# Patient Record
Sex: FEMALE | Race: WHITE | Employment: UNEMPLOYED | URBAN - METROPOLITAN AREA
[De-identification: names, ages, dates, MRNs, and addresses within clinical notes are randomized per-mention and may not be internally consistent; named-entity substitution may affect disease eponyms.]

---

## 2017-04-24 ENCOUNTER — APPOINTMENT (OUTPATIENT)
Dept: GENERAL RADIOLOGY | Age: 67
DRG: 853 | End: 2017-04-24
Attending: EMERGENCY MEDICINE
Payer: COMMERCIAL

## 2017-04-24 ENCOUNTER — HOSPITAL ENCOUNTER (INPATIENT)
Age: 67
LOS: 8 days | Discharge: HOME HEALTH | DRG: 853 | End: 2017-05-02
Attending: EMERGENCY MEDICINE | Admitting: INTERNAL MEDICINE
Payer: COMMERCIAL

## 2017-04-24 DIAGNOSIS — M79.7 FIBROMYALGIA: ICD-10-CM

## 2017-04-24 DIAGNOSIS — L03.115 CELLULITIS OF RIGHT LOWER EXTREMITY: Primary | ICD-10-CM

## 2017-04-24 PROBLEM — L03.90 CELLULITIS: Status: ACTIVE | Noted: 2017-04-24

## 2017-04-24 PROBLEM — A41.9 SEPSIS (HCC): Status: ACTIVE | Noted: 2017-04-24

## 2017-04-24 LAB
ALBUMIN SERPL BCP-MCNC: 2.8 G/DL (ref 3.4–5)
ALBUMIN/GLOB SERPL: 0.7 {RATIO} (ref 0.8–1.7)
ALP SERPL-CCNC: 246 U/L (ref 45–117)
ALT SERPL-CCNC: 35 U/L (ref 13–56)
ANION GAP BLD CALC-SCNC: 11 MMOL/L (ref 3–18)
AST SERPL W P-5'-P-CCNC: 23 U/L (ref 15–37)
BASOPHILS # BLD AUTO: 0 K/UL (ref 0–0.06)
BASOPHILS # BLD: 0 % (ref 0–3)
BILIRUB SERPL-MCNC: 0.4 MG/DL (ref 0.2–1)
BUN SERPL-MCNC: 46 MG/DL (ref 7–18)
BUN/CREAT SERPL: 28 (ref 12–20)
CALCIUM SERPL-MCNC: 9.4 MG/DL (ref 8.5–10.1)
CHLORIDE SERPL-SCNC: 104 MMOL/L (ref 100–108)
CO2 SERPL-SCNC: 22 MMOL/L (ref 21–32)
CREAT SERPL-MCNC: 1.62 MG/DL (ref 0.6–1.3)
D DIMER PPP FEU-MCNC: 2.79 UG/ML(FEU)
DIFFERENTIAL METHOD BLD: ABNORMAL
EOSINOPHIL # BLD: 0 K/UL (ref 0–0.4)
EOSINOPHIL NFR BLD: 0 % (ref 0–5)
ERYTHROCYTE [DISTWIDTH] IN BLOOD BY AUTOMATED COUNT: 14.8 % (ref 11.6–14.5)
GLOBULIN SER CALC-MCNC: 4.3 G/DL (ref 2–4)
GLUCOSE SERPL-MCNC: 182 MG/DL (ref 74–99)
HCT VFR BLD AUTO: 34 % (ref 35–45)
HGB BLD-MCNC: 11.3 G/DL (ref 12–16)
LACTATE BLD-SCNC: 1.9 MMOL/L (ref 0.4–2)
LYMPHOCYTES # BLD AUTO: 1 % (ref 20–51)
LYMPHOCYTES # BLD: 0.2 K/UL (ref 0.8–3.5)
MCH RBC QN AUTO: 26 PG (ref 24–34)
MCHC RBC AUTO-ENTMCNC: 33.2 G/DL (ref 31–37)
MCV RBC AUTO: 78.3 FL (ref 74–97)
MONOCYTES # BLD: 0.6 K/UL (ref 0–1)
MONOCYTES NFR BLD AUTO: 3 % (ref 2–9)
NEUTS BAND NFR BLD MANUAL: 2 % (ref 0–5)
NEUTS SEG # BLD: 20.2 K/UL (ref 1.8–8)
NEUTS SEG NFR BLD AUTO: 94 % (ref 42–75)
PLATELET # BLD AUTO: 295 K/UL (ref 135–420)
PLATELET COMMENTS,PCOM: ABNORMAL
PMV BLD AUTO: 10.4 FL (ref 9.2–11.8)
POTASSIUM SERPL-SCNC: 4.3 MMOL/L (ref 3.5–5.5)
PROT SERPL-MCNC: 7.1 G/DL (ref 6.4–8.2)
RBC # BLD AUTO: 4.34 M/UL (ref 4.2–5.3)
RBC MORPH BLD: ABNORMAL
SODIUM SERPL-SCNC: 137 MMOL/L (ref 136–145)
WBC # BLD AUTO: 21 K/UL (ref 4.6–13.2)

## 2017-04-24 PROCEDURE — 71010 XR CHEST PORT: CPT

## 2017-04-24 PROCEDURE — 87077 CULTURE AEROBIC IDENTIFY: CPT | Performed by: EMERGENCY MEDICINE

## 2017-04-24 PROCEDURE — 74011250636 HC RX REV CODE- 250/636: Performed by: EMERGENCY MEDICINE

## 2017-04-24 PROCEDURE — 96375 TX/PRO/DX INJ NEW DRUG ADDON: CPT

## 2017-04-24 PROCEDURE — 85025 COMPLETE CBC W/AUTO DIFF WBC: CPT | Performed by: EMERGENCY MEDICINE

## 2017-04-24 PROCEDURE — 74011250637 HC RX REV CODE- 250/637: Performed by: EMERGENCY MEDICINE

## 2017-04-24 PROCEDURE — 73070 X-RAY EXAM OF ELBOW: CPT

## 2017-04-24 PROCEDURE — 93971 EXTREMITY STUDY: CPT

## 2017-04-24 PROCEDURE — 83605 ASSAY OF LACTIC ACID: CPT

## 2017-04-24 PROCEDURE — 99285 EMERGENCY DEPT VISIT HI MDM: CPT

## 2017-04-24 PROCEDURE — 73030 X-RAY EXAM OF SHOULDER: CPT

## 2017-04-24 PROCEDURE — 65660000004 HC RM CVT STEPDOWN

## 2017-04-24 PROCEDURE — 85379 FIBRIN DEGRADATION QUANT: CPT | Performed by: EMERGENCY MEDICINE

## 2017-04-24 PROCEDURE — 87040 BLOOD CULTURE FOR BACTERIA: CPT | Performed by: EMERGENCY MEDICINE

## 2017-04-24 PROCEDURE — 96374 THER/PROPH/DIAG INJ IV PUSH: CPT

## 2017-04-24 PROCEDURE — 80053 COMPREHEN METABOLIC PANEL: CPT | Performed by: EMERGENCY MEDICINE

## 2017-04-24 PROCEDURE — 73080 X-RAY EXAM OF ELBOW: CPT

## 2017-04-24 RX ORDER — ACETAMINOPHEN 500 MG
1000 TABLET ORAL
Status: COMPLETED | OUTPATIENT
Start: 2017-04-24 | End: 2017-04-24

## 2017-04-24 RX ORDER — SODIUM CHLORIDE 0.9 % (FLUSH) 0.9 %
5-10 SYRINGE (ML) INJECTION AS NEEDED
Status: DISCONTINUED | OUTPATIENT
Start: 2017-04-24 | End: 2017-05-02 | Stop reason: HOSPADM

## 2017-04-24 RX ORDER — CLINDAMYCIN HYDROCHLORIDE 300 MG/1
300 CAPSULE ORAL 4 TIMES DAILY
Qty: 40 CAP | Refills: 0 | Status: SHIPPED | OUTPATIENT
Start: 2017-04-24 | End: 2017-05-02

## 2017-04-24 RX ORDER — ONDANSETRON 2 MG/ML
4 INJECTION INTRAMUSCULAR; INTRAVENOUS
Status: COMPLETED | OUTPATIENT
Start: 2017-04-24 | End: 2017-04-24

## 2017-04-24 RX ORDER — MORPHINE SULFATE 4 MG/ML
4 INJECTION, SOLUTION INTRAMUSCULAR; INTRAVENOUS
Status: COMPLETED | OUTPATIENT
Start: 2017-04-24 | End: 2017-04-24

## 2017-04-24 RX ADMIN — SODIUM CHLORIDE 2478 ML: 900 INJECTION, SOLUTION INTRAVENOUS at 23:05

## 2017-04-24 RX ADMIN — Medication 4 MG: at 18:31

## 2017-04-24 RX ADMIN — ONDANSETRON 4 MG: 2 INJECTION INTRAMUSCULAR; INTRAVENOUS at 18:31

## 2017-04-24 RX ADMIN — VANCOMYCIN HYDROCHLORIDE 1750 MG: 10 INJECTION, POWDER, LYOPHILIZED, FOR SOLUTION INTRAVENOUS at 23:04

## 2017-04-24 RX ADMIN — ACETAMINOPHEN 1000 MG: 500 TABLET ORAL at 23:10

## 2017-04-24 NOTE — IP AVS SNAPSHOT
Current Discharge Medication List  
  
START taking these medications Dose & Instructions Dispensing Information Comments Morning Noon Evening Bedtime * amiodarone 200 mg tablet Commonly known as:  CORDARONE Your last dose was: Your next dose is:    
   
   
 Dose:  200 mg Take 1 Tab by mouth two (2) times a day. Quantity:  30 Tab Refills:  0 Refill will be given by PCP or cardiology with dose adjustment * amiodarone 200 mg tablet Commonly known as:  CORDARONE Start taking on:  5/3/2017 Your last dose was: Your next dose is:    
   
   
 Dose:  200 mg Take 1 Tab by mouth daily. Quantity:  30 Tab Refills:  0  
     
   
   
   
  
 dilTIAZem  mg ER capsule Commonly known as:  CARDIZEM CD Your last dose was: Your next dose is:    
   
   
 Dose:  180 mg Take 1 Cap by mouth daily. Quantity:  30 Cap Refills:  0  
     
   
   
   
  
 famotidine 20 mg tablet Commonly known as:  PEPCID Your last dose was: Your next dose is:    
   
   
 Dose:  20 mg Take 1 Tab by mouth two (2) times a day. Quantity:  60 Tab Refills:  0 Lactobacillus Acidoph & Bulgar 1 million cell Tab tablet Commonly known as:  Kwame Sauger Your last dose was: Your next dose is:    
   
   
 Dose:  1 Tab Take 1 Tab by mouth two (2) times a day. Quantity:  60 Tab Refills:  0  
     
   
   
   
  
 * Notice: This list has 2 medication(s) that are the same as other medications prescribed for you. Read the directions carefully, and ask your doctor or other care provider to review them with you. CONTINUE these medications which have CHANGED Dose & Instructions Dispensing Information Comments Morning Noon Evening Bedtime ALPRAZolam 1 mg tablet Commonly known as:  Andre Valencia What changed:   
- how much to take 
- how to take this - when to take this - reasons to take this Your last dose was: Your next dose is:    
   
   
 Dose:  1 mg Take 1 Tab by mouth nightly as needed for Anxiety. Max Daily Amount: 1 mg. Quantity:  10 Tab Refills:  0 DULoxetine 60 mg capsule Commonly known as:  CYMBALTA What changed:   
- how to take this - when to take this Your last dose was: Your next dose is:    
   
   
 Dose:  60 mg Take 1 Cap by mouth daily. Quantity:  30 Cap Refills:  0 HYDROcodone-acetaminophen 5-325 mg per tablet Commonly known as:  Philip Holder What changed:   
- how much to take - when to take this 
- reasons to take this Your last dose was: Your next dose is:    
   
   
 Dose:  1 Tab Take 1 Tab by mouth every six (6) hours as needed for Pain. Max Daily Amount: 4 Tabs. Quantity:  15 Tab Refills:  0 CONTINUE these medications which have NOT CHANGED Dose & Instructions Dispensing Information Comments Morning Noon Evening Bedtime LANTUS SC Your last dose was: Your next dose is:    
   
   
  Refills:  0  
     
   
   
   
  
 lisinopril 10 mg tablet Commonly known as:  Nitin Leys Your last dose was: Your next dose is:    
   
   
 Dose:  10 mg Take 1 Tab by mouth daily. Quantity:  15 Tab Refills:  0 LYRICA 75 mg capsule Generic drug:  pregabalin Your last dose was: Your next dose is:    
   
   
 Dose:  75 mg  
75 mg. Refills:  0  
     
   
   
   
  
 methocarbamol 750 mg tablet Commonly known as:  ROBAXIN Your last dose was: Your next dose is:    
   
   
 Dose:  750 mg Take 1 Tab by mouth four (4) times daily. Quantity:  20 Tab Refills:  0  
     
   
   
   
  
 simvastatin 40 mg tablet Commonly known as:  ZOCOR Your last dose was:     
   
Your next dose is:    
   
   
 Dose:  40 mg  
 40 mg. Refills:  0 Where to Get Your Medications Information on where to get these meds will be given to you by the nurse or doctor. ! Ask your nurse or doctor about these medications ALPRAZolam 1 mg tablet  
 amiodarone 200 mg tablet  
 amiodarone 200 mg tablet  
 dilTIAZem  mg ER capsule DULoxetine 60 mg capsule  
 famotidine 20 mg tablet HYDROcodone-acetaminophen 5-325 mg per tablet Lactobacillus Acidoph & Bulgar 1 million cell Tab tablet  
 lisinopril 10 mg tablet

## 2017-04-24 NOTE — ED NOTES
Pt alert and orientated laying on stretcher with head of bed elevated. Pt only able to lift right arm at elbow. Right arm and hand swollen. Had good radial pulse. Patients right leg swollen, large red area over right shin.    Pedal pulse noted

## 2017-04-24 NOTE — ED NOTES
Report given to Miko Sampson, [place]  Pt remains hooked to monitor  IV 20 gauge, left arm intact  Pt in gown, clean and dry  Pt condition stable  Call bell within reach

## 2017-04-24 NOTE — IP AVS SNAPSHOT
[address] Patient: Robert Alcala MRN: [id number] You are allergic to the following No active allergies Recent Documentation Height Weight Breastfeeding? BMI OB Status Smoking Status 1.575 m 90.7 kg No 36.58 kg/m2 Hysterectomy Never Smoker Unresulted Labs Order Current Status CULTURE, ANAEROBIC Preliminary result CULTURE, WOUND W GRAM STAIN Preliminary result Emergency Contacts Name Discharge Info Relation Home Work Mobile Mirtha Botello DECLINED CAREGIVER [4] Son [22] [phone] About your hospitalization You were admitted on:  April 24, 2017 You last received care in the:  [address] [place] [address] You were discharged on:  May 2, 2017 Unit phone number:  [phone] Why you were hospitalized Your primary diagnosis was:  Not on File Your diagnoses also included:  Cellulitis, Sepsis (Hcc), Type Ii Diabetes Mellitus (Hcc), Fibromyalgia, Htn (Hypertension), Septic Joint Of Right Shoulder Region (Hcc), Sleep Apnea, Atrial Fibrillation (Hcc), Gastric Bypass Status For Obesity, History Of Pulmonary Embolism Providers Seen During Your Hospitalizations Provider Role Specialty Primary office phone Mary Lou Morrell DO Attending Provider Emergency Medicine [phone] Yovana Peña MD Attending Provider Internal Medicine [phone] Ann Reis MD Attending Provider Internal Medicine [phone] Your Primary Care Physician (PCP) Primary Care Physician Office Phone Office Fax Aura Wild [phone] Follow-up Information Follow up With Details Comments Contact Info Nik Austin MD On 5/5/2017 @10:45am w/[facility] [place] [facility] [address] [place] [address] 100 [id number] [facility] [id number] [phone] Your Appointments Wednesday May 03, 2017 To Be Determined START OF CARE with Patricia Reyes RN  
[facility] CARE SCHEDULING/INTAKE ([facility]) [facility] SCHEDULING/INTAKE ([facility]OSP[facility]) Monday May 22, 2017 12:45 PM EDT  
ESTABLISHED PATIENT with Esteban Zamora MD  
[facility] ([address]) [address] [facility] [place], [place] [address]  
[phone] Current Discharge Medication List  
  
START taking these medications Dose & Instructions Dispensing Information Comments Morning Noon Evening Bedtime * amiodarone 200 mg tablet Commonly known as:  CORDARONE Your last dose was: Your next dose is:    
   
   
 Dose:  200 mg Take 1 Tab by mouth two (2) times a day. Quantity:  30 Tab Refills:  0 Refill will be given by PCP or cardiology with dose adjustment * amiodarone 200 mg tablet Commonly known as:  CORDARONE Start taking on:  5/3/2017 Your last dose was: Your next dose is:    
   
   
 Dose:  200 mg Take 1 Tab by mouth daily. Quantity:  30 Tab Refills:  0  
     
   
   
   
  
 dilTIAZem  mg ER capsule Commonly known as:  CARDIZEM CD Your last dose was: Your next dose is:    
   
   
 Dose:  180 mg Take 1 Cap by mouth daily. Quantity:  30 Cap Refills:  0  
     
   
   
   
  
 famotidine 20 mg tablet Commonly known as:  PEPCID Your last dose was: Your next dose is:    
   
   
 Dose:  20 mg Take 1 Tab by mouth two (2) times a day. Quantity:  60 Tab Refills:  0 Lactobacillus Acidoph & Bulgar 1 million cell Tab tablet Commonly known as:  Ashley Jury Your last dose was: Your next dose is:    
   
   
 Dose:  1 Tab Take 1 Tab by mouth two (2) times a day. Quantity:  60 Tab Refills:  0 * Notice: This list has 2 medication(s) that are the same as other medications prescribed for you. Read the directions carefully, and ask your doctor or other care provider to review them with you. CONTINUE these medications which have CHANGED Dose & Instructions Dispensing Information Comments Morning Noon Evening Bedtime ALPRAZolam 1 mg tablet Commonly known as:  [place] What changed:   
- how much to take 
- how to take this - when to take this 
- reasons to take this Your last dose was: Your next dose is:    
   
   
 Dose:  1 mg Take 1 Tab by mouth nightly as needed for Anxiety. Max Daily Amount: 1 mg. Quantity:  10 Tab Refills:  0 DULoxetine 60 mg capsule Commonly known as:  CYMBALTA What changed:   
- how to take this - when to take this Your last dose was: Your next dose is:    
   
   
 Dose:  60 mg Take 1 Cap by mouth daily. Quantity:  30 Cap Refills:  0 HYDROcodone-acetaminophen 5-325 mg per tablet Commonly known as:  Sury Flynn What changed:   
- how much to take - when to take this 
- reasons to take this Your last dose was: Your next dose is:    
   
   
 Dose:  1 Tab Take 1 Tab by mouth every six (6) hours as needed for Pain. Max Daily Amount: 4 Tabs. Quantity:  15 Tab Refills:  0 CONTINUE these medications which have NOT CHANGED Dose & Instructions Dispensing Information Comments Morning Noon Evening Bedtime LANTUS SC Your last dose was: Your next dose is:    
   
   
  Refills:  0  
     
   
   
   
  
 lisinopril 10 mg tablet Commonly known as:  Tanda Limerick Your last dose was: Your next dose is:    
   
   
 Dose:  10 mg Take 1 Tab by mouth daily. Quantity:  15 Tab Refills:  0 LYRICA 75 mg capsule Generic drug:  pregabalin Your last dose was: Your next dose is:    
   
   
 Dose:  75 mg  
75 mg. Refills:  0  
     
   
   
   
  
 methocarbamol 750 mg tablet Commonly known as:  ROBAXIN Your last dose was: Your next dose is:    
   
   
 Dose:  750 mg Take 1 Tab by mouth four (4) times daily. Quantity:  20 Tab Refills:  0  
     
   
   
   
  
 simvastatin 40 mg tablet Commonly known as:  ZOCOR Your last dose was: Your next dose is:    
   
   
 Dose:  40 mg  
40 mg. Refills:  0 Where to Get Your Medications Information on where to get these meds will be given to you by the nurse or doctor. ! Ask your nurse or doctor about these medications ALPRAZolam 1 mg tablet  
 amiodarone 200 mg tablet  
 amiodarone 200 mg tablet  
 dilTIAZem  mg ER capsule DULoxetine 60 mg capsule  
 famotidine 20 mg tablet HYDROcodone-acetaminophen 5-325 mg per tablet Lactobacillus Acidoph & Bulgar 1 million cell Tab tablet  
 lisinopril 10 mg tablet Discharge Instructions DISCHARGE SUMMARY from Nurse The following personal items are in your possession at time of discharge: 
 
Dental Appliances: Uppers, At home Visual Aid: Glasses, At home Home Medications: None Jewelry: Maury Kendall Clothing: Pants, Other (comment), Footwear (sweat shirt) Other Valuables: Cell Phone Personal Items Sent to Safe: no PATIENT INSTRUCTIONS: 
 
 
F-face looks uneven A-arms unable to move or move unevenly S-speech slurred or non-existent T-time-call 911 as soon as signs and symptoms begin-DO NOT go Back to bed or wait to see if you get better-TIME IS BRAIN. Warning Signs of HEART ATTACK Call 911 if you have these symptoms: 
? Chest discomfort. Most heart attacks involve discomfort in the center of the chest that lasts more than a few minutes, or that goes away and comes back. It can feel like uncomfortable pressure, squeezing, fullness, or pain. ? Discomfort in other areas of the upper body. Symptoms can include pain or discomfort in one or both arms, the back, neck, jaw, or stomach. ? Shortness of breath with or without chest discomfort. ? Other signs may include breaking out in a cold sweat, nausea, or lightheadedness. Don't wait more than five minutes to call [address]! Fast action can save your life. Calling 911 is almost always the fastest way to get lifesaving treatment. Emergency Medical Services staff can begin treatment when they arrive  up to an hour sooner than if someone gets to the hospital by car. The discharge information has been reviewed with the patient. The patient verbalized understanding. Discharge medications reviewed with the patient and appropriate educational materials and side effects teaching were provided. Patient armband removed and shredded. [employer]vo Activation Thank you for requesting access to [employer]. Please follow the instructions below to securely access and download your online medical record. E[employer]vo allows you to send messages to your doctor, view your test results, renew your prescriptions, schedule appointments, and more. How Do I Sign Up? 1. In your internet browser, go to [email or web address] 
2. Click on the First Time User? Click Here link in the Sign In box. You will be redirect to the New Member Sign Up page. 3. Enter your Enevo Access Code exactly as it appears below. You will not need to use this code after youve completed the sign-up process. If you do not sign up before the expiration date, you must request a new code. [employer] Access Code: Activation code not generated Current [employer] Status: Active (This is the date your [employer] access code will ) 4. Enter the last four digits of your Social Security Number (xxxx) and Date of Birth (mm/dd/yyyy) as indicated and click Submit. You will be taken to the next sign-up page. 5. Create a COSMIC COLORt ID. This will be your [employer] login ID and cannot be changed, so think of one that is secure and easy to remember. 6. Create a [employer] password. You can change your password at any time. 7. Enter your Password Reset Question and Answer. This can be used at a later time if you forget your password. 8. Enter your e-mail address. You will receive e-mail notification when new information is available in [address] 9. Click Sign Up. You can now view and download portions of your medical record. 10. Click the Download Summary menu link to download a portable copy of your medical information. Additional Information If you have questions, please visit the Frequently Asked Questions section of the [employer] website at [email or web address] Remember, [employer] is NOT to be used for urgent needs. For medical emergencies, dial 911. Discharge Orders Procedure Order Date Status Priority Quantity Spec Type Associated Dx 200 [facility] 17 1035 Normal Routine 1 Comments:  [facility] Care Wound dressing of surgical wound till healing CBC every other day for 3 occasion and inform results to PCP - Dr Leopoldo Seashore [employer] Announcement We are excited to announce that we are making your provider's discharge notes available to you in [employer]. You will see these notes when they are completed and signed by the physician that discharged you from your recent hospital stay.   If you have any questions or concerns about any information you see in [employer], please call the [facility] Information Department where you were seen or reach out to your Primary Care Provider for more information about your plan of care. Introducing [facility] & HEALTH SERVICES! Dear Grecia Aragon: Thank you for requesting a [employer] account. Our records indicate that you already have an active [employer] account. You can access your account anytime at [email or web address] Did you know that you can access your hospital and ER discharge instructions at any time in Photetica? You can also review all of your test results from your hospital stay or ER visit. Additional Information If you have questions, please visit the Frequently Asked Questions section of the [employer] website at [email or web address] Remember, [employer] is NOT to be used for urgent needs. For medical emergencies, dial 911. Now available from your iPhone and Android! General Information Please provide this summary of care documentation to your next provider. Patient Signature:  ____________________________________________________________ Date:  ____________________________________________________________  
  
[facility] Provider Signature:  ____________________________________________________________ Date:  ____________________________________________________________

## 2017-04-24 NOTE — Clinical Note
Status[de-identified] Inpatient [101] Type of Bed: Stepdown [17] Inpatient Hospitalization Certified Necessary for the Following Reasons: 3. Patient receiving treatment that can only be provided in an inpatient setting (further clarification in H&P documentation) Admitting Diagnosis: Sepsis ([facility] [address] [[id number]] Admitting Diagnosis: Cellulitis [913160] Admitting Physician: Lonne Opitz Attending Physician: Lonne Opitz Estimated Length of Stay: 3-4 Midnights Discharge Plan[de-identified] [facility]

## 2017-04-24 NOTE — ED NOTES
IV access established by Wendy Reeves drawn and sent  Pt medicated per [facility] instructions for 10/10 intermittent, sharp right arm and leg pain  Pt's comfortable level of pain is 5/10  Call bell within reach

## 2017-04-24 NOTE — ED PROVIDER NOTES
HPI Comments: 4:05 PM Ras Alexander is a 74 y.o. female with a h/o fibromyalgia, HTN and DM, who presents to the ED for the evaluation of R shoulder pain, back pain and RLE swelling. Pt states that although she has chronic R shoulder pain, she woke up with the worse pain in her shoulder that she has ever had. Reports having surgery on the kalnai shoulder about 4 years ago. States that her R leg pain and swelling onset last night. Pain exacerbated with movement. No smoking, drinking, or drug use. No other complaints at this time. PCP: Richard Boggs MD     The history is provided by the patient. Past Medical History:   Diagnosis Date    Diabetes ([facility]     Fibromyalgia     Hypercholesteremia     Hypertension        Past Surgical History:   Procedure Laterality Date    HX BREAST LUMPECTOMY      HX CARPAL TUNNEL RELEASE      HX KNEE REPLACEMENT      bilater    HX PARTIAL THYROIDECTOMY      HX SHOULDER ARTHROSCOPY      HX TUBAL LIGATION           Family History:   Problem Relation Age of Onset    Asthma Mother     Diabetes Maternal Grandmother     Hypertension Mother     Kidney Disease Mother     Thyroid Disease Mother     Thyroid Disease Maternal Grandmother        Social History     Social History    Marital status: LEGALLY      Spouse name: N/A    Number of children: N/A    Years of education: N/A     Occupational History    Not on file. Social History Main Topics    Smoking status: Never Smoker    Smokeless tobacco: Never Used    Alcohol use No    Drug use: No    Sexual activity: Yes     Other Topics Concern    Not on file     Social History Narrative         ALLERGIES: Review of patient's allergies indicates no known allergies. Review of Systems   Constitutional: Negative. Negative for chills, diaphoresis and fever. HENT: Negative. Negative for congestion, sore throat and trouble swallowing. Eyes: Negative. Negative for photophobia, pain and redness. Respiratory: Negative. Negative for cough, chest tightness, shortness of breath and wheezing. Cardiovascular: Positive for leg swelling. Negative for chest pain and palpitations. Gastrointestinal: Negative. Negative for abdominal pain, blood in stool, diarrhea, nausea and vomiting. Genitourinary: Negative for difficulty urinating, dysuria and frequency. Musculoskeletal: Positive for arthralgias, back pain and myalgias. Negative for neck pain and neck stiffness. Skin: Negative. Neurological: Negative. Negative for dizziness, tremors, seizures, syncope, speech difficulty, light-headedness and headaches. Psychiatric/Behavioral: Negative. Negative for confusion. The patient is not nervous/anxious. All other systems reviewed and are negative. Vitals:    04/24/17 1533   BP: 139/70   Pulse: 68   Resp: 16   Temp: 97.6 °F (36.4 °C)   SpO2: 99%   Weight: 82.6 kg (182 lb)   Height: 5' 2\" (1.575 m)        99% within normal limits     Physical Exam   Constitutional: She appears well-developed and well-nourished. Non-toxic appearance. She does not have a sickly appearance. She does not appear ill. No distress. HENT:   Head: Normocephalic and atraumatic. Mouth/Throat: Oropharynx is clear and moist. No oropharyngeal exudate. Eyes: Conjunctivae and EOM are normal. Pupils are equal, round, and reactive to light. No scleral icterus. Neck: Normal range of motion. Neck supple. No hepatojugular reflux and no JVD present. No tracheal deviation present. No thyromegaly present. Cardiovascular: Normal rate, regular rhythm, S1 normal, S2 normal, normal heart sounds, intact distal pulses and normal pulses. Exam reveals no gallop, no S3 and no S4. No murmur heard. Pulses:       Radial pulses are 2+ on the right side, and 2+ on the left side. Dorsalis pedis pulses are 2+ on the right side, and 2+ on the left side.    Pulmonary/Chest: Effort normal and breath sounds normal. No respiratory distress. She has no decreased breath sounds. She has no wheezes. She has no rhonchi. She has no rales. Abdominal: Soft. Normal appearance and bowel sounds are normal. She exhibits no distension and no mass. There is no hepatosplenomegaly. There is no tenderness. There is no rigidity, no rebound, no guarding, no CVA tenderness, no tenderness at McBurney's point and negative Morrow's sign. Musculoskeletal:        Right shoulder: She exhibits decreased range of motion, tenderness and pain. She exhibits no bony tenderness, no swelling, no effusion, no crepitus, no deformity, no laceration, no spasm, normal pulse and normal strength. Strength 4/5 throughout with exception of right shoulder pain   Lymphadenopathy:        Head (right side): No submental, no submandibular, no preauricular and no occipital adenopathy present. Head (left side): No submental, no submandibular, no preauricular and no occipital adenopathy present. She has no cervical adenopathy. Right: No supraclavicular adenopathy present. Left: No supraclavicular adenopathy present. Neurological: She is alert. She has normal strength and normal reflexes. She is not disoriented. No cranial nerve deficit or sensory deficit. Coordination and gait normal. GCS eye subscore is 4. GCS verbal subscore is 5. GCS motor subscore is 6. Grossly intact    Skin: Skin is warm, dry and intact. No rash noted. She is not diaphoretic. Psychiatric: She has a normal mood and affect. Her speech is normal and behavior is normal. Judgment and thought content normal. Cognition and memory are normal.   Nursing note and vitals reviewed.        MDM  Number of Diagnoses or Management Options  Cellulitis of right lower extremity:   Fibromyalgia:   Diagnosis management comments: Shoulder pain   DVT  ACS  PE      ED Course       Procedures    Vitals:  Patient Vitals for the past 12 hrs:   Temp Pulse Resp BP SpO2   04/24/17 2034 - 83 18 136/72 94 % 04/24/17 1815 - 72 22 - 100 %   04/24/17 1803 - 73 17 171/65 97 %   04/24/17 1533 97.6 °F (36.4 °C) 68 16 139/70 99 %     Pulse ox reviewed    Labs, Radiology, EKG:  Labs Reviewed   CBC WITH AUTOMATED DIFF - Abnormal; Notable for the following:        Result Value    WBC 21.0 (*)     HGB 11.3 (*)     HCT 34.0 (*)     RDW 14.8 (*)     NEUTROPHILS 94 (*)     LYMPHOCYTES 1 (*)     ABS. NEUTROPHILS 20.2 (*)     ABS. LYMPHOCYTES 0.2 (*)     All other components within normal limits   METABOLIC PANEL, COMPREHENSIVE - Abnormal; Notable for the following:     Glucose 182 (*)     BUN 46 (*)     Creatinine 1.62 (*)     BUN/Creatinine ratio 28 (*)     GFR est AA 39 (*)     GFR est non-AA 32 (*)     Alk. phosphatase 246 (*)     Albumin 2.8 (*)     Globulin 4.3 (*)     A-G Ratio 0.7 (*)     All other components within normal limits   D DIMER - Abnormal; Notable for the following:     D DIMER 2.79 (*)     All other components within normal limits        Labs essentially normal with the exception of WBC of 21,000, pt states that she has been taking Prednisone at home. Electrolytes normal, elevated BUN and Creatinine. D-dimer of 2.79. PVL showed:   Right leg :  Duplex images were obtained using 2-D gray scale, color flow, and spectral Doppler analysis. 1. No evidence of deep venous thrombosis detected in the veins visualized. 2. Deep veins visualized include the common femoral, femoral, popliteal, posterior tibial and peroneal veins. 3. No evidence of superficial thrombosis detected. 4. Superficial veins visualized include the proximal great saphenous vein. 5. No evidence of deep vein thrombosis in the contralateral common femoral vein.   Right shoulder X-ray showed no acute process  Right elbow X-ray showed no acute process    9:39 PM 4/24/2017    Progress notes, Consult notes or additional Procedure notes:    Consult:  Discussed care with Dr. Nir Marcial, Specialty: Hospitalist  Standard discussion; including history of patients chief complaint, available diagnostic results, and treatment course. He agrees with admission. 10:27 PM, 4/24/2017       Disposition:  Diagnosis:   1. Cellulitis of right lower extremity    2. Fibromyalgia          SCRIBE ATTESTATION STATEMENT  Documented by: Cece Mcgarry scribing for, and in the presence of,Igor Humphrey DO   9:39 PM     Signed by: Efra Krishna, 04/24/17 9:39 PM    Documented by: Iva Cevallos. Skip Frame for, and in the presence of, [employer] Association[employer] 4:04 PM     Signed by: Iva Cevallos. Kayleigh Amor, [facility] [address], 4/24/2017 4:04 PM     PROVIDER ATTESTATION STATEMENT  I personally performed the services described in the documentation, reviewed the documentation, as recorded by the scribe in my presence, and it accurately and completely records my words and actions.   [employer], DO

## 2017-04-24 NOTE — PROCEDURES
[facility]  *** FINAL REPORT ***    Name: Kevin Posadas  MRN: [id number]  : 05 Mar 1950  HIS Order #: [id number]  [address] [facility] Visit #: [id number]  Date: 2017    TYPE OF TEST: Peripheral Venous Testing    REASON FOR TEST  Pain in limb, Limb swelling    Right Leg:-  Deep venous thrombosis:           No  Superficial venous thrombosis:    No  Deep venous insufficiency:        Not examined  Superficial venous insufficiency: Not examined      INTERPRETATION/FINDINGS  Right leg :  Duplex images were obtained using 2-D gray scale, color flow, and  spectral Doppler analysis. 1. No evidence of deep venous thrombosis detected in the veins  visualized. 2. Deep veins visualized include the common femoral, femoral,  popliteal, posterior tibial and peroneal veins. 3. No evidence of superficial thrombosis detected. 4. Superficial veins visualized include the proximal great saphenous  vein. 5. No evidence of deep vein thrombosis in the contralateral common  femoral vein. ADDITIONAL COMMENTS    I have personally reviewed the data relevant to the interpretation of  this  study.     TECHNOLOGIST: Peter Ch RVT  Signed: 2017 05:29 PM    PHYSICIAN: Esteban Zamora MD  Signed: 2017 09:25 AM

## 2017-04-24 NOTE — ED TRIAGE NOTES
C/o right arm pain, right shoulder pain, and right leg pain with redness and swelling to the calf that started this am.

## 2017-04-25 LAB
ANION GAP BLD CALC-SCNC: 10 MMOL/L (ref 3–18)
ATRIAL RATE: 89 BPM
BASOPHILS # BLD AUTO: 0 K/UL (ref 0–0.06)
BASOPHILS # BLD: 0 % (ref 0–3)
BUN SERPL-MCNC: 53 MG/DL (ref 7–18)
BUN/CREAT SERPL: 30 (ref 12–20)
CALCIUM SERPL-MCNC: 8.5 MG/DL (ref 8.5–10.1)
CALCULATED P AXIS, ECG09: 60 DEGREES
CALCULATED R AXIS, ECG10: 77 DEGREES
CALCULATED T AXIS, ECG11: 45 DEGREES
CHLORIDE SERPL-SCNC: 108 MMOL/L (ref 100–108)
CO2 SERPL-SCNC: 18 MMOL/L (ref 21–32)
CREAT SERPL-MCNC: 1.74 MG/DL (ref 0.6–1.3)
DIAGNOSIS, 93000: NORMAL
DIFFERENTIAL METHOD BLD: ABNORMAL
EOSINOPHIL # BLD: 0 K/UL (ref 0–0.4)
EOSINOPHIL NFR BLD: 0 % (ref 0–5)
ERYTHROCYTE [DISTWIDTH] IN BLOOD BY AUTOMATED COUNT: 15.1 % (ref 11.6–14.5)
GLUCOSE BLD STRIP.AUTO-MCNC: 165 MG/DL (ref 70–110)
GLUCOSE BLD STRIP.AUTO-MCNC: 181 MG/DL (ref 70–110)
GLUCOSE BLD STRIP.AUTO-MCNC: 210 MG/DL (ref 70–110)
GLUCOSE BLD STRIP.AUTO-MCNC: 222 MG/DL (ref 70–110)
GLUCOSE SERPL-MCNC: 172 MG/DL (ref 74–99)
HBA1C MFR BLD: 9.1 % (ref 4.2–5.6)
HCT VFR BLD AUTO: 30.5 % (ref 35–45)
HGB BLD-MCNC: 10 G/DL (ref 12–16)
LYMPHOCYTES # BLD AUTO: 4 % (ref 20–51)
LYMPHOCYTES # BLD: 0.8 K/UL (ref 0.8–3.5)
MCH RBC QN AUTO: 26 PG (ref 24–34)
MCHC RBC AUTO-ENTMCNC: 32.8 G/DL (ref 31–37)
MCV RBC AUTO: 79.4 FL (ref 74–97)
MONOCYTES # BLD: 0.6 K/UL (ref 0–1)
MONOCYTES NFR BLD AUTO: 3 % (ref 2–9)
NEUTS BAND NFR BLD MANUAL: 27 % (ref 0–5)
NEUTS SEG # BLD: 18 K/UL (ref 1.8–8)
NEUTS SEG NFR BLD AUTO: 66 % (ref 42–75)
P-R INTERVAL, ECG05: 172 MS
PLATELET # BLD AUTO: 230 K/UL (ref 135–420)
PLATELET COMMENTS,PCOM: ABNORMAL
PMV BLD AUTO: 10.7 FL (ref 9.2–11.8)
POTASSIUM SERPL-SCNC: 4.2 MMOL/L (ref 3.5–5.5)
Q-T INTERVAL, ECG07: 378 MS
QRS DURATION, ECG06: 90 MS
QTC CALCULATION (BEZET), ECG08: 459 MS
RBC # BLD AUTO: 3.84 M/UL (ref 4.2–5.3)
RBC MORPH BLD: ABNORMAL
SODIUM SERPL-SCNC: 136 MMOL/L (ref 136–145)
VENTRICULAR RATE, ECG03: 89 BPM
WBC # BLD AUTO: 19.4 K/UL (ref 4.6–13.2)

## 2017-04-25 PROCEDURE — 83036 HEMOGLOBIN GLYCOSYLATED A1C: CPT | Performed by: HOSPITALIST

## 2017-04-25 PROCEDURE — 74011636637 HC RX REV CODE- 636/637: Performed by: INTERNAL MEDICINE

## 2017-04-25 PROCEDURE — 74011250637 HC RX REV CODE- 250/637: Performed by: INTERNAL MEDICINE

## 2017-04-25 PROCEDURE — 93005 ELECTROCARDIOGRAM TRACING: CPT

## 2017-04-25 PROCEDURE — 36415 COLL VENOUS BLD VENIPUNCTURE: CPT | Performed by: INTERNAL MEDICINE

## 2017-04-25 PROCEDURE — 82962 GLUCOSE BLOOD TEST: CPT

## 2017-04-25 PROCEDURE — 74011000258 HC RX REV CODE- 258: Performed by: INTERNAL MEDICINE

## 2017-04-25 PROCEDURE — 80048 BASIC METABOLIC PNL TOTAL CA: CPT | Performed by: INTERNAL MEDICINE

## 2017-04-25 PROCEDURE — 65660000000 HC RM CCU STEPDOWN

## 2017-04-25 PROCEDURE — 85025 COMPLETE CBC W/AUTO DIFF WBC: CPT | Performed by: INTERNAL MEDICINE

## 2017-04-25 PROCEDURE — 74011250636 HC RX REV CODE- 250/636: Performed by: INTERNAL MEDICINE

## 2017-04-25 RX ORDER — PREGABALIN 75 MG/1
75 CAPSULE ORAL 2 TIMES DAILY
Status: DISCONTINUED | OUTPATIENT
Start: 2017-04-25 | End: 2017-05-02 | Stop reason: HOSPADM

## 2017-04-25 RX ORDER — DEXTROSE 50 % IN WATER (D50W) INTRAVENOUS SYRINGE
25-50 AS NEEDED
Status: DISCONTINUED | OUTPATIENT
Start: 2017-04-25 | End: 2017-05-02 | Stop reason: HOSPADM

## 2017-04-25 RX ORDER — INSULIN LISPRO 100 [IU]/ML
INJECTION, SOLUTION INTRAVENOUS; SUBCUTANEOUS
Status: DISCONTINUED | OUTPATIENT
Start: 2017-04-25 | End: 2017-05-02 | Stop reason: HOSPADM

## 2017-04-25 RX ORDER — SIMVASTATIN 40 MG/1
40 TABLET, FILM COATED ORAL
Status: DISCONTINUED | OUTPATIENT
Start: 2017-04-25 | End: 2017-05-02 | Stop reason: HOSPADM

## 2017-04-25 RX ORDER — ONDANSETRON 2 MG/ML
4 INJECTION INTRAMUSCULAR; INTRAVENOUS
Status: DISCONTINUED | OUTPATIENT
Start: 2017-04-25 | End: 2017-05-02 | Stop reason: HOSPADM

## 2017-04-25 RX ORDER — ALPRAZOLAM 0.5 MG/1
0.5 TABLET ORAL
Status: DISCONTINUED | OUTPATIENT
Start: 2017-04-25 | End: 2017-05-02 | Stop reason: HOSPADM

## 2017-04-25 RX ORDER — HYDROCODONE BITARTRATE AND ACETAMINOPHEN 5; 325 MG/1; MG/1
1 TABLET ORAL
Status: DISCONTINUED | OUTPATIENT
Start: 2017-04-25 | End: 2017-05-02 | Stop reason: HOSPADM

## 2017-04-25 RX ORDER — INSULIN GLARGINE 100 [IU]/ML
10 INJECTION, SOLUTION SUBCUTANEOUS
Status: DISCONTINUED | OUTPATIENT
Start: 2017-04-25 | End: 2017-04-29

## 2017-04-25 RX ORDER — MAGNESIUM SULFATE 100 %
16 CRYSTALS MISCELLANEOUS AS NEEDED
Status: DISCONTINUED | OUTPATIENT
Start: 2017-04-25 | End: 2017-05-02 | Stop reason: HOSPADM

## 2017-04-25 RX ORDER — DULOXETIN HYDROCHLORIDE 60 MG/1
60 CAPSULE, DELAYED RELEASE ORAL DAILY
Status: DISCONTINUED | OUTPATIENT
Start: 2017-04-25 | End: 2017-04-25

## 2017-04-25 RX ORDER — HEPARIN SODIUM 5000 [USP'U]/ML
5000 INJECTION, SOLUTION INTRAVENOUS; SUBCUTANEOUS EVERY 8 HOURS
Status: DISCONTINUED | OUTPATIENT
Start: 2017-04-25 | End: 2017-04-28

## 2017-04-25 RX ADMIN — INSULIN LISPRO 4 UNITS: 100 INJECTION, SOLUTION INTRAVENOUS; SUBCUTANEOUS at 13:12

## 2017-04-25 RX ADMIN — PIPERACILLIN AND TAZOBACTAM 3.38 G: 3; .375 INJECTION, POWDER, LYOPHILIZED, FOR SOLUTION INTRAVENOUS; PARENTERAL at 09:31

## 2017-04-25 RX ADMIN — HYDROCODONE BITARTRATE AND ACETAMINOPHEN 1 TABLET: 5; 325 TABLET ORAL at 09:33

## 2017-04-25 RX ADMIN — INSULIN LISPRO 2 UNITS: 100 INJECTION, SOLUTION INTRAVENOUS; SUBCUTANEOUS at 21:15

## 2017-04-25 RX ADMIN — DULOXETINE HYDROCHLORIDE 60 MG: 60 CAPSULE, DELAYED RELEASE ORAL at 09:30

## 2017-04-25 RX ADMIN — HEPARIN SODIUM 5000 UNITS: 5000 INJECTION INTRAVENOUS; SUBCUTANEOUS at 17:28

## 2017-04-25 RX ADMIN — HEPARIN SODIUM 5000 UNITS: 5000 INJECTION INTRAVENOUS; SUBCUTANEOUS at 01:16

## 2017-04-25 RX ADMIN — PIPERACILLIN AND TAZOBACTAM 3.38 G: 3; .375 INJECTION, POWDER, LYOPHILIZED, FOR SOLUTION INTRAVENOUS; PARENTERAL at 01:17

## 2017-04-25 RX ADMIN — HEPARIN SODIUM 5000 UNITS: 5000 INJECTION INTRAVENOUS; SUBCUTANEOUS at 09:31

## 2017-04-25 RX ADMIN — PREGABALIN 75 MG: 75 CAPSULE ORAL at 17:28

## 2017-04-25 RX ADMIN — SIMVASTATIN 40 MG: 40 TABLET, FILM COATED ORAL at 21:15

## 2017-04-25 RX ADMIN — HYDROCODONE BITARTRATE AND ACETAMINOPHEN 1 TABLET: 5; 325 TABLET ORAL at 17:28

## 2017-04-25 RX ADMIN — SIMVASTATIN 40 MG: 40 TABLET, FILM COATED ORAL at 01:15

## 2017-04-25 RX ADMIN — HYDROCODONE BITARTRATE AND ACETAMINOPHEN 1 TABLET: 5; 325 TABLET ORAL at 01:15

## 2017-04-25 RX ADMIN — PREGABALIN 75 MG: 75 CAPSULE ORAL at 09:31

## 2017-04-25 RX ADMIN — INSULIN GLARGINE 10 UNITS: 100 INJECTION, SOLUTION SUBCUTANEOUS at 01:15

## 2017-04-25 RX ADMIN — INSULIN GLARGINE 10 UNITS: 100 INJECTION, SOLUTION SUBCUTANEOUS at 21:16

## 2017-04-25 NOTE — ROUTINE PROCESS
Krzysztof Old TRANSFER - IN REPORT:    Verbal report received from CHESTER Gunderson(name) on Crys Mendez  being received from ER(unit) for routine progression of care      Report consisted of patients Situation, Background, Assessment and   Recommendations(SBAR). Information from the following report(s) SBAR, ED Summary, Intake/Output, MAR, Recent Results and Cardiac Rhythm SR was reviewed with the receiving nurse. Opportunity for questions and clarification was provided. Assessment completed upon patients arrival to unit and care assumed. Primary Nurse Jaclyn Buchanan RN and CHESTER Meza, RN performed a dual skin assessment on this patient Impairment noted- see wound doc flow sheet  Neel score is 17      Bedside and Verbal shift change report given to Skip Sepulveda (oncoming nurse) by me (offgoing nurse).  Report included the following information SBAR, Kardex, ED Summary, Intake/Output, MAR, Recent Results and Cardiac Rhythm SR.

## 2017-04-25 NOTE — ED NOTES
Pt considerably cooler to touch.   Will obtain temp in 30 minutes as patient just had some ice water

## 2017-04-25 NOTE — ED NOTES
Went to patient room with water amd crackers to give patient meds. Pt short of breath and trying to get up to bathroom. Pt desating as she was getting up. Back area was extremely hot.  tempeture obtained and resulted as 103.4. Dr Tova Iglesias paged to room.

## 2017-04-25 NOTE — ROUTINE PROCESS
TRANSFER - OUT REPORT:    Verbal report given to Maia Stovall on The[facility] Board  being transferred to [facility](unit) for routine progression of care       Report consisted of patients Situation, Background, Assessment and   Recommendations(SBAR). Information from the following report(s) SBAR, ED Summary and MAR was reviewed with the receiving nurse. Lines:   Peripheral IV 04/24/17 Left;Upper Arm (Active)   Site Assessment Clean, dry, & intact 4/24/2017  6:30 PM   Phlebitis Assessment 0 4/24/2017  6:30 PM   Infiltration Assessment 0 4/24/2017  6:30 PM   Dressing Status Clean, dry, & intact 4/24/2017  6:30 PM   Dressing Type Transparent 4/24/2017  6:30 PM   Hub Color/Line Status Pink 4/24/2017  6:30 PM        Opportunity for questions and clarification was provided.       Patient transported with:   Monitor  O2 @ 2 liters

## 2017-04-25 NOTE — PROGRESS NOTES
NUTRITION    BPA/MST Referral       RECOMMENDATIONS / PLAN:     - Change to bariatric maintenance diet. - Add supplements: Unjury TID.  - Recommend starting daily multivitamin.   - Continue RD inpatient monitoring and evaluation. NUTRITION INTERVENTIONS & DIAGNOSIS:     [x] Meals/Snacks: modified diet  [x] Medical food supplementation: initiate  [x] Vitamin/mineral supplementation: recommend MVI     Nutrition Diagnosis: Obese related to excess energy intake as evidenced by BMI of 34 kg/m^2. ASSESSMENT:     Subjective/Objective: Hx of gastric bypass last year. States she is compliant with bariatric diet, small frequent meals and daily multivitamin. Wishes to follow bariatric maintenance diet here. Portions of regular, diabetic diet are too large per pt report.       Average po intake adequate to meet patients estimated nutritional needs:   [x] Yes     [] No   [] Unable to determine at this time    Diet: DIET DIABETIC CONSISTENT CARB Regular      Food Allergies: NKFA  Current Appetite:   [x] Good     [] Fair     [] Poor     [] Other:  Appetite/meal intake prior to admission:   [x] Good     [] Fair     [] Poor     [] Other:  Feeding Limitations:  [] Swallowing difficulty    [x] Chewing difficulty: missing teeth, dentures at home    [] Other:  Current Meal Intake: Patient Vitals for the past 100 hrs:   % Diet Eaten   04/25/17 0944 50 %       BM: 4/24   Skin Integrity: arm sore, leg cellulitis   Edema: 2+ RLE  Pertinent Medications: Reviewed    Recent Labs      04/25/17   0539  04/24/17   1828   NA  136  137   K  4.2  4.3   CL  108  104   CO2  18*  22   GLU  172*  182*   BUN  53*  46*   CREA  1.74*  1.62*   CA  8.5  9.4   ALB   --   2.8*   SGOT   --   23   ALT   --   35       Intake/Output Summary (Last 24 hours) at 04/25/17 1405  Last data filed at 04/25/17 1116   Gross per 24 hour   Intake              100 ml   Output                0 ml   Net              100 ml       Anthropometrics:  Ht Readings from Last 1 Encounters:   04/25/17 5' 2\" (1.575 m)     Last 3 Recorded Weights in this Encounter    04/24/17 1533 04/25/17 0426   Weight: 82.6 kg (182 lb) 86 kg (189 lb 9.5 oz)     Body mass index is 34.68 kg/(m^2). Obese, Class I     Weight History: patient reports previous weight of 292 lb last year prior to gastric bypass surgery (110 lb, 38% intentional weight loss x year)     Weight Metrics 4/25/2017 9/5/2016 3/2/2015 1/23/2015 12/12/2014   Weight 189 lb 9.5 oz 250 lb 245 lb 285 lb 285 lb   BMI 34.68 kg/m2 45.73 kg/m2 44.8 kg/m2 52.11 kg/m2 52.11 kg/m2        Ad[facility]  Cellulitis  Past Medical History:   Diagnosis Date    Diabetes ([facility]     Fibromyalgia     Hypercholesteremia     Hypertension        Education Needs:        [x] None identified  [] Identified - Not appropriate at this time  []  Identified and addressed - refer to education log  Learning Limitations:   [x] None identified  [] Identified    Cultural, Anabaptism & ethnic food preferences:  [x] None identified    [] Identified and addressed     ESTIMATED NUTRITION NEEDS:     Calories: 6713-8757 kcal (MSJx1.2-1.3) based on  [x] Actual BW 86 kg     [] IBW   CHO: 204-221 gm (50% kcal)   Protein: 69-86 gm (0.8-1 gm/kg) based on  [x] Actual BW      [] IBW   Fluid: 1 mL/kcal     MONITORING & EVALUATION:     Nutrition Goal(s):   1. Po intake of meals will meet >75% of patient estimated nutritional needs within the next 7 days.   Outcome:  [] Met/Ongoing    []  Not Met    [x] New/Initial Goal     Monitoring:   [x] Diet tolerance   [x] Meal intake   [x] Supplement intake   [] GI symptoms/ability to tolerate po diet   [] Respiratory status   [] Plan of care      Previous Recommendations (for follow-up assessments only):     []   Implemented       []   Not Implemented (RD to address)     [] No Recommendation Made     Discharge Planning: bariatric maintenance diet, daily multivitamin   [x] Participated in care planning, discharge planning, & interdisciplinary rounds as appropriate      Lara Grewal, [address] [place] [address], [address] [place]    Pager: [phone]

## 2017-04-25 NOTE — DIABETES MGMT
Diabetes Patient/Family Education Record    Factors That  May Influence Patients Ability  to Learn or  Comply With  Recommendations:    []   Language barrier    []   Cultural needs   []   Motivation    []   Cognitive limitation    []   Physical   []   Education    []   Physiological factors   []   Hearing/vision/speaking impairment   []   Mandaeism beliefs    []   Financial factors   [x]  Other: Pt reports being forgetful   []  No factors identified at this time. Person Instructed:   [x]   Patient   []   Family   []  Other     Preference for Learning:   [x]   Verbal   [x]   Written   []  Demonstration     Level of Comprehension & Competence:    []  Good                                      [x] Fair                                     []  Poor                             [x]  Needs Reinforcement   [x]  Teachback completed    Education Component:   [x]  Medication management, including how to administer insulin (if appropriate) and potential medication interactions  Pt reports taking Lantus insulin 20 units at night. Reports taking larger dose in the past but was having some hypoglycemia. Pt reports being forgetful and missing her Lantus dose sometimes. Encouraged her to keep a calendar and check off each day or set an alarm.      [x]  Nutritional management including the role of carbohydrate intake   []  Exercise   [x]  Signs, symptoms, and treatment of hyperglycemia and hypoglycemia   [x] Treatment of hyperglycemia and hypoglycemia   [x]  Importance of blood glucose monitoring and how to obtain a blood glucose meter    []  Instruction on use of blood glucose meter   [x]  Discuss the importance of HbA1C monitoring and provide patient with  results   []  Sick day guidelines   []  Proper use and disposal of lancets, needles, syringes or insulin pens (if appropriate)   [x]  Potential long-term complications (retinopathy, kidney disease, neuropathy, heart disease, stroke, vascular disease, foot care)   [x] Provide emergency contact number and contact number for more information    [x]  Goal:  Patient/family will demonstrate understanding of Diabetes Self Management Skills by: 5/02/17  Plan for post-discharge education or self-management support:    [x] Outpatient class schedule provided            [] Patient Declined    [] Scheduled for outpatient classes (date) _______       Himanshu Kruse RD, CDE

## 2017-04-25 NOTE — ROUTINE PROCESS
Received patient in bed, awake, alert and oriented. on oxygen at 2 lpm via nasal cannula. No complaints made, will continue to monitor.

## 2017-04-25 NOTE — CONSULTS
Infectious Disease Consultation Note    Requested by: Dr. Selene Browne    Reason: right leg cellulitis    Current abx Prior abx   Pip/tazo since 4/25  Vancomycin since 4/24 Ceftriaxone x 1 in ED     Lines:       Assessment :    74 y.o. female with a PMH of DM-2 (uncontrolled), HTN, chronic pain and fibromyalgia who presented to ED on 4/24/17 with c/c of Right leg swelling and pain. Clinical picture consistent with acute right leg cellulitis. Exact microbial etiology not known. However, abrupt onset favors infection with gram positive bacteria such as streptococcus, staphylococcus. Preceding h/o dog bite 2 weeks ago - but no erythema or pain up until 4/23 makes cellulitis due to indolent pathogens related to dog bite less likely. Recommendations:    1. Continue vancomycin  2. Discontinue pip/tazo  3. D/c cymbalta so that linezolid can be prescribed upon discharge if needed (linezolid has interaction with cymbalta. D/w patient - she is ok with stopping cymbalta temporarily)  4. F/u blood cultures, clinically      Thank you for consultation request. Above plan was discussed in details with patient. Will d/w dr Héctor Sorenson. Please call me if any further questions or concerns. Will continue to participate in the care of this patient. HPI:    74 y.o. female with a PMH of DM-2 (uncontrolled), HTN, chronic pain and fibromyalgia who presented to ED on 4/24/17 with c/c of Right leg swelling and pain. Patient states that about 2 weeks ago, she had a dog bite on her right leg. the lesion healed with a scab and she was doing fine subsequently. On 4/23, she started having chills, some redness and pain right leg. The swelling of the right leg, pain and erythema worsened yesterday and she came to ed with these complaints. Here in ED she was found to have fever, tachycardia and elevated WBC with diagnosis of cellulitis of her right leg and started on IV antibiotics with vancomycin and ceftriaxone.  Blood culture was ordered and admitted for further evaluation and management. Her abx were switched to pip/tazo, vancomycin per admitting hospitalist. I have been consulted for further recommendations. Patient feels that her right leg pain and redness are slightly better today. Patient denies headaches, visual disturbances, sore throat, runny nose, earaches, cp, sob, chills, cough, abdominal pain, diarrhea, burning micturition, or weakness in extremities. she denies back pain/flank pain. she denies recent sick contacts. No h/o recent travel. No known h/o MRSA colonization or infection in the past.      Past Medical History:   Diagnosis Date    Diabetes ([facility] [address]     Fibromyalgia     Hypercholesteremia     Hypertension        Past Surgical History:   Procedure Laterality Date    HX BREAST LUMPECTOMY      HX CARPAL TUNNEL RELEASE      HX KNEE REPLACEMENT      bilater    HX PARTIAL THYROIDECTOMY      HX SHOULDER ARTHROSCOPY      HX TUBAL LIGATION         Home Medication List    Details   !! clindamycin (CLEOCIN) 300 mg capsule Take 1 Cap by mouth four (4) times daily for 10 days. Qty: 40 Cap, Refills: 0      !! clindamycin (CLEOCIN) 300 mg capsule Take 1 Cap by mouth four (4) times daily for 10 days. Qty: 40 Cap, Refills: 0       Details   lisinopril (PRINIVIL, ZESTRIL) 10 mg tablet Take 10 mg by mouth daily. methocarbamol (ROBAXIN) 750 mg tablet Take 1 Tab by mouth four (4) times daily. Qty: 20 Tab, Refills: 0      HYDROcodone-acetaminophen (NORCO) 5-325 mg per tablet Take  by mouth. simvastatin (ZOCOR) 40 mg tablet 40 mg.      pregabalin (LYRICA) 75 mg capsule 75 mg. INSULIN GLARGINE,HUM. REC. ANLOG (LANTUS SC)       DULoxetine (CYMBALTA) 60 mg capsule 60 mg.       ALPRAZolam (XANAX) 1 mg tablet              Current Facility-Administered Medications   Medication Dose Route Frequency    ALPRAZolam (XANAX) tablet 0.5 mg  0.5 mg Oral TID PRN    DULoxetine (CYMBALTA) capsule 60 mg  60 mg Oral DAILY    HYDROcodone-acetaminophen (NORCO) 5-325 mg per tablet 1 Tab  1 Tab Oral Q6H PRN    pregabalin (LYRICA) capsule 75 mg  75 mg Oral BID    simvastatin (ZOCOR) tablet 40 mg  40 mg Oral QHS    ondansetron (ZOFRAN) injection 4 mg  4 mg IntraVENous Q4H PRN    heparin (porcine) injection 5,000 Units  5,000 Units SubCUTAneous Q8H    insulin lispro (HUMALOG) injection   SubCUTAneous AC&HS    glucose chewable tablet 16 g  16 g Oral PRN    glucagon (GLUCAGEN) injection 1 mg  1 mg IntraMUSCular PRN    dextrose (D50W) injection syrg 12.5-25 g  25-50 mL IntraVENous PRN    insulin glargine (LANTUS) injection 10 Units  10 Units SubCUTAneous QHS    piperacillin-tazobactam (ZOSYN) 3.375 g in 0.9% sodium chloride (MBP/ADV) 100 mL MBP  3.375 g IntraVENous Q6H    sodium chloride (NS) flush 5-10 mL  5-10 mL IntraVENous PRN    [START ON 2017] vancomycin (VANCOCIN) 1,500 mg in 0.9% sodium chloride 500 mL IVPB  1,500 mg IntraVENous Q36H    sodium chloride (NS) flush 5-10 mL  5-10 mL IntraVENous PRN       Allergies: Review of patient's allergies indicates no known allergies. Family History   Problem Relation Age of Onset    Asthma Mother     Diabetes Maternal Grandmother     Hypertension Mother     Kidney Disease Mother     Thyroid Disease Mother     Thyroid Disease Maternal Grandmother      Social History     Social History    Marital status: LEGALLY      Spouse name: N/A    Number of children: N/A    Years of education: N/A     Occupational History    Not on file.      Social History Main Topics    Smoking status: Never Smoker    Smokeless tobacco: Never Used    Alcohol use No    Drug use: No    Sexual activity: Yes     Other Topics Concern    Not on file     Social History Narrative     History   Smoking Status    Never Smoker   Smokeless Tobacco    Never Used        Temp (24hrs), Av.1 °F (37.3 °C), Min:97.6 °F (36.4 °C), Max:103.4 °F (39.7 °C)    Visit Vitals    /75 (BP 1 Location: Right arm, BP Patient Position: At rest)    Pulse 77    Temp 98.1 °F (36.7 °C)    Resp 18    Ht 5' 2\" (1.575 m)    Wt 86 kg (189 lb 9.5 oz)    SpO2 96%    Breastfeeding No    BMI 34.68 kg/m2       ROS: 12 point ROS obtained in details. Pertinent positives as mentioned in HPI,   otherwise negative    Physical Exam:    General: Well developed, well nourished female laying on the bed AAOx3 in no acute distress. General:   awake alert and oriented   HEENT:  Normocephalic, atraumatic, PERRL, EOMI, no scleral icterus or pallor; no conjunctival hemmohage;  nasal and oral mucous are moist and without evidence of lesions. No thrush. Neck supple, no bruits. Lymph Nodes:   no cervical, axillary or inguinal adenopathy   Lungs:   non-labored, bilaterally clear to auscultation- no crackles wheezes rales or rhonchi   Heart:  RRR, s1 and s2; no murmurs rubs or gallops, no edema, + pedal pulses   Abdomen:  soft, non-distended, active bowel sounds, no hepatomegaly, no splenomegaly. Non-tender   Genitourinary:  deferred   Extremities:   no clubbing, cyanosis; no joint effusions or swelling; Full ROM of all large joints to the upper and lower extremities; muscle mass appropriate for age, erythema and tenderness right leg   Neurologic:  No gross focal sensory abnormalities; 5/5 muscle strength to upper and lower extremities. Speech appropriate.  Cranial nerves intact                        Skin:  Scab right leg, erythema of skin of right leg, surgical scar both knees well healed   Back:  no spinal or paraspinal muscle tenderness or rigidity, no CVA tenderness     Psychiatric:  No suicidal or homicidal ideations, appropriate mood and affect         Labs: Results:   Chemistry Recent Labs      04/25/17   0539  04/24/17   1828   GLU  172*  182*   NA  136  137   K  4.2  4.3   CL  108  104   CO2  18*  22   BUN  53*  46*   CREA  1.74*  1.62*   CA  8.5  9.4   AGAP  10  11   BUCR  30*  28*   AP   --   246*   TP   --   7.1   ALB   -- 2.8*   GLOB   --   4.3*   AGRAT   --   0.7*      CBC w/Diff Recent Labs      04/25/17   0539  04/24/17   1828   WBC  19.4*  21.0*   RBC  3.84*  4.34   HGB  10.0*  11.3*   HCT  30.5*  34.0*   PLT  230  295   GRANS  66  94*   LYMPH  4*  1*   EOS  0  0      Microbiology Recent Labs      04/24/17   2255  04/24/17   2229   CULT  CULTURE IN PROGRESS,FURTHER UPDATES TO FOLLOW  CULTURE IN PROGRESS,FURTHER UPDATES TO FOLLOW          RADIOLOGY:    All available imaging studies/reports in [facility] for this admission were reviewed    Dr. Savanna Enciso, Infectious Disease Specialist  [phone]  April 25, 2017  9:12 AM

## 2017-04-25 NOTE — PROGRESS NOTES
Bedside and Verbal shift change report given to Mika Platt RN (oncoming nurse) by Vincenzo Lee RN (offgoing nurse). Report included the following information SBAR, Kardex and Cardiac Rhythm Sinus Rhythm.

## 2017-04-25 NOTE — DIABETES MGMT
GLYCEMIC CONTROL PLAN OF CARE    Assessment/Recommendations:  Pt is a 74year old female with a past medical history significant for diabetes, fibromyalgia, hypercholesterolemia, gastric bypass, and hypertension admitted with cellulitis. Continue basal and correctional insulin coverage. Monitor need to advance to the very insulin resistant correctional Lispro scale. Diabetes education     Most recent blood glucose values: 165, 222 mg/dL    Current A1C of 9.1% is equivalent to average blood glucose of 214 mg/dl over the past 2-3 months.     Current hospital diabetes medications:   Lantus insulin 10 units every bedtime  Correctional Lispro insulin 4 times daily ACHS (normal insulin sensitivity)    Home diabetes medications:  Lantus insulin 20 units nightly     Diet:  Bariatric maintenance     Education:  __x__Refer to Diabetes Education Record             ____Education not indicated at this time      Elmore Jeans, RD, CDE

## 2017-04-25 NOTE — H&P
History and Physical      NAME:  Leslie Byrd   :   1950   MRN:   [id number]     Date/Time:  2017     CHIEF COMPLAINT: right leg swelling     HISTORY OF PRESENT ILLNESS:     Ms. Haritha Santiago is a 74 y.o.   female with a PMH of DM-2, HTN, chronic pain and fibromyalgia who presents with c/c of  Right leg swelling and pain. She was also complaining of right shoulder and back pain. She denies fever or chills. According to the patient she had dog bite on her right leg about 2 weeks ago but she wasn't complaining anything until yesterday when she started to have swelling of the right leg, pain and erythema. Here  in ED she was found to have fever, tachycardia and elevated WBC with diagnosis of cellulitis of her right leg and started on IV antibiotics with vancomycin and ceftriaxone. Blood culture has been ordered and admitted for further evaluation and management. Past Medical History:   Diagnosis Date    Diabetes ([address]     Fibromyalgia     Hypercholesteremia     Hypertension         Past Surgical History:   Procedure Laterality Date    HX BREAST LUMPECTOMY      HX CARPAL TUNNEL RELEASE      HX KNEE REPLACEMENT      bilater    HX PARTIAL THYROIDECTOMY      HX SHOULDER ARTHROSCOPY      HX TUBAL LIGATION         Social History   Substance Use Topics    Smoking status: Never Smoker    Smokeless tobacco: Never Used    Alcohol use No        Family History   Problem Relation Age of Onset    Asthma Mother     Diabetes Maternal Grandmother     Hypertension Mother     Kidney Disease Mother     Thyroid Disease Mother     Thyroid Disease Maternal Grandmother         No Known Allergies     Prior to Admission medications    Medication Sig Start Date End Date Taking? Authorizing Provider   clindamycin (CLEOCIN) 300 mg capsule Take 1 Cap by mouth four (4) times daily for 10 days.  17 Yes Igor Humphrey DO   clindamycin (CLEOCIN) 300 mg capsule Take 1 Cap by mouth four (4) times daily for 10 days. 4/24/17 5/4/17 Yes Igor Humphrey DO   lisinopril (PRINIVIL, ZESTRIL) 10 mg tablet Take 10 mg by mouth daily. Yahir Reyes MD   methocarbamol (ROBAXIN) 750 mg tablet Take 1 Tab by mouth four (4) times daily. 9/5/16   Joann Antunez PA-C   HYDROcodone-acetaminophen (NORCO) 5-325 mg per tablet Take  by mouth. Historical Provider   simvastatin (ZOCOR) 40 mg tablet 40 mg. Historical Provider   pregabalin (LYRICA) 75 mg capsule 75 mg. 5/7/10   Historical Provider   INSULIN GLARGINE,HUM. REC. ANLOG (LANTUS SC)     Historical Provider   DULoxetine (CYMBALTA) 60 mg capsule 60 mg.     Historical Provider   ALPRAZolam [place]) 1 mg tablet     Historical Provider       REVIEW OF SYSTEMS:     CONSTITUTIONAL: No Fever, No chills, No weight loss, No Night sweats  HEENT:  No epistaxis, No diff in swallowing  CVS: No chest pain, No palpitations, No syncope, No peripheral edema, No PND, No orthopnea  RS: No shortness of breath, No cough, No hemoptysis, No pleuritic chest pain  GI: No abd pain, No vomitting, No diarrhea, No hematemesis, No rectal bleeding, No acid reflux or heartburn  NEURO: No focal weakness, No headaches, No seizures  PSYCH: No anxiety, No depression  MUSCULOSKLETAL: No joint pain or swelling  : No hematuria or dysuria  SKIN: RLE erythema      Physical Exam:    VITALS:    Vital signs reviewed; most recent are:    Visit Vitals    /83 (BP 1 Location: Left arm)    Pulse (!) 102    Temp (!) 103.4 °F (39.7 °C)    Resp 22    Ht 5' 2\" (1.575 m)    Wt 82.6 kg (182 lb)    SpO2 94%    BMI 33.29 kg/m2     SpO2 Readings from Last 6 Encounters:   04/24/17 94%   09/05/16 100%        No intake or output data in the 24 hours ending 04/24/17 5520       GENERAL: Not in acute distress  HEENT: pink conjunctiva, un icteric sclera,   NECK: No lymphadenopthy or thyroid swelling, JVD not seen  LYMPH: No supraclavicular or cervical or axillary nodes on both sides  CVS: tachycardia  RS: CTA, No wheezing or crackles  Abd: Soft, non tender, not distended, No guarding, No rigidity  NEURO:  No focal neurologic deficits   Extrm: RLE erythema and swelling   Skin: No rash      Labs:  Recent Results (from the past 24 hour(s))   CBC WITH AUTOMATED DIFF    Collection Time: 04/24/17  6:28 PM   Result Value Ref Range    WBC 21.0 (H) 4.6 - 13.2 K/uL    RBC 4.34 4.20 - 5.30 M/uL    HGB 11.3 (L) 12.0 - 16.0 g/dL    HCT 34.0 (L) 35.0 - 45.0 %    MCV 78.3 74.0 - 97.0 FL    MCH 26.0 24.0 - 34.0 PG    MCHC 33.2 31.0 - 37.0 g/dL    RDW 14.8 (H) 11.6 - 14.5 %    PLATELET 155 860 - 948 K/uL    MPV 10.4 9.2 - 11.8 FL    NEUTROPHILS 94 (H) 42 - 75 %    BAND NEUTROPHILS 2 0 - 5 %    LYMPHOCYTES 1 (L) 20 - 51 %    MONOCYTES 3 2 - 9 %    EOSINOPHILS 0 0 - 5 %    BASOPHILS 0 0 - 3 %    ABS. NEUTROPHILS 20.2 (H) 1.8 - 8.0 K/UL    ABS. LYMPHOCYTES 0.2 (L) 0.8 - 3.5 K/UL    ABS. MONOCYTES 0.6 0 - 1.0 K/UL    ABS. EOSINOPHILS 0.0 0.0 - 0.4 K/UL    ABS. BASOPHILS 0.0 0.0 - 0.06 K/UL    DF MANUAL      PLATELET COMMENTS ADEQUATE PLATELETS      RBC COMMENTS NORMOCYTIC, NORMOCHROMIC     METABOLIC PANEL, COMPREHENSIVE    Collection Time: 04/24/17  6:28 PM   Result Value Ref Range    Sodium 137 136 - 145 mmol/L    Potassium 4.3 3.5 - 5.5 mmol/L    Chloride 104 100 - 108 mmol/L    CO2 22 21 - 32 mmol/L    Anion gap 11 3.0 - 18 mmol/L    Glucose 182 (H) 74 - 99 mg/dL    BUN 46 (H) 7.0 - 18 MG/DL    Creatinine 1.62 (H) 0.6 - 1.3 MG/DL    BUN/Creatinine ratio 28 (H) 12 - 20      GFR est AA 39 (L) >60 ml/min/1.73m2    GFR est non-AA 32 (L) >60 ml/min/1.73m2    Calcium 9.4 8.5 - 10.1 MG/DL    Bilirubin, total 0.4 0.2 - 1.0 MG/DL    ALT (SGPT) 35 13 - 56 U/L    AST (SGOT) 23 15 - 37 U/L    Alk.  phosphatase 246 (H) 45 - 117 U/L    Protein, total 7.1 6.4 - 8.2 g/dL    Albumin 2.8 (L) 3.4 - 5.0 g/dL    Globulin 4.3 (H) 2.0 - 4.0 g/dL    A-G Ratio 0.7 (L) 0.8 - 1.7     D DIMER    Collection Time: 04/24/17  6:28 PM Result Value Ref Range    D DIMER 2.79 (H) <0.46 ug/ml(FEU)   POC LACTIC ACID    Collection Time: 04/24/17 10:30 PM   Result Value Ref Range    Lactic Acid (POC) 1.9 0.4 - 2.0 mmol/L         Active Problems:    Cellulitis (4/24/2017)      Sepsis ([address] (4/24/2017)        Assessment:       1. Sepsis POA secondary to skin infection  2. Right LE cellulitis  3. Recent Dog bite , 2 weeks ago  4. JAMES, prerenal  5. DM-2, uncontrolled  6. HTN, controlled  7. Chronic pain secondary to fibromyalgia      Plan:       · Patient being admitted to stepdown unit  · Blood culture drawn in ED  · Will continue IV antibiotics with Vancomycin and change ceftriaxone to Zosyn for better coverage of animal bite  · Continue IVF hydration  · Monitor renal function  · Monitor blood sugar, SSI coverage  · DVT and GI prophylaxsis        Total time:  76 minutes             _______________________________________________________________________        Attending Physician:  Milton Laura MD

## 2017-04-25 NOTE — PROGRESS NOTES
conducted an initial consultation and Spiritual Assessment for Robert Alcala, who is a 74 y.o.,female. Patients Primary Language is: [place]. According to the patients EMR Jain Affiliation is: [place]. The reason the Patient came to the hospital is:   Patient Active Problem List    Diagnosis Date Noted    Cellulitis 04/24/2017    Sepsis ([facility] 04/24/2017    Incomplete bladder emptying 01/23/2015    Stress incontinence 01/23/2015    Sleep apnea 01/25/2012    Type II diabetes mellitus ([facility] 01/25/2012    Acquired trigger finger 01/25/2012    Primary localized osteoarthrosis, hand 01/25/2012    Osteoarthrosis 11/06/2009    Enthesopathy of hip region 05/06/2009    Myalgia and myositis 05/06/2009        The  provided the following Interventions:  Initiated a relationship of care and support. Listened empathically. Provided information about Spiritual Care Services. Chart reviewed. The following outcomes where achieved:  Patient expressed gratitude for 's visit. Assessment:  Patient does not have any Lutheran/cultural needs that will affect patients preferences in health care. There are no spiritual or Lutheran issues which require intervention at this time. Plan:  Chaplains will continue to follow and will provide pastoral care on an as needed/requested basis.  recommends bedside caregivers page  on duty if patient shows signs of acute spiritual or emotional distress.     [address]  [phone])

## 2017-04-25 NOTE — PROGRESS NOTES
[facility] Hospitalist Group  Progress Note    Patient: Leslie Byrd Age: 74 y.o. : 1950 MR#: [id number] SSN: [id number]  Date/Time: 2017     Subjective:     Review of systems  General: No fevers or chills. Cardiovascular: No chest pain or pressure. No palpitations. Pulmonary: No shortness of breath, cough or wheeze. Gastrointestinal: No abdominal pain, nausea, vomiting or diarrhea. Genitourinary: No urinary frequency, urgency, hesitancy or dysuria. Musculoskeletal: No joint or muscle pain, no back pain, no recent trauma. Neurologic: No headache, numbness, tingling or weakness. Assessment/Plan:   1. Cellulitis Leg   - open , dry wound on toe   - local warmth and redness still significant   - S/B ID , on Vanco   - Follow Cultures   - DVT study negative , high D Dimer ?  Related to infection , clinically no evidence of PE     2 DM2  - Continue SSI     3 JAMES   - Probably prerenal , IVF     4 Leucocytosis - related to infection     5 H/o Dog bite 2 weeks ago - unlikely cause of current condition , if timing of bote is 2 weeks ago      6 HTN  - Continue home meds     7 HLD- on statin       8 anxiety disorder  - continue Xanax     Full CODE       Case discussed with:  [x]Patient  []Family  [x]Nursing  []Case Management  DVT Prophylaxis:  []Lovenox  [x]Hep SQ  []SCDs  []Coumadin   []On Heparin gtt    Patient Active Problem List   Diagnosis Code    Sleep apnea G47.30    Type II diabetes mellitus ([facility] [address] E11.9    Enthesopathy of hip region M76.899    Acquired trigger finger M65.30    Primary localized osteoarthrosis, hand M19.049    Myalgia and myositis [id number]    Osteoarthrosis M19.90    Incomplete bladder emptying R33.9    Stress incontinence N39.3    Cellulitis L03.90    Sepsis (HCC) A41.9       Objective:   VS:   Visit Vitals    /80 (BP 1 Location: Right arm, BP Patient Position: At rest)    Pulse 71    Temp 97.4 °F (36.3 °C)    Resp 18    Ht 5' 2\" (1.575 m)    Wt 86 kg (189 lb 9.5 oz)    SpO2 98%    Breastfeeding No    BMI 34.68 kg/m2      Tmax/24hrs: Temp (24hrs), Av.8 °F (37.1 °C), Min:97.4 °F (36.3 °C), Max:103.4 °F (39.7 °C)  IOBRIEF  Intake/Output Summary (Last 24 hours) at 17 1333  Last data filed at 17 1116   Gross per 24 hour   Intake              100 ml   Output                0 ml   Net              100 ml       General:  Alert, cooperative, no acute distress    HEENT:  NC, Atraumatic. PERRLA, anicteric sclerae. Pulmonary:  CTA Bilaterally. No Wheezing/Rhonchi/Rales. Cardiovascular: Regular rate and Rhythm. GI:  Soft, Non distended, Non tender. + Bowel sounds. Extremities: right LL - redness , swelling and tenderness with loca warmth   Psych:  Not anxious or agitated. Neurologic: Grossly - Motor and Sensory functions are intact .  No Anxiety , calm , no Agitation         Medications:   Current Facility-Administered Medications   Medication Dose Route Frequency    ALPRAZolam (XANAX) tablet 0.5 mg  0.5 mg Oral TID PRN    HYDROcodone-acetaminophen (NORCO) 5-325 mg per tablet 1 Tab  1 Tab Oral Q6H PRN    pregabalin (LYRICA) capsule 75 mg  75 mg Oral BID    simvastatin (ZOCOR) tablet 40 mg  40 mg Oral QHS    ondansetron (ZOFRAN) injection 4 mg  4 mg IntraVENous Q4H PRN    heparin (porcine) injection 5,000 Units  5,000 Units SubCUTAneous Q8H    insulin lispro (HUMALOG) injection   SubCUTAneous AC&HS    glucose chewable tablet 16 g  16 g Oral PRN    glucagon (GLUCAGEN) injection 1 mg  1 mg IntraMUSCular PRN    dextrose (D50W) injection syrg 12.5-25 g  25-50 mL IntraVENous PRN    insulin glargine (LANTUS) injection 10 Units  10 Units SubCUTAneous QHS    sodium chloride (NS) flush 5-10 mL  5-10 mL IntraVENous PRN    [START ON 2017] vancomycin (VANCOCIN) 1,500 mg in 0.9% sodium chloride 500 mL IVPB  1,500 mg IntraVENous Q36H    sodium chloride (NS) flush 5-10 mL  5-10 mL IntraVENous PRN       Labs:    Recent Results (from the past 24 hour(s))   CBC WITH AUTOMATED DIFF    Collection Time: 04/24/17  6:28 PM   Result Value Ref Range    WBC 21.0 (H) 4.6 - 13.2 K/uL    RBC 4.34 4.20 - 5.30 M/uL    HGB 11.3 (L) 12.0 - 16.0 g/dL    HCT 34.0 (L) 35.0 - 45.0 %    MCV 78.3 74.0 - 97.0 FL    MCH 26.0 24.0 - 34.0 PG    MCHC 33.2 31.0 - 37.0 g/dL    RDW 14.8 (H) 11.6 - 14.5 %    PLATELET 898 468 - 419 K/uL    MPV 10.4 9.2 - 11.8 FL    NEUTROPHILS 94 (H) 42 - 75 %    BAND NEUTROPHILS 2 0 - 5 %    LYMPHOCYTES 1 (L) 20 - 51 %    MONOCYTES 3 2 - 9 %    EOSINOPHILS 0 0 - 5 %    BASOPHILS 0 0 - 3 %    ABS. NEUTROPHILS 20.2 (H) 1.8 - 8.0 K/UL    ABS. LYMPHOCYTES 0.2 (L) 0.8 - 3.5 K/UL    ABS. MONOCYTES 0.6 0 - 1.0 K/UL    ABS. EOSINOPHILS 0.0 0.0 - 0.4 K/UL    ABS. BASOPHILS 0.0 0.0 - 0.06 K/UL    DF MANUAL      PLATELET COMMENTS ADEQUATE PLATELETS      RBC COMMENTS NORMOCYTIC, NORMOCHROMIC     METABOLIC PANEL, COMPREHENSIVE    Collection Time: 04/24/17  6:28 PM   Result Value Ref Range    Sodium 137 136 - 145 mmol/L    Potassium 4.3 3.5 - 5.5 mmol/L    Chloride 104 100 - 108 mmol/L    CO2 22 21 - 32 mmol/L    Anion gap 11 3.0 - 18 mmol/L    Glucose 182 (H) 74 - 99 mg/dL    BUN 46 (H) 7.0 - 18 MG/DL    Creatinine 1.62 (H) 0.6 - 1.3 MG/DL    BUN/Creatinine ratio 28 (H) 12 - 20      GFR est AA 39 (L) >60 ml/min/1.73m2    GFR est non-AA 32 (L) >60 ml/min/1.73m2    Calcium 9.4 8.5 - 10.1 MG/DL    Bilirubin, total 0.4 0.2 - 1.0 MG/DL    ALT (SGPT) 35 13 - 56 U/L    AST (SGOT) 23 15 - 37 U/L    Alk.  phosphatase 246 (H) 45 - 117 U/L    Protein, total 7.1 6.4 - 8.2 g/dL    Albumin 2.8 (L) 3.4 - 5.0 g/dL    Globulin 4.3 (H) 2.0 - 4.0 g/dL    A-G Ratio 0.7 (L) 0.8 - 1.7     D DIMER    Collection Time: 04/24/17  6:28 PM   Result Value Ref Range    D DIMER 2.79 (H) <0.46 ug/ml(FEU)   CULTURE, BLOOD    Collection Time: 04/24/17 10:29 PM   Result Value Ref Range    Special Requests: NO SPECIAL REQUESTS      GRAM STAIN        GRAM POSITIVE COCCI  IN CHAINS  AEROBIC AND ANAEROBIC BOTTLES      Culture result: CULTURE IN PROGRESS,FURTHER UPDATES TO FOLLOW     POC LACTIC ACID    Collection Time: 04/24/17 10:30 PM   Result Value Ref Range    Lactic Acid (POC) 1.9 0.4 - 2.0 mmol/L   CULTURE, BLOOD    Collection Time: 04/24/17 10:55 PM   Result Value Ref Range    Special Requests: NO SPECIAL REQUESTS      GRAM STAIN        GRAM POSITIVE COCCI  IN CHAINS  AEROBIC AND ANAEROBIC BOTTLES      GRAM STAIN        SMEAR CALLED TO AND CORRECTLY REPEATED BY:  EMILY XIAO RN 2S [phone] BY NICOLE BRITO [facility] MICRO      Culture result: CULTURE IN PROGRESS,FURTHER UPDATES TO FOLLOW     EKG, 12 LEAD, INITIAL    Collection Time: 04/25/17 12:40 AM   Result Value Ref Range    Ventricular Rate 89 BPM    Atrial Rate 89 BPM    P-R Interval 172 ms    QRS Duration 90 ms    Q-T Interval 378 ms    QTC Calculation (Bezet) 459 ms    Calculated P Axis 60 degrees    Calculated R Axis 77 degrees    Calculated T Axis 45 degrees    Diagnosis       Normal sinus rhythm  Possible Lateral infarct , age undetermined  Abnormal ECG  No previous ECGs available  Confirmed by [place] Figures ([id number]) on 4/25/2017 8:09:40 AM     GLUCOSE, POC    Collection Time: 04/25/17  1:09 AM   Result Value Ref Range    Glucose (POC) 165 (H) 70 - 549 mg/dL   METABOLIC PANEL, BASIC    Collection Time: 04/25/17  5:39 AM   Result Value Ref Range    Sodium 136 136 - 145 mmol/L    Potassium 4.2 3.5 - 5.5 mmol/L    Chloride 108 100 - 108 mmol/L    CO2 18 (L) 21 - 32 mmol/L    Anion gap 10 3.0 - 18 mmol/L    Glucose 172 (H) 74 - 99 mg/dL    BUN 53 (H) 7.0 - 18 MG/DL    Creatinine 1.74 (H) 0.6 - 1.3 MG/DL    BUN/Creatinine ratio 30 (H) 12 - 20      GFR est AA 36 (L) >60 ml/min/1.73m2    GFR est non-AA 29 (L) >60 ml/min/1.73m2    Calcium 8.5 8.5 - 10.1 MG/DL   CBC WITH AUTOMATED DIFF    Collection Time: 04/25/17  5:39 AM   Result Value Ref Range    WBC 19.4 (H) 4.6 - 13.2 K/uL    RBC 3.84 (L) 4.20 - 5.30 M/uL    HGB 10.0 (L) 12.0 - 16.0 g/dL HCT 30.5 (L) 35.0 - 45.0 %    MCV 79.4 74.0 - 97.0 FL    MCH 26.0 24.0 - 34.0 PG    MCHC 32.8 31.0 - 37.0 g/dL    RDW 15.1 (H) 11.6 - 14.5 %    PLATELET 949 002 - 993 K/uL    MPV 10.7 9.2 - 11.8 FL    NEUTROPHILS 66 42 - 75 %    BAND NEUTROPHILS 27 (H) 0 - 5 %    LYMPHOCYTES 4 (L) 20 - 51 %    MONOCYTES 3 2 - 9 %    EOSINOPHILS 0 0 - 5 %    BASOPHILS 0 0 - 3 %    ABS. NEUTROPHILS 18.0 (H) 1.8 - 8.0 K/UL    ABS. LYMPHOCYTES 0.8 0.8 - 3.5 K/UL    ABS. MONOCYTES 0.6 0 - 1.0 K/UL    ABS. EOSINOPHILS 0.0 0.0 - 0.4 K/UL    ABS.  BASOPHILS 0.0 0.0 - 0.06 K/UL    DF MANUAL      PLATELET COMMENTS ADEQUATE PLATELETS      RBC COMMENTS NORMOCYTIC, NORMOCHROMIC     HEMOGLOBIN A1C W/O EAG    Collection Time: 04/25/17  5:39 AM   Result Value Ref Range    Hemoglobin A1c 9.1 (H) 4.2 - 5.6 %   GLUCOSE, POC    Collection Time: 04/25/17 12:33 PM   Result Value Ref Range    Glucose (POC) 222 (H) 70 - 110 mg/dL       Signed By: Bao Ly MD     April 25, 2017

## 2017-04-25 NOTE — ED NOTES
Pt placed back in bed Oxygen started at 3 liters,  02 sats increased to 90. Patient is a mouth breather.

## 2017-04-26 ENCOUNTER — APPOINTMENT (OUTPATIENT)
Dept: CT IMAGING | Age: 67
DRG: 853 | End: 2017-04-26
Attending: INTERNAL MEDICINE
Payer: COMMERCIAL

## 2017-04-26 LAB
ANION GAP BLD CALC-SCNC: 10 MMOL/L (ref 3–18)
BACTERIA SPEC CULT: ABNORMAL
BACTERIA SPEC CULT: ABNORMAL
BASOPHILS # BLD AUTO: 0 K/UL (ref 0–0.1)
BASOPHILS # BLD: 0 % (ref 0–2)
BUN SERPL-MCNC: 65 MG/DL (ref 7–18)
BUN/CREAT SERPL: 34 (ref 12–20)
CALCIUM SERPL-MCNC: 8.5 MG/DL (ref 8.5–10.1)
CHLORIDE SERPL-SCNC: 107 MMOL/L (ref 100–108)
CO2 SERPL-SCNC: 19 MMOL/L (ref 21–32)
CREAT SERPL-MCNC: 1.93 MG/DL (ref 0.6–1.3)
DIFFERENTIAL METHOD BLD: ABNORMAL
EOSINOPHIL # BLD: 0.1 K/UL (ref 0–0.4)
EOSINOPHIL NFR BLD: 0 % (ref 0–5)
ERYTHROCYTE [DISTWIDTH] IN BLOOD BY AUTOMATED COUNT: 15.1 % (ref 11.6–14.5)
GLUCOSE BLD STRIP.AUTO-MCNC: 115 MG/DL (ref 70–110)
GLUCOSE BLD STRIP.AUTO-MCNC: 119 MG/DL (ref 70–110)
GLUCOSE BLD STRIP.AUTO-MCNC: 133 MG/DL (ref 70–110)
GLUCOSE BLD STRIP.AUTO-MCNC: 167 MG/DL (ref 70–110)
GLUCOSE BLD STRIP.AUTO-MCNC: 185 MG/DL (ref 70–110)
GLUCOSE SERPL-MCNC: 117 MG/DL (ref 74–99)
GRAM STN SPEC: ABNORMAL
HCT VFR BLD AUTO: 31.5 % (ref 35–45)
HGB BLD-MCNC: 10 G/DL (ref 12–16)
LYMPHOCYTES # BLD AUTO: 4 % (ref 21–52)
LYMPHOCYTES # BLD: 0.6 K/UL (ref 0.9–3.6)
MCH RBC QN AUTO: 25.4 PG (ref 24–34)
MCHC RBC AUTO-ENTMCNC: 31.7 G/DL (ref 31–37)
MCV RBC AUTO: 80.2 FL (ref 74–97)
MONOCYTES # BLD: 0.7 K/UL (ref 0.05–1.2)
MONOCYTES NFR BLD AUTO: 4 % (ref 3–10)
NEUTS SEG # BLD: 14.1 K/UL (ref 1.8–8)
NEUTS SEG NFR BLD AUTO: 92 % (ref 40–73)
PLATELET # BLD AUTO: 270 K/UL (ref 135–420)
PMV BLD AUTO: 11.3 FL (ref 9.2–11.8)
POTASSIUM SERPL-SCNC: 4.3 MMOL/L (ref 3.5–5.5)
RBC # BLD AUTO: 3.93 M/UL (ref 4.2–5.3)
SERVICE CMNT-IMP: ABNORMAL
SERVICE CMNT-IMP: ABNORMAL
SODIUM SERPL-SCNC: 136 MMOL/L (ref 136–145)
VANCOMYCIN SERPL-MCNC: 10.6 UG/ML (ref 5–40)
WBC # BLD AUTO: 15.5 K/UL (ref 4.6–13.2)

## 2017-04-26 PROCEDURE — 77010033678 HC OXYGEN DAILY

## 2017-04-26 PROCEDURE — 73200 CT UPPER EXTREMITY W/O DYE: CPT

## 2017-04-26 PROCEDURE — 74011250636 HC RX REV CODE- 250/636: Performed by: INTERNAL MEDICINE

## 2017-04-26 PROCEDURE — 87040 BLOOD CULTURE FOR BACTERIA: CPT | Performed by: INTERNAL MEDICINE

## 2017-04-26 PROCEDURE — 36415 COLL VENOUS BLD VENIPUNCTURE: CPT | Performed by: INTERNAL MEDICINE

## 2017-04-26 PROCEDURE — 80202 ASSAY OF VANCOMYCIN: CPT | Performed by: EMERGENCY MEDICINE

## 2017-04-26 PROCEDURE — 65660000000 HC RM CCU STEPDOWN

## 2017-04-26 PROCEDURE — 74011636637 HC RX REV CODE- 636/637: Performed by: INTERNAL MEDICINE

## 2017-04-26 PROCEDURE — 74011250637 HC RX REV CODE- 250/637: Performed by: INTERNAL MEDICINE

## 2017-04-26 PROCEDURE — 82962 GLUCOSE BLOOD TEST: CPT

## 2017-04-26 PROCEDURE — 74011000250 HC RX REV CODE- 250: Performed by: INTERNAL MEDICINE

## 2017-04-26 PROCEDURE — 80048 BASIC METABOLIC PNL TOTAL CA: CPT | Performed by: INTERNAL MEDICINE

## 2017-04-26 PROCEDURE — 74011000258 HC RX REV CODE- 258: Performed by: INTERNAL MEDICINE

## 2017-04-26 PROCEDURE — 85025 COMPLETE CBC W/AUTO DIFF WBC: CPT | Performed by: INTERNAL MEDICINE

## 2017-04-26 RX ORDER — SODIUM CHLORIDE 9 MG/ML
100 INJECTION, SOLUTION INTRAVENOUS CONTINUOUS
Status: DISCONTINUED | OUTPATIENT
Start: 2017-04-26 | End: 2017-05-02 | Stop reason: HOSPADM

## 2017-04-26 RX ADMIN — PREGABALIN 75 MG: 75 CAPSULE ORAL at 08:14

## 2017-04-26 RX ADMIN — CLINDAMYCIN PHOSPHATE 900 MG: 150 INJECTION, SOLUTION, CONCENTRATE INTRAVENOUS at 11:10

## 2017-04-26 RX ADMIN — AMPICILLIN SODIUM AND SULBACTAM SODIUM 1.5 G: 1; .5 INJECTION, POWDER, FOR SOLUTION INTRAMUSCULAR; INTRAVENOUS at 23:30

## 2017-04-26 RX ADMIN — HYDROCODONE BITARTRATE AND ACETAMINOPHEN 1 TABLET: 5; 325 TABLET ORAL at 02:21

## 2017-04-26 RX ADMIN — DIPHENHYDRAMINE HYDROCHLORIDE AND LIDOCAINE HYDROCHLORIDE AND ALUMINUM HYDROXIDE AND MAGNESIUM HYDRO 10 ML: KIT at 16:10

## 2017-04-26 RX ADMIN — DIPHENHYDRAMINE HYDROCHLORIDE AND LIDOCAINE HYDROCHLORIDE AND ALUMINUM HYDROXIDE AND MAGNESIUM HYDRO 10 ML: KIT at 11:30

## 2017-04-26 RX ADMIN — HEPARIN SODIUM 5000 UNITS: 5000 INJECTION INTRAVENOUS; SUBCUTANEOUS at 08:14

## 2017-04-26 RX ADMIN — INSULIN GLARGINE 10 UNITS: 100 INJECTION, SOLUTION SUBCUTANEOUS at 21:51

## 2017-04-26 RX ADMIN — HYDROCODONE BITARTRATE AND ACETAMINOPHEN 1 TABLET: 5; 325 TABLET ORAL at 08:13

## 2017-04-26 RX ADMIN — SODIUM CHLORIDE 100 ML/HR: 900 INJECTION, SOLUTION INTRAVENOUS at 23:23

## 2017-04-26 RX ADMIN — HYDROCODONE BITARTRATE AND ACETAMINOPHEN 1 TABLET: 5; 325 TABLET ORAL at 16:02

## 2017-04-26 RX ADMIN — HYDROCODONE BITARTRATE AND ACETAMINOPHEN 1 TABLET: 5; 325 TABLET ORAL at 21:59

## 2017-04-26 RX ADMIN — PREGABALIN 75 MG: 75 CAPSULE ORAL at 18:50

## 2017-04-26 RX ADMIN — AMPICILLIN SODIUM AND SULBACTAM SODIUM 1.5 G: 1; .5 INJECTION, POWDER, FOR SOLUTION INTRAMUSCULAR; INTRAVENOUS at 16:02

## 2017-04-26 RX ADMIN — HEPARIN SODIUM 5000 UNITS: 5000 INJECTION INTRAVENOUS; SUBCUTANEOUS at 18:51

## 2017-04-26 RX ADMIN — SIMVASTATIN 40 MG: 40 TABLET, FILM COATED ORAL at 23:31

## 2017-04-26 RX ADMIN — HEPARIN SODIUM 5000 UNITS: 5000 INJECTION INTRAVENOUS; SUBCUTANEOUS at 00:06

## 2017-04-26 RX ADMIN — INSULIN LISPRO 2 UNITS: 100 INJECTION, SOLUTION INTRAVENOUS; SUBCUTANEOUS at 21:50

## 2017-04-26 RX ADMIN — CLINDAMYCIN PHOSPHATE 900 MG: 150 INJECTION, SOLUTION, CONCENTRATE INTRAVENOUS at 18:50

## 2017-04-26 NOTE — DIABETES MGMT
Glycemic Control: Pt reports home glucometer is in storage due to moving recently. Generic brand glucometer provided to patient. Stressed importance of blood glucose monitoring. Continue inpatient monitoring and intervention.      Gustabo Carpenter RD, CDE

## 2017-04-26 NOTE — PROGRESS NOTES
Care Management Interventions  PCP Verified by CM: Yes Ervin Isabel )  Last Visit to PCP: 04/12/17  Palliative Care Consult (Criteria: CHF and RRAT>21): No (No order RRAt = 15)  Reason for No Palliative Care Consult:  Other (see comment)  Mode of Transport at Discharge: S  Transition of Care Consult (CM Consult): Discharge Planning  MyChart Signup: No  Discharge Durable Medical Equipment: No (has been falling recentlt at home - may need PT evaluation for DME)  Physical Therapy Consult: No  Occupational Therapy Consult: No  Speech Therapy Consult: No  Current Support Network: Own Home (recenlty diviorced, lives in one story home 3 steps to enter with 25 & 21 yr old grandchildren- son Dirk Boss is primary support ([phone])  )  Confirm Follow Up Transport: Family (son or daughter-in law )  Plan discussed with Pt/Family/Caregiver: Yes (with pt who was lying on back in bed with reddened left leg outside covers, Iv infusing)  Discharge Location  Discharge Placement: Home with home health (contingent on PT /Ot evaluations)      During evaluation, pt revealed to CM that her glucometer and test strips have not been available  to her since moving into new home after  from - thinks they are in a storage unit and will be difficult to locate them- gave information on [employer] brand affordable monitor and test strips that can be purchased at [facility] for under 30.00 encouraged to purchase in order to [facility] correct blood glucose levels    CM to follow for PT/OT recommendations and possible home health  needs

## 2017-04-26 NOTE — ROUTINE PROCESS
Bedside and Verbal shift change report given to [employer] (oncoming nurse) by Mily James RN (offgoing nurse). Report included the following information SBAR, Kardex, MAR and Recent Results.     SITUATION:    Code Status: Full Code  Reason for Admission: Sepsis ([facility] [address]  [address] day: 2   Problem List:       Hospital Problems  Date Reviewed: 1/23/2015          Codes Class Noted POA    Cellulitis ICD-10-CM: L03.90  ICD-9-CM: 682.9  4/24/2017 Unknown        Sepsis [facility]) ICD-10-CM: A41.9  ICD-9-CM: 038.9, 995.91  4/24/2017 Unknown              BACKGROUND:    Past Medical History:   Past Medical History:   Diagnosis Date    Diabetes ([facility] [address]     Fibromyalgia     Hypercholesteremia     Hypertension          Patient taking anticoagulants yes/heparin     ASSESSMENT:    Changes in Assessment Throughout Shift: none     Patient has Central Line: no Reasons if yes: na   Patient has Fuentes Cath: no Reasons if yes: na      Last Vitals:     Vitals:    04/25/17 2000 04/26/17 0015 04/26/17 0400 04/26/17 0741   BP: 137/76 146/85 145/80 151/87   Pulse: 82 72 80 75   Resp: 12 15 18 20   Temp: 97.5 °F (36.4 °C) 97 °F (36.1 °C) 97.4 °F (36.3 °C) 97.1 °F (36.2 °C)   SpO2: 97% 98% 98% 100%   Weight:   85 kg (187 lb 8 oz)    Height:            IV and DRAINS (will only show if present)   Peripheral IV 04/24/17 Left;Upper Arm-Site Assessment: Clean, dry, & intact     WOUND (if present)   Wound Type:  Dog bite and diabetic wound,    Dressing present Dressing Present : No   Wound Concerns/Notes:  none     PAIN    Pain Assessment    Pain Intensity 1: 10 (04/26/17 0819)    Pain Location 1: Shoulder, Arm    Pain Intervention(s) 1: Medication (see MAR)    Patient Stated Pain Goal: 0  o Interventions for Pain:  none  o Intervention effective: na  o Time of last intervention: na   o Reassessment Completed: yes      Last 3 Weights:  Last 3 Recorded Weights in this Encounter    04/24/17 1533 04/25/17 0426 04/26/17 0400 Weight: 82.6 kg (182 lb) 86 kg (189 lb 9.5 oz) 85 kg (187 lb 8 oz)     Weight change: 2.495 kg (5 lb 8 oz)     INTAKE/OUPUT    Current Shift: 04/26 0701 - 04/26 1900  In: 236 [P.O.:236]  Out: -     Last three shifts: 04/24 1901 - 04/26 0700  In: 250 [P.O.:250]  Out: 0      LAB RESULTS     Recent Labs      04/26/17   0400  04/25/17   0539  04/24/17 1828   WBC  15.5*  19.4*  21.0*   HGB  10.0*  10.0*  11.3*   HCT  31.5*  30.5*  34.0*   PLT  270  230  295        Recent Labs      04/26/17 0400 04/25/17 0539  04/24/17 1828   NA  136  136  137   K  4.3  4.2  4.3   GLU  117*  172*  182*   BUN  65*  53*  46*   CREA  1.93*  1.74*  1.62*   CA  8.5  8.5  9.4       RECOMMENDATIONS AND DISCHARGE PLANNING     1. Pending tests/procedures/ Plan of Care or Other Needs: continue to monitor     2. Discharge plan for patient and Needs/Barriers: home    3. Estimated Discharge Date: 5 days Posted on Whiteboard in [facility]: yes      4. The patient's care plan was reviewed with the oncoming nurse. \"HEALS\" SAFETY CHECK      Fall Risk    Total Score: 4    Safety Measures: Safety Measures: Bed/Chair alarm on, Bed/Chair-Wheels locked, Bed in low position, Call light within reach, Gripper socks, Sitter at bedside    A safety check occurred in the patient's room between off going nurse and oncoming nurse listed above.     The safety check included the below items  Area Items   H  High Alert Medications - Verify all high alert medication drips (heparin, PCA, etc.)   E  Equipment - Suction is set up for ALL patients (with yanker)  - Red plugs utilized for all equipment (IV pumps, etc.)  - WOWs wiped down at end of shift.  - Room stocked with oxygen, suction, and other unit-specific supplies   A  Alarms - Bed alarm is set for fall risk patients  - Ensure chair alarm is in place and activated if patient is up in a chair   L  Lines - Check IV for any infiltration  - Fuentes bag is empty if patient has a Fuentes   - Tubing and IV bags are labeled   S  Safety   - Room is clean, patient is clean, and equipment is clean. - Hallways are clear from equipment besides carts. - Fall bracelet on for fall risk patients  - Ensure room is clear and free of clutter  - Suction is set up for ALL patients (with richmondker)  - Hallways are clear from equipment besides carts.    - Isolation precautions followed, supplies available outside room, sign posted     Renard Maciel RN

## 2017-04-26 NOTE — ROUTINE PROCESS
Bedside and Verbal shift change report given to [address] [place] [address] (oncoming nurse) by Torres Vazquez (offgoing nurse). Report included the following information SBAR, Kardex, MAR and Recent Results.     SITUATION:    Code Status: Full Code   Reason for Admission: Sepsis ([facility] [address]  [address]. day: 2   Problem List:       Hospital Problems  Date Reviewed: 1/23/2015          Codes Class Noted POA    Cellulitis ICD-10-CM: L03.90  ICD-9-CM: 682.9  4/24/2017 Unknown        Sepsis ([address] ICD-10-CM: A41.9  ICD-9-CM: 038.9, 995.91  4/24/2017 Unknown              BACKGROUND:    Past Medical History:   Past Medical History:   Diagnosis Date    Diabetes ([facility] [address]     Fibromyalgia     Hypercholesteremia     Hypertension          Patient taking anticoagulants yes/heparin     ASSESSMENT:    Changes in Assessment Throughout Shift: none     Patient has Central Line: no Reasons if yes: na   Patient has Fuentes Cath: no Reasons if yes: na      Last Vitals:     Vitals:    04/25/17 1111 04/25/17 1524 04/25/17 2000 04/26/17 0015   BP: 141/80 137/80 137/76 146/85   Pulse: 71 74 82 72   Resp: 18 18 12 15   Temp: 97.4 °F (36.3 °C) 97.1 °F (36.2 °C) 97.5 °F (36.4 °C) 97 °F (36.1 °C)   SpO2: 98% 94% 97% 98%   Weight:       Height:            IV and DRAINS (will only show if present)   Peripheral IV 04/24/17 Left;Upper Arm-Site Assessment: Clean, dry, & intact     WOUND (if present)   Wound Type:  Dog bite and diabetic wound,    Dressing present Dressing Present : No   Wound Concerns/Notes:  none     PAIN    Pain Assessment    Pain Intensity 1: 0 (04/26/17 0015)    Pain Location 1: Arm    Pain Intervention(s) 1: Medication (see MAR)    Patient Stated Pain Goal: 0  o Interventions for Pain:  none  o Intervention effective: na  o Time of last intervention: na   o Reassessment Completed: yes      Last 3 Weights:  Last 3 Recorded Weights in this Encounter    04/24/17 1533 04/25/17 0426   Weight: 82.6 kg (182 lb) 86 kg (189 lb 9.5 oz)     Weight change:      INTAKE/OUPUT    Current Shift:      Last three shifts: 04/24 0701 - 04/25 1900  In: 250 [P.O.:250]  Out: 0      LAB RESULTS     Recent Labs      04/25/17 0539  04/24/17 1828   WBC  19.4*  21.0*   HGB  10.0*  11.3*   HCT  30.5*  34.0*   PLT  230  295        Recent Labs      04/25/17 0539  04/24/17 1828   NA  136  137   K  4.2  4.3   GLU  172*  182*   BUN  53*  46*   CREA  1.74*  1.62*   CA  8.5  9.4       RECOMMENDATIONS AND DISCHARGE PLANNING     1. Pending tests/procedures/ Plan of Care or Other Needs: continue to monitor     2. Discharge plan for patient and Needs/Barriers: home    3. Estimated Discharge Date: 5 days Posted on Whiteboard in [facility]: yes      4. The patient's care plan was reviewed with the oncoming nurse. \"HEALS\" SAFETY CHECK      Fall Risk    Total Score: 4    Safety Measures: Safety Measures: Bed/Chair-Wheels locked, Bed in low position, Call light within reach, Fall prevention (comment), Gripper socks, Side rails X 3    A safety check occurred in the patient's room between off going nurse and oncoming nurse listed above. The safety check included the below items  Area Items   H  High Alert Medications - Verify all high alert medication drips (heparin, PCA, etc.)   E  Equipment - Suction is set up for ALL patients (with yanker)  - Red plugs utilized for all equipment (IV pumps, etc.)  - WOWs wiped down at end of shift.  - Room stocked with oxygen, suction, and other unit-specific supplies   A  Alarms - Bed alarm is set for fall risk patients  - Ensure chair alarm is in place and activated if patient is up in a chair   L  Lines - Check IV for any infiltration  - Fuentes bag is empty if patient has a Fuentes   - Tubing and IV bags are labeled   S  Safety   - Room is clean, patient is clean, and equipment is clean. - Hallways are clear from equipment besides carts.    - Fall bracelet on for fall risk patients  - Ensure room is clear and free of clutter  - Suction is set up for ALL patients (with tonny)  - Hallways are clear from equipment besides carts.    - Isolation precautions followed, supplies available outside room, sign posted     Inocencia Mehta

## 2017-04-26 NOTE — PROGRESS NOTES
[facility] Hospitalist Group  Progress Note    Patient: Chucho Etienne Age: 74 y.o. : 1950 MR#: [id number] SSN: [id number]  Date/Time: 2017     Subjective:     Review of systems  General: No fevers or chills. Cardiovascular: No chest pain or pressure. No palpitations. Pulmonary: No shortness of breath, cough or wheeze. Gastrointestinal: No abdominal pain, nausea, vomiting or diarrhea. Genitourinary: No urinary frequency, urgency, hesitancy or dysuria. Musculoskeletal: No joint or muscle pain, no back pain, no recent trauma. Neurologic: No headache, numbness, tingling or weakness. Assessment/Plan:   1.  Cellulitis Leg   Blood culture positive   -AEROBIC AND ANAEROBIC BOTTLES   STREPTOCOCCI, BETA HEMOLYTIC GROUP A   Reported to Dept of Health   - now on Unasyn and Clinda  - ID following   - Swelling is very impressive - DVT study negative   - Will get arterial doppler - so far no indication of ischemia       2 DM2  - Continue SSI     3 JAMES   - Probably prerenal , IVF     4 Leucocytosis - related to infection     5 H/o Dog bite 2 weeks ago - unlikely cause of current condition , if timing of bote is 2 weeks ago      6 HTN  - Continue home meds     7 HLD- on statin       8 anxiety disorder  - continue Xanax     Full CODE       Case discussed with:  [x]Patient  []Family  [x]Nursing  []Case Management  DVT Prophylaxis:  []Lovenox  [x]Hep SQ  []SCDs  []Coumadin   []On Heparin gtt    Patient Active Problem List   Diagnosis Code    Sleep apnea G47.30    Type II diabetes mellitus ([facility] [address] E11.9    Enthesopathy of hip region M76.899    Acquired trigger finger M65.30    Primary localized osteoarthrosis, hand M19.049    Myalgia and myositis [id number]    Osteoarthrosis M19.90    Incomplete bladder emptying R33.9    Stress incontinence N39.3    Cellulitis L03.90    Sepsis (HCC) A41.9       Objective:   VS:   Visit Vitals    /87 (BP 1 Location: Left arm, BP Patient Position: Sitting)    Pulse 75    Temp 97.1 °F (36.2 °C)    Resp 20    Ht 5' 2\" (1.575 m)    Wt 85 kg (187 lb 8 oz)    SpO2 100%    Breastfeeding No    BMI 34.29 kg/m2      Tmax/24hrs: Temp (24hrs), Av.2 °F (36.2 °C), Min:97 °F (36.1 °C), Max:97.5 °F (36.4 °C)  IOBRIEF    Intake/Output Summary (Last 24 hours) at 17 1201  Last data filed at 17 0819   Gross per 24 hour   Intake              386 ml   Output                0 ml   Net              386 ml       General:  Alert, cooperative, no acute distress    HEENT:  NC, Atraumatic. PERRLA, anicteric sclerae. Pulmonary:  CTA Bilaterally. No Wheezing/Rhonchi/Rales. Cardiovascular: Regular rate and Rhythm. GI:  Soft, Non distended, Non tender. + Bowel sounds. Extremities: right LL - redness , swelling and tenderness with loca warmth   Psych:  Not anxious or agitated. Neurologic: Grossly - Motor and Sensory functions are intact .  No Anxiety , calm , no Agitation         Medications:   Current Facility-Administered Medications   Medication Dose Route Frequency    ampicillin-sulbactam (UNASYN) 1.5 g in 0.9% sodium chloride (MBP/ADV) 50 mL MBP  1.5 g IntraVENous Q6H    clindamycin phosphate 900 mg in 0.9% sodium chloride (MBP/ADV) 100 mL ADV  900 mg IntraVENous Q8H    magic mouthwash (FIRST-MOUTHWASH BLM) oral suspension 10 mL  10 mL Oral TIDAC    ALPRAZolam (XANAX) tablet 0.5 mg  0.5 mg Oral TID PRN    HYDROcodone-acetaminophen (NORCO) 5-325 mg per tablet 1 Tab  1 Tab Oral Q6H PRN    pregabalin (LYRICA) capsule 75 mg  75 mg Oral BID    simvastatin (ZOCOR) tablet 40 mg  40 mg Oral QHS    ondansetron (ZOFRAN) injection 4 mg  4 mg IntraVENous Q4H PRN    heparin (porcine) injection 5,000 Units  5,000 Units SubCUTAneous Q8H    insulin lispro (HUMALOG) injection   SubCUTAneous AC&HS    glucose chewable tablet 16 g  16 g Oral PRN    glucagon (GLUCAGEN) injection 1 mg  1 mg IntraMUSCular PRN    dextrose (D50W) injection syrg 12.5-25 g  25-50 mL IntraVENous PRN    insulin glargine (LANTUS) injection 10 Units  10 Units SubCUTAneous QHS    sodium chloride (NS) flush 5-10 mL  5-10 mL IntraVENous PRN    sodium chloride (NS) flush 5-10 mL  5-10 mL IntraVENous PRN       Labs:    Recent Results (from the past 24 hour(s))   GLUCOSE, POC    Collection Time: 04/25/17 12:33 PM   Result Value Ref Range    Glucose (POC) 222 (H) 70 - 110 mg/dL   GLUCOSE, POC    Collection Time: 04/25/17  7:05 PM   Result Value Ref Range    Glucose (POC) 210 (H) 70 - 110 mg/dL   GLUCOSE, POC    Collection Time: 04/25/17  8:16 PM   Result Value Ref Range    Glucose (POC) 181 (H) 70 - 077 mg/dL   METABOLIC PANEL, BASIC    Collection Time: 04/26/17  4:00 AM   Result Value Ref Range    Sodium 136 136 - 145 mmol/L    Potassium 4.3 3.5 - 5.5 mmol/L    Chloride 107 100 - 108 mmol/L    CO2 19 (L) 21 - 32 mmol/L    Anion gap 10 3.0 - 18 mmol/L    Glucose 117 (H) 74 - 99 mg/dL    BUN 65 (H) 7.0 - 18 MG/DL    Creatinine 1.93 (H) 0.6 - 1.3 MG/DL    BUN/Creatinine ratio 34 (H) 12 - 20      GFR est AA 32 (L) >60 ml/min/1.73m2    GFR est non-AA 26 (L) >60 ml/min/1.73m2    Calcium 8.5 8.5 - 10.1 MG/DL   CBC WITH AUTOMATED DIFF    Collection Time: 04/26/17  4:00 AM   Result Value Ref Range    WBC 15.5 (H) 4.6 - 13.2 K/uL    RBC 3.93 (L) 4.20 - 5.30 M/uL    HGB 10.0 (L) 12.0 - 16.0 g/dL    HCT 31.5 (L) 35.0 - 45.0 %    MCV 80.2 74.0 - 97.0 FL    MCH 25.4 24.0 - 34.0 PG    MCHC 31.7 31.0 - 37.0 g/dL    RDW 15.1 (H) 11.6 - 14.5 %    PLATELET 920 289 - 412 K/uL    MPV 11.3 9.2 - 11.8 FL    NEUTROPHILS 92 (H) 40 - 73 %    LYMPHOCYTES 4 (L) 21 - 52 %    MONOCYTES 4 3 - 10 %    EOSINOPHILS 0 0 - 5 %    BASOPHILS 0 0 - 2 %    ABS. NEUTROPHILS 14.1 (H) 1.8 - 8.0 K/UL    ABS. LYMPHOCYTES 0.6 (L) 0.9 - 3.6 K/UL    ABS. MONOCYTES 0.7 0.05 - 1.2 K/UL    ABS. EOSINOPHILS 0.1 0.0 - 0.4 K/UL    ABS.  BASOPHILS 0.0 0.0 - 0.1 K/UL    DF AUTOMATED     GLUCOSE, POC    Collection Time: 04/26/17  7:45 AM   Result Value Ref Range    Glucose (POC) 119 (H) 70 - 110 mg/dL   GLUCOSE, POC    Collection Time: 04/26/17 11:33 AM   Result Value Ref Range    Glucose (POC) 115 (H) 70 - 110 mg/dL       Signed By: Mendez Giles MD     April 26, 2017

## 2017-04-26 NOTE — PROGRESS NOTES
NUTRITION    BPA/MST Referral       RECOMMENDATIONS / PLAN:     - Start daily multivitamin.   - Change diet to soft solid consistency for improved tolerance. - Continue RD inpatient monitoring and evaluation. NUTRITION INTERVENTIONS & DIAGNOSIS:     [x] Meals/Snacks: modified diet  [x] Medical food supplementation: Unjury TID   [x] Vitamin/mineral supplementation: start MVI     Nutrition Diagnosis: Obese related to excess energy intake as evidenced by BMI of 34 kg/m^2. ASSESSMENT:     Subjective/Objective: Hx of gastric bypass last year, compliant with diet and MVI. Tolerating bariatric maintenance diet, ate 100% of meals today. Broccoli a little too tough, agreeable to softer diet.       Average po intake adequate to meet patients estimated nutritional needs:   [x] Yes     [] No   [] Unable to determine at this time    Diet: DIET BARIATRIC MAINTENANCE  DIET NUTRITIONAL SUPPLEMENTS All Meals; Sudha Hunter      Food Allergies: NKFA  Current Appetite:   [x] Good     [] Fair     [] Poor     [] Other:  Appetite/meal intake prior to admission:   [x] Good     [] Fair     [] Poor     [] Other:  Feeding Limitations:  [] Swallowing difficulty    [x] Chewing difficulty: missing teeth, dentures at home    [] Other:  Current Meal Intake:   Patient Vitals for the past 100 hrs:   % Diet Eaten   04/26/17 0819 100 %   04/25/17 1750 100 %   04/25/17 1400 100 %   04/25/17 0944 50 %       BM: 4/25  Skin Integrity: arm wound/sore  Edema: 2+ RLE, 1+ RUE  Pertinent Medications: Reviewed; magic mouthwash, zofran     Recent Labs      04/26/17   0400  04/25/17   0539  04/24/17   1828   NA  136  136  137   K  4.3  4.2  4.3   CL  107  108  104   CO2  19*  18*  22   GLU  117*  172*  182*   BUN  65*  53*  46*   CREA  1.93*  1.74*  1.62*   CA  8.5  8.5  9.4   ALB   --    --   2.8*   SGOT   --    --   23   ALT   --    --   35       Intake/Output Summary (Last 24 hours) at 04/26/17 1510  Last data filed at 04/26/17 0819   Gross per 24 hour Intake              386 ml   Output                0 ml   Net              386 ml       Anthropometrics:  Ht Readings from Last 1 Encounters:   04/25/17 5' 2\" (1.575 m)     Last 3 Recorded Weights in this Encounter    04/24/17 1533 04/25/17 0426 04/26/17 0400   Weight: 82.6 kg (182 lb) 86 kg (189 lb 9.5 oz) 85 kg (187 lb 8 oz)     Body mass index is 34.29 kg/(m^2). Obese, Class I     Weight History: patient reports previous weight of 292 lb last year prior to gastric bypass surgery (110 lb, 38% intentional weight loss x year)     Weight Metrics 4/26/2017 9/5/2016 3/2/2015 1/23/2015 12/12/2014   Weight 187 lb 8 oz 250 lb 245 lb 285 lb 285 lb   BMI 34.29 kg/m2 45.73 kg/m2 44.8 kg/m2 52.11 kg/m2 52.11 kg/m2        Admitting Diagnosis: [facility]epsis ([facility]  Cellulitis  Past Medical History:   Diagnosis Date    Diabetes ([email or web address]     Fibromyalgia     Hypercholesteremia     Hypertension        Education Needs:        [x] None identified  [] Identified - Not appropriate at this time  []  Identified and addressed - refer to education log  Learning Limitations:   [x] None identified  [] Identified    Cultural, Oriental orthodox & ethnic food preferences:  [x] None identified    [] Identified and addressed     ESTIMATED NUTRITION NEEDS:     Calories: 2252-6596 kcal (MSJx1.2-1.3) based on  [x] Actual BW 86 kg     [] IBW   CHO: 204-221 gm (50% kcal)   Protein: 69-86 gm (0.8-1 gm/kg) based on  [x] Actual BW      [] IBW   Fluid: 1 mL/kcal     MONITORING & EVALUATION:     Nutrition Goal(s):   1. Po intake of meals will meet >75% of patient estimated nutritional needs within the next 7 days.   Outcome:  [x] Met/Ongoing    []  Not Met    [] New/Initial Goal     Monitoring:   [x] Diet tolerance   [x] Meal intake   [x] Supplement intake   [] GI symptoms/ability to tolerate po diet   [] Respiratory status   [] Plan of care      Previous Recommendations (for follow-up assessments only):     [x]   Implemented       []   Not Implemented (RD to address)     [] No Recommendation Made     Discharge Planning: bariatric maintenance diet, daily multivitamin   [x] Participated in care planning, discharge planning, & interdisciplinary rounds as appropriate      Rolando Wang, [address], [address] [place] [address]    Pager: [phone]

## 2017-04-26 NOTE — PROGRESS NOTES
Infectious Disease progress Note    Requested by: Dr. Sanjay Martinez    Reason: right leg cellulitis    Current abx Prior abx     Vancomycin since 4/24 Ceftriaxone x 1 in ED  Pip/tazo 4/25     Lines:       Assessment :    74 y.o. female with a PMH of DM-2 (uncontrolled), HTN, chronic pain and fibromyalgia who presented to ED on 4/24/17 with c/c of Right leg swelling and pain. Clinical picture consistent with sepsis (POA) due to group A streptococcus bloodstream infection. Most likely source of bloodstream infection is acute right leg cellulitis. Increased right shoulder pain noted on today's exam - no joint effusion noted on recent x ray - will obtain ct scan to r/o septic arthritis. No significant tenderness of cervical spine or right sternoclavicular joint on today's exam - will monitor to r/o infections of these joints. Preceding h/o dog bite 2 weeks ago - but no erythema or pain up until 4/23 makes cellulitis due to indolent pathogens related to dog bite less likely. Increasing creatinine - acute renal failure likely due to sepsis - monitor for post streptococcal glomerulonephritis (usually occurs about one week after onset of streptococcal infection)    Tongue pain/erythematous tongue likely streptococcal toxin mediated strawberry tongue    Recommendations:    1. discontinue vancomycin  2. Start iv ampicillin/sulbactam, clindamycin. Will adjust unasyn dose per changing renal function  3. Ct scan right shoulder  4.ok to restart cymbalta  5. Magic mouthwash     Above plan was discussed in details with patient. Will d/w dr Oziel Wolf. Please call me if any further questions or concerns. Will continue to participate in the care of this patient. subjective:    C/o increased swelling right leg, increased pain right shoulder, raw tongue.     Patient denies headaches, visual disturbances, sore throat, runny nose, earaches, cp, sob, chills, cough, abdominal pain, diarrhea, burning micturition, or weakness in extremities. she denies back pain/flank pain. Home Medication List    Details   !! clindamycin (CLEOCIN) 300 mg capsule Take 1 Cap by mouth four (4) times daily for 10 days. Qty: 40 Cap, Refills: 0      !! clindamycin (CLEOCIN) 300 mg capsule Take 1 Cap by mouth four (4) times daily for 10 days. Qty: 40 Cap, Refills: 0       Details   lisinopril (PRINIVIL, ZESTRIL) 10 mg tablet Take 10 mg by mouth daily. methocarbamol (ROBAXIN) 750 mg tablet Take 1 Tab by mouth four (4) times daily. Qty: 20 Tab, Refills: 0      HYDROcodone-acetaminophen (NORCO) 5-325 mg per tablet Take  by mouth. simvastatin (ZOCOR) 40 mg tablet 40 mg.      pregabalin (LYRICA) 75 mg capsule 75 mg. INSULIN GLARGINE,HUM. REC. ANLOG (LANTUS SC)       DULoxetine (CYMBALTA) 60 mg capsule 60 mg. ALPRAZolam (XANAX) 1 mg tablet              Current Facility-Administered Medications   Medication Dose Route Frequency    VANCOMYCIN INFORMATION NOTE   Other CONTINUOUS    ALPRAZolam (XANAX) tablet 0.5 mg  0.5 mg Oral TID PRN    HYDROcodone-acetaminophen (NORCO) 5-325 mg per tablet 1 Tab  1 Tab Oral Q6H PRN    pregabalin (LYRICA) capsule 75 mg  75 mg Oral BID    simvastatin (ZOCOR) tablet 40 mg  40 mg Oral QHS    ondansetron (ZOFRAN) injection 4 mg  4 mg IntraVENous Q4H PRN    heparin (porcine) injection 5,000 Units  5,000 Units SubCUTAneous Q8H    insulin lispro (HUMALOG) injection   SubCUTAneous AC&HS    glucose chewable tablet 16 g  16 g Oral PRN    glucagon (GLUCAGEN) injection 1 mg  1 mg IntraMUSCular PRN    dextrose (D50W) injection syrg 12.5-25 g  25-50 mL IntraVENous PRN    insulin glargine (LANTUS) injection 10 Units  10 Units SubCUTAneous QHS    sodium chloride (NS) flush 5-10 mL  5-10 mL IntraVENous PRN    sodium chloride (NS) flush 5-10 mL  5-10 mL IntraVENous PRN       Allergies: Review of patient's allergies indicates no known allergies.     Temp (24hrs), Av.3 °F (36.3 °C), Min:97 °F (36.1 °C), Max:97.5 °F (36.4 °C)    Visit Vitals    /87 (BP 1 Location: Left arm, BP Patient Position: Sitting)    Pulse 75    Temp 97.1 °F (36.2 °C)    Resp 20    Ht 5' 2\" (1.575 m)    Wt 85 kg (187 lb 8 oz)    SpO2 100%    Breastfeeding No    BMI 34.29 kg/m2       ROS: 12 point ROS obtained in details. Pertinent positives as mentioned in HPI,   otherwise negative    Physical Exam:    General: Well developed, well nourished female laying on the bed AAOx3 in no acute distress. General:   awake alert and oriented   HEENT:  Normocephalic, atraumatic, PERRL, EOMI, no scleral icterus or pallor; no conjunctival hemmohage;  nasal and oral mucous are moist and without evidence of lesions. No thrush. Neck supple, no bruits. Lymph Nodes:   no cervical, axillary or inguinal adenopathy   Lungs:   non-labored, bilaterally clear to auscultation- no crackles wheezes rales or rhonchi   Heart:  RRR, s1 and s2; no murmurs rubs or gallops, no edema, + pedal pulses   Abdomen:  soft, non-distended, active bowel sounds, no hepatomegaly, no splenomegaly. Non-tender   Genitourinary:  deferred   Extremities:   no clubbing, cyanosis; no joint effusions or swelling; pain on active and passive movements right shoulder, tenderness on palpation of shoulder; muscle mass appropriate for age, erythema and tenderness right leg   Neurologic:  No gross focal sensory abnormalities; 5/5 muscle strength to upper and lower extremities. Speech appropriate.  Cranial nerves intact                        Skin:  Scab right leg, decreased erythema of skin of right leg, surgical scar both knees well healed   Back:  no paraspinal muscle tenderness or rigidity, no CVA tenderness, minimal cervical spine tenderness     Psychiatric:  No suicidal or homicidal ideations, appropriate mood and affect         Labs: Results:   Chemistry Recent Labs      04/26/17   0400  04/25/17   0539  04/24/17   1828   GLU  117*  172*  182*   NA  136  136  137   K  4.3  4.2 4.3   CL  107  108  104   CO2  19*  18*  22   BUN  65*  53*  46*   CREA  1.93*  1.74*  1.62*   CA  8.5  8.5  9.4   AGAP  10  10  11   BUCR  34*  30*  28*   AP   --    --   246*   TP   --    --   7.1   ALB   --    --   2.8*   GLOB   --    --   4.3*   AGRAT   --    --   0.7*      CBC w/Diff Recent Labs      04/26/17   0400  04/25/17   0539  04/24/17   1828   WBC  15.5*  19.4*  21.0*   RBC  3.93*  3.84*  4.34   HGB  10.0*  10.0*  11.3*   HCT  31.5*  30.5*  34.0*   PLT  270  230  295   GRANS  92*  66  94*   LYMPH  4*  4*  1*   EOS  0  0  0      Microbiology Recent Labs      04/24/17   2255  04/24/17   2229   CULT  AEROBIC AND ANAEROBIC BOTTLES  STREPTOCOCCI, BETA HEMOLYTIC GROUP A  Reported to Dept of Health  *  AEROBIC AND ANAEROBIC BOTTLES  STREPTOCOCCI, BETA HEMOLYTIC GROUP A  Reported to Dept of Health  *          RADIOLOGY:    All available imaging studies/reports in [facility] for this admission were reviewed    Dr. Vaibhav Nixon, Infectious Disease Specialist  [phone]  April 26, 2017  9:12 AM

## 2017-04-27 ENCOUNTER — ANESTHESIA EVENT (OUTPATIENT)
Dept: SURGERY | Age: 67
DRG: 853 | End: 2017-04-27
Payer: COMMERCIAL

## 2017-04-27 ENCOUNTER — ANESTHESIA (OUTPATIENT)
Dept: SURGERY | Age: 67
DRG: 853 | End: 2017-04-27
Payer: COMMERCIAL

## 2017-04-27 PROBLEM — M79.7 FIBROMYALGIA: Status: ACTIVE | Noted: 2017-04-27

## 2017-04-27 PROBLEM — I10 HTN (HYPERTENSION): Chronic | Status: ACTIVE | Noted: 2017-04-27

## 2017-04-27 PROBLEM — M00.9 SEPTIC JOINT OF RIGHT SHOULDER REGION (HCC): Status: ACTIVE | Noted: 2017-04-27

## 2017-04-27 LAB
ANION GAP BLD CALC-SCNC: 9 MMOL/L (ref 3–18)
APPEARANCE SNV: ABNORMAL
BASOPHILS # BLD AUTO: 0 K/UL (ref 0–0.1)
BASOPHILS # BLD: 0 % (ref 0–2)
BODY FLD TYPE: NORMAL
BUN SERPL-MCNC: 50 MG/DL (ref 7–18)
BUN/CREAT SERPL: 36 (ref 12–20)
CALCIUM SERPL-MCNC: 8.3 MG/DL (ref 8.5–10.1)
CHLORIDE SERPL-SCNC: 111 MMOL/L (ref 100–108)
CO2 SERPL-SCNC: 20 MMOL/L (ref 21–32)
COLOR SNV: YELLOW
CREAT SERPL-MCNC: 1.37 MG/DL (ref 0.6–1.3)
CRYSTALS FLD MICRO: NORMAL
DIFFERENTIAL METHOD BLD: ABNORMAL
EOSINOPHIL # BLD: 0.1 K/UL (ref 0–0.4)
EOSINOPHIL # FLD: 0 %
EOSINOPHIL NFR BLD: 1 % (ref 0–5)
ERYTHROCYTE [DISTWIDTH] IN BLOOD BY AUTOMATED COUNT: 15.2 % (ref 11.6–14.5)
GLUCOSE BLD STRIP.AUTO-MCNC: 101 MG/DL (ref 70–110)
GLUCOSE BLD STRIP.AUTO-MCNC: 105 MG/DL (ref 70–110)
GLUCOSE BLD STRIP.AUTO-MCNC: 90 MG/DL (ref 70–110)
GLUCOSE BLD STRIP.AUTO-MCNC: 92 MG/DL (ref 70–110)
GLUCOSE SERPL-MCNC: 93 MG/DL (ref 74–99)
HCT VFR BLD AUTO: 29 % (ref 35–45)
HGB BLD-MCNC: 9.3 G/DL (ref 12–16)
LYMPHOCYTES # BLD AUTO: 7 % (ref 21–52)
LYMPHOCYTES # BLD: 0.8 K/UL (ref 0.9–3.6)
LYMPHOCYTES NFR FLD: 1 %
MCH RBC QN AUTO: 25.5 PG (ref 24–34)
MCHC RBC AUTO-ENTMCNC: 32.1 G/DL (ref 31–37)
MCV RBC AUTO: 79.7 FL (ref 74–97)
MONOCYTES # BLD: 0.8 K/UL (ref 0.05–1.2)
MONOCYTES NFR BLD AUTO: 7 % (ref 3–10)
MONOCYTES NFR FLD: 4 %
NEUTS BAND # FLD: 0 %
NEUTS SEG # BLD: 9.8 K/UL (ref 1.8–8)
NEUTS SEG NFR BLD AUTO: 85 % (ref 40–73)
NEUTS SEG NFR FLD: 95 %
PLATELET # BLD AUTO: 261 K/UL (ref 135–420)
PMV BLD AUTO: 11 FL (ref 9.2–11.8)
POTASSIUM SERPL-SCNC: 4.3 MMOL/L (ref 3.5–5.5)
RBC # BLD AUTO: 3.64 M/UL (ref 4.2–5.3)
RBC # SNV: ABNORMAL /CU MM
SODIUM SERPL-SCNC: 140 MMOL/L (ref 136–145)
SPECIMEN SITE: ABNORMAL
WBC # BLD AUTO: 11.6 K/UL (ref 4.6–13.2)
WBC # SNV: ABNORMAL /CU MM (ref 0–200)

## 2017-04-27 PROCEDURE — 74011000250 HC RX REV CODE- 250

## 2017-04-27 PROCEDURE — 74011000258 HC RX REV CODE- 258: Performed by: INTERNAL MEDICINE

## 2017-04-27 PROCEDURE — 87070 CULTURE OTHR SPECIMN AEROBIC: CPT | Performed by: HOSPITALIST

## 2017-04-27 PROCEDURE — 77030018673: Performed by: SPECIALIST

## 2017-04-27 PROCEDURE — 74011250636 HC RX REV CODE- 250/636: Performed by: NURSE ANESTHETIST, CERTIFIED REGISTERED

## 2017-04-27 PROCEDURE — 77030002966 HC SUT PDS J&J -A: Performed by: SPECIALIST

## 2017-04-27 PROCEDURE — 89060 EXAM SYNOVIAL FLUID CRYSTALS: CPT | Performed by: SPECIALIST

## 2017-04-27 PROCEDURE — 89050 BODY FLUID CELL COUNT: CPT | Performed by: SPECIALIST

## 2017-04-27 PROCEDURE — 85025 COMPLETE CBC W/AUTO DIFF WBC: CPT | Performed by: INTERNAL MEDICINE

## 2017-04-27 PROCEDURE — 74011250636 HC RX REV CODE- 250/636: Performed by: INTERNAL MEDICINE

## 2017-04-27 PROCEDURE — 77030019908 HC STETH ESOPH SIMS -A: Performed by: ANESTHESIOLOGY

## 2017-04-27 PROCEDURE — 77030011640 HC PAD GRND REM COVD -A: Performed by: SPECIALIST

## 2017-04-27 PROCEDURE — 74011250636 HC RX REV CODE- 250/636

## 2017-04-27 PROCEDURE — 80048 BASIC METABOLIC PNL TOTAL CA: CPT | Performed by: INTERNAL MEDICINE

## 2017-04-27 PROCEDURE — 74011000250 HC RX REV CODE- 250: Performed by: INTERNAL MEDICINE

## 2017-04-27 PROCEDURE — 77030018836 HC SOL IRR NACL ICUM -A: Performed by: SPECIALIST

## 2017-04-27 PROCEDURE — 82962 GLUCOSE BLOOD TEST: CPT

## 2017-04-27 PROCEDURE — 77030020782 HC GWN BAIR PAWS FLX 3M -B: Performed by: SPECIALIST

## 2017-04-27 PROCEDURE — 87070 CULTURE OTHR SPECIMN AEROBIC: CPT | Performed by: SPECIALIST

## 2017-04-27 PROCEDURE — 76210000006 HC OR PH I REC 0.5 TO 1 HR: Performed by: SPECIALIST

## 2017-04-27 PROCEDURE — 65660000000 HC RM CCU STEPDOWN

## 2017-04-27 PROCEDURE — 77030002933 HC SUT MCRYL J&J -A: Performed by: SPECIALIST

## 2017-04-27 PROCEDURE — 74011250637 HC RX REV CODE- 250/637: Performed by: INTERNAL MEDICINE

## 2017-04-27 PROCEDURE — 76060000033 HC ANESTHESIA 1 TO 1.5 HR: Performed by: SPECIALIST

## 2017-04-27 PROCEDURE — 87075 CULTR BACTERIA EXCEPT BLOOD: CPT | Performed by: HOSPITALIST

## 2017-04-27 PROCEDURE — 77030032490 HC SLV COMPR SCD KNE COVD -B: Performed by: SPECIALIST

## 2017-04-27 PROCEDURE — 76010000149 HC OR TIME 1 TO 1.5 HR: Performed by: SPECIALIST

## 2017-04-27 PROCEDURE — 36415 COLL VENOUS BLD VENIPUNCTURE: CPT | Performed by: INTERNAL MEDICINE

## 2017-04-27 PROCEDURE — 77030008683 HC TU ET CUF COVD -A: Performed by: ANESTHESIOLOGY

## 2017-04-27 PROCEDURE — 77030012422 HC DRN WND COVD -A: Performed by: SPECIALIST

## 2017-04-27 PROCEDURE — 77030013708 HC HNDPC SUC IRR PULS STRY –B: Performed by: SPECIALIST

## 2017-04-27 RX ORDER — SODIUM CHLORIDE, SODIUM LACTATE, POTASSIUM CHLORIDE, CALCIUM CHLORIDE 600; 310; 30; 20 MG/100ML; MG/100ML; MG/100ML; MG/100ML
INJECTION, SOLUTION INTRAVENOUS
Status: DISCONTINUED | OUTPATIENT
Start: 2017-04-27 | End: 2017-04-27 | Stop reason: HOSPADM

## 2017-04-27 RX ORDER — PROPOFOL 10 MG/ML
INJECTION, EMULSION INTRAVENOUS AS NEEDED
Status: DISCONTINUED | OUTPATIENT
Start: 2017-04-27 | End: 2017-04-27 | Stop reason: HOSPADM

## 2017-04-27 RX ORDER — LIDOCAINE HYDROCHLORIDE 20 MG/ML
INJECTION, SOLUTION EPIDURAL; INFILTRATION; INTRACAUDAL; PERINEURAL AS NEEDED
Status: DISCONTINUED | OUTPATIENT
Start: 2017-04-27 | End: 2017-04-27 | Stop reason: HOSPADM

## 2017-04-27 RX ORDER — ACETAMINOPHEN 325 MG/1
325 TABLET ORAL
Status: DISCONTINUED | OUTPATIENT
Start: 2017-04-27 | End: 2017-05-02 | Stop reason: HOSPADM

## 2017-04-27 RX ORDER — FENTANYL CITRATE 50 UG/ML
INJECTION, SOLUTION INTRAMUSCULAR; INTRAVENOUS AS NEEDED
Status: DISCONTINUED | OUTPATIENT
Start: 2017-04-27 | End: 2017-04-27 | Stop reason: HOSPADM

## 2017-04-27 RX ORDER — HYDROMORPHONE HYDROCHLORIDE 2 MG/ML
0.5 INJECTION, SOLUTION INTRAMUSCULAR; INTRAVENOUS; SUBCUTANEOUS
Status: CANCELLED | OUTPATIENT
Start: 2017-04-27

## 2017-04-27 RX ORDER — ONDANSETRON 2 MG/ML
INJECTION INTRAMUSCULAR; INTRAVENOUS AS NEEDED
Status: DISCONTINUED | OUTPATIENT
Start: 2017-04-27 | End: 2017-04-27 | Stop reason: HOSPADM

## 2017-04-27 RX ORDER — UREA 10 %
1 LOTION (ML) TOPICAL 2 TIMES DAILY
Status: DISCONTINUED | OUTPATIENT
Start: 2017-04-27 | End: 2017-05-02 | Stop reason: HOSPADM

## 2017-04-27 RX ORDER — SUCCINYLCHOLINE CHLORIDE 20 MG/ML
INJECTION INTRAMUSCULAR; INTRAVENOUS AS NEEDED
Status: DISCONTINUED | OUTPATIENT
Start: 2017-04-27 | End: 2017-04-27 | Stop reason: HOSPADM

## 2017-04-27 RX ORDER — HYDRALAZINE HYDROCHLORIDE 20 MG/ML
10 INJECTION INTRAMUSCULAR; INTRAVENOUS
Status: COMPLETED | OUTPATIENT
Start: 2017-04-27 | End: 2017-04-27

## 2017-04-27 RX ORDER — HYDROMORPHONE HYDROCHLORIDE 1 MG/ML
0.5 INJECTION, SOLUTION INTRAMUSCULAR; INTRAVENOUS; SUBCUTANEOUS
Status: DISCONTINUED | OUTPATIENT
Start: 2017-04-27 | End: 2017-04-28 | Stop reason: HOSPADM

## 2017-04-27 RX ORDER — BUPIVACAINE HYDROCHLORIDE 5 MG/ML
10 INJECTION, SOLUTION EPIDURAL; INTRACAUDAL ONCE
Status: DISPENSED | OUTPATIENT
Start: 2017-04-27 | End: 2017-04-27

## 2017-04-27 RX ORDER — CEFAZOLIN SODIUM IN 0.9 % NACL 2 G/100 ML
PLASTIC BAG, INJECTION (ML) INTRAVENOUS AS NEEDED
Status: DISCONTINUED | OUTPATIENT
Start: 2017-04-27 | End: 2017-04-27 | Stop reason: HOSPADM

## 2017-04-27 RX ORDER — MIDAZOLAM HYDROCHLORIDE 1 MG/ML
INJECTION, SOLUTION INTRAMUSCULAR; INTRAVENOUS AS NEEDED
Status: DISCONTINUED | OUTPATIENT
Start: 2017-04-27 | End: 2017-04-27 | Stop reason: HOSPADM

## 2017-04-27 RX ORDER — DULOXETIN HYDROCHLORIDE 60 MG/1
60 CAPSULE, DELAYED RELEASE ORAL DAILY
Status: DISCONTINUED | OUTPATIENT
Start: 2017-04-27 | End: 2017-05-02 | Stop reason: HOSPADM

## 2017-04-27 RX ORDER — HYDROMORPHONE HYDROCHLORIDE 2 MG/ML
INJECTION, SOLUTION INTRAMUSCULAR; INTRAVENOUS; SUBCUTANEOUS
Status: DISPENSED
Start: 2017-04-27 | End: 2017-04-28

## 2017-04-27 RX ADMIN — ONDANSETRON 4 MG: 2 INJECTION INTRAMUSCULAR; INTRAVENOUS at 23:19

## 2017-04-27 RX ADMIN — LACTOBACILLUS TAB 1 TABLET: TAB at 17:28

## 2017-04-27 RX ADMIN — CLINDAMYCIN PHOSPHATE 900 MG: 150 INJECTION, SOLUTION, CONCENTRATE INTRAVENOUS at 09:35

## 2017-04-27 RX ADMIN — SUCCINYLCHOLINE CHLORIDE 120 MG: 20 INJECTION INTRAMUSCULAR; INTRAVENOUS at 22:37

## 2017-04-27 RX ADMIN — DIPHENHYDRAMINE HYDROCHLORIDE AND LIDOCAINE HYDROCHLORIDE AND ALUMINUM HYDROXIDE AND MAGNESIUM HYDRO 10 ML: KIT at 11:30

## 2017-04-27 RX ADMIN — AMPICILLIN SODIUM AND SULBACTAM SODIUM 1.5 G: 1; .5 INJECTION, POWDER, FOR SOLUTION INTRAMUSCULAR; INTRAVENOUS at 09:42

## 2017-04-27 RX ADMIN — LACTOBACILLUS TAB 1 TABLET: TAB at 09:38

## 2017-04-27 RX ADMIN — HEPARIN SODIUM 5000 UNITS: 5000 INJECTION INTRAVENOUS; SUBCUTANEOUS at 17:27

## 2017-04-27 RX ADMIN — HEPARIN SODIUM 5000 UNITS: 5000 INJECTION INTRAVENOUS; SUBCUTANEOUS at 01:14

## 2017-04-27 RX ADMIN — SODIUM CHLORIDE 100 ML/HR: 900 INJECTION, SOLUTION INTRAVENOUS at 14:35

## 2017-04-27 RX ADMIN — HEPARIN SODIUM 5000 UNITS: 5000 INJECTION INTRAVENOUS; SUBCUTANEOUS at 08:30

## 2017-04-27 RX ADMIN — HYDROCODONE BITARTRATE AND ACETAMINOPHEN 1 TABLET: 5; 325 TABLET ORAL at 06:58

## 2017-04-27 RX ADMIN — PREGABALIN 75 MG: 75 CAPSULE ORAL at 17:28

## 2017-04-27 RX ADMIN — FENTANYL CITRATE 100 MCG: 50 INJECTION, SOLUTION INTRAMUSCULAR; INTRAVENOUS at 22:37

## 2017-04-27 RX ADMIN — DULOXETINE HYDROCHLORIDE 60 MG: 60 CAPSULE, DELAYED RELEASE ORAL at 09:39

## 2017-04-27 RX ADMIN — HYDRALAZINE HYDROCHLORIDE 10 MG: 20 INJECTION INTRAMUSCULAR; INTRAVENOUS at 23:58

## 2017-04-27 RX ADMIN — LIDOCAINE HYDROCHLORIDE 80 MG: 20 INJECTION, SOLUTION EPIDURAL; INFILTRATION; INTRACAUDAL; PERINEURAL at 22:37

## 2017-04-27 RX ADMIN — AMPICILLIN SODIUM AND SULBACTAM SODIUM 1.5 G: 1; .5 INJECTION, POWDER, FOR SOLUTION INTRAMUSCULAR; INTRAVENOUS at 16:00

## 2017-04-27 RX ADMIN — HYDROMORPHONE HYDROCHLORIDE 0.5 MG: 1 INJECTION, SOLUTION INTRAMUSCULAR; INTRAVENOUS; SUBCUTANEOUS at 20:48

## 2017-04-27 RX ADMIN — Medication 2 G: at 22:53

## 2017-04-27 RX ADMIN — DIPHENHYDRAMINE HYDROCHLORIDE AND LIDOCAINE HYDROCHLORIDE AND ALUMINUM HYDROXIDE AND MAGNESIUM HYDRO 10 ML: KIT at 07:30

## 2017-04-27 RX ADMIN — FENTANYL CITRATE 50 MCG: 50 INJECTION, SOLUTION INTRAMUSCULAR; INTRAVENOUS at 23:09

## 2017-04-27 RX ADMIN — DIPHENHYDRAMINE HYDROCHLORIDE AND LIDOCAINE HYDROCHLORIDE AND ALUMINUM HYDROXIDE AND MAGNESIUM HYDRO 10 ML: KIT at 16:30

## 2017-04-27 RX ADMIN — SODIUM CHLORIDE, SODIUM LACTATE, POTASSIUM CHLORIDE, CALCIUM CHLORIDE: 600; 310; 30; 20 INJECTION, SOLUTION INTRAVENOUS at 22:31

## 2017-04-27 RX ADMIN — MIDAZOLAM HYDROCHLORIDE 2 MG: 1 INJECTION, SOLUTION INTRAMUSCULAR; INTRAVENOUS at 22:31

## 2017-04-27 RX ADMIN — PROPOFOL 150 MG: 10 INJECTION, EMULSION INTRAVENOUS at 22:37

## 2017-04-27 RX ADMIN — MULTIPLE VITAMIN LIQUID 30 ML: LIQUID at 09:33

## 2017-04-27 RX ADMIN — CLINDAMYCIN PHOSPHATE 900 MG: 150 INJECTION, SOLUTION, CONCENTRATE INTRAVENOUS at 01:15

## 2017-04-27 RX ADMIN — PREGABALIN 75 MG: 75 CAPSULE ORAL at 09:00

## 2017-04-27 RX ADMIN — AMPICILLIN SODIUM AND SULBACTAM SODIUM 1.5 G: 1; .5 INJECTION, POWDER, FOR SOLUTION INTRAMUSCULAR; INTRAVENOUS at 05:23

## 2017-04-27 NOTE — PROGRESS NOTES
[facility]  Progress Note    Patient: Felipe Antoine Age: 74 y.o. : 1950 MR#: [id number] SSN: [id number]  Date/Time: 2017     Subjective:     Review of systems  General: No fevers or chills. Cardiovascular: No chest pain or pressure. No palpitations. Pulmonary: No shortness of breath, cough or wheeze. Gastrointestinal: No abdominal pain, nausea, vomiting or diarrhea. Genitourinary: No urinary frequency, urgency, hesitancy or dysuria. Musculoskeletal:right shoulder pain or muscle pain, no back pain, no recent trauma. Right leg swelling    Neurologic: No headache, numbness, tingling or weakness. Assessment/Plan:   1.  Cellulitis Right Leg / h/o dog bite 2 weeks before cellulitis   Blood culture positive   -AEROBIC AND ANAEROBIC BOTTLES   STREPTOCOCCI, BETA HEMOLYTIC GROUP A   Reported to Dept of Health   - now on Unasyn and off Clinda  -D/W ID,   ORTHO CONSULT placed for right shoulder effusion - arthrocentesis for ID  - Swelling is very impressive - DVT study negative   - Will get arterial doppler - so far no indication of ischemia       2 DM2  - Continue SSI     3 JAMES   - Probably prerenal , IVF     4 Leucocytosis - related to infection     5 H/o Dog bite 2 weeks ago - unlikely cause of current condition , if timing of bote is 2 weeks ago      6 HTN  - Continue home meds     7 HLD- on statin       8 anxiety disorder  - continue Xanax     Full CODE       Case discussed with:  [x]Patient  []Family  [x]Nursing  []Case Management  DVT Prophylaxis:  []Lovenox  [x]Hep SQ  []SCDs  []Coumadin   []On Heparin gtt    Patient Active Problem List   Diagnosis Code    Sleep apnea G47.30    Type II diabetes mellitus ([facility] [address] E11.9    Enthesopathy of hip region M76.899    Acquired trigger finger M65.30    Primary localized osteoarthrosis, hand M19.049    Myalgia and myositis ABH0324    Osteoarthrosis M19.90    Incomplete bladder emptying R33.9    Stress incontinence N39.3    Cellulitis L03.90    Sepsis (HCC) A41.9       Objective:   VS:   Visit Vitals    /78 (BP 1 Location: Left arm, BP Patient Position: At rest)    Pulse 78    Temp 98.5 °F (36.9 °C)    Resp 16    Ht 5' 2\" (1.575 m)    Wt 85 kg (187 lb 4.8 oz)    SpO2 98%    Breastfeeding No    BMI 34.26 kg/m2      Tmax/24hrs: Temp (24hrs), Av.7 °F (36.5 °C), Min:97 °F (36.1 °C), Max:98.5 °F (36.9 °C)  IOBRIEF    Intake/Output Summary (Last 24 hours) at 17 0902  Last data filed at 17 0700   Gross per 24 hour   Intake             1610 ml   Output             1200 ml   Net              410 ml       General:  Alert, cooperative, no acute distress    HEENT:  NC, Atraumatic. PERRLA, anicteric sclerae. Pulmonary:  CTA Bilaterally. No Wheezing/Rhonchi/Rales. Cardiovascular: Regular rate and Rhythm. GI:  Soft, Non distended, Non tender. + Bowel sounds. Extremities: right LL - redness , swelling and tenderness with loca warmth   Psych:  Not anxious or agitated. Neurologic: Grossly - Motor and Sensory functions are intact .  No Anxiety , calm , no Agitation         Medications:   Current Facility-Administered Medications   Medication Dose Route Frequency    Lactobacillus Acidoph & Bulgar (FLORANEX) tablet 1 Tab  1 Tab Oral BID    DULoxetine (CYMBALTA) capsule 60 mg  60 mg Oral DAILY    ampicillin-sulbactam (UNASYN) 1.5 g in 0.9% sodium chloride (MBP/ADV) 50 mL MBP  1.5 g IntraVENous Q6H    clindamycin phosphate 900 mg in 0.9% sodium chloride (MBP/ADV) 100 mL ADV  900 mg IntraVENous Q8H    magic mouthwash (FIRST-MOUTHWASH BLM) oral suspension 10 mL  10 mL Oral TIDAC    multivitamin (MULTI-DELYN, WELLESSE) oral liquid 30 mL  30 mL Oral DAILY    0.9% sodium chloride infusion  100 mL/hr IntraVENous CONTINUOUS    ALPRAZolam (XANAX) tablet 0.5 mg  0.5 mg Oral TID PRN    HYDROcodone-acetaminophen (NORCO) 5-325 mg per tablet 1 Tab  1 Tab Oral Q6H PRN    pregabalin (LYRICA) capsule 75 mg 75 mg Oral BID    simvastatin (ZOCOR) tablet 40 mg  40 mg Oral QHS    ondansetron (ZOFRAN) injection 4 mg  4 mg IntraVENous Q4H PRN    heparin (porcine) injection 5,000 Units  5,000 Units SubCUTAneous Q8H    insulin lispro (HUMALOG) injection   SubCUTAneous AC&HS    glucose chewable tablet 16 g  16 g Oral PRN    glucagon (GLUCAGEN) injection 1 mg  1 mg IntraMUSCular PRN    dextrose (D50W) injection syrg 12.5-25 g  25-50 mL IntraVENous PRN    insulin glargine (LANTUS) injection 10 Units  10 Units SubCUTAneous QHS    sodium chloride (NS) flush 5-10 mL  5-10 mL IntraVENous PRN    sodium chloride (NS) flush 5-10 mL  5-10 mL IntraVENous PRN       Labs:    Recent Results (from the past 24 hour(s))   VANCOMYCIN, RANDOM    Collection Time: 04/26/17 11:13 AM   Result Value Ref Range    VANCOMYCIN,RANDOM 10.6 5.0 - 40.0 UG/ML   CULTURE, BLOOD    Collection Time: 04/26/17 11:13 AM   Result Value Ref Range    Special Requests: NO SPECIAL REQUESTS      Culture result: NO GROWTH AFTER 19 HOURS     CULTURE, BLOOD    Collection Time: 04/26/17 11:30 AM   Result Value Ref Range    Special Requests: NO SPECIAL REQUESTS      Culture result: NO GROWTH AFTER 19 HOURS     GLUCOSE, POC    Collection Time: 04/26/17 11:33 AM   Result Value Ref Range    Glucose (POC) 115 (H) 70 - 110 mg/dL   GLUCOSE, POC    Collection Time: 04/26/17  4:05 PM   Result Value Ref Range    Glucose (POC) 133 (H) 70 - 110 mg/dL   GLUCOSE, POC    Collection Time: 04/26/17  7:38 PM   Result Value Ref Range    Glucose (POC) 167 (H) 70 - 110 mg/dL   GLUCOSE, POC    Collection Time: 04/26/17  9:48 PM   Result Value Ref Range    Glucose (POC) 185 (H) 70 - 818 mg/dL   METABOLIC PANEL, BASIC    Collection Time: 04/27/17  2:40 AM   Result Value Ref Range    Sodium 140 136 - 145 mmol/L    Potassium 4.3 3.5 - 5.5 mmol/L    Chloride 111 (H) 100 - 108 mmol/L    CO2 20 (L) 21 - 32 mmol/L    Anion gap 9 3.0 - 18 mmol/L    Glucose 93 74 - 99 mg/dL    BUN 50 (H) 7.0 - 18 MG/DL    Creatinine 1.37 (H) 0.6 - 1.3 MG/DL    BUN/Creatinine ratio 36 (H) 12 - 20      GFR est AA 47 (L) >60 ml/min/1.73m2    GFR est non-AA 39 (L) >60 ml/min/1.73m2    Calcium 8.3 (L) 8.5 - 10.1 MG/DL   CBC WITH AUTOMATED DIFF    Collection Time: 04/27/17  2:40 AM   Result Value Ref Range    WBC 11.6 4.6 - 13.2 K/uL    RBC 3.64 (L) 4.20 - 5.30 M/uL    HGB 9.3 (L) 12.0 - 16.0 g/dL    HCT 29.0 (L) 35.0 - 45.0 %    MCV 79.7 74.0 - 97.0 FL    MCH 25.5 24.0 - 34.0 PG    MCHC 32.1 31.0 - 37.0 g/dL    RDW 15.2 (H) 11.6 - 14.5 %    PLATELET 883 343 - 917 K/uL    MPV 11.0 9.2 - 11.8 FL    NEUTROPHILS 85 (H) 40 - 73 %    LYMPHOCYTES 7 (L) 21 - 52 %    MONOCYTES 7 3 - 10 %    EOSINOPHILS 1 0 - 5 %    BASOPHILS 0 0 - 2 %    ABS. NEUTROPHILS 9.8 (H) 1.8 - 8.0 K/UL    ABS. LYMPHOCYTES 0.8 (L) 0.9 - 3.6 K/UL    ABS. MONOCYTES 0.8 0.05 - 1.2 K/UL    ABS. EOSINOPHILS 0.1 0.0 - 0.4 K/UL    ABS.  BASOPHILS 0.0 0.0 - 0.1 K/UL    DF AUTOMATED     GLUCOSE, POC    Collection Time: 04/27/17  8:22 AM   Result Value Ref Range    Glucose (POC) 101 70 - 110 mg/dL       Signed By: Andry Berkowitz MD     April 27, 2017

## 2017-04-27 NOTE — INTERDISCIPLINARY ROUNDS
IDR/SLIDR Summary          Patient: Teofilo Colunga MRN: [id number]    Age: 74 y.o. YOB: 1950 Room/Bed: 206/01   Admit Diagnosis: Sepsis ([address]  Cellulitis  Principal Diagnosis: <principal problem not specified>   Goals: safety, no pain, free from infection  Readmission: NO  Quality Measure: SEPSIS  VTE Prophylaxis: Chemical  Influenza Vaccine screening completed? NO  Pneumococcal Vaccine screening completed? NO  Mobility needs: Yes   Nutrition plan:No  Consults:P.T, O.T. and Case Management    Financial concerns:No  Escalated to CM? NO  RRAT Score: 15  [facility]   Testing due for pt today?  NO  LOS: 3 days Expected length of stay 5 days  Discharge plan: home   PCP: Ana Fontanez MD  Transportation needs: No    Days before discharge:discharge pending  Discharge disposition: Home    Signed:     Erika Ding  4/27/2017  2:32 AM

## 2017-04-27 NOTE — ROUTINE PROCESS
Bedside and Verbal shift change report given to CHESTER Charles(oncoming nurse) by Kal Vidal (offgoing nurse). Report included the following information SBAR, Kardex, MAR and Recent Results.     SITUATION:    Code Status: Full Code   Reason for Admission: Sepsis ([facility] [address]  [address]. day: 3   Problem List:       Hospital Problems  Date Reviewed: 1/23/2015          Codes Class Noted POA    Cellulitis ICD-10-CM: L03.90  ICD-9-CM: 682.9  4/24/2017 Unknown        Sepsis ([facility] [address] ICD-10-CM: A41.9  ICD-9-CM: 038.9, 995.91  4/24/2017 Unknown              BACKGROUND:    Past Medical History:   Past Medical History:   Diagnosis Date    Diabetes ([facility] [address]     Fibromyalgia     Hypercholesteremia     Hypertension          Patient taking anticoagulants yes     ASSESSMENT:    Changes in Assessment Throughout Shift: No change      Patient has Central Line: no Reasons if yes: N/A   Patient has Fuentes Cath: no Reasons if yes: N/A      Last Vitals:     Vitals:    04/26/17 1602 04/26/17 1938 04/26/17 2000 04/27/17 0000   BP: 154/86 (!) 162/93 169/80 175/86   Pulse: 74 78 74 76   Resp: 20 18 18 18   Temp: 97.9 °F (36.6 °C) 97 °F (36.1 °C) 97 °F (36.1 °C) 98.3 °F (36.8 °C)   SpO2: 100% 99% 99% 98%   Weight:       Height:            IV and DRAINS (will only show if present)   [REMOVED] Peripheral IV 04/24/17 Left;Upper Arm-Site Assessment: Clean, dry, & intact     WOUND (if present)   Wound Type:  none   Dressing present Dressing Present : No   Wound Concerns/Notes:  none     PAIN    Pain Assessment    Pain Intensity 1: 0 (04/27/17 0010)    Pain Location 1: Shoulder    Pain Intervention(s) 1: Medication (see MAR)    Patient Stated Pain Goal: 0  o Interventions for Pain:  Norco  o Intervention effective: no  o Time of last intervention: See MAR   o Reassessment Completed: yes      Last 3 Weights:  Last 3 Recorded Weights in this Encounter    04/24/17 1533 04/25/17 0426 04/26/17 0400   Weight: 82.6 kg (182 lb) 86 kg (189 lb 9.5 oz) 85 kg (187 lb 8 oz)     Weight change:      INTAKE/OUPUT    Current Shift: 04/26 1901 - 04/27 0700  In: -   Out: 500 [Urine:500]    Last three shifts: 04/25 0701 - 04/26 1900  In: 5 [P.O.:486]  Out: 0      LAB RESULTS     Recent Labs      04/26/17   0400  04/25/17   0539  04/24/17   1828   WBC  15.5*  19.4*  21.0*   HGB  10.0*  10.0*  11.3*   HCT  31.5*  30.5*  34.0*   PLT  270  230  295        Recent Labs      04/26/17   0400  04/25/17   0539  04/24/17   1828   NA  136  136  137   K  4.3  4.2  4.3   GLU  117*  172*  182*   BUN  65*  53*  46*   CREA  1.93*  1.74*  1.62*   CA  8.5  8.5  9.4       RECOMMENDATIONS AND DISCHARGE PLANNING     1. Pending tests/procedures/ Plan of Care or Other Needs: Infectious Disease Consult     2. Discharge plan for patient and Needs/Barriers: Discharge home    3. Estimated Discharge Date: Pending Posted on Whiteboard in Patients Room: yes      4. The patient's care plan was reviewed with the oncoming nurse. \"HEALS\" SAFETY CHECK      Fall Risk    Total Score: 5    Safety Measures: Safety Measures: Bed/Chair alarm on, Bed/Chair-Wheels locked, Bed in low position, Call light within reach, Gripper socks, Sitter at bedside    A safety check occurred in the patient's room between off going nurse and oncoming nurse listed above.     The safety check included the below items  Area Items   H  High Alert Medications - Verify all high alert medication drips (heparin, PCA, etc.)   E  Equipment - Suction is set up for ALL patients (with yanker)  - Red plugs utilized for all equipment (IV pumps, etc.)  - WOWs wiped down at end of shift.  - Room stocked with oxygen, suction, and other unit-specific supplies   A  Alarms - Bed alarm is set for fall risk patients  - Ensure chair alarm is in place and activated if patient is up in a chair   L  Lines - Check IV for any infiltration  - Fuentes bag is empty if patient has a Fuentes   - Tubing and IV bags are labeled   S  Safety   - Room is clean, patient is clean, and equipment is clean. - Hallways are clear from equipment besides carts. - Fall bracelet on for fall risk patients  - Ensure room is clear and free of clutter  - Suction is set up for ALL patients (with tonny)  - Hallways are clear from equipment besides carts.    - Isolation precautions followed, supplies available outside room, sign posted     Celena Tovar

## 2017-04-27 NOTE — PROGRESS NOTES
Infectious Disease progress Note    Requested by: Dr. Brown Speaker    Reason: right leg cellulitis    Current abx Prior abx     Vancomycin since 4/24 Ceftriaxone x 1 in ED  Pip/tazo 4/25     Lines:       Assessment :    74 y.o. female with a PMH of DM-2 (uncontrolled), HTN, chronic pain and fibromyalgia who presented to ED on 4/24/17 with c/c of Right leg swelling and pain. Clinical picture consistent with sepsis (POA) due to group A streptococcus bloodstream infection. Most likely source of bloodstream infection is acute right leg cellulitis. Increased right shoulder pain last few days - CT scan 4/26 reveals large right glenohumeral joint effusion concerning for septic arthritis. No significant tenderness of cervical spine or right sternoclavicular joint on today's exam - will monitor to r/o infections of these joints. Preceding h/o dog bite 2 weeks ago - but no erythema or pain up until 4/23 makes cellulitis due to indolent pathogens related to dog bite less likely. Increasing creatinine - acute renal failure likely due to sepsis - improved today    Tongue pain/erythematous tongue likely streptococcal toxin mediated strawberry tongue - improved tongue pain on today's exam    Recommendations:    1. continue iv ampicillin/sulbactam  2. D/c clindamycin   3. Ortho evaluation for right glenohumeral joint aspiration, ?need for I&D  4. ok to restart cymbalta  5. Magic mouthwash     Above plan was discussed in details with patient, dr. Andrew Pruitt Please call me if any further questions or concerns. Will continue to participate in the care of this patient. subjective:    C/o increased swelling right leg, increased pain right shoulder, raw tongue. Patient denies headaches, visual disturbances, sore throat, runny nose, earaches, cp, sob, chills, cough, abdominal pain, diarrhea, burning micturition, or weakness in extremities. she denies back pain/flank pain.       Home Medication List    Details   !! clindamycin (CLEOCIN) 300 mg capsule Take 1 Cap by mouth four (4) times daily for 10 days. Qty: 40 Cap, Refills: 0      !! clindamycin (CLEOCIN) 300 mg capsule Take 1 Cap by mouth four (4) times daily for 10 days. Qty: 40 Cap, Refills: 0       Details   lisinopril (PRINIVIL, ZESTRIL) 10 mg tablet Take 10 mg by mouth daily. methocarbamol (ROBAXIN) 750 mg tablet Take 1 Tab by mouth four (4) times daily. Qty: 20 Tab, Refills: 0      HYDROcodone-acetaminophen (NORCO) 5-325 mg per tablet Take  by mouth. simvastatin (ZOCOR) 40 mg tablet 40 mg.      pregabalin (LYRICA) 75 mg capsule 75 mg. INSULIN GLARGINE,HUM. REC. ANLOG (LANTUS SC)       DULoxetine (CYMBALTA) 60 mg capsule 60 mg.       ALPRAZolam (XANAX) 1 mg tablet              Current Facility-Administered Medications   Medication Dose Route Frequency    ampicillin-sulbactam (UNASYN) 1.5 g in 0.9% sodium chloride (MBP/ADV) 50 mL MBP  1.5 g IntraVENous Q6H    clindamycin phosphate 900 mg in 0.9% sodium chloride (MBP/ADV) 100 mL ADV  900 mg IntraVENous Q8H    magic mouthwash (FIRST-MOUTHWASH BLM) oral suspension 10 mL  10 mL Oral TIDAC    multivitamin (MULTI-DELYN, WELLESSE) oral liquid 30 mL  30 mL Oral DAILY    0.9% sodium chloride infusion  100 mL/hr IntraVENous CONTINUOUS    ALPRAZolam (XANAX) tablet 0.5 mg  0.5 mg Oral TID PRN    HYDROcodone-acetaminophen (NORCO) 5-325 mg per tablet 1 Tab  1 Tab Oral Q6H PRN    pregabalin (LYRICA) capsule 75 mg  75 mg Oral BID    simvastatin (ZOCOR) tablet 40 mg  40 mg Oral QHS    ondansetron (ZOFRAN) injection 4 mg  4 mg IntraVENous Q4H PRN    heparin (porcine) injection 5,000 Units  5,000 Units SubCUTAneous Q8H    insulin lispro (HUMALOG) injection   SubCUTAneous AC&HS    glucose chewable tablet 16 g  16 g Oral PRN    glucagon (GLUCAGEN) injection 1 mg  1 mg IntraMUSCular PRN    dextrose (D50W) injection syrg 12.5-25 g  25-50 mL IntraVENous PRN    insulin glargine (LANTUS) injection 10 Units  10 Units SubCUTAneous QHS    sodium chloride (NS) flush 5-10 mL  5-10 mL IntraVENous PRN    sodium chloride (NS) flush 5-10 mL  5-10 mL IntraVENous PRN       Allergies: Review of patient's allergies indicates no known allergies. Temp (24hrs), Av.7 °F (36.5 °C), Min:97 °F (36.1 °C), Max:98.5 °F (36.9 °C)    Visit Vitals    /78 (BP 1 Location: Left arm, BP Patient Position: At rest)    Pulse 78    Temp 98.5 °F (36.9 °C)    Resp 16    Ht 5' 2\" (1.575 m)    Wt 85 kg (187 lb 4.8 oz)    SpO2 98%    Breastfeeding No    BMI 34.26 kg/m2       ROS: 12 point ROS obtained in details. Pertinent positives as mentioned in HPI,   otherwise negative    Physical Exam:    General: Well developed, well nourished female laying on the bed AAOx3 in no acute distress. General:   awake alert and oriented   HEENT:  Normocephalic, atraumatic, PERRL, EOMI, no scleral icterus or pallor; no conjunctival hemmohage;  nasal and oral mucous are moist and without evidence of lesions. No thrush. Neck supple, no bruits. Lymph Nodes:   no cervical, axillary or inguinal adenopathy   Lungs:   non-labored, bilaterally clear to auscultation- no crackles wheezes rales or rhonchi   Heart:  RRR, s1 and s2; no murmurs rubs or gallops, no edema, + pedal pulses   Abdomen:  soft, non-distended, active bowel sounds, no hepatomegaly, no splenomegaly. Non-tender   Genitourinary:  deferred   Extremities:   no clubbing, cyanosis; no joint effusions or swelling; pain on active and passive movements right shoulder, tenderness on palpation of shoulder; muscle mass appropriate for age, erythema and tenderness right leg   Neurologic:  No gross focal sensory abnormalities; 5/5 muscle strength to upper and lower extremities. Speech appropriate.  Cranial nerves intact                        Skin:  Scab right leg, decreased erythema of skin of right leg, surgical scar both knees well healed   Back:  no paraspinal muscle tenderness or rigidity, no CVA tenderness, minimal cervical spine tenderness     Psychiatric:  No suicidal or homicidal ideations, appropriate mood and affect         Labs: Results:   Chemistry Recent Labs      04/27/17   0240  04/26/17   0400  04/25/17   0539  04/24/17   1828   GLU  93  117*  172*  182*   NA  140  136  136  137   K  4.3  4.3  4.2  4.3   CL  111*  107  108  104   CO2  20*  19*  18*  22   BUN  50*  65*  53*  46*   CREA  1.37*  1.93*  1.74*  1.62*   CA  8.3*  8.5  8.5  9.4   AGAP  9  10  10  11   BUCR  36*  34*  30*  28*   AP   --    --    --   246*   TP   --    --    --   7.1   ALB   --    --    --   2.8*   GLOB   --    --    --   4.3*   AGRAT   --    --    --   0.7*      CBC w/Diff Recent Labs      04/27/17   0240  04/26/17   0400  04/25/17   0539   WBC  11.6  15.5*  19.4*   RBC  3.64*  3.93*  3.84*   HGB  9.3*  10.0*  10.0*   HCT  29.0*  31.5*  30.5*   PLT  261  270  230   GRANS  85*  92*  66   LYMPH  7*  4*  4*   EOS  1  0  0      Microbiology Recent Labs      04/26/17   1130  04/26/17   1113  04/24/17   2255  04/24/17   2229   CULT  NO GROWTH AFTER 19 HOURS  NO GROWTH AFTER 19 HOURS  AEROBIC AND ANAEROBIC BOTTLES  STREPTOCOCCI, BETA HEMOLYTIC GROUP A  Reported to Dept of Health  *  AEROBIC AND ANAEROBIC BOTTLES  STREPTOCOCCI, BETA HEMOLYTIC GROUP A  Reported to Dept of Health  *          RADIOLOGY:    All available imaging studies/reports in [facility] for this admission were reviewed    Dr. Dianna Coto, Infectious Disease Specialist  [phone]  April 27, 2017  9:12 AM

## 2017-04-27 NOTE — PERIOP NOTES
TRANSFER - IN REPORT:    Verbal report received from Araceli on [facility]  being received from [address] [place] [address] for routine progression of care      Report consisted of patients Situation, Background, Assessment and   Recommendations(SBAR). Information from the following report(s) SBAR was reviewed with the receiving nurse. Opportunity for questions and clarification was provided. Assessment completed upon patients arrival to unit and care assumed. Araceli aware to complete vital signs, CHG wipes, checklist prior to patient transfer.

## 2017-04-27 NOTE — PROGRESS NOTES
FALL ASSESSMENT NOTE  [facility] EVMS Service    Patient: Patricia Bull MRN: [id number]   SSN: [id number]  YOB: 1950   Age: 74 y.o. Sex: female    Admit date: 4/24/2017 Length of stay:  LOS: 2 days    PCP: Rema Tuttle MD PP: <principal problem not specified>     Subjective:   Called to bedside by nursing staff for fall evaluation. Pt had an unwitnessed fall in the bathroom. She lost her balance and hit her shoulder and then her head. She did not have any loss of consciousness and did not have any confusion. She states that she is doing okay and is not in any pain. Objective:   Vital Signs:  Visit Vitals    /80    Pulse 74    Temp 97 °F (36.1 °C)    Resp 18    Ht 5' 2\" (1.575 m)    Wt 85 kg (187 lb 8 oz)    SpO2 99%    Breastfeeding No    BMI 34.29 kg/m2       Physical Exam:  General appearance: nad  Head: non-tender, no lesions  Lungs: ctab s rrw  Heart: rrr s mrg  Abdomen: soft, non-distended, +bs, non-ttp  Pulses: 2+ radial pulses intact bilaterally, wwp  Skin: No gross abnormalities  MSK: 4/5 strength in upper/lower ext bilaterally  Neuro: cranial nerves 2-12 intact  Psych: aox3    Assessment and Plan:   74 y.o. yo female admitted for <principal problem not specified> s/p fall in house. Patient doing well. Fall precautions in place. Counseled on how to avoid falling in the future. Glucose 167, /80. Patient stable at this time.     Signed,  Kaleigh Gutierrez DO, PGY-1    April 26, 2017    8:30 PM

## 2017-04-28 PROBLEM — Z98.84 GASTRIC BYPASS STATUS FOR OBESITY: Status: ACTIVE | Noted: 2017-04-28

## 2017-04-28 PROBLEM — I48.91 ATRIAL FIBRILLATION (HCC): Status: ACTIVE | Noted: 2017-04-28

## 2017-04-28 PROBLEM — Z86.711 HISTORY OF PULMONARY EMBOLISM: Status: ACTIVE | Noted: 2017-04-28

## 2017-04-28 LAB
ANION GAP BLD CALC-SCNC: 15 MMOL/L (ref 3–18)
ATRIAL RATE: 120 BPM
BASOPHILS # BLD AUTO: 0 K/UL (ref 0–0.1)
BASOPHILS # BLD: 0 % (ref 0–2)
BUN SERPL-MCNC: 35 MG/DL (ref 7–18)
BUN/CREAT SERPL: 32 (ref 12–20)
CALCIUM SERPL-MCNC: 8.6 MG/DL (ref 8.5–10.1)
CALCULATED R AXIS, ECG10: 76 DEGREES
CALCULATED T AXIS, ECG11: -97 DEGREES
CHLORIDE SERPL-SCNC: 109 MMOL/L (ref 100–108)
CK MB CFR SERPL CALC: 4.7 % (ref 0–4)
CK MB CFR SERPL CALC: 5 % (ref 0–4)
CK MB SERPL-MCNC: 1.5 NG/ML (ref 5–25)
CK MB SERPL-MCNC: 2 NG/ML (ref 5–25)
CK SERPL-CCNC: 32 U/L (ref 26–192)
CK SERPL-CCNC: 40 U/L (ref 26–192)
CO2 SERPL-SCNC: 17 MMOL/L (ref 21–32)
CREAT SERPL-MCNC: 1.1 MG/DL (ref 0.6–1.3)
DIAGNOSIS, 93000: NORMAL
DIFFERENTIAL METHOD BLD: ABNORMAL
EOSINOPHIL # BLD: 0 K/UL (ref 0–0.4)
EOSINOPHIL NFR BLD: 0 % (ref 0–5)
ERYTHROCYTE [DISTWIDTH] IN BLOOD BY AUTOMATED COUNT: 15.5 % (ref 11.6–14.5)
GLUCOSE BLD STRIP.AUTO-MCNC: 111 MG/DL (ref 70–110)
GLUCOSE BLD STRIP.AUTO-MCNC: 179 MG/DL (ref 70–110)
GLUCOSE BLD STRIP.AUTO-MCNC: 209 MG/DL (ref 70–110)
GLUCOSE BLD STRIP.AUTO-MCNC: 219 MG/DL (ref 70–110)
GLUCOSE BLD STRIP.AUTO-MCNC: 93 MG/DL (ref 70–110)
GLUCOSE SERPL-MCNC: 150 MG/DL (ref 74–99)
HCT VFR BLD AUTO: 34 % (ref 35–45)
HGB BLD-MCNC: 10.6 G/DL (ref 12–16)
LYMPHOCYTES # BLD AUTO: 13 % (ref 21–52)
LYMPHOCYTES # BLD: 1.2 K/UL (ref 0.9–3.6)
MCH RBC QN AUTO: 25.7 PG (ref 24–34)
MCHC RBC AUTO-ENTMCNC: 31.2 G/DL (ref 31–37)
MCV RBC AUTO: 82.3 FL (ref 74–97)
MONOCYTES # BLD: 0.7 K/UL (ref 0.05–1.2)
MONOCYTES NFR BLD AUTO: 8 % (ref 3–10)
NEUTS SEG # BLD: 7.1 K/UL (ref 1.8–8)
NEUTS SEG NFR BLD AUTO: 79 % (ref 40–73)
PLATELET # BLD AUTO: 306 K/UL (ref 135–420)
PMV BLD AUTO: 11.1 FL (ref 9.2–11.8)
POTASSIUM SERPL-SCNC: 4.3 MMOL/L (ref 3.5–5.5)
Q-T INTERVAL, ECG07: 324 MS
QRS DURATION, ECG06: 104 MS
QTC CALCULATION (BEZET), ECG08: 469 MS
RBC # BLD AUTO: 4.13 M/UL (ref 4.2–5.3)
SODIUM SERPL-SCNC: 141 MMOL/L (ref 136–145)
T4 FREE SERPL-MCNC: 0.9 NG/DL (ref 0.7–1.5)
TROPONIN I SERPL-MCNC: 0.04 NG/ML (ref 0–0.04)
TROPONIN I SERPL-MCNC: 0.05 NG/ML (ref 0–0.04)
TSH SERPL DL<=0.05 MIU/L-ACNC: 0.94 UIU/ML (ref 0.36–3.74)
VENTRICULAR RATE, ECG03: 126 BPM
WBC # BLD AUTO: 9 K/UL (ref 4.6–13.2)

## 2017-04-28 PROCEDURE — 74011250636 HC RX REV CODE- 250/636: Performed by: SPECIALIST

## 2017-04-28 PROCEDURE — 74011000250 HC RX REV CODE- 250: Performed by: INTERNAL MEDICINE

## 2017-04-28 PROCEDURE — 82550 ASSAY OF CK (CPK): CPT | Performed by: NURSE PRACTITIONER

## 2017-04-28 PROCEDURE — 80048 BASIC METABOLIC PNL TOTAL CA: CPT | Performed by: INTERNAL MEDICINE

## 2017-04-28 PROCEDURE — 74011250637 HC RX REV CODE- 250/637: Performed by: INTERNAL MEDICINE

## 2017-04-28 PROCEDURE — 82962 GLUCOSE BLOOD TEST: CPT

## 2017-04-28 PROCEDURE — 74011250637 HC RX REV CODE- 250/637: Performed by: SPECIALIST

## 2017-04-28 PROCEDURE — 74011250636 HC RX REV CODE- 250/636: Performed by: INTERNAL MEDICINE

## 2017-04-28 PROCEDURE — 0R9J0ZZ DRAINAGE OF RIGHT SHOULDER JOINT, OPEN APPROACH: ICD-10-PCS | Performed by: SPECIALIST

## 2017-04-28 PROCEDURE — 84443 ASSAY THYROID STIM HORMONE: CPT | Performed by: NURSE PRACTITIONER

## 2017-04-28 PROCEDURE — 74011250636 HC RX REV CODE- 250/636: Performed by: NURSE PRACTITIONER

## 2017-04-28 PROCEDURE — 93005 ELECTROCARDIOGRAM TRACING: CPT

## 2017-04-28 PROCEDURE — 77030012935 HC DRSG AQUACEL BMS -B

## 2017-04-28 PROCEDURE — 36415 COLL VENOUS BLD VENIPUNCTURE: CPT | Performed by: NURSE PRACTITIONER

## 2017-04-28 PROCEDURE — 85025 COMPLETE CBC W/AUTO DIFF WBC: CPT | Performed by: INTERNAL MEDICINE

## 2017-04-28 PROCEDURE — 74011250637 HC RX REV CODE- 250/637: Performed by: NURSE PRACTITIONER

## 2017-04-28 PROCEDURE — 74011000258 HC RX REV CODE- 258: Performed by: INTERNAL MEDICINE

## 2017-04-28 PROCEDURE — 84439 ASSAY OF FREE THYROXINE: CPT | Performed by: NURSE PRACTITIONER

## 2017-04-28 PROCEDURE — 74011636637 HC RX REV CODE- 636/637: Performed by: INTERNAL MEDICINE

## 2017-04-28 PROCEDURE — 65660000000 HC RM CCU STEPDOWN

## 2017-04-28 RX ORDER — ENOXAPARIN SODIUM 100 MG/ML
90 INJECTION SUBCUTANEOUS EVERY 12 HOURS
Status: COMPLETED | OUTPATIENT
Start: 2017-04-28 | End: 2017-04-28

## 2017-04-28 RX ORDER — DILTIAZEM HYDROCHLORIDE 5 MG/ML
5 INJECTION INTRAVENOUS ONCE
Status: COMPLETED | OUTPATIENT
Start: 2017-04-28 | End: 2017-04-28

## 2017-04-28 RX ORDER — DILTIAZEM HYDROCHLORIDE 60 MG/1
60 TABLET, FILM COATED ORAL EVERY 6 HOURS
Status: DISCONTINUED | OUTPATIENT
Start: 2017-04-28 | End: 2017-05-02 | Stop reason: HOSPADM

## 2017-04-28 RX ORDER — SODIUM CHLORIDE 0.9 % (FLUSH) 0.9 %
5-10 SYRINGE (ML) INJECTION AS NEEDED
Status: DISCONTINUED | OUTPATIENT
Start: 2017-04-28 | End: 2017-05-02 | Stop reason: HOSPADM

## 2017-04-28 RX ORDER — HYDROMORPHONE HYDROCHLORIDE 1 MG/ML
1 INJECTION, SOLUTION INTRAMUSCULAR; INTRAVENOUS; SUBCUTANEOUS
Status: DISCONTINUED | OUTPATIENT
Start: 2017-04-28 | End: 2017-05-02 | Stop reason: HOSPADM

## 2017-04-28 RX ORDER — OXYCODONE AND ACETAMINOPHEN 5; 325 MG/1; MG/1
1 TABLET ORAL
Status: DISCONTINUED | OUTPATIENT
Start: 2017-04-28 | End: 2017-05-02 | Stop reason: HOSPADM

## 2017-04-28 RX ORDER — DOCUSATE SODIUM 100 MG/1
100 CAPSULE, LIQUID FILLED ORAL 2 TIMES DAILY
Status: DISCONTINUED | OUTPATIENT
Start: 2017-04-28 | End: 2017-04-30

## 2017-04-28 RX ORDER — ACETAMINOPHEN 325 MG/1
650 TABLET ORAL
Status: DISCONTINUED | OUTPATIENT
Start: 2017-04-28 | End: 2017-04-30

## 2017-04-28 RX ORDER — SODIUM CHLORIDE, SODIUM LACTATE, POTASSIUM CHLORIDE, CALCIUM CHLORIDE 600; 310; 30; 20 MG/100ML; MG/100ML; MG/100ML; MG/100ML
50 INJECTION, SOLUTION INTRAVENOUS CONTINUOUS
Status: DISPENSED | OUTPATIENT
Start: 2017-04-28 | End: 2017-04-29

## 2017-04-28 RX ORDER — SODIUM CHLORIDE 0.9 % (FLUSH) 0.9 %
5-10 SYRINGE (ML) INJECTION EVERY 8 HOURS
Status: DISCONTINUED | OUTPATIENT
Start: 2017-04-28 | End: 2017-05-02 | Stop reason: HOSPADM

## 2017-04-28 RX ORDER — ENOXAPARIN SODIUM 100 MG/ML
90 INJECTION SUBCUTANEOUS EVERY 12 HOURS
Status: DISCONTINUED | OUTPATIENT
Start: 2017-04-28 | End: 2017-04-28

## 2017-04-28 RX ADMIN — HEPARIN SODIUM 5000 UNITS: 5000 INJECTION INTRAVENOUS; SUBCUTANEOUS at 09:10

## 2017-04-28 RX ADMIN — SODIUM CHLORIDE 5 MG/HR: 900 INJECTION, SOLUTION INTRAVENOUS at 09:06

## 2017-04-28 RX ADMIN — VANCOMYCIN HYDROCHLORIDE 1750 MG: 10 INJECTION, POWDER, LYOPHILIZED, FOR SOLUTION INTRAVENOUS at 15:52

## 2017-04-28 RX ADMIN — HYDROMORPHONE HYDROCHLORIDE 1 MG: 1 INJECTION, SOLUTION INTRAMUSCULAR; INTRAVENOUS; SUBCUTANEOUS at 21:55

## 2017-04-28 RX ADMIN — MULTIPLE VITAMIN LIQUID 30 ML: LIQUID at 09:09

## 2017-04-28 RX ADMIN — LACTOBACILLUS TAB 1 TABLET: TAB at 17:58

## 2017-04-28 RX ADMIN — DILTIAZEM HYDROCHLORIDE 60 MG: 60 TABLET, FILM COATED ORAL at 15:30

## 2017-04-28 RX ADMIN — AMPICILLIN SODIUM AND SULBACTAM SODIUM 1.5 G: 1; .5 INJECTION, POWDER, FOR SOLUTION INTRAMUSCULAR; INTRAVENOUS at 06:42

## 2017-04-28 RX ADMIN — DOCUSATE SODIUM 100 MG: 100 CAPSULE, LIQUID FILLED ORAL at 09:09

## 2017-04-28 RX ADMIN — PREGABALIN 75 MG: 75 CAPSULE ORAL at 17:58

## 2017-04-28 RX ADMIN — DILTIAZEM HYDROCHLORIDE 60 MG: 60 TABLET, FILM COATED ORAL at 17:58

## 2017-04-28 RX ADMIN — ENOXAPARIN SODIUM 90 MG: 100 INJECTION SUBCUTANEOUS at 15:30

## 2017-04-28 RX ADMIN — PREGABALIN 75 MG: 75 CAPSULE ORAL at 09:09

## 2017-04-28 RX ADMIN — DILTIAZEM HYDROCHLORIDE 5 MG: 5 INJECTION INTRAVENOUS at 08:50

## 2017-04-28 RX ADMIN — AMPICILLIN SODIUM AND SULBACTAM SODIUM 1.5 G: 1; .5 INJECTION, POWDER, FOR SOLUTION INTRAMUSCULAR; INTRAVENOUS at 21:40

## 2017-04-28 RX ADMIN — DIPHENHYDRAMINE HYDROCHLORIDE AND LIDOCAINE HYDROCHLORIDE AND ALUMINUM HYDROXIDE AND MAGNESIUM HYDRO 10 ML: KIT at 15:30

## 2017-04-28 RX ADMIN — SIMVASTATIN 40 MG: 40 TABLET, FILM COATED ORAL at 21:40

## 2017-04-28 RX ADMIN — SODIUM CHLORIDE 100 ML/HR: 900 INJECTION, SOLUTION INTRAVENOUS at 17:58

## 2017-04-28 RX ADMIN — INSULIN GLARGINE 10 UNITS: 100 INJECTION, SOLUTION SUBCUTANEOUS at 21:41

## 2017-04-28 RX ADMIN — Medication 10 ML: at 15:53

## 2017-04-28 RX ADMIN — HYDROMORPHONE HYDROCHLORIDE 1 MG: 1 INJECTION, SOLUTION INTRAMUSCULAR; INTRAVENOUS; SUBCUTANEOUS at 07:10

## 2017-04-28 RX ADMIN — OXYCODONE HYDROCHLORIDE AND ACETAMINOPHEN 1 TABLET: 5; 325 TABLET ORAL at 18:03

## 2017-04-28 RX ADMIN — DOCUSATE SODIUM 100 MG: 100 CAPSULE, LIQUID FILLED ORAL at 17:58

## 2017-04-28 RX ADMIN — SODIUM CHLORIDE 100 ML/HR: 900 INJECTION, SOLUTION INTRAVENOUS at 07:06

## 2017-04-28 RX ADMIN — INSULIN LISPRO 4 UNITS: 100 INJECTION, SOLUTION INTRAVENOUS; SUBCUTANEOUS at 17:58

## 2017-04-28 RX ADMIN — DULOXETINE HYDROCHLORIDE 60 MG: 60 CAPSULE, DELAYED RELEASE ORAL at 09:09

## 2017-04-28 RX ADMIN — LACTOBACILLUS TAB 1 TABLET: TAB at 09:09

## 2017-04-28 RX ADMIN — AMPICILLIN SODIUM AND SULBACTAM SODIUM 1.5 G: 1; .5 INJECTION, POWDER, FOR SOLUTION INTRAMUSCULAR; INTRAVENOUS at 15:30

## 2017-04-28 RX ADMIN — INSULIN LISPRO 4 UNITS: 100 INJECTION, SOLUTION INTRAVENOUS; SUBCUTANEOUS at 21:41

## 2017-04-28 RX ADMIN — AMPICILLIN SODIUM AND SULBACTAM SODIUM 1.5 G: 1; .5 INJECTION, POWDER, FOR SOLUTION INTRAMUSCULAR; INTRAVENOUS at 00:44

## 2017-04-28 NOTE — BRIEF OP NOTE
BRIEF OPERATIVE NOTE    Date of Procedure: 4/27/2017   Preoperative Diagnosis:  SEPTIC ARTHRITIS RIGHT SHOULDER  Postoperative Diagnosis: SAME    Procedure(s):  INCISION AND DRAINAGE, IRRIGATION SEPTIC RIGHT SHOULDER  Surgeon(s) and Role:     * Trace Spears MD - Primary         Assistant Staff:  Surgical Staff:  Circ-1: Lela Arenas RN  Scrub Tech-1: Shun Shay  Surg Asst-1: Torres Colon  Event Time In   Incision Start 2304   Incision Close      Anesthesia: General   Estimated Blood Loss: 15 CC  Specimens:   ID Type Source Tests Collected by Time Destination   1 : culture aerobic /anaerobic right shoulder Wound Shoulder CULTURE, ANAEROBIC, CULTURE, WOUND W GRAM STAIN Trace Spears MD 4/27/2017 2314 Microbiology      Findings: above   Complications: none  Implants: * No implants in log *

## 2017-04-28 NOTE — ROUTINE PROCESS
Bedside and Verbal shift change report given to Dariana Gutierrez (oncoming nurse) by Mike Palacios (offgoing nurse). Report included the following information SBAR, Kardex, MAR and Recent Results.     SITUATION:    Code Status: Full Code   Reason for Admission: Sepsis ([facility].)   Cellulitis   dx    [facility] day: 4   Problem List:       Hospital Problems  Date Reviewed: 1/23/2015          Codes Class Noted POA    Fibromyalgia ICD-10-CM: M79.7  ICD-9-CM: 729.1  4/27/2017 Unknown        HTN (hypertension) (Chronic) ICD-10-CM: I10  ICD-9-CM: 401.9  4/27/2017 Unknown        Septic joint of right shoulder region [facility]) ICD-10-CM: M00.9  ICD-9-CM: 711.01  4/27/2017 Yes        Cellulitis ICD-10-CM: L03.90  ICD-9-CM: 682.9  4/24/2017 Unknown        Sepsis ([facility] ICD-10-CM: A41.9  ICD-9-CM: 038.9, 995.91  4/24/2017 Unknown        Type II diabetes mellitus ([facility] ICD-10-CM: E11.9  ICD-9-CM: 250.00  1/25/2012 Yes              BACKGROUND:    Past Medical History:   Past Medical History:   Diagnosis Date    Diabetes ([facility]     Fibromyalgia     Hypercholesteremia     Hypertension          Patient taking anticoagulants yes     ASSESSMENT:    Changes in Assessment Throughout Shift: No change     Patient has Central Line: no Reasons if yes: N/A   Patient has Fuentes Cath: no Reasons if yes: N/A      Last Vitals:     Vitals:    04/28/17 0000 04/28/17 0005 04/28/17 0015 04/28/17 0400   BP: 151/78 162/77 160/70 170/83   Pulse: 89 93 95 84   Resp: 18 15 18 19   Temp: 97.6 °F (36.4 °C)  98.9 °F (37.2 °C) 98 °F (36.7 °C)   SpO2: 91% 99% 99% 97%   Weight:    88.9 kg (196 lb)   Height:            IV and DRAINS (will only show if present)   [REMOVED] Peripheral IV 04/24/17 Left;Upper Arm-Site Assessment: Clean, dry, & intact  Peripheral IV 04/26/17 Right Arm-Site Assessment: Clean, dry, & intact  [REMOVED] Peripheral IV 04/27/17 Left Arm-Site Assessment: Clean, dry, & intact, Clean  Peripheral IV 04/27/17 Right Wrist-Site Assessment: Clean, dry, & intact     WOUND (if present)   Wound Type:  none   Dressing present Dressing Present : No   Wound Concerns/Notes:  none     PAIN    Pain Assessment    Pain Intensity 1: 0 (04/28/17 0202)    Pain Location 1: Shoulder    Pain Intervention(s) 1: Medication (see MAR)    Patient Stated Pain Goal: 0  o Interventions for Pain:  Yes, See MAR  o Intervention effective: yes  o Time of last intervention: See MAR   o Reassessment Completed: yes      Last 3 Weights:  Last 3 Recorded Weights in this Encounter    04/26/17 0400 04/27/17 0624 04/28/17 0400   Weight: 85 kg (187 lb 8 oz) 85 kg (187 lb 4.8 oz) 88.9 kg (196 lb)     Weight change: 3.946 kg (8 lb 11.2 oz)     INTAKE/OUPUT    Current Shift: 04/27 1901 - 04/28 0700  In: 700 [I.V.:700]  Out: 1350 [Urine:1350]    Last three shifts: 04/26 0701 - 04/27 1900  In: 1846 [P.O.:436; I.V.:1410]  Out: 1600 [Urine:1600]     LAB RESULTS     Recent Labs      04/27/17   0240  04/26/17   0400   WBC  11.6  15.5*   HGB  9.3*  10.0*   HCT  29.0*  31.5*   PLT  261  270        Recent Labs      04/27/17   0240  04/26/17   0400   NA  140  136   K  4.3  4.3   GLU  93  117*   BUN  50*  65*   CREA  1.37*  1.93*   CA  8.3*  8.5       RECOMMENDATIONS AND DISCHARGE PLANNING     1. Pending tests/procedures/ Plan of Care or Other Needs: None     2. Discharge plan for patient and Needs/Barriers: Discharge home    3. Estimated Discharge Date: Pending Posted on Whiteboard in Patients Room: yes      4. The patient's care plan was reviewed with the oncoming nurse. \"HEALS\" SAFETY CHECK      Fall Risk    Total Score: 5    Safety Measures: Safety Measures: Bed/Chair-Wheels locked, Bed in low position, Call light within reach, Fall prevention (comment), Gripper socks, Side rails X 3    A safety check occurred in the patient's room between off going nurse and oncoming nurse listed above.     The safety check included the below items  Area Items   H  High Alert Medications - Verify all high alert medication drips (heparin, PCA, etc.)   E  Equipment - Suction is set up for ALL patients (with tonny)  - Red plugs utilized for all equipment (IV pumps, etc.)  - WOWs wiped down at end of shift.  - Room stocked with oxygen, suction, and other unit-specific supplies   A  Alarms - Bed alarm is set for fall risk patients  - Ensure chair alarm is in place and activated if patient is up in a chair   L  Lines - Check IV for any infiltration  - Fuentes bag is empty if patient has a Fuentes   - Tubing and IV bags are labeled   S  Safety   - Room is clean, patient is clean, and equipment is clean. - Hallways are clear from equipment besides carts. - Fall bracelet on for fall risk patients  - Ensure room is clear and free of clutter  - Suction is set up for ALL patients (with tonny)  - Hallways are clear from equipment besides carts.    - Isolation precautions followed, supplies available outside room, sign posted     Morenita Cagle

## 2017-04-28 NOTE — CONSULTS
[facility] - Consult Note    Date of  Admission: 4/24/2017  3:48 PM     Primary Care Physician:  Ashley Marques MD     Plan:     1. Paroxymal atrial Fibrillation with RVR-converted to NSR. Stable no chest pain or pressure no SOB no CHF symptoms. Will give one dose Lovenox. Discussed with Ortho.  will change Cardizem drip to po.follow cardiac troponin. Ordered echo to assess LVF, RVF or any WMA. Follow TSH and T4   2. Hypertension-controlled on Cardizem   3. Hx Pulmonary embolism on 2014  4. Hx DVT on 208  5. Hx normal NUC stress test in 02/17   6. S/p  Irrigation, incision and drainage, septic right shoulder 04/27/17. 7. Hx gastric bypass surgery 10/2016  8. Cellulitis Right Leg / h/o dog bite 2 weeks before cellulitis   9. Sepsis- on antibiotics managed per ID     Continue current meds  Atrial fibrillation likely post op. Will f/u after echo report    NUC stress test 2/17  IMPRESSION:  1. Ejection fraction 58%  2. No fixed or reversible myocardial perfusion defects. Echo 2014  Interpretation Summary  A complete two-dimensional transthoracic echocardiogram was performed (2D, M-  mode, Doppler and color flow Doppler). The study was technically adequate. Left ventricular systolic function is normal.  The estimated left ventricular ejection fraction is 65%. The transmitral spectral Doppler flow pattern is suggestive of impaired LV  relaxation. There is moderate concentric left ventricular hypertrophy. Dilated left atrium  There is mild tricuspid regurgitation. Based on the peak tricuspid regurgitation velocity the estimated right  ventricular systolic pressure is 47 mmHg.   No recent study for comparison  Other findings as noted           Assessment:     Hospital Problems  Date Reviewed: 1/23/2015          Codes Class Noted POA    Atrial fibrillation [facility]) ICD-10-CM: I48.91  ICD-9-CM: 427.31  4/28/2017 Unknown        Fibromyalgia ICD-10-CM: M79.7  ICD-9-CM: 729.1  4/27/2017 Unknown HTN (hypertension) (Chronic) ICD-10-CM: I10  ICD-9-CM: 401.9  4/27/2017 Unknown        Septic joint of right shoulder region [facility]) ICD-10-CM: M00.9  ICD-9-CM: 711.01  4/27/2017 Yes        Cellulitis ICD-10-CM: L03.90  ICD-9-CM: 682.9  4/24/2017 Unknown        Sepsis ([facility] 75.) ICD-10-CM: A41.9  ICD-9-CM: 038.9, 995.91  4/24/2017 Unknown        Sleep apnea ICD-10-CM: G47.30  ICD-9-CM: 780.57  1/25/2012 Yes        Type II diabetes mellitus ([facility] 75.) ICD-10-CM: E11.9  ICD-9-CM: 250.00  1/25/2012 Yes                   History of Present Illness: This is a 74 y.o. female admitted for Sepsis ([facility] 75.) new onset atrial fibrillation. Patient PMH of DM-2 (uncontrolled), HTN, chronic pain and fibromyalgia who presented to ED on 4/24/17 with c/c of Right leg swelling and pain. Patient underwent Irrigation, incision and drainage, septic right shoulder 04/27/17. Today patient was found to be on Atrial fibrilation with RVR. Cardiology has been consulted Patient is resting in bed. Right shoulder pain is improving she denies any chest pain or pressure. No palpitations. No SOB. No orthopnea. No cough. No pnd. No dizziness or lightheadedness. No fever or chills. No diaphoresis. No leg swelling. No recent syncopal episodes.          Past Medical History:     Past Medical History:   Diagnosis Date    Diabetes ([facility].)     Fibromyalgia     Hypercholesteremia     Hypertension          Social History:     Social History     Social History    Marital status: LEGALLY      Spouse name: N/A    Number of children: N/A    Years of education: N/A     Social History Main Topics    Smoking status: Never Smoker    Smokeless tobacco: Never Used    Alcohol use No    Drug use: No    Sexual activity: Yes     Other Topics Concern    None     Social History Narrative        Family History:     Family History   Problem Relation Age of Onset   [facility] Asthma Mother     Hypertension Mother     Kidney Disease Mother     Thyroid Disease Mother    [facility] Diabetes Maternal Grandmother     Thyroid Disease Maternal Grandmother         Medications:   No Known Allergies     Current Facility-Administered Medications   Medication Dose Route Frequency    sodium chloride (NS) flush 5-10 mL  5-10 mL IntraVENous Q8H    sodium chloride (NS) flush 5-10 mL  5-10 mL IntraVENous PRN    acetaminophen (TYLENOL) tablet 650 mg  650 mg Oral Q4H PRN    oxyCODONE-acetaminophen (PERCOCET) 5-325 mg per tablet 1 Tab  1 Tab Oral Q4H PRN    HYDROmorphone (PF) (DILAUDID) injection 1 mg  1 mg IntraVENous Q4H PRN    docusate sodium (COLACE) capsule 100 mg  100 mg Oral BID    lactated ringers infusion  50 mL/hr IntraVENous CONTINUOUS    dilTIAZem (CARDIZEM) 100 mg in 0.9% sodium chloride (MBP/ADV) 100 mL infusion  5 mg/hr IntraVENous CONTINUOUS    VANCOMYCIN INFORMATION NOTE   Other CONTINUOUS    vancomycin (VANCOCIN) 1,750 mg in 0.9% sodium chloride 500 mL IVPB  1,750 mg IntraVENous ONCE    Lactobacillus Acidoph & Bulgar (FLORANEX) tablet 1 Tab  1 Tab Oral BID    DULoxetine (CYMBALTA) capsule 60 mg  60 mg Oral DAILY    acetaminophen (TYLENOL) tablet 325 mg  325 mg Oral Q4H PRN    ampicillin-sulbactam (UNASYN) 1.5 g in 0.9% sodium chloride (MBP/ADV) 50 mL MBP  1.5 g IntraVENous Q6H    magic mouthwash (FIRST-MOUTHWASH BLM) oral suspension 10 mL  10 mL Oral TIDAC    multivitamin (MULTI-DELYN, WELLESSE) oral liquid 30 mL  30 mL Oral DAILY    0.9% sodium chloride infusion  100 mL/hr IntraVENous CONTINUOUS    ALPRAZolam (XANAX) tablet 0.5 mg  0.5 mg Oral TID PRN    HYDROcodone-acetaminophen (NORCO) 5-325 mg per tablet 1 Tab  1 Tab Oral Q6H PRN    pregabalin (LYRICA) capsule 75 mg  75 mg Oral BID    simvastatin (ZOCOR) tablet 40 mg  40 mg Oral QHS    ondansetron (ZOFRAN) injection 4 mg  4 mg IntraVENous Q4H PRN    heparin (porcine) injection 5,000 Units  5,000 Units SubCUTAneous Q8H    insulin lispro (HUMALOG) injection   SubCUTAneous AC&HS    glucose chewable tablet 16 g  16 g Oral PRN    glucagon (GLUCAGEN) injection 1 mg  1 mg IntraMUSCular PRN    dextrose (D50W) injection syrg 12.5-25 g  25-50 mL IntraVENous PRN    insulin glargine (LANTUS) injection 10 Units  10 Units SubCUTAneous QHS    sodium chloride (NS) flush 5-10 mL  5-10 mL IntraVENous PRN    sodium chloride (NS) flush 5-10 mL  5-10 mL IntraVENous PRN        Review Of Systems:           Constitutional: No fever, no chills, no weight loss, no night sweats   HEENT: No epistaxis, no nasal drainage, no difficulty in swallowing, no redness in eyes  Respiratory:  negative for cough, sputum, hemoptysis, pleurisy/chest pain, wheezing, dyspnea on exertion or emphysema  Cardiovascular: no chest pain, no chest pressure, no dyspnea, no pnd, no claudication no palpitations, no chronic leg edema, no syncope  Gastrointestinal: no abd pain, no vomiting, no diarrhea, no bleeding symptoms  Genitourinary: No urinary symptoms or hematuria  Integument/breast: No ulcers or rashes  Musculoskeletal: right shoulder pain   Neurological: No focal weakness, no seizures, no headaches  Behvioral/Psych: No anxiety, no depression         Physical Exam:     Visit Vitals    BP (!) 139/93 (BP 1 Location: Left arm, BP Patient Position: At rest)    Pulse (!) 128    Temp 98.1 °F (36.7 °C)    Resp 18    Ht 5' 2\" (1.575 m)    Wt 88.9 kg (196 lb)    SpO2 98%    Breastfeeding No    BMI 35.85 kg/m2     BP Readings from Last 3 Encounters:   04/28/17 (!) 139/93   09/05/16 137/69   01/23/15 112/70     Pulse Readings from Last 3 Encounters:   04/28/17 (!) 128   09/05/16 62     Wt Readings from Last 3 Encounters:   04/28/17 88.9 kg (196 lb)   09/05/16 113.4 kg (250 lb)   03/02/15 111.1 kg (245 lb)       General:  alert, cooperative, no distress, appears stated age, morbidly obese  Skin: Warm and dry, acyanotic, normal color. Head: Normocephalic, atraumatic. Eyes: Sclerae anicteric, conjunctivae without injection.   Neck:  nontender, no nuchal rigidity, no masses, no stridor, no carotid bruit, no JVD  Lungs:  clear to auscultation bilaterally. Heart:  irregularly irregular rhythm, S1, S2 normal, no S3 or S4, no click, no rub  Abdomen:  abdomen is soft without significant tenderness, masses, organomegaly or guarding  Extremities:   atraumatic, no cyanosis or edema. Right shoulder cover with dressing   Neurological: grossly intact. No focal abnormalities, moves all extremities well. Psychiatric Affect: The patient is awake, alert and oriented x3. Alis Gruber is interactive and appropriate.        Data Review:     Recent Results (from the past 48 hour(s))   GLUCOSE, POC    Collection Time: 04/26/17  4:05 PM   Result Value Ref Range    Glucose (POC) 133 (H) 70 - 110 mg/dL   GLUCOSE, POC    Collection Time: 04/26/17  7:38 PM   Result Value Ref Range    Glucose (POC) 167 (H) 70 - 110 mg/dL   GLUCOSE, POC    Collection Time: 04/26/17  9:48 PM   Result Value Ref Range    Glucose (POC) 185 (H) 70 - 746 mg/dL   METABOLIC PANEL, BASIC    Collection Time: 04/27/17  2:40 AM   Result Value Ref Range    Sodium 140 136 - 145 mmol/L    Potassium 4.3 3.5 - 5.5 mmol/L    Chloride 111 (H) 100 - 108 mmol/L    CO2 20 (L) 21 - 32 mmol/L    Anion gap 9 3.0 - 18 mmol/L    Glucose 93 74 - 99 mg/dL    BUN 50 (H) 7.0 - 18 MG/DL    Creatinine 1.37 (H) 0.6 - 1.3 MG/DL    BUN/Creatinine ratio 36 (H) 12 - 20      GFR est AA 47 (L) >60 ml/min/1.73m2    GFR est non-AA 39 (L) >60 ml/min/1.73m2    Calcium 8.3 (L) 8.5 - 10.1 MG/DL   CBC WITH AUTOMATED DIFF    Collection Time: 04/27/17  2:40 AM   Result Value Ref Range    WBC 11.6 4.6 - 13.2 K/uL    RBC 3.64 (L) 4.20 - 5.30 M/uL    HGB 9.3 (L) 12.0 - 16.0 g/dL    HCT 29.0 (L) 35.0 - 45.0 %    MCV 79.7 74.0 - 97.0 FL    MCH 25.5 24.0 - 34.0 PG    MCHC 32.1 31.0 - 37.0 g/dL    RDW 15.2 (H) 11.6 - 14.5 %    PLATELET 760 810 - 463 K/uL    MPV 11.0 9.2 - 11.8 FL    NEUTROPHILS 85 (H) 40 - 73 %    LYMPHOCYTES 7 (L) 21 - 52 %    MONOCYTES 7 3 - 10 % EOSINOPHILS 1 0 - 5 %    BASOPHILS 0 0 - 2 %    ABS. NEUTROPHILS 9.8 (H) 1.8 - 8.0 K/UL    ABS. LYMPHOCYTES 0.8 (L) 0.9 - 3.6 K/UL    ABS. MONOCYTES 0.8 0.05 - 1.2 K/UL    ABS. EOSINOPHILS 0.1 0.0 - 0.4 K/UL    ABS. BASOPHILS 0.0 0.0 - 0.1 K/UL    DF AUTOMATED     GLUCOSE, POC    Collection Time: 04/27/17  8:22 AM   Result Value Ref Range    Glucose (POC) 101 70 - 110 mg/dL   GLUCOSE, POC    Collection Time: 04/27/17 12:15 PM   Result Value Ref Range    Glucose (POC) 105 70 - 110 mg/dL   CRYSTALS, SYNOVIAL FLUID    Collection Time: 04/27/17  1:00 PM   Result Value Ref Range    FLUID TYPE(7) FLUID      Crystals, body fluid NO CRYSTALS SEEN. MANY LEUKOCYTES SEEN.     CELL COUNT, SYNOVIAL    Collection Time: 04/27/17  1:00 PM   Result Value Ref Range    SYNOVIAL FLD SITE RIGHT SHOULDER     SYN FLUID COLOR YELLOW      SYN FLUID APPEARANCE CLOUDY      SYNOVIAL [place] [address] (H) 0 /cu mm    SYNOVIAL FLD WBC CT 648634 (H) 0 - 200 /cu mm   CULTURE, BODY FLUID W GRAM STAIN    Collection Time: 04/27/17  1:00 PM   Result Value Ref Range    Special Requests: NO SPECIAL REQUESTS      GRAM STAIN MANY  WBC'S        GRAM STAIN NO ORGANISMS SEEN      Culture result: NO GROWTH AFTER 22 HOURS     BODY FLUID DIFF    Collection Time: 04/27/17  1:00 PM   Result Value Ref Range    FLD SEGS 95 %    FLD BANDS 0 %    FLD LYMPHS 1 %    FLD MONOCYTES 4 %    FLD EOSINS 0 %   GLUCOSE, POC    Collection Time: 04/27/17  4:04 PM   Result Value Ref Range    Glucose (POC) 90 70 - 110 mg/dL   GLUCOSE, POC    Collection Time: 04/27/17  7:59 PM   Result Value Ref Range    Glucose (POC) 92 70 - 110 mg/dL   CULTURE, WOUND W GRAM STAIN    Collection Time: 04/27/17 11:16 PM   Result Value Ref Range    Special Requests: NO SPECIAL REQUESTS      GRAM STAIN FEW  WBC'S        GRAM STAIN NO ORGANISMS SEEN      Culture result: PENDING    GLUCOSE, POC    Collection Time: 04/28/17 12:03 AM   Result Value Ref Range    Glucose (POC) 93 70 - 110 mg/dL   GLUCOSE, POC Collection Time: 04/28/17  8:11 AM   Result Value Ref Range    Glucose (POC) 111 (H) 70 - 110 mg/dL   EKG, 12 LEAD, SUBSEQUENT    Collection Time: 04/28/17  8:40 AM   Result Value Ref Range    Ventricular Rate 126 BPM    Atrial Rate 120 BPM    QRS Duration 104 ms    Q-T Interval 324 ms    QTC Calculation (Bezet) 469 ms    Calculated R Axis 76 degrees    Calculated T Axis -97 degrees    Diagnosis       Atrial fibrillation with rapid ventricular response  Marked ST abnormality, possible inferior subendocardial injury  Abnormal ECG  When compared with ECG of 25-APR-2017 00:40,  Atrial fibrillation has replaced Sinus rhythm  ST now depressed in Inferior leads  ST now depressed in Anterolateral leads  T wave inversion now evident in Inferior leads  T wave inversion now evident in Lateral leads     GLUCOSE, POC    Collection Time: 04/28/17 11:09 AM   Result Value Ref Range    Glucose (POC) 179 (H) 70 - 110 mg/dL         Intake/Output Summary (Last 24 hours) at 04/28/17 1247  Last data filed at 04/28/17 0390   Gross per 24 hour   Intake              700 ml   Output             2150 ml   Net            -1450 ml       Cardiographics:     ECG: Atrial fibrillation with RVR  Echocardiogram: ordered      Signed By: Duyen Ritter NP supervised    April 28, 2017      I have independently evaluated and examined the patient. All relevant labs and testing data's are reviewed. Care plan discussed and updated after review.     Nina Vasquez MD

## 2017-04-28 NOTE — OP NOTES
[address]    Name:  Cristopher Frederick  MR#:  [id number]  :  1950  Account #:  [de-identified]  Date of Adm:  2017  Date of Surgery:  2017      PREOPERATIVE DIAGNOSIS: Septic arthritis, right shoulder. POSTOPERATIVE DIAGNOSIS:    PROCEDURES PERFORMED: Irrigation, incision and drainage, septic  right shoulder. SURGEON: Hiwot Obando MD    ANESTHESIA: General.    CONDITION: Good. ESTIMATED BLOOD LOSS: 15 mL. SPECIMENS REMOVED: Aerobic and anaerobic cultures. DESCRIPTION OF PROCEDURE: After satisfactory general  anesthesia, the patient in the supine beach chair position, the patient's  right shoulder was prepped and field draped since she had an IV in her  right arm and had difficult IV access. A deltopectoral incision was  made overlying the patient's right shoulder. The incision was carried  down through the subcutaneous tissue. The cephalic vein was  identified. The deltopectoral interval was dissected with sharp and  blunt dissection. The anterior capsule of the shoulder was identified. There was a large effusion noted. An anterior capsulotomy was  performed, yielding copious amounts of thick, purulent-appearing fluid. Aerobic and anaerobic cultures were taken. Shoulder was copiously  irrigated with normal saline solution. The capsule was loosely closed  with 0 PDS suture material. Subcutaneous tissue was closed with 2-0  Monocryl and staples were used for the skin. The patient was  transported back to the recovery room in good condition with a  shoulder dressing and a sling in place. Sponge and needle count was  correct.         MD VAMSI Randolph / ELLY  D:  2017   23:29  T:  2017   00:40  Job #:  [id number]

## 2017-04-28 NOTE — ANESTHESIA POSTPROCEDURE EVALUATION
Post-Anesthesia Evaluation and Assessment    Patient: Laz Castellanos MRN: [id number]  SSN: [id number]    YOB: 1950  Age: 74 y.o. Sex: female       Cardiovascular Function/Vital Signs  Visit Vitals    /83 (BP 1 Location: Right arm, BP Patient Position: At rest)    Pulse 84    Temp 36.7 °C (98 °F)    Resp 19    Ht 5' 2\" (1.575 m)    Wt 88.9 kg (196 lb)    SpO2 97%    Breastfeeding No    BMI 35.85 kg/m2       Patient is status post general anesthesia for Procedure(s):  INCISION AND DRAINAGE/ [address] OUT  /RIGHT SHOULDER . Nausea/Vomiting: None    Postoperative hydration reviewed and adequate. Pain:  Pain Scale 1: Numeric (0 - 10) (04/28/17 0440)  Pain Intensity 1: 0 (04/28/17 0202)   Managed    Neurological Status:   Neuro (WDL): Within Defined Limits (04/27/17 1116)   At baseline    Mental Status and Level of Consciousness: Alert and oriented     Pulmonary Status:   O2 Device: Nasal cannula (04/27/17 1162)   Adequate oxygenation and airway patent    Complications related to anesthesia: None    Post-anesthesia assessment completed.  No concerns    Signed By: Dewane Gosselin, MD     April 28, 2017

## 2017-04-28 NOTE — ANESTHESIA PREPROCEDURE EVALUATION
Anesthetic History   No history of anesthetic complications            Review of Systems / Medical History  Patient summary reviewed and pertinent labs reviewed    Pulmonary  Within defined limits      Sleep apnea: No treatment           Neuro/Psych   Within defined limits           Cardiovascular  Within defined limits  Hypertension              Exercise tolerance: >4 METS     GI/Hepatic/Renal  Within defined limits              Endo/Other  Within defined limits  Diabetes: well controlled, type 2         Other Findings   Comments:   Risk Factors for Postoperative nausea/vomiting:       History of postoperative nausea/vomiting? NO       Female? NO       Motion sickness? NO       Intended opioid administration for postoperative analgesia? YES    S/p gastric BP 1 yr ago with >100# weight loss.            Physical Exam    Airway  Mallampati: II  TM Distance: 4 - 6 cm  Neck ROM: normal range of motion   Mouth opening: Normal     Cardiovascular  Regular rate and rhythm,  S1 and S2 normal,  no murmur, click, rub, or gallop  Rhythm: regular  Rate: normal         Dental    Dentition: Edentulous     Pulmonary  Breath sounds clear to auscultation               Abdominal  GI exam deferred       Other Findings            Anesthetic Plan    ASA: 3  Anesthesia type: general          Induction: Intravenous  Anesthetic plan and risks discussed with: Patient

## 2017-04-28 NOTE — CONSULTS
Consult    Patient: Kait Hernandez MRN: [id number]  SSN: [id number]    YOB: 1950  Age: 74 y.o. Sex: female      Subjective:      Kait Hernandez is a 74 y.o. female who is being seen for right shoulder swelling and pain. Pt developed severe swelling and pain in her right shoulder 4 days ago. Her motion is now limited and painful. She is scheduled to see a rheumatologist for RA.     Past Medical History:   Diagnosis Date    Diabetes ([address]     Fibromyalgia     Hypercholesteremia     Hypertension      Past Surgical History:   Procedure Laterality Date    HX BREAST LUMPECTOMY      HX CARPAL TUNNEL RELEASE      HX KNEE REPLACEMENT      bilater    HX PARTIAL THYROIDECTOMY      HX SHOULDER ARTHROSCOPY      HX TUBAL LIGATION        Family History   Problem Relation Age of Onset    Asthma Mother     Diabetes Maternal Grandmother     Hypertension Mother     Kidney Disease Mother     Thyroid Disease Mother     Thyroid Disease Maternal Grandmother      Social History   Substance Use Topics    Smoking status: Never Smoker    Smokeless tobacco: Never Used    Alcohol use No      Current Facility-Administered Medications   Medication Dose Route Frequency Provider Last Rate Last Dose    Lactobacillus Acidoph & Bulgar (FLORANEX) tablet 1 Tab  1 Tab Oral BID Doreen Cannon MD   1 Tab at 04/27/17 1728    DULoxetine (CYMBALTA) capsule 60 mg  60 mg Oral DAILY Steve Browning MD   60 mg at 04/27/17 0939    bupivacaine (PF) (MARCAINE) 0.5 % (5 mg/mL) injection 50 mg  10 mL Intra artICUlar ONCE Matthew Valdovinos MD   Stopped at 04/27/17 1000    acetaminophen (TYLENOL) tablet 325 mg  325 mg Oral Q4H PRN Doreen Cannon MD        ampicillin-sulbactam (UNASYN) 1.5 g in 0.9% sodium chloride (MBP/ADV) 50 mL MBP  1.5 g IntraVENous Q6H Doreen Cannon  mL/hr at 04/27/17 1600 1.5 g at 04/27/17 1600    magic mouthwash (FIRST-MOUTHWASH BLM) oral suspension 10 mL  10 mL Oral TIDAC Cynthia Iraheta MD   10 mL at 04/27/17 1630    multivitamin (MULTI-DELYN, WELLESSE) oral liquid 30 mL  30 mL Oral DAILY Nara Mejia MD   30 mL at 04/27/17 0933    0.9% sodium chloride infusion  100 mL/hr IntraVENous CONTINUOUS Nara Mejia  mL/hr at 04/27/17 1435 100 mL/hr at 04/27/17 1435    ALPRAZolam (XANAX) tablet 0.5 mg  0.5 mg Oral TID PRN Luis Carlos Ventura MD        HYDROcodone-acetaminophen (NORCO) 5-325 mg per tablet 1 Tab  1 Tab Oral Q6H PRN Luis Carlos Ventura MD   1 Tab at 04/27/17 0658    pregabalin (LYRICA) capsule 75 mg  75 mg Oral BID Luis Carlos Ventura MD   75 mg at 04/27/17 1728    simvastatin (ZOCOR) tablet 40 mg  40 mg Oral QHS Luis Carlos Ventura MD   40 mg at 04/26/17 2331    ondansetron (ZOFRAN) injection 4 mg  4 mg IntraVENous Q4H PRN Luis Carlos Ventura MD        heparin (porcine) injection 5,000 Units  5,000 Units SubCUTAneous Q8H Luis Carlos Ventura MD   5,000 Units at 04/27/17 1727    insulin lispro (HUMALOG) injection   SubCUTAneous AC&HS Luis Carlos Ventura MD   Stopped at 04/27/17 0730    glucose chewable tablet 16 g  16 g Oral PRN Luis Carlos Ventura MD        glucagon (GLUCAGEN) injection 1 mg  1 mg IntraMUSCular PRN Luis Carlos Ventura MD        dextrose (D50W) injection syrg 12.5-25 g  25-50 mL IntraVENous PRN Luis Carlos Ventura MD        insulin glargine (LANTUS) injection 10 Units  10 Units SubCUTAneous QHS Luis Carlos Ventura MD   10 Units at 04/26/17 2151    sodium chloride (NS) flush 5-10 mL  5-10 mL IntraVENous PRN Pito Humphrey, DO        sodium chloride (NS) flush 5-10 mL  5-10 mL IntraVENous PRN Pito Novacion, DO            No Known Allergies    Review of Systems:  A comprehensive review of systems was negative except for that written in the History of Present Illness.     Objective:     Vitals:    04/27/17 1211 04/27/17 1559 04/27/17 1752 04/27/17 1857   BP: 180/84 (!) 182/92 154/77 150/76   Pulse: 80 77 76 74   Resp: 16 18 20 18   Temp: 98 °F (36.7 °C) 98.3 °F (36.8 °C) 98.1 °F (36.7 °C) 98.9 °F (37.2 °C)   SpO2: 99% 98%     Weight:       Height:        Results for Jeanette Herrera (MRN [id number]) as of 4/27/2017 20:19   Ref. Range 11/22/2016 11:15   Sed rate (ESR) Latest Ref Range: 0 - 30 mm/Hr 52 (H)       Physical Exam:  EXTREMITIES:  3+ right shoulder effusion    Right shoulder ROM is limited and painful    Distal CMS intact    FINDINGS:     No evidence of fracture, dislocation or subluxation in right shoulder. At the  right acromioclavicular joint, there are moderate to marked osteoarthritic  changes. At the greater tuberosity of head of right humerus there are sclerotic  changes, which may be reactive changes secondary to chronic rotator cuff  disease. Clinical correlation suggested.         R shoulder x ray  IMPRESSION  IMPRESSION:     No evidence of fracture or dislocation at right shoulder.     Advanced osteoarthritis at the right [facility] joint.     Reactive sclerotic changes at the greater tuberosity of head of right humerus  suggestive of chronic rotator cuff disease. Assessment:     Hospital Problems  Date Reviewed: 1/23/2015          Codes Class Noted POA    Fibromyalgia ICD-10-CM: M79.7  ICD-9-CM: 729.1  4/27/2017 Unknown        HTN (hypertension) (Chronic) ICD-10-CM: I10  ICD-9-CM: 401.9  4/27/2017 Unknown        Septic joint of right shoulder region [facility]) ICD-10-CM: M00.9  ICD-9-CM: 711.01  4/27/2017 Yes        Cellulitis ICD-10-CM: L03.90  ICD-9-CM: 682.9  4/24/2017 Unknown        Sepsis ([facility] [address] ICD-10-CM: A41.9  ICD-9-CM: 038.9, 995.91  4/24/2017 Unknown        Type II diabetes mellitus ([facility] [address] ICD-10-CM: E11.9  ICD-9-CM: 250.00  1/25/2012 Yes              Plan:      To OR for I&D washout right shoulder    Signed By: Rock Pema MD     April 27, 2017

## 2017-04-28 NOTE — ROUTINE PROCESS
TRANSFER - IN REPORT:    Verbal report received from Rhonda Roger RN(name) on Ricki Herman  being received from Recovery(unit) for routine progression of care      Report consisted of patients Situation, Background, Assessment and   Recommendations(SBAR). Information from the following report(s) SBAR, Kardex, OR Summary, Procedure Summary and MAR was reviewed with the receiving nurse. Opportunity for questions and clarification was provided. Assessment completed upon patients arrival to unit and care assumed.

## 2017-04-28 NOTE — PROGRESS NOTES
Orthopaedic Surgery Daily Progress Note        Subjective: 74 y.o., female, 1 Day Post-Op, Procedure(s):  INCISION AND DRAINAGE/ WASH OUT  /RIGHT SHOULDER      Pain Scale 5/10     [XX] Dull, [  Zada Ahtanum, [  Shandra Rides, [  ]Shooting, [  ] Comfort Areas,     [  Debera Jaquez         [  Louana Callander   [  ] Left  [  ] Hip           [  Louana Callander  [  ] Left  [  ] Cresenciano Du        [  Louana Callander  [  ] Left  [  ] Wrist        [  Louana Callander  [  ] Left  [  ] Alissa Kub       [  Louana Callander  [  ] Left  [XX] Shoulder  [XX]Right  [  ] Left  [  ] Ankle       [  Louana Callander   [  ] Left   [  ] Vannan Rocher          [  Louana Callander  [  ] Left     [Not] with radiation to:     Critical events past 24 hours:None          Review of Systems -  Pain as above, No CP, No SOB, No F/C/NS, voiding [No] thomas,   [No] Stool, (+) Flatus    Vital Signs:  Visit Vitals    BP (!) 139/93 (BP 1 Location: Left arm, BP Patient Position: At rest)    Pulse (!) 128    Temp 98.1 °F (36.7 °C)    Resp 18    Ht 5' 2\" (1.575 m)    Wt 196 lb (88.9 kg)    SpO2 98%    Breastfeeding No    BMI 35.85 kg/m2       Lab Results   Component Value Date/Time    Specimen Description: CLEAN CATCH 09/07/2010 03:50 PM    Specimen Description: CLEAN CATCH 08/23/2010 08:55 AM    Lab Results   Component Value Date/Time    Culture result: PENDING 04/27/2017 11:16 PM    Culture result: NO GROWTH AFTER 22 HOURS 04/27/2017 01:00 PM    Culture result: NO GROWTH 2 DAYS 04/26/2017 11:30 AM    Culture result: NO GROWTH 2 DAYS 04/26/2017 11:13 AM    Culture result:  04/24/2017 10:55 PM     AEROBIC AND ANAEROBIC BOTTLES  STREPTOCOCCI, BETA HEMOLYTIC GROUP A  Reported to Dept of Health          Labs:  Recent Results (from the past 24 hour(s))   GLUCOSE, POC    Collection Time: 04/27/17  4:04 PM   Result Value Ref Range    Glucose (POC) 90 70 - 110 mg/dL   GLUCOSE, POC    Collection Time: 04/27/17  7:59 PM   Result Value Ref Range    Glucose (POC) 92 70 - 110 mg/dL   CULTURE, WOUND W GRAM STAIN    Collection Time: 04/27/17 11:16 PM Result Value Ref Range    Special Requests: NO SPECIAL REQUESTS      GRAM STAIN FEW  WBC'S        GRAM STAIN NO ORGANISMS SEEN      Culture result: PENDING    GLUCOSE, POC    Collection Time: 04/28/17 12:03 AM   Result Value Ref Range    Glucose (POC) 93 70 - 110 mg/dL   GLUCOSE, POC    Collection Time: 04/28/17  8:11 AM   Result Value Ref Range    Glucose (POC) 111 (H) 70 - 110 mg/dL   EKG, 12 LEAD, SUBSEQUENT    Collection Time: 04/28/17  8:40 AM   Result Value Ref Range    Ventricular Rate 126 BPM    Atrial Rate 120 BPM    QRS Duration 104 ms    Q-T Interval 324 ms    QTC Calculation (Bezet) 469 ms    Calculated R Axis 76 degrees    Calculated T Axis -97 degrees    Diagnosis       Atrial fibrillation with rapid ventricular response  Marked ST abnormality, possible inferior subendocardial injury  Abnormal ECG  When compared with ECG of 25-APR-2017 00:40,  Atrial fibrillation has replaced Sinus rhythm  ST now depressed in Inferior leads  ST now depressed in Anterolateral leads  T wave inversion now evident in Inferior leads  T wave inversion now evident in Lateral leads     GLUCOSE, POC    Collection Time: 04/28/17 11:09 AM   Result Value Ref Range    Glucose (POC) 179 (H) 70 - 110 mg/dL       Procedure(s): Wound site examined: [Yes] surgical dressing removed    Objective findings / Data:     Patient is awake, pleasant, and cooperative sociable and oriented  x 3, sitting up at bedside, right shoulder sx site intact with sx dressings /  intact, no indurations, no erythema, no evidence cellulitis, noted soft tissue sx swelling bracketing sx site, skin sta  ples intact with wound borders well approximated, right upper extremity NVI, ROM intact but guarded UE to include shoulder, elbow, and wrist,  cap refill < 2 sec Alex UE,  no calf pain nor evidence DVT Alex LE, distal NVI fully Alex LE, cap refill < 2 sec Alex LE.                   Patient administered Vancomycin IVPB Pre-operatively due to high risk   Methicillin Resistant Staph Aureus (MRSA) in local population. I have independently examined the patient and reviewed all lab studies and imgaing as well as review of nursing notes and physican notes from the past 24 hours. The plan of care has been discussed with the patient and all questions are answered. Case and finding(s) discussed with attending Orthopaedic Surgeon and:  [X]Patient   [X]Family   [X]Nursing   [X]Case Management       [ ] Full WBAT, [Yes] Partial WBAT, [ ] Toetouch WB, [ ] Non Weight Bearing:     [XX] RUE [ ] RLE  [ ] LUE  [ ] LLE    ================================================================  [XX]Patient administered Vancomycin IVPB Pre-operatively due to high risk        Methicillin Resistant Staph Aureus (MRSA) in local population. I have independently examined the patient and reviewed all lab studies and imgaing as well as review of nursing notes and physican notes from the past 24 hours. The plan of care has been discussed with the following below and all questions are answered. Case and finding(s) discussed with attending Orthopaedic Surgeon and:  [  ] Consulted Hospitalist and or attending Speciatist  [XX]Patient   [  ]Family   [XX]Nursing   [  ]Case Management      Per [address] this  case was discussed with attending surgeon, and reflects their agreement with orders and (plan of care)  as confirmed by their   cosignature.            Mo Cardenas, APA, APC, MPAS,  Orthopaedic Surgical Physician Assistant-C  Department of [facility]   Cell [phone]    4/28/2017  1:25 PM

## 2017-04-28 NOTE — PROGRESS NOTES
[facility]  Progress Note    Patient: Felipe Antoine Age: 74 y.o. : 1950 MR#: [id number] SSN: [id number]  Date/Time: 2017     Subjective:     Review of systems  General: No fevers or chills. Cardiovascular: No chest pain or pressure. No palpitations. Pulmonary: No shortness of breath, cough or wheeze. Gastrointestinal: No abdominal pain, nausea, vomiting or diarrhea. Genitourinary: No urinary frequency, urgency, hesitancy or dysuria. Musculoskeletal:right shoulder pain or muscle pain, no back pain, no recent trauma. Right leg swelling    Neurologic: No headache, numbness, tingling or weakness.      Assessment/Plan:   Admitted with h/o Right leg cellulitis blood culture positive , streptococci , on CT right shoulder - effusion , ortho consulted , I&D and wash out , antibiotics managed by ID , Developed new afib on Lovenox (  may hold if surgery is planned ) cardiology following , H/o DVT and PE in remote past .     1. Cellulitis Right Leg / h/o dog bite 2 weeks before cellulitis   Blood culture positive   -AEROBIC AND ANAEROBIC BOTTLES   STREPTOCOCCI, BETA HEMOLYTIC GROUP A   - now on Unasyn and  Clinda  -D/W ID,   - right LL DVT negative   - follow up with ortho - right shoulder effusion - follow post op pathology     2 DM2  - Continue SSI     3 JAMES   - Probably prerenal , IVF - improving     4 Leucocytosis - related to infection - improved     5 H/o Dog bite 2 weeks ago - unlikely cause of current condition , if timing of bote is 2 weeks ago      6 HTN  - Continue home meds     7 HLD- on statin       8 anxiety disorder  - continue Xanax     9  Afib - RVR- On Cardizem drip   - D/W cardiology   - AC per cardio     Full CODE       Case discussed with:  [x]Patient  []Family  [x]Nursing  []Case Management  DVT Prophylaxis:  []Lovenox  [x]Hep SQ  []SCDs  []Coumadin   []On Heparin gtt    Patient Active Problem List   Diagnosis Code    Sleep apnea G47.30    Type II diabetes mellitus (HCC) E11.9    Enthesopathy of hip region M76.899    Acquired trigger finger M65.30    Primary localized osteoarthrosis, hand M19.049    Myalgia and myositis LYE7672    Osteoarthrosis M19.90    Incomplete bladder emptying R33.9    Stress incontinence N39.3    Cellulitis L03.90    Sepsis (HCC) A41.9    Fibromyalgia M79.7    HTN (hypertension) I10    Septic joint of right shoulder region ([address] M00.9    Atrial fibrillation (HCC) I48.91    Gastric bypass status for obesity Z98.84    History of pulmonary embolism Z86.711       Objective:   VS:   Visit Vitals    BP (!) 139/93 (BP 1 Location: Left arm, BP Patient Position: At rest)    Pulse (!) 128    Temp 98.1 °F (36.7 °C)    Resp 18    Ht 5' 2\" (1.575 m)    Wt 88.9 kg (196 lb)    SpO2 98%    Breastfeeding No    BMI 35.85 kg/m2      Tmax/24hrs: Temp (24hrs), Av.3 °F (36.8 °C), Min:96.8 °F (36 °C), Max:98.9 °F (37.2 °C)  IOBRIEF    Intake/Output Summary (Last 24 hours) at 17 1538  Last data filed at 17 0707   Gross per 24 hour   Intake              700 ml   Output             1750 ml   Net            -1050 ml       General:  Alert, cooperative, no acute distress    HEENT:  NC, Atraumatic. PERRLA, anicteric sclerae. Pulmonary:  CTA Bilaterally. No Wheezing/Rhonchi/Rales. Cardiovascular: Regular rate and Rhythm. GI:  Soft, Non distended, Non tender. + Bowel sounds. Extremities: right LL - redness , swelling and tenderness with loca warmth   Psych:  Not anxious or agitated. Neurologic: Grossly - Motor and Sensory functions are intact .  No Anxiety , calm , no Agitation         Medications:   Current Facility-Administered Medications   Medication Dose Route Frequency    sodium chloride (NS) flush 5-10 mL  5-10 mL IntraVENous Q8H    sodium chloride (NS) flush 5-10 mL  5-10 mL IntraVENous PRN    acetaminophen (TYLENOL) tablet 650 mg  650 mg Oral Q4H PRN    oxyCODONE-acetaminophen (PERCOCET) 5-325 mg per tablet 1 Tab  1 Tab Oral Q4H PRN    HYDROmorphone (PF) (DILAUDID) injection 1 mg  1 mg IntraVENous Q4H PRN    docusate sodium (COLACE) capsule 100 mg  100 mg Oral BID    lactated ringers infusion  50 mL/hr IntraVENous CONTINUOUS    VANCOMYCIN INFORMATION NOTE   Other CONTINUOUS    vancomycin (VANCOCIN) 1,750 mg in 0.9% sodium chloride 500 mL IVPB  1,750 mg IntraVENous ONCE    dilTIAZem (CARDIZEM) IR tablet 60 mg  60 mg Oral Q6H    Lactobacillus Acidoph & Bulgar (FLORANEX) tablet 1 Tab  1 Tab Oral BID    DULoxetine (CYMBALTA) capsule 60 mg  60 mg Oral DAILY    acetaminophen (TYLENOL) tablet 325 mg  325 mg Oral Q4H PRN    ampicillin-sulbactam (UNASYN) 1.5 g in 0.9% sodium chloride (MBP/ADV) 50 mL MBP  1.5 g IntraVENous Q6H    magic mouthwash (FIRST-MOUTHWASH BLM) oral suspension 10 mL  10 mL Oral TIDAC    multivitamin (MULTI-DELYN, WELLESSE) oral liquid 30 mL  30 mL Oral DAILY    0.9% sodium chloride infusion  100 mL/hr IntraVENous CONTINUOUS    ALPRAZolam (XANAX) tablet 0.5 mg  0.5 mg Oral TID PRN    HYDROcodone-acetaminophen (NORCO) 5-325 mg per tablet 1 Tab  1 Tab Oral Q6H PRN    pregabalin (LYRICA) capsule 75 mg  75 mg Oral BID    simvastatin (ZOCOR) tablet 40 mg  40 mg Oral QHS    ondansetron (ZOFRAN) injection 4 mg  4 mg IntraVENous Q4H PRN    insulin lispro (HUMALOG) injection   SubCUTAneous AC&HS    glucose chewable tablet 16 g  16 g Oral PRN    glucagon (GLUCAGEN) injection 1 mg  1 mg IntraMUSCular PRN    dextrose (D50W) injection syrg 12.5-25 g  25-50 mL IntraVENous PRN    insulin glargine (LANTUS) injection 10 Units  10 Units SubCUTAneous QHS    sodium chloride (NS) flush 5-10 mL  5-10 mL IntraVENous PRN    sodium chloride (NS) flush 5-10 mL  5-10 mL IntraVENous PRN       Labs:    Recent Results (from the past 24 hour(s))   GLUCOSE, POC    Collection Time: 04/27/17  4:04 PM   Result Value Ref Range    Glucose (POC) 90 70 - 110 mg/dL   GLUCOSE, POC    Collection Time: 04/27/17  7:59 PM   Result Value Ref Range    Glucose (POC) 92 70 - 110 mg/dL   CULTURE, WOUND W GRAM STAIN    Collection Time: 04/27/17 11:16 PM   Result Value Ref Range    Special Requests: NO SPECIAL REQUESTS      GRAM STAIN FEW  WBC'S        GRAM STAIN NO ORGANISMS SEEN      Culture result: PENDING    GLUCOSE, POC    Collection Time: 04/28/17 12:03 AM   Result Value Ref Range    Glucose (POC) 93 70 - 110 mg/dL   GLUCOSE, POC    Collection Time: 04/28/17  8:11 AM   Result Value Ref Range    Glucose (POC) 111 (H) 70 - 110 mg/dL   EKG, 12 LEAD, SUBSEQUENT    Collection Time: 04/28/17  8:40 AM   Result Value Ref Range    Ventricular Rate 126 BPM    Atrial Rate 120 BPM    QRS Duration 104 ms    Q-T Interval 324 ms    QTC Calculation (Bezet) 469 ms    Calculated R Axis 76 degrees    Calculated T Axis -97 degrees    Diagnosis       Atrial fibrillation with rapid ventricular response  Marked ST abnormality, possible inferior subendocardial injury  Abnormal ECG  When compared with ECG of 25-APR-2017 00:40,  Atrial fibrillation has replaced Sinus rhythm  ST now depressed in Inferior leads  ST now depressed in Anterolateral leads  T wave inversion now evident in Inferior leads  T wave inversion now evident in Lateral leads     GLUCOSE, POC    Collection Time: 04/28/17 11:09 AM   Result Value Ref Range    Glucose (POC) 179 (H) 70 - 232 mg/dL   METABOLIC PANEL, BASIC    Collection Time: 04/28/17 12:44 PM   Result Value Ref Range    Sodium 141 136 - 145 mmol/L    Potassium 4.3 3.5 - 5.5 mmol/L    Chloride 109 (H) 100 - 108 mmol/L    CO2 17 (L) 21 - 32 mmol/L    Anion gap 15 3.0 - 18 mmol/L    Glucose 150 (H) 74 - 99 mg/dL    BUN 35 (H) 7.0 - 18 MG/DL    Creatinine 1.10 0.6 - 1.3 MG/DL    BUN/Creatinine ratio 32 (H) 12 - 20      GFR est AA >60 >60 ml/min/1.73m2    GFR est non-AA 50 (L) >60 ml/min/1.73m2    Calcium 8.6 8.5 - 10.1 MG/DL   CBC WITH AUTOMATED DIFF    Collection Time: 04/28/17 12:44 PM   Result Value Ref Range    WBC 9.0 4.6 - 13.2 K/uL    RBC 4.13 (L) 4.20 - 5.30 M/uL    HGB 10.6 (L) 12.0 - 16.0 g/dL    HCT 34.0 (L) 35.0 - 45.0 %    MCV 82.3 74.0 - 97.0 FL    MCH 25.7 24.0 - 34.0 PG    MCHC 31.2 31.0 - 37.0 g/dL    RDW 15.5 (H) 11.6 - 14.5 %    PLATELET 276 293 - 346 K/uL    MPV 11.1 9.2 - 11.8 FL    NEUTROPHILS 79 (H) 40 - 73 %    LYMPHOCYTES 13 (L) 21 - 52 %    MONOCYTES 8 3 - 10 %    EOSINOPHILS 0 0 - 5 %    BASOPHILS 0 0 - 2 %    ABS. NEUTROPHILS 7.1 1.8 - 8.0 K/UL    ABS. LYMPHOCYTES 1.2 0.9 - 3.6 K/UL    ABS. MONOCYTES 0.7 0.05 - 1.2 K/UL    ABS. EOSINOPHILS 0.0 0.0 - 0.4 K/UL    ABS.  BASOPHILS 0.0 0.0 - 0.1 K/UL    DF AUTOMATED         Signed By: Zeyad Gonzalez MD     April 28, 2017

## 2017-04-28 NOTE — ROUTINE PROCESS
Bedside and Verbal shift change report given to Mauricio Villanueva RN (oncoming nurse) by Brie Navarro RN (offgoing nurse). Report included the following information SBAR, Kardex, Intake/Output, MAR and Recent Results.

## 2017-04-28 NOTE — ROUTINE PROCESS
TRANSFER - OUT REPORT:    Verbal report given to [facility] Akash Gross  being transferred to room 206 for routine progression of care       Report consisted of patients Situation, Background, Assessment and   Recommendations(SBAR). Information from the following report(s) SBAR, OR Summary, Intake/Output, MAR and Recent Results was reviewed with the receiving nurse. Opportunity for questions and clarification was provided.       Patient transported with:   O2 @ 2 liters  Registered Nurse

## 2017-04-28 NOTE — PROGRESS NOTES
Infectious Disease progress Note    Requested by: Dr. Serjio Adrian    Reason: right leg cellulitis    Current abx Prior abx     Vancomycin since 4/24 Ceftriaxone x 1 in ED  Pip/tazo 4/25     Lines:       Assessment :    74 y.o. female with a PMH of DM-2 (uncontrolled), HTN, chronic pain and fibromyalgia who presented to ED on 4/24/17 with c/c of Right leg swelling and pain. Clinical picture consistent with sepsis (POA) due to group A streptococcus bloodstream infection. Most likely source of bloodstream infection is acute right leg cellulitis. Increased right shoulder pain last few days - CT scan 4/26 reveals large right glenohumeral joint effusion s/p bedside aspiration with finding of purulent fluid suggestive of septic arthritis. Ortho consult appreciated. Plans for I&D 4/28    Preceding h/o dog bite 2 weeks ago - but no erythema or pain up until 4/23 makes cellulitis due to indolent pathogens related to dog bite less likely. Increasing creatinine - acute renal failure likely due to sepsis - improved today    Tongue pain/erythematous tongue likely streptococcal toxin mediated strawberry tongue - improved tongue pain on today's exam    Recommendations:    1. continue iv ampicillin/sulbactam  2. Agree with  I&D right shoulder  3. F/u intra op findings and cultures     Above plan was discussed in details with patient, ortho team. Please call me if any further questions or concerns. Will continue to participate in the care of this patient. subjective:    Improved pain and swelling right leg, persistent pain right shoulder, raw tongue. Patient denies headaches, visual disturbances, sore throat, runny nose, earaches, cp, sob, chills, cough, abdominal pain, diarrhea, burning micturition, or weakness in extremities. she denies back pain/flank pain. Home Medication List    Details   !! clindamycin (CLEOCIN) 300 mg capsule Take 1 Cap by mouth four (4) times daily for 10 days.   Qty: 40 Cap, Refills: 0      !! clindamycin (CLEOCIN) 300 mg capsule Take 1 Cap by mouth four (4) times daily for 10 days. Qty: 40 Cap, Refills: 0       Details   lisinopril (PRINIVIL, ZESTRIL) 10 mg tablet Take 10 mg by mouth daily. methocarbamol (ROBAXIN) 750 mg tablet Take 1 Tab by mouth four (4) times daily. Qty: 20 Tab, Refills: 0      HYDROcodone-acetaminophen (NORCO) 5-325 mg per tablet Take  by mouth. simvastatin (ZOCOR) 40 mg tablet 40 mg.      pregabalin (LYRICA) 75 mg capsule 75 mg. INSULIN GLARGINE,HUM. REC. ANLOG (LANTUS SC)       DULoxetine (CYMBALTA) 60 mg capsule 60 mg.       ALPRAZolam (XANAX) 1 mg tablet              Current Facility-Administered Medications   Medication Dose Route Frequency    sodium chloride (NS) flush 5-10 mL  5-10 mL IntraVENous Q8H    sodium chloride (NS) flush 5-10 mL  5-10 mL IntraVENous PRN    acetaminophen (TYLENOL) tablet 650 mg  650 mg Oral Q4H PRN    oxyCODONE-acetaminophen (PERCOCET) 5-325 mg per tablet 1 Tab  1 Tab Oral Q4H PRN    HYDROmorphone (PF) (DILAUDID) injection 1 mg  1 mg IntraVENous Q4H PRN    docusate sodium (COLACE) capsule 100 mg  100 mg Oral BID    lactated ringers infusion  50 mL/hr IntraVENous CONTINUOUS    Lactobacillus Acidoph & Bulgar (FLORANEX) tablet 1 Tab  1 Tab Oral BID    DULoxetine (CYMBALTA) capsule 60 mg  60 mg Oral DAILY    acetaminophen (TYLENOL) tablet 325 mg  325 mg Oral Q4H PRN    HYDROmorphone (PF) (DILAUDID) 2 mg/mL injection        ampicillin-sulbactam (UNASYN) 1.5 g in 0.9% sodium chloride (MBP/ADV) 50 mL MBP  1.5 g IntraVENous Q6H    magic mouthwash (FIRST-MOUTHWASH BLM) oral suspension 10 mL  10 mL Oral TIDAC    multivitamin (MULTI-DELYN, WELLESSE) oral liquid 30 mL  30 mL Oral DAILY    0.9% sodium chloride infusion  100 mL/hr IntraVENous CONTINUOUS    ALPRAZolam (XANAX) tablet 0.5 mg  0.5 mg Oral TID PRN    HYDROcodone-acetaminophen (NORCO) 5-325 mg per tablet 1 Tab  1 Tab Oral Q6H PRN    pregabalin (LYRICA) capsule 75 mg  75 mg Oral BID    simvastatin (ZOCOR) tablet 40 mg  40 mg Oral QHS    ondansetron (ZOFRAN) injection 4 mg  4 mg IntraVENous Q4H PRN    heparin (porcine) injection 5,000 Units  5,000 Units SubCUTAneous Q8H    insulin lispro (HUMALOG) injection   SubCUTAneous AC&HS    glucose chewable tablet 16 g  16 g Oral PRN    glucagon (GLUCAGEN) injection 1 mg  1 mg IntraMUSCular PRN    dextrose (D50W) injection syrg 12.5-25 g  25-50 mL IntraVENous PRN    insulin glargine (LANTUS) injection 10 Units  10 Units SubCUTAneous QHS    sodium chloride (NS) flush 5-10 mL  5-10 mL IntraVENous PRN    sodium chloride (NS) flush 5-10 mL  5-10 mL IntraVENous PRN       Allergies: Review of patient's allergies indicates no known allergies. Temp (24hrs), Av.4 °F (36.9 °C), Min:97.6 °F (36.4 °C), Max:98.9 °F (37.2 °C)    Visit Vitals    /83 (BP 1 Location: Right arm, BP Patient Position: At rest)    Pulse 84    Temp 98 °F (36.7 °C)    Resp 19    Ht 5' 2\" (1.575 m)    Wt 88.9 kg (196 lb)    SpO2 97%    Breastfeeding No    BMI 35.85 kg/m2       ROS: 12 point ROS obtained in details. Pertinent positives as mentioned in HPI,   otherwise negative    Physical Exam:    General: Well developed, well nourished female laying on the bed AAOx3 in no acute distress. General:   awake alert and oriented   HEENT:  Normocephalic, atraumatic, PERRL, EOMI, no scleral icterus or pallor; no conjunctival hemmohage;  nasal and oral mucous are moist and without evidence of lesions. No thrush. Neck supple, no bruits. Lymph Nodes:   no cervical, axillary or inguinal adenopathy   Lungs:   non-labored, bilaterally clear to auscultation- no crackles wheezes rales or rhonchi   Heart:  RRR, s1 and s2; no murmurs rubs or gallops, no edema, + pedal pulses   Abdomen:  soft, non-distended, active bowel sounds, no hepatomegaly, no splenomegaly.  Non-tender   Genitourinary:  deferred   Extremities:   no clubbing, cyanosis; no joint effusions or swelling; pain on active and passive movements right shoulder, tenderness on palpation of shoulder; muscle mass appropriate for age, erythema and tenderness right leg   Neurologic:  No gross focal sensory abnormalities; 5/5 muscle strength to upper and lower extremities. Speech appropriate.  Cranial nerves intact                        Skin:  Scab right leg, decreased erythema of skin of right leg, surgical scar both knees well healed   Back:  no paraspinal muscle tenderness or rigidity, no CVA tenderness, minimal cervical spine tenderness     Psychiatric:  No suicidal or homicidal ideations, appropriate mood and affect         Labs: Results:   Chemistry Recent Labs      04/27/17   0240  04/26/17   0400   GLU  93  117*   NA  140  136   K  4.3  4.3   CL  111*  107   CO2  20*  19*   BUN  50*  65*   CREA  1.37*  1.93*   CA  8.3*  8.5   AGAP  9  10   BUCR  36*  34*      CBC w/Diff Recent Labs      04/27/17   0240  04/26/17   0400   WBC  11.6  15.5*   RBC  3.64*  3.93*   HGB  9.3*  10.0*   HCT  29.0*  31.5*   PLT  261  270   GRANS  85*  92*   LYMPH  7*  4*   EOS  1  0      Microbiology Recent Labs      04/27/17   1300  04/26/17   1130  04/26/17   1113   CULT  PENDING  NO GROWTH 2 DAYS  NO GROWTH 2 DAYS          RADIOLOGY:    All available imaging studies/reports in [facility] for this admission were reviewed    Dr. Angela Ch, Infectious Disease Specialist  [phone]  April 28, 2017  9:12 AM

## 2017-04-28 NOTE — PROGRESS NOTES
Tele room reports uncontrolled AFIB heart rate 130-140's. Patient denies chest pain, dizziness or shortness of breath. She states that her right shoulder hurts. Will medicate for pain, monitor heart rate and rhythm. Will page hospitalist to make aware.

## 2017-04-29 LAB
ANION GAP BLD CALC-SCNC: 9 MMOL/L (ref 3–18)
BUN SERPL-MCNC: 34 MG/DL (ref 7–18)
BUN/CREAT SERPL: 31 (ref 12–20)
CALCIUM SERPL-MCNC: 8.2 MG/DL (ref 8.5–10.1)
CHLORIDE SERPL-SCNC: 111 MMOL/L (ref 100–108)
CO2 SERPL-SCNC: 22 MMOL/L (ref 21–32)
CREAT SERPL-MCNC: 1.1 MG/DL (ref 0.6–1.3)
ERYTHROCYTE [DISTWIDTH] IN BLOOD BY AUTOMATED COUNT: 15.2 % (ref 11.6–14.5)
GLUCOSE BLD STRIP.AUTO-MCNC: 187 MG/DL (ref 70–110)
GLUCOSE BLD STRIP.AUTO-MCNC: 263 MG/DL (ref 70–110)
GLUCOSE BLD STRIP.AUTO-MCNC: 267 MG/DL (ref 70–110)
GLUCOSE BLD STRIP.AUTO-MCNC: 279 MG/DL (ref 70–110)
GLUCOSE SERPL-MCNC: 189 MG/DL (ref 74–99)
HCT VFR BLD AUTO: 27.4 % (ref 35–45)
HGB BLD-MCNC: 8.6 G/DL (ref 12–16)
MCH RBC QN AUTO: 25.1 PG (ref 24–34)
MCHC RBC AUTO-ENTMCNC: 31.4 G/DL (ref 31–37)
MCV RBC AUTO: 80.1 FL (ref 74–97)
PLATELET # BLD AUTO: 249 K/UL (ref 135–420)
PMV BLD AUTO: 10.6 FL (ref 9.2–11.8)
POTASSIUM SERPL-SCNC: 3.9 MMOL/L (ref 3.5–5.5)
RBC # BLD AUTO: 3.42 M/UL (ref 4.2–5.3)
SODIUM SERPL-SCNC: 142 MMOL/L (ref 136–145)
VANCOMYCIN SERPL-MCNC: 18.9 UG/ML (ref 5–40)
WBC # BLD AUTO: 6.6 K/UL (ref 4.6–13.2)

## 2017-04-29 PROCEDURE — 77030011255 HC DSG AQUACEL AG BMS -A

## 2017-04-29 PROCEDURE — 74011636637 HC RX REV CODE- 636/637: Performed by: INTERNAL MEDICINE

## 2017-04-29 PROCEDURE — 74011250636 HC RX REV CODE- 250/636: Performed by: SPECIALIST

## 2017-04-29 PROCEDURE — 74011250637 HC RX REV CODE- 250/637: Performed by: SPECIALIST

## 2017-04-29 PROCEDURE — 74011250637 HC RX REV CODE- 250/637: Performed by: INTERNAL MEDICINE

## 2017-04-29 PROCEDURE — 74011636637 HC RX REV CODE- 636/637: Performed by: HOSPITALIST

## 2017-04-29 PROCEDURE — 74011000258 HC RX REV CODE- 258: Performed by: INTERNAL MEDICINE

## 2017-04-29 PROCEDURE — 93306 TTE W/DOPPLER COMPLETE: CPT

## 2017-04-29 PROCEDURE — 85027 COMPLETE CBC AUTOMATED: CPT | Performed by: SPECIALIST

## 2017-04-29 PROCEDURE — 36415 COLL VENOUS BLD VENIPUNCTURE: CPT | Performed by: SPECIALIST

## 2017-04-29 PROCEDURE — 74011250636 HC RX REV CODE- 250/636: Performed by: INTERNAL MEDICINE

## 2017-04-29 PROCEDURE — 65660000000 HC RM CCU STEPDOWN

## 2017-04-29 PROCEDURE — 74011250637 HC RX REV CODE- 250/637: Performed by: NURSE PRACTITIONER

## 2017-04-29 PROCEDURE — 80202 ASSAY OF VANCOMYCIN: CPT | Performed by: SPECIALIST

## 2017-04-29 PROCEDURE — 82962 GLUCOSE BLOOD TEST: CPT

## 2017-04-29 PROCEDURE — 80048 BASIC METABOLIC PNL TOTAL CA: CPT | Performed by: SPECIALIST

## 2017-04-29 RX ORDER — INSULIN GLARGINE 100 [IU]/ML
14 INJECTION, SOLUTION SUBCUTANEOUS
Status: DISCONTINUED | OUTPATIENT
Start: 2017-04-29 | End: 2017-05-02 | Stop reason: HOSPADM

## 2017-04-29 RX ADMIN — DOCUSATE SODIUM 100 MG: 100 CAPSULE, LIQUID FILLED ORAL at 08:59

## 2017-04-29 RX ADMIN — DILTIAZEM HYDROCHLORIDE 60 MG: 60 TABLET, FILM COATED ORAL at 05:55

## 2017-04-29 RX ADMIN — AMPICILLIN SODIUM AND SULBACTAM SODIUM 1.5 G: 1; .5 INJECTION, POWDER, FOR SOLUTION INTRAMUSCULAR; INTRAVENOUS at 00:53

## 2017-04-29 RX ADMIN — MULTIPLE VITAMIN LIQUID 30 ML: LIQUID at 08:59

## 2017-04-29 RX ADMIN — AMPICILLIN SODIUM AND SULBACTAM SODIUM 1.5 G: 1; .5 INJECTION, POWDER, FOR SOLUTION INTRAMUSCULAR; INTRAVENOUS at 13:07

## 2017-04-29 RX ADMIN — HYDROMORPHONE HYDROCHLORIDE 1 MG: 1 INJECTION, SOLUTION INTRAMUSCULAR; INTRAVENOUS; SUBCUTANEOUS at 21:04

## 2017-04-29 RX ADMIN — Medication 10 ML: at 15:48

## 2017-04-29 RX ADMIN — Medication 10 ML: at 00:57

## 2017-04-29 RX ADMIN — VANCOMYCIN HYDROCHLORIDE 1250 MG: 10 INJECTION, POWDER, LYOPHILIZED, FOR SOLUTION INTRAVENOUS at 17:15

## 2017-04-29 RX ADMIN — AMPICILLIN SODIUM AND SULBACTAM SODIUM 1.5 G: 1; .5 INJECTION, POWDER, FOR SOLUTION INTRAMUSCULAR; INTRAVENOUS at 20:13

## 2017-04-29 RX ADMIN — DILTIAZEM HYDROCHLORIDE 60 MG: 60 TABLET, FILM COATED ORAL at 00:53

## 2017-04-29 RX ADMIN — PREGABALIN 75 MG: 75 CAPSULE ORAL at 08:58

## 2017-04-29 RX ADMIN — DIPHENHYDRAMINE HYDROCHLORIDE AND LIDOCAINE HYDROCHLORIDE AND ALUMINUM HYDROXIDE AND MAGNESIUM HYDRO 10 ML: KIT at 11:55

## 2017-04-29 RX ADMIN — INSULIN LISPRO 6 UNITS: 100 INJECTION, SOLUTION INTRAVENOUS; SUBCUTANEOUS at 21:12

## 2017-04-29 RX ADMIN — HYDROMORPHONE HYDROCHLORIDE 1 MG: 1 INJECTION, SOLUTION INTRAMUSCULAR; INTRAVENOUS; SUBCUTANEOUS at 05:55

## 2017-04-29 RX ADMIN — DOCUSATE SODIUM 100 MG: 100 CAPSULE, LIQUID FILLED ORAL at 17:12

## 2017-04-29 RX ADMIN — LACTOBACILLUS TAB 1 TABLET: TAB at 17:11

## 2017-04-29 RX ADMIN — SODIUM CHLORIDE 100 ML/HR: 900 INJECTION, SOLUTION INTRAVENOUS at 15:58

## 2017-04-29 RX ADMIN — Medication 10 ML: at 15:49

## 2017-04-29 RX ADMIN — LACTOBACILLUS TAB 1 TABLET: TAB at 08:59

## 2017-04-29 RX ADMIN — SODIUM CHLORIDE 100 ML/HR: 900 INJECTION, SOLUTION INTRAVENOUS at 04:21

## 2017-04-29 RX ADMIN — AMPICILLIN SODIUM AND SULBACTAM SODIUM 1.5 G: 1; .5 INJECTION, POWDER, FOR SOLUTION INTRAMUSCULAR; INTRAVENOUS at 06:01

## 2017-04-29 RX ADMIN — Medication 10 ML: at 21:06

## 2017-04-29 RX ADMIN — INSULIN LISPRO 6 UNITS: 100 INJECTION, SOLUTION INTRAVENOUS; SUBCUTANEOUS at 17:08

## 2017-04-29 RX ADMIN — DULOXETINE HYDROCHLORIDE 60 MG: 60 CAPSULE, DELAYED RELEASE ORAL at 08:58

## 2017-04-29 RX ADMIN — INSULIN GLARGINE 14 UNITS: 100 INJECTION, SOLUTION SUBCUTANEOUS at 21:21

## 2017-04-29 RX ADMIN — PREGABALIN 75 MG: 75 CAPSULE ORAL at 17:11

## 2017-04-29 RX ADMIN — INSULIN LISPRO 6 UNITS: 100 INJECTION, SOLUTION INTRAVENOUS; SUBCUTANEOUS at 12:59

## 2017-04-29 RX ADMIN — HYDROMORPHONE HYDROCHLORIDE 1 MG: 1 INJECTION, SOLUTION INTRAMUSCULAR; INTRAVENOUS; SUBCUTANEOUS at 11:45

## 2017-04-29 RX ADMIN — DILTIAZEM HYDROCHLORIDE 60 MG: 60 TABLET, FILM COATED ORAL at 17:10

## 2017-04-29 RX ADMIN — DILTIAZEM HYDROCHLORIDE 60 MG: 60 TABLET, FILM COATED ORAL at 11:45

## 2017-04-29 RX ADMIN — INSULIN LISPRO 2 UNITS: 100 INJECTION, SOLUTION INTRAVENOUS; SUBCUTANEOUS at 09:05

## 2017-04-29 RX ADMIN — SIMVASTATIN 40 MG: 40 TABLET, FILM COATED ORAL at 21:04

## 2017-04-29 NOTE — PROGRESS NOTES
[facility] Hospitalist Group  Progress Note    Patient: Susy Castellano Age: 74 y.o. : 1950 MR#: [id number] SSN: [id number]  Date/Time: 2017     Subjective: Had echo this am. Denies chest pain. Shoulder feels better, RLE cellulitis gradually improving. Assessment/Plan:     1. Cellulitis RLE in the setting of dog bite 2 weeks pta. 2. large right glenohumeral joint effusion s/p bedside aspiration with finding of purulent fluid suggestive of septic arthritis. S/p Irrigation, incision and drainage, septic right shoulder 17. abx per ID  3. DM2 - SSI / lantus / ADA diet. 4. sepsis (POA) due to group A streptococcus bloodstream infection. Most likely source of bloodstream infection is acute right leg cellulitis. 5. DVT . Pulmonary embolism on    6. Hypertension on cardizem  7. Dyslipidemia - on statin   8 anxiety disorder  - continue Xanax   9. Atrial fib, was in RVR, now controlled on po cardizem  AC per cardio and ortho. 10. gastric bypass surgery 10/2016 - check B12  11. ARF - Probably prerenal , IVF - improving   12. Tongue pain/erythematous tongue likely streptococcal toxin mediated strawberry tongue - improving. 13. Full CODE . Pt and ot.      Case discussed with:  [x]Patient  []Family  [x]Nursing  []Case Management  DVT Prophylaxis:  []Lovenox  [x]Hep SQ  []SCDs  []Coumadin   []On Heparin gtt    Patient Active Problem List   Diagnosis Code    Sleep apnea G47.30    Type II diabetes mellitus ([address] E11.9    Enthesopathy of hip region M76.899    Acquired trigger finger M65.30    Primary localized osteoarthrosis, hand M19.049    Myalgia and myositis [id number]    Osteoarthrosis M19.90    Incomplete bladder emptying R33.9    Stress incontinence N39.3    Cellulitis L03.90    Sepsis ([address] A41.9    Fibromyalgia M79.7    HTN (hypertension) I10    Septic joint of right shoulder region ([address] M00.9    Atrial fibrillation (HCC) I48.91    Gastric bypass status for obesity Z98.84    History of pulmonary embolism Z86.711       Objective:   VS:   Visit Vitals    /78 (BP 1 Location: Left arm, BP Patient Position: At rest)    Pulse 74    Temp 97.8 °F (36.6 °C)    Resp 16    Ht 5' 2\" (1.575 m)    Wt 96.8 kg (213 lb 8 oz)    SpO2 97%    Breastfeeding No    BMI 39.05 kg/m2      Tmax/24hrs: Temp (24hrs), Av.5 °F (36.9 °C), Min:97.8 °F (36.6 °C), Max:99.4 °F (37.4 °C)  IOBRIEF    Intake/Output Summary (Last 24 hours) at 17 1111  Last data filed at 17 0650   Gross per 24 hour   Intake             5795 ml   Output                0 ml   Net             5795 ml       General:  Alert, cooperative, no acute distress    HEENT:  NC, Atraumatic. PERRL, anicteric sclerae. Pulmonary:  CTA b.l  Cardiovascular: Regular rate and Rhythm. GI:  Soft, Non distended, Non tender. + Bowel sounds. Extremities: lateral aspect of RLE +erythema , swelling.  Minimal ttp  Neurologic: no focal deficit    Medications:   Current Facility-Administered Medications   Medication Dose Route Frequency    vancomycin (VANCOCIN) 1,250 mg in 0.9% sodium chloride 250 mL IVPB  1,250 mg IntraVENous ONCE    sodium chloride (NS) flush 5-10 mL  5-10 mL IntraVENous Q8H    sodium chloride (NS) flush 5-10 mL  5-10 mL IntraVENous PRN    acetaminophen (TYLENOL) tablet 650 mg  650 mg Oral Q4H PRN    oxyCODONE-acetaminophen (PERCOCET) 5-325 mg per tablet 1 Tab  1 Tab Oral Q4H PRN    HYDROmorphone (PF) (DILAUDID) injection 1 mg  1 mg IntraVENous Q4H PRN    docusate sodium (COLACE) capsule 100 mg  100 mg Oral BID    VANCOMYCIN INFORMATION NOTE   Other CONTINUOUS    dilTIAZem (CARDIZEM) IR tablet 60 mg  60 mg Oral Q6H    Lactobacillus Acidoph & Bulgar (FLORANEX) tablet 1 Tab  1 Tab Oral BID    DULoxetine (CYMBALTA) capsule 60 mg  60 mg Oral DAILY    acetaminophen (TYLENOL) tablet 325 mg  325 mg Oral Q4H PRN    ampicillin-sulbactam (UNASYN) 1.5 g in 0.9% sodium chloride (MBP/ADV) 50 mL MBP  1.5 g IntraVENous Q6H    magic mouthwash (FIRST-MOUTHWASH BLM) oral suspension 10 mL  10 mL Oral TIDAC    multivitamin (MULTI-DELYN, WELLESSE) oral liquid 30 mL  30 mL Oral DAILY    0.9% sodium chloride infusion  100 mL/hr IntraVENous CONTINUOUS    ALPRAZolam (XANAX) tablet 0.5 mg  0.5 mg Oral TID PRN    HYDROcodone-acetaminophen (NORCO) 5-325 mg per tablet 1 Tab  1 Tab Oral Q6H PRN    pregabalin (LYRICA) capsule 75 mg  75 mg Oral BID    simvastatin (ZOCOR) tablet 40 mg  40 mg Oral QHS    ondansetron (ZOFRAN) injection 4 mg  4 mg IntraVENous Q4H PRN    insulin lispro (HUMALOG) injection   SubCUTAneous AC&HS    glucose chewable tablet 16 g  16 g Oral PRN    glucagon (GLUCAGEN) injection 1 mg  1 mg IntraMUSCular PRN    dextrose (D50W) injection syrg 12.5-25 g  25-50 mL IntraVENous PRN    insulin glargine (LANTUS) injection 10 Units  10 Units SubCUTAneous QHS    sodium chloride (NS) flush 5-10 mL  5-10 mL IntraVENous PRN    sodium chloride (NS) flush 5-10 mL  5-10 mL IntraVENous PRN       Labs:    Recent Results (from the past 24 hour(s))   METABOLIC PANEL, BASIC    Collection Time: 04/28/17 12:44 PM   Result Value Ref Range    Sodium 141 136 - 145 mmol/L    Potassium 4.3 3.5 - 5.5 mmol/L    Chloride 109 (H) 100 - 108 mmol/L    CO2 17 (L) 21 - 32 mmol/L    Anion gap 15 3.0 - 18 mmol/L    Glucose 150 (H) 74 - 99 mg/dL    BUN 35 (H) 7.0 - 18 MG/DL    Creatinine 1.10 0.6 - 1.3 MG/DL    BUN/Creatinine ratio 32 (H) 12 - 20      GFR est AA >60 >60 ml/min/1.73m2    GFR est non-AA 50 (L) >60 ml/min/1.73m2    Calcium 8.6 8.5 - 10.1 MG/DL   CBC WITH AUTOMATED DIFF    Collection Time: 04/28/17 12:44 PM   Result Value Ref Range    WBC 9.0 4.6 - 13.2 K/uL    RBC 4.13 (L) 4.20 - 5.30 M/uL    HGB 10.6 (L) 12.0 - 16.0 g/dL    HCT 34.0 (L) 35.0 - 45.0 %    MCV 82.3 74.0 - 97.0 FL    MCH 25.7 24.0 - 34.0 PG    MCHC 31.2 31.0 - 37.0 g/dL    RDW 15.5 (H) 11.6 - 14.5 %    PLATELET 290 336 - 720 K/uL MPV 11.1 9.2 - 11.8 FL    NEUTROPHILS 79 (H) 40 - 73 %    LYMPHOCYTES 13 (L) 21 - 52 %    MONOCYTES 8 3 - 10 %    EOSINOPHILS 0 0 - 5 %    BASOPHILS 0 0 - 2 %    ABS. NEUTROPHILS 7.1 1.8 - 8.0 K/UL    ABS. LYMPHOCYTES 1.2 0.9 - 3.6 K/UL    ABS. MONOCYTES 0.7 0.05 - 1.2 K/UL    ABS. EOSINOPHILS 0.0 0.0 - 0.4 K/UL    ABS.  BASOPHILS 0.0 0.0 - 0.1 K/UL    DF AUTOMATED     CARDIAC PANEL,(CK, CKMB & TROPONIN)    Collection Time: 04/28/17  4:15 PM   Result Value Ref Range    CK 40 26 - 192 U/L    CK - MB 2.0 <3.6 ng/ml    CK-MB Index 5.0 (H) 0.0 - 4.0 %    Troponin-I, Qt. 0.04 0.0 - 0.045 NG/ML   TSH 3RD GENERATION    Collection Time: 04/28/17  4:15 PM   Result Value Ref Range    TSH 0.94 0.36 - 3.74 uIU/mL   T4, FREE    Collection Time: 04/28/17  4:15 PM   Result Value Ref Range    T4, Free 0.9 0.7 - 1.5 NG/DL   GLUCOSE, POC    Collection Time: 04/28/17  5:55 PM   Result Value Ref Range    Glucose (POC) 209 (H) 70 - 110 mg/dL   GLUCOSE, POC    Collection Time: 04/28/17  9:11 PM   Result Value Ref Range    Glucose (POC) 219 (H) 70 - 110 mg/dL   CARDIAC PANEL,(CK, CKMB & TROPONIN)    Collection Time: 04/28/17 11:10 PM   Result Value Ref Range    CK 32 26 - 192 U/L    CK - MB 1.5 <3.6 ng/ml    CK-MB Index 4.7 (H) 0.0 - 4.0 %    Troponin-I, Qt. 0.05 (H) 0.0 - 6.897 NG/ML   METABOLIC PANEL, BASIC    Collection Time: 04/29/17 12:05 AM   Result Value Ref Range    Sodium 142 136 - 145 mmol/L    Potassium 3.9 3.5 - 5.5 mmol/L    Chloride 111 (H) 100 - 108 mmol/L    CO2 22 21 - 32 mmol/L    Anion gap 9 3.0 - 18 mmol/L    Glucose 189 (H) 74 - 99 mg/dL    BUN 34 (H) 7.0 - 18 MG/DL    Creatinine 1.10 0.6 - 1.3 MG/DL    BUN/Creatinine ratio 31 (H) 12 - 20      GFR est AA >60 >60 ml/min/1.73m2    GFR est non-AA 50 (L) >60 ml/min/1.73m2    Calcium 8.2 (L) 8.5 - 10.1 MG/DL   CBC W/O DIFF    Collection Time: 04/29/17 12:05 AM   Result Value Ref Range    WBC 6.6 4.6 - 13.2 K/uL    RBC 3.42 (L) 4.20 - 5.30 M/uL    HGB 8.6 (L) 12.0 - 16.0 g/dL    HCT 27.4 (L) 35.0 - 45.0 %    MCV 80.1 74.0 - 97.0 FL    MCH 25.1 24.0 - 34.0 PG    MCHC 31.4 31.0 - 37.0 g/dL    RDW 15.2 (H) 11.6 - 14.5 %    PLATELET 408 018 - 314 K/uL    MPV 10.6 9.2 - 11.8 FL   VANCOMYCIN, RANDOM    Collection Time: 04/29/17  5:04 AM   Result Value Ref Range    VANCOMYCIN,RANDOM 18.9 5.0 - 40.0 UG/ML   GLUCOSE, POC    Collection Time: 04/29/17  8:14 AM   Result Value Ref Range    Glucose (POC) 187 (H) 70 - 110 mg/dL       Signed By: Shira Orantes MD     April 29, 2017

## 2017-04-29 NOTE — ROUTINE PROCESS
Bedside and Verbal shift change report given to Rachel Mtz RN (oncoming nurse) by Antonio Coulter RN (offgoing nurse). Report included the following information SBAR, Kardex, Intake/Output, MAR and Recent Results.

## 2017-04-29 NOTE — PROGRESS NOTES
[facility]      CARDIOLOGY PROGRESS NOTE  RECS:  1. Paroxymal atrial Fibrillation with RVR-converted to NSR. Stable no chest pain or pressure no SOB no CHF symptoms. Will give one dose Lovenox. Discussed with Ortho.  will change Cardizem drip to po.follow cardiac troponin. Follow  echo to assess LVF, RVF or any WMA. Follow TSH and T4   2. Hypertension-controlled on Cardizem   3. Hx Pulmonary embolism on 2014  4. Hx DVT on 208  5. Hx normal NUC stress test in 02/17   6. S/p Irrigation, incision and drainage, septic right shoulder 04/27/17. 7. Hx gastric bypass surgery 10/2016  8. Cellulitis Right Leg / h/o dog bite 2 weeks before cellulitis   9.  Sepsis- on antibiotics managed per ID    ASSESSMENT:  Hospital Problems  Date Reviewed: 1/23/2015          Codes Class Noted POA    Atrial fibrillation ([facility] ICD-10-CM: I48.91  ICD-9-CM: 427.31  4/28/2017 Unknown        Gastric bypass status for obesity ICD-10-CM: Z98.84  ICD-9-CM: V45.86  4/28/2017 Unknown    Overview Signed 4/28/2017  1:09 PM by Alejo Lui NP     Done 10/2016             History of pulmonary embolism ICD-10-CM: K57.718  ICD-9-CM: V12.55  4/28/2017 Unknown        Fibromyalgia ICD-10-CM: M79.7  ICD-9-CM: 729.1  4/27/2017 Unknown        HTN (hypertension) (Chronic) ICD-10-CM: I10  ICD-9-CM: 401.9  4/27/2017 Unknown        Septic joint of right shoulder region [facility]) ICD-10-CM: M00.9  ICD-9-CM: 711.01  4/27/2017 Yes        Cellulitis ICD-10-CM: L03.90  ICD-9-CM: 682.9  4/24/2017 Unknown        Sepsis ([facility] ICD-10-CM: A41.9  ICD-9-CM: 038.9, 995.91  4/24/2017 Unknown        Sleep apnea ICD-10-CM: G47.30  ICD-9-CM: 780.57  1/25/2012 Yes        Type II diabetes mellitus ([address] ICD-10-CM: E11.9  ICD-9-CM: 250.00  1/25/2012 Yes                SUBJECTIVE:  none    No CP or SOB    OBJECTIVE:    VS:   Visit Vitals    /78 (BP 1 Location: Left arm, BP Patient Position: At rest)    Pulse 74    Temp 97.8 °F (36.6 °C)    Resp 16    Ht 5' 2\" (1.575 m)    Wt 96.8 kg (213 lb 8 oz)    SpO2 97%    Breastfeeding No    BMI 39.05 kg/m2         Intake/Output Summary (Last 24 hours) at 04/29/17 1048  Last data filed at 04/29/17 0650   Gross per 24 hour   Intake             5795 ml   Output                0 ml   Net             5795 ml     TELE: normal sinus rhythm    General: alert and in no apparent distress  HENT: Normocephalic, atraumatic. Normal external eye. Neck :  no JVD  Cardiac:  S1, S2 normal  Lungs: clear to auscultation bilaterally  Abdomen: Soft, nontender, no masses  Extremities:  No c/c/e, peripheral pulses present      Labs: Results:       Chemistry Recent Labs      04/29/17   0005  04/28/17   1244  04/27/17   0240   GLU  189*  150*  93   NA  142  141  140   K  3.9  4.3  4.3   CL  111*  109*  111*   CO2  22  17*  20*   BUN  34*  35*  50*   CREA  1.10  1.10  1.37*   CA  8.2*  8.6  8.3*   AGAP  9  15  9   BUCR  31*  32*  36*      CBC w/Diff Recent Labs      04/29/17   0005  04/28/17   1244  04/27/17   0240   WBC  6.6  9.0  11.6   RBC  3.42*  4.13*  3.64*   HGB  8.6*  10.6*  9.3*   HCT  27.4*  34.0*  29.0*   PLT  249  306  261   GRANS   --   79*  85*   LYMPH   --   13*  7*   EOS   --   0  1      Cardiac Enzymes Recent Labs      04/28/17   2310  04/28/17   1615   CPK  32  40   CKND1  4.7*  5.0*      Coagulation No results for input(s): PTP, INR, APTT in the last 72 hours. No lab exists for component: INREXT    Lipid Panel Lab Results   Component Value Date/Time    Cholesterol, total 222 10/31/2016 01:54 PM    HDL Cholesterol 62 10/31/2016 01:54 PM    LDL, calculated 115 10/31/2016 01:54 PM    VLDL, calculated 21.4 10/05/2010 08:36 AM    Triglyceride 227 10/31/2016 01:54 PM    CHOL/HDL Ratio 3.6 10/31/2016 01:54 PM      BNP No results for input(s): BNPP in the last 72 hours. Liver Enzymes No results for input(s): TP, ALB, TBIL, AP, SGOT, GPT in the last 72 hours.     No lab exists for component: DBIL   Thyroid Studies Lab Results   Component Value Date/Time    TSH 0.94 04/28/2017 04:15 PM              Semaj Vazquez MD   Pager # [phone]

## 2017-04-29 NOTE — PROGRESS NOTES
Ortho    Pt seen and evaluated  Doing well  Feeling better  No cp, sob, abd pain    Visit Vitals    /78 (BP 1 Location: Left arm, BP Patient Position: At rest)    Pulse 74    Temp 97.8 °F (36.6 °C)    Resp 16    Ht 5' 2\" (1.575 m)    Wt 213 lb 8 oz (96.8 kg)    SpO2 97%    Breastfeeding No    BMI 39.05 kg/m2     Right shoulder  Dressing cdi  ROM, int/ext rotation without pain  nv intact to UE  No cce  Radial pulse 2+    Labs  Lab Results   Component Value Date/Time    WBC 6.6 04/29/2017 12:05 AM    HGB 8.6 04/29/2017 12:05 AM    HCT 27.4 04/29/2017 12:05 AM    PLATELET 221 19/75/6838 12:05 AM    MCV 80.1 04/29/2017 12:05 AM     Micro- no growth to current    A: sp right shoulder I&D    P:  Continue with current tx, abx, pain control.

## 2017-04-30 ENCOUNTER — APPOINTMENT (OUTPATIENT)
Dept: NUCLEAR MEDICINE | Age: 67
DRG: 853 | End: 2017-04-30
Attending: HOSPITALIST
Payer: COMMERCIAL

## 2017-04-30 LAB
ANION GAP BLD CALC-SCNC: 8 MMOL/L (ref 3–18)
APTT PPP: 30.6 SEC (ref 23–36.4)
BASOPHILS # BLD AUTO: 0 K/UL (ref 0–0.1)
BASOPHILS # BLD: 0 % (ref 0–2)
BUN SERPL-MCNC: 25 MG/DL (ref 7–18)
BUN/CREAT SERPL: 24 (ref 12–20)
CALCIUM SERPL-MCNC: 8 MG/DL (ref 8.5–10.1)
CHLORIDE SERPL-SCNC: 111 MMOL/L (ref 100–108)
CO2 SERPL-SCNC: 24 MMOL/L (ref 21–32)
CREAT SERPL-MCNC: 1.05 MG/DL (ref 0.6–1.3)
DIFFERENTIAL METHOD BLD: ABNORMAL
EOSINOPHIL # BLD: 0.2 K/UL (ref 0–0.4)
EOSINOPHIL NFR BLD: 2 % (ref 0–5)
ERYTHROCYTE [DISTWIDTH] IN BLOOD BY AUTOMATED COUNT: 15 % (ref 11.6–14.5)
FERRITIN SERPL-MCNC: 98 NG/ML (ref 8–388)
FOLATE SERPL-MCNC: 8 NG/ML (ref 3.1–17.5)
GLUCOSE BLD STRIP.AUTO-MCNC: 130 MG/DL (ref 70–110)
GLUCOSE BLD STRIP.AUTO-MCNC: 190 MG/DL (ref 70–110)
GLUCOSE BLD STRIP.AUTO-MCNC: 227 MG/DL (ref 70–110)
GLUCOSE BLD STRIP.AUTO-MCNC: 272 MG/DL (ref 70–110)
GLUCOSE SERPL-MCNC: 169 MG/DL (ref 74–99)
HCT VFR BLD AUTO: 22.8 % (ref 35–45)
HCT VFR BLD AUTO: 25.6 % (ref 35–45)
HEMOCCULT STL QL: POSITIVE
HGB BLD-MCNC: 7.2 G/DL (ref 12–16)
HGB BLD-MCNC: 8 G/DL (ref 12–16)
INR PPP: 1 (ref 0.8–1.2)
IRON SATN MFR SERPL: 10 %
IRON SERPL-MCNC: 20 UG/DL (ref 50–175)
LYMPHOCYTES # BLD AUTO: 19 % (ref 21–52)
LYMPHOCYTES # BLD: 1.4 K/UL (ref 0.9–3.6)
MAGNESIUM SERPL-MCNC: 1.8 MG/DL (ref 1.6–2.6)
MCH RBC QN AUTO: 25.1 PG (ref 24–34)
MCHC RBC AUTO-ENTMCNC: 31.3 G/DL (ref 31–37)
MCV RBC AUTO: 80.3 FL (ref 74–97)
MONOCYTES # BLD: 1.1 K/UL (ref 0.05–1.2)
MONOCYTES NFR BLD AUTO: 15 % (ref 3–10)
NEUTS SEG # BLD: 4.8 K/UL (ref 1.8–8)
NEUTS SEG NFR BLD AUTO: 64 % (ref 40–73)
PLATELET # BLD AUTO: 266 K/UL (ref 135–420)
PMV BLD AUTO: 10.5 FL (ref 9.2–11.8)
POTASSIUM SERPL-SCNC: 4 MMOL/L (ref 3.5–5.5)
PROTHROMBIN TIME: 12.7 SEC (ref 11.5–15.2)
RBC # BLD AUTO: 3.19 M/UL (ref 4.2–5.3)
SODIUM SERPL-SCNC: 143 MMOL/L (ref 136–145)
TIBC SERPL-MCNC: 201 UG/DL (ref 250–450)
VANCOMYCIN SERPL-MCNC: 20.2 UG/ML (ref 5–40)
VIT B12 SERPL-MCNC: >2000 PG/ML (ref 211–911)
WBC # BLD AUTO: 7.5 K/UL (ref 4.6–13.2)

## 2017-04-30 PROCEDURE — 85025 COMPLETE CBC W/AUTO DIFF WBC: CPT | Performed by: HOSPITALIST

## 2017-04-30 PROCEDURE — 30233N0 TRANSFUSION OF AUTOLOGOUS RED BLOOD CELLS INTO PERIPHERAL VEIN, PERCUTANEOUS APPROACH: ICD-10-PCS | Performed by: INTERNAL MEDICINE

## 2017-04-30 PROCEDURE — 86900 BLOOD TYPING SEROLOGIC ABO: CPT | Performed by: HOSPITALIST

## 2017-04-30 PROCEDURE — 74011250637 HC RX REV CODE- 250/637: Performed by: NURSE PRACTITIONER

## 2017-04-30 PROCEDURE — 74011250637 HC RX REV CODE- 250/637: Performed by: INTERNAL MEDICINE

## 2017-04-30 PROCEDURE — 82962 GLUCOSE BLOOD TEST: CPT

## 2017-04-30 PROCEDURE — 65660000000 HC RM CCU STEPDOWN

## 2017-04-30 PROCEDURE — 74011250636 HC RX REV CODE- 250/636: Performed by: HOSPITALIST

## 2017-04-30 PROCEDURE — 83735 ASSAY OF MAGNESIUM: CPT | Performed by: HOSPITALIST

## 2017-04-30 PROCEDURE — 82272 OCCULT BLD FECES 1-3 TESTS: CPT | Performed by: HOSPITALIST

## 2017-04-30 PROCEDURE — 74011000258 HC RX REV CODE- 258: Performed by: INTERNAL MEDICINE

## 2017-04-30 PROCEDURE — 80048 BASIC METABOLIC PNL TOTAL CA: CPT | Performed by: HOSPITALIST

## 2017-04-30 PROCEDURE — 82728 ASSAY OF FERRITIN: CPT | Performed by: HOSPITALIST

## 2017-04-30 PROCEDURE — 74011250636 HC RX REV CODE- 250/636: Performed by: INTERNAL MEDICINE

## 2017-04-30 PROCEDURE — A9560 TC99M LABELED RBC: HCPCS

## 2017-04-30 PROCEDURE — P9016 RBC LEUKOCYTES REDUCED: HCPCS | Performed by: HOSPITALIST

## 2017-04-30 PROCEDURE — 36430 TRANSFUSION BLD/BLD COMPNT: CPT

## 2017-04-30 PROCEDURE — 74011636637 HC RX REV CODE- 636/637: Performed by: INTERNAL MEDICINE

## 2017-04-30 PROCEDURE — 80202 ASSAY OF VANCOMYCIN: CPT | Performed by: SPECIALIST

## 2017-04-30 PROCEDURE — 83540 ASSAY OF IRON: CPT | Performed by: HOSPITALIST

## 2017-04-30 PROCEDURE — 85018 HEMOGLOBIN: CPT | Performed by: HOSPITALIST

## 2017-04-30 PROCEDURE — 74011250636 HC RX REV CODE- 250/636: Performed by: SPECIALIST

## 2017-04-30 PROCEDURE — 86920 COMPATIBILITY TEST SPIN: CPT | Performed by: HOSPITALIST

## 2017-04-30 PROCEDURE — 85610 PROTHROMBIN TIME: CPT | Performed by: HOSPITALIST

## 2017-04-30 PROCEDURE — 85730 THROMBOPLASTIN TIME PARTIAL: CPT | Performed by: HOSPITALIST

## 2017-04-30 PROCEDURE — 36415 COLL VENOUS BLD VENIPUNCTURE: CPT | Performed by: HOSPITALIST

## 2017-04-30 PROCEDURE — 82607 VITAMIN B-12: CPT | Performed by: HOSPITALIST

## 2017-04-30 RX ORDER — HEPARIN SODIUM 1000 [USP'U]/ML
30 INJECTION, SOLUTION INTRAVENOUS; SUBCUTANEOUS
Status: COMPLETED | OUTPATIENT
Start: 2017-04-30 | End: 2017-04-30

## 2017-04-30 RX ORDER — SODIUM CHLORIDE 9 MG/ML
250 INJECTION, SOLUTION INTRAVENOUS AS NEEDED
Status: DISCONTINUED | OUTPATIENT
Start: 2017-04-30 | End: 2017-05-02 | Stop reason: HOSPADM

## 2017-04-30 RX ORDER — FOLIC ACID 1 MG/1
1 TABLET ORAL DAILY
Status: DISCONTINUED | OUTPATIENT
Start: 2017-05-01 | End: 2017-05-02 | Stop reason: HOSPADM

## 2017-04-30 RX ORDER — FAMOTIDINE 20 MG/1
20 TABLET, FILM COATED ORAL 2 TIMES DAILY
Status: DISCONTINUED | OUTPATIENT
Start: 2017-04-30 | End: 2017-05-02 | Stop reason: HOSPADM

## 2017-04-30 RX ORDER — ACETAMINOPHEN 500 MG
500 TABLET ORAL
Status: DISCONTINUED | OUTPATIENT
Start: 2017-04-30 | End: 2017-05-02 | Stop reason: HOSPADM

## 2017-04-30 RX ORDER — ACETAMINOPHEN 500 MG
500 TABLET ORAL
Status: DISCONTINUED | OUTPATIENT
Start: 2017-04-30 | End: 2017-04-30

## 2017-04-30 RX ORDER — AMIODARONE HYDROCHLORIDE 200 MG/1
200 TABLET ORAL 2 TIMES DAILY
Status: DISCONTINUED | OUTPATIENT
Start: 2017-04-30 | End: 2017-05-02

## 2017-04-30 RX ADMIN — AMPICILLIN SODIUM AND SULBACTAM SODIUM 1.5 G: 1; .5 INJECTION, POWDER, FOR SOLUTION INTRAMUSCULAR; INTRAVENOUS at 16:01

## 2017-04-30 RX ADMIN — HYDROCODONE BITARTRATE AND ACETAMINOPHEN 1 TABLET: 5; 325 TABLET ORAL at 21:18

## 2017-04-30 RX ADMIN — AMIODARONE HYDROCHLORIDE 200 MG: 200 TABLET ORAL at 13:01

## 2017-04-30 RX ADMIN — HYDROMORPHONE HYDROCHLORIDE 1 MG: 1 INJECTION, SOLUTION INTRAMUSCULAR; INTRAVENOUS; SUBCUTANEOUS at 13:02

## 2017-04-30 RX ADMIN — Medication 10 ML: at 13:02

## 2017-04-30 RX ADMIN — DIPHENHYDRAMINE HYDROCHLORIDE AND LIDOCAINE HYDROCHLORIDE AND ALUMINUM HYDROXIDE AND MAGNESIUM HYDRO 10 ML: KIT at 08:17

## 2017-04-30 RX ADMIN — AMIODARONE HYDROCHLORIDE 200 MG: 200 TABLET ORAL at 17:39

## 2017-04-30 RX ADMIN — LACTOBACILLUS TAB 1 TABLET: TAB at 17:38

## 2017-04-30 RX ADMIN — Medication 10 ML: at 08:31

## 2017-04-30 RX ADMIN — LACTOBACILLUS TAB 1 TABLET: TAB at 08:19

## 2017-04-30 RX ADMIN — AMPICILLIN SODIUM AND SULBACTAM SODIUM 1.5 G: 1; .5 INJECTION, POWDER, FOR SOLUTION INTRAMUSCULAR; INTRAVENOUS at 00:22

## 2017-04-30 RX ADMIN — AMPICILLIN SODIUM AND SULBACTAM SODIUM 1.5 G: 1; .5 INJECTION, POWDER, FOR SOLUTION INTRAMUSCULAR; INTRAVENOUS at 06:00

## 2017-04-30 RX ADMIN — VANCOMYCIN HYDROCHLORIDE 1250 MG: 10 INJECTION, POWDER, LYOPHILIZED, FOR SOLUTION INTRAVENOUS at 13:00

## 2017-04-30 RX ADMIN — DILTIAZEM HYDROCHLORIDE 60 MG: 60 TABLET, FILM COATED ORAL at 00:22

## 2017-04-30 RX ADMIN — Medication 10 ML: at 08:38

## 2017-04-30 RX ADMIN — DILTIAZEM HYDROCHLORIDE 60 MG: 60 TABLET, FILM COATED ORAL at 13:01

## 2017-04-30 RX ADMIN — HEPARIN SODIUM 30 UNITS: 1000 INJECTION, SOLUTION INTRAVENOUS; SUBCUTANEOUS at 22:00

## 2017-04-30 RX ADMIN — INSULIN LISPRO 2 UNITS: 100 INJECTION, SOLUTION INTRAVENOUS; SUBCUTANEOUS at 08:35

## 2017-04-30 RX ADMIN — SODIUM CHLORIDE 100 ML/HR: 900 INJECTION, SOLUTION INTRAVENOUS at 04:51

## 2017-04-30 RX ADMIN — DILTIAZEM HYDROCHLORIDE 60 MG: 60 TABLET, FILM COATED ORAL at 17:37

## 2017-04-30 RX ADMIN — HYDROMORPHONE HYDROCHLORIDE 1 MG: 1 INJECTION, SOLUTION INTRAMUSCULAR; INTRAVENOUS; SUBCUTANEOUS at 02:44

## 2017-04-30 RX ADMIN — INSULIN LISPRO 4 UNITS: 100 INJECTION, SOLUTION INTRAVENOUS; SUBCUTANEOUS at 16:15

## 2017-04-30 RX ADMIN — HYDROMORPHONE HYDROCHLORIDE 1 MG: 1 INJECTION, SOLUTION INTRAMUSCULAR; INTRAVENOUS; SUBCUTANEOUS at 08:25

## 2017-04-30 RX ADMIN — HYDROMORPHONE HYDROCHLORIDE 1 MG: 1 INJECTION, SOLUTION INTRAMUSCULAR; INTRAVENOUS; SUBCUTANEOUS at 17:41

## 2017-04-30 RX ADMIN — PREGABALIN 75 MG: 75 CAPSULE ORAL at 08:21

## 2017-04-30 RX ADMIN — PREGABALIN 75 MG: 75 CAPSULE ORAL at 17:39

## 2017-04-30 RX ADMIN — DIPHENHYDRAMINE HYDROCHLORIDE AND LIDOCAINE HYDROCHLORIDE AND ALUMINUM HYDROXIDE AND MAGNESIUM HYDRO 10 ML: KIT at 17:34

## 2017-04-30 RX ADMIN — INSULIN LISPRO 6 UNITS: 100 INJECTION, SOLUTION INTRAVENOUS; SUBCUTANEOUS at 13:17

## 2017-04-30 RX ADMIN — MULTIPLE VITAMIN LIQUID 30 ML: LIQUID at 08:21

## 2017-04-30 RX ADMIN — DULOXETINE HYDROCHLORIDE 60 MG: 60 CAPSULE, DELAYED RELEASE ORAL at 08:20

## 2017-04-30 RX ADMIN — DILTIAZEM HYDROCHLORIDE 60 MG: 60 TABLET, FILM COATED ORAL at 05:59

## 2017-04-30 RX ADMIN — Medication 10 ML: at 16:06

## 2017-04-30 NOTE — ROUTINE PROCESS
Patient noted to have dark red colored stool h&h stable at this time  Patient has no complaints of abd pain all vs are wdl at current time will continue to monitor

## 2017-04-30 NOTE — PROGRESS NOTES
[facility]      CARDIOLOGY PROGRESS NOTE  RECS:  Paroxymal atrial Fibrillation with RVR-recurrent episode last night  Discussed option of starting sotalol-does not want to stay in hospital for monitoring  Discussed and will start amiodarone  Mild mitral stenosis-monitor  anticoagulation    ASSESSMENT:  Hospital Problems  Date Reviewed: 1/23/2015          Codes Class Noted POA    Atrial fibrillation ([facility] ICD-10-CM: I48.91  ICD-9-CM: 427.31  4/28/2017 Unknown        Gastric bypass status for obesity ICD-10-CM: Z98.84  ICD-9-CM: V45.86  4/28/2017 Unknown    Overview Signed 4/28/2017  1:09 PM by Jodee Burns NP     Done 10/2016             History of pulmonary embolism ICD-10-CM: Z86.711  ICD-9-CM: V12.55  4/28/2017 Unknown        Fibromyalgia ICD-10-CM: M79.7  ICD-9-CM: 729.1  4/27/2017 Unknown        HTN (hypertension) (Chronic) ICD-10-CM: I10  ICD-9-CM: 401.9  4/27/2017 Unknown        Septic joint of right shoulder region [facility]) ICD-10-CM: M00.9  ICD-9-CM: 711.01  4/27/2017 Yes        Cellulitis ICD-10-CM: L03.90  ICD-9-CM: 682.9  4/24/2017 Unknown        Sepsis ([facility] ICD-10-CM: A41.9  ICD-9-CM: 038.9, 995.91  4/24/2017 Unknown        Sleep apnea ICD-10-CM: G47.30  ICD-9-CM: 780.57  1/25/2012 Yes        Type II diabetes mellitus ([facility] ICD-10-CM: E11.9  ICD-9-CM: 250.00  1/25/2012 Yes                SUBJECTIVE:  none    No CP or SOB    OBJECTIVE:    VS:   Visit Vitals    /85 (BP 1 Location: Left arm, BP Patient Position: At rest)    Pulse 74    Temp 96.9 °F (36.1 °C)    Resp 20    Ht 5' 2\" (1.575 m)    Wt 91.1 kg (200 lb 14.4 oz)    SpO2 99%    Breastfeeding No    BMI 36.75 kg/m2         Intake/Output Summary (Last 24 hours) at 04/30/17 1143  Last data filed at 04/30/17 0600   Gross per 24 hour   Intake             2980 ml   Output                0 ml   Net             2980 ml     TELE: normal sinus rhythm    General: alert and in no apparent distress  HENT: Normocephalic, atraumatic. Normal external eye. Neck :  no JVD  Cardiac:  S1, S2 normal  Lungs: clear to auscultation bilaterally  Abdomen: Soft, nontender, no masses  Extremities:  No c/c/e, peripheral pulses present      Labs: Results:       Chemistry Recent Labs      04/30/17 0218 04/29/17   0005  04/28/17   1244   GLU  169*  189*  150*   NA  143  142  141   K  4.0  3.9  4.3   CL  111*  111*  109*   CO2  24  22  17*   BUN  25*  34*  35*   CREA  1.05  1.10  1.10   CA  8.0*  8.2*  8.6   AGAP  8  9  15   BUCR  24*  31*  32*      CBC w/Diff Recent Labs      04/30/17 0218 04/29/17   0005  04/28/17   1244   WBC  7.5  6.6  9.0   RBC  3.19*  3.42*  4.13*   HGB  8.0*  8.6*  10.6*   HCT  25.6*  27.4*  34.0*   PLT  266  249  306   GRANS  64   --   79*   LYMPH  19*   --   13*   EOS  2   --   0      Cardiac Enzymes Recent Labs      04/28/17   2310  04/28/17   1615   CPK  32  40   CKND1  4.7*  5.0*      Coagulation No results for input(s): PTP, INR, APTT in the last 72 hours. No lab exists for component: INREXT, INREXT    Lipid Panel Lab Results   Component Value Date/Time    Cholesterol, total 222 10/31/2016 01:54 PM    HDL Cholesterol 62 10/31/2016 01:54 PM    LDL, calculated 115 10/31/2016 01:54 PM    VLDL, calculated 21.4 10/05/2010 08:36 AM    Triglyceride 227 10/31/2016 01:54 PM    CHOL/HDL Ratio 3.6 10/31/2016 01:54 PM      BNP No results for input(s): BNPP in the last 72 hours. Liver Enzymes No results for input(s): TP, ALB, TBIL, AP, SGOT, GPT in the last 72 hours.     No lab exists for component: DBIL   Thyroid Studies Lab Results   Component Value Date/Time    TSH 0.94 04/28/2017 04:15 PM              Semaj Vazquez MD   Pager # [phone]

## 2017-04-30 NOTE — INTERDISCIPLINARY ROUNDS
IDR/SLIDR Summary          Patient: Leslie Byrd MRN: [id number]    Age: 74 y.o. YOB: 1950 Room/Bed: 206/01   Admit Diagnosis: Sepsis ([address]  Cellulitis  dx  Principal Diagnosis: <principal problem not specified>   Goals:   Readmission: NO  Quality Measure: SEPSIS and COPD  VTE Prophylaxis: Mechanical  Influenza Vaccine screening completed? YES  Pneumococcal Vaccine screening completed? YES  Mobility needs: Yes   Nutrition plan:Yes  Consults:P.T, O.T. and Case Management    Financial concerns:No  Escalated to CM? NO  RRAT Score:    Interventions:Home Health  Testing due for pt today?  NO  LOS: 6 days Expected length of stay  days  Discharge plan: home   PCP: Jason Finley MD  Transportation needs: Yes    Days before discharge:two or more days before discharge   Discharge disposition: Home    Signed:     Julissa Prado RN  4/30/2017  5:48 PM

## 2017-04-30 NOTE — PROGRESS NOTES
[facility]  Progress Note    Patient: Ricki Herman Age: 74 y.o. : 1950 MR#: [id number] SSN: [id number]  Date/Time: 2017     Subjective:     Echo results noted. Hb down to 8.0. Called by nsg for maroon colored stool this am, + occult blood. Patient reports loose stool is improving. She was taken off coumadin in 2016 per her surgeon and cardiologist [facility], does not recall name). Denies current chest pain or shortness of breath. Had colonoscopy 2 years ago at [facility], does not recall who performed colo, normal per patient. Assessment/Plan:     1. Cellulitis RLE in the setting of dog bite 2 weeks pta. ID following. 2. large right glenohumeral joint effusion s/p bedside aspiration with finding of purulent fluid suggestive of septic arthritis. S/p Irrigation, incision and drainage, septic right shoulder 17. abx per ID  3. DM2 - SSI / lantus / ADA diet. 4. sepsis (POA) due to group A streptococcus bloodstream infection. Most likely source of bloodstream infection is acute right leg cellulitis. 5. DVT . Pulmonary embolism on , off AC since  per patient. 6. Hypertension on cardizem  7. Dyslipidemia - on statin   8 anxiety disorder  - continue Xanax   9. Atrial fib, was in RVR, now controlled on po cardizem  AC per cardio and ortho. 10. gastric bypass surgery 10/2015 per Dr. Cullen Liu - B12 okay. 11. ARF - prerenal azotemia improving with IVF. 12. Tongue pain/erythematous tongue likely streptococcal toxin mediated strawberry tongue - improving. 13. Mild mitral stenosis  14. Anemia, apparently lgib - if active bleeding noted will get bleeding scan. Npo pending input from GI. Check stat labs. 15. Anemia requiring transfusion - 1 unit prbc today  16. Full CODE . Pt and ot. I discussed the case with Dr. Leyla Gonsalez.      Case discussed with:  [x]Patient  []Family  [x]Nursing  []Case Management  DVT Prophylaxis: []Lovenox  [x]Hep SQ  []SCDs  []Coumadin   []On Heparin gtt    Patient Active Problem List   Diagnosis Code    Sleep apnea G47.30    Type II diabetes mellitus ([facility] E11.9    Enthesopathy of hip region M76.899    Acquired trigger finger M65.30    Primary localized osteoarthrosis, hand M19.049    Myalgia and myositis [id number]    Osteoarthrosis M19.90    Incomplete bladder emptying R33.9    Stress incontinence N39.3    Cellulitis L03.90    Sepsis ([facility] A41.9    Fibromyalgia M79.7    HTN (hypertension) I10    Septic joint of right shoulder region ([facility] M00.9    Atrial fibrillation ([facility]) I48.91    Gastric bypass status for obesity Z98.84    History of pulmonary embolism Z86.711       Objective:   VS:   Visit Vitals    BP (!) 164/99 (BP 1 Location: Left arm, BP Patient Position: At rest)    Pulse 100    Temp 96.6 °F (35.9 °C)    Resp 20    Ht 5' 2\" (1.575 m)    Wt 91.1 kg (200 lb 14.4 oz)    SpO2 94%    Breastfeeding No    BMI 36.75 kg/m2      Tmax/24hrs: Temp (24hrs), Av.5 °F (36.4 °C), Min:96.6 °F (35.9 °C), Max:98.6 °F (37 °C)  IOBRIEF    Intake/Output Summary (Last 24 hours) at 17 0622  Last data filed at 17   Gross per 24 hour   Intake             7865 ml   Output              600 ml   Net             7265 ml       General:  Alert, cooperative, no acute distress    HEENT:  NC, Atraumatic. PERRL, anicteric sclerae. Pulmonary:  CTA b.l. Symmetrical chest expansion. No dullness to percussion. Cardiovascular: Regular rate and Rhythm. GI:  Soft, Non distended, Non tender. + Bowel sounds. Extremities: lateral aspect of RLE +erythema , swelling.  Minimal ttp  Neurologic: no focal deficit    Medications:   Current Facility-Administered Medications   Medication Dose Route Frequency    insulin glargine (LANTUS) injection 14 Units  14 Units SubCUTAneous QHS    sodium chloride (NS) flush 5-10 mL  5-10 mL IntraVENous Q8H    sodium chloride (NS) flush 5-10 mL  5-10 mL IntraVENous PRN    acetaminophen (TYLENOL) tablet 650 mg  650 mg Oral Q4H PRN    oxyCODONE-acetaminophen (PERCOCET) 5-325 mg per tablet 1 Tab  1 Tab Oral Q4H PRN    HYDROmorphone (PF) (DILAUDID) injection 1 mg  1 mg IntraVENous Q4H PRN    docusate sodium (COLACE) capsule 100 mg  100 mg Oral BID    VANCOMYCIN INFORMATION NOTE   Other CONTINUOUS    dilTIAZem (CARDIZEM) IR tablet 60 mg  60 mg Oral Q6H    Lactobacillus Acidoph & Bulgar (FLORANEX) tablet 1 Tab  1 Tab Oral BID    DULoxetine (CYMBALTA) capsule 60 mg  60 mg Oral DAILY    acetaminophen (TYLENOL) tablet 325 mg  325 mg Oral Q4H PRN    ampicillin-sulbactam (UNASYN) 1.5 g in 0.9% sodium chloride (MBP/ADV) 50 mL MBP  1.5 g IntraVENous Q6H    magic mouthwash (FIRST-MOUTHWASH BLM) oral suspension 10 mL  10 mL Oral TIDAC    multivitamin (MULTI-DELYN, WELLESSE) oral liquid 30 mL  30 mL Oral DAILY    0.9% sodium chloride infusion  100 mL/hr IntraVENous CONTINUOUS    ALPRAZolam (XANAX) tablet 0.5 mg  0.5 mg Oral TID PRN    HYDROcodone-acetaminophen (NORCO) 5-325 mg per tablet 1 Tab  1 Tab Oral Q6H PRN    pregabalin (LYRICA) capsule 75 mg  75 mg Oral BID    simvastatin (ZOCOR) tablet 40 mg  40 mg Oral QHS    ondansetron (ZOFRAN) injection 4 mg  4 mg IntraVENous Q4H PRN    insulin lispro (HUMALOG) injection   SubCUTAneous AC&HS    glucose chewable tablet 16 g  16 g Oral PRN    glucagon (GLUCAGEN) injection 1 mg  1 mg IntraMUSCular PRN    dextrose (D50W) injection syrg 12.5-25 g  25-50 mL IntraVENous PRN    sodium chloride (NS) flush 5-10 mL  5-10 mL IntraVENous PRN    sodium chloride (NS) flush 5-10 mL  5-10 mL IntraVENous PRN       Labs:    Recent Results (from the past 24 hour(s))   GLUCOSE, POC    Collection Time: 04/29/17  8:14 AM   Result Value Ref Range    Glucose (POC) 187 (H) 70 - 110 mg/dL   GLUCOSE, POC    Collection Time: 04/29/17 12:05 PM   Result Value Ref Range    Glucose (POC) 263 (H) 70 - 110 mg/dL   GLUCOSE, POC    Collection Time: 04/29/17  3:44 PM   Result Value Ref Range    Glucose (POC) 279 (H) 70 - 110 mg/dL   GLUCOSE, POC    Collection Time: 04/29/17  9:10 PM   Result Value Ref Range    Glucose (POC) 267 (H) 70 - 110 mg/dL   VANCOMYCIN, RANDOM    Collection Time: 04/30/17  2:18 AM   Result Value Ref Range    VANCOMYCIN,RANDOM 20.2 5.0 - 40.0 UG/ML   CBC WITH AUTOMATED DIFF    Collection Time: 04/30/17  2:18 AM   Result Value Ref Range    WBC 7.5 4.6 - 13.2 K/uL    RBC 3.19 (L) 4.20 - 5.30 M/uL    HGB 8.0 (L) 12.0 - 16.0 g/dL    HCT 25.6 (L) 35.0 - 45.0 %    MCV 80.3 74.0 - 97.0 FL    MCH 25.1 24.0 - 34.0 PG    MCHC 31.3 31.0 - 37.0 g/dL    RDW 15.0 (H) 11.6 - 14.5 %    PLATELET 533 433 - 638 K/uL    MPV 10.5 9.2 - 11.8 FL    NEUTROPHILS 64 40 - 73 %    LYMPHOCYTES 19 (L) 21 - 52 %    MONOCYTES 15 (H) 3 - 10 %    EOSINOPHILS 2 0 - 5 %    BASOPHILS 0 0 - 2 %    ABS. NEUTROPHILS 4.8 1.8 - 8.0 K/UL    ABS. LYMPHOCYTES 1.4 0.9 - 3.6 K/UL    ABS. MONOCYTES 1.1 0.05 - 1.2 K/UL    ABS. EOSINOPHILS 0.2 0.0 - 0.4 K/UL    ABS.  BASOPHILS 0.0 0.0 - 0.1 K/UL    DF AUTOMATED     MAGNESIUM    Collection Time: 04/30/17  2:18 AM   Result Value Ref Range    Magnesium 1.8 1.6 - 2.6 mg/dL   METABOLIC PANEL, BASIC    Collection Time: 04/30/17  2:18 AM   Result Value Ref Range    Sodium 143 136 - 145 mmol/L    Potassium 4.0 3.5 - 5.5 mmol/L    Chloride 111 (H) 100 - 108 mmol/L    CO2 24 21 - 32 mmol/L    Anion gap 8 3.0 - 18 mmol/L    Glucose 169 (H) 74 - 99 mg/dL    BUN 25 (H) 7.0 - 18 MG/DL    Creatinine 1.05 0.6 - 1.3 MG/DL    BUN/Creatinine ratio 24 (H) 12 - 20      GFR est AA >60 >60 ml/min/1.73m2    GFR est non-AA 53 (L) >60 ml/min/1.73m2    Calcium 8.0 (L) 8.5 - 10.1 MG/DL   VITAMIN B12 & FOLATE    Collection Time: 04/30/17  2:18 AM   Result Value Ref Range    Vitamin B12 PENDING pg/mL    Folate 8.0 3.10 - 17.50 ng/mL       Signed By: Yoselin Scales MD     April 30, 2017

## 2017-04-30 NOTE — CONSULTS
[facility]   [email or web address]      Impression:   1. Hemodynamically stable LGI bleed likely due to ischemic colits likely due to RVR last night   2. Septic joint s/p I and D  3. DM2   4. sepsis (POA) due to group A streptococcus bloodstream infection. 5. DVT 2008. Pulmonary embolism on 2014  6. Hypertension   7. anxiety disorder   9. Atrial fib,  RVR last night   10. gastric bypass surgery . 11. ARF   13. Mild mitral stenosis  14. Anemia, multifactorial   15. Hx of colon polyps last colo 2 years ago at [facility] with routine removal of 2 polyps   16. Diarrhea on mult abx     Plan:     1. Endoscopy not recommended acutely  2. Monitor hct   3. Transfuse for hct < 23%   4. Check c diff   5. pepcid 40mg nightly prophy       Chief Complaint: bleeding       HPI:  Bruno Rogers is a 74 y.o. female who is being seen on consult for bright red blood per rectum without abd pain or fevers . Had a bout of RVR last night that now is controlled . Hemodynamically stable at this time . No n/v dyspepsia abd pain or change in bms otherwise . Had a colo 2 years ago at [id number] with routine removal of 2 polyps. PMH:   Past Medical History:   Diagnosis Date    Diabetes ([address]     Fibromyalgia     Hypercholesteremia     Hypertension        PSH:   Past Surgical History:   Procedure Laterality Date    HX BREAST LUMPECTOMY      HX CARPAL TUNNEL RELEASE      HX KNEE REPLACEMENT      bilater    HX PARTIAL THYROIDECTOMY      HX SHOULDER ARTHROSCOPY      HX TUBAL LIGATION         Social HX:   Social History     Social History    Marital status: LEGALLY      Spouse name: N/A    Number of children: N/A    Years of education: N/A     Occupational History    Not on file.      Social History Main Topics    Smoking status: Never Smoker    Smokeless tobacco: Never Used    Alcohol use No    Drug use: No    Sexual activity: Yes     Other Topics Concern    Not on file     Social History Narrative       FHX:   Family History   Problem Relation Age of Onset   [facility] Asthma Mother     Hypertension Mother     Kidney Disease Mother     Thyroid Disease Mother     Diabetes Maternal Grandmother     Thyroid Disease Maternal Grandmother        Allergy:   No Known Allergies    Home Medications:     Prescriptions Prior to Admission   Medication Sig    lisinopril (PRINIVIL, ZESTRIL) 10 mg tablet Take 10 mg by mouth daily.  methocarbamol (ROBAXIN) 750 mg tablet Take 1 Tab by mouth four (4) times daily.  HYDROcodone-acetaminophen (NORCO) 5-325 mg per tablet Take  by mouth.  simvastatin (ZOCOR) 40 mg tablet 40 mg.  pregabalin (LYRICA) 75 mg capsule 75 mg.    INSULIN GLARGINE,HUM. REC. ANLOG (LANTUS SC)     DULoxetine (CYMBALTA) 60 mg capsule 60 mg.    ALPRAZolam (XANAX) 1 mg tablet        Review of Systems:     Constitutional: No fevers, chills, weight loss, fatigue. Skin: Cellulitic rashe, no pruritis, jaundice, ulcerations, erythema. HENT: No headaches, nosebleeds, sinus pressure, rhinorrhea, sore throat. Eyes: No visual changes, blurred vision, eye pain, photophobia, jaundice. Cardiovascular: No chest pain, heart palpitations. Respiratory: No cough, SOB, wheezing, chest discomfort, orthopnea. Gastrointestinal: Red blood per rectum no n/v or pain    Genitourinary: No dysuria, bleeding, discharge, pyuria. Musculoskeletal: No weakness, arthralgias, wasting. sore joint    Endo: No sweats. Heme: No bruising, easy bleeding. Allergies: As noted. Neurological: Cranial nerves intact. Alert and oriented. Gait not assessed. Psychiatric:  No anxiety, depression, hallucinations.                  Visit Vitals    /78 (BP 1 Location: Left arm, BP Patient Position: At rest)    Pulse 68    Temp 98.2 °F (36.8 °C)    Resp 20    Ht 5' 2\" (1.575 m)    Wt 91.1 kg (200 lb 14.4 oz)    SpO2 98%    Breastfeeding No    BMI 36.75 kg/m2       Physical Assessment:     constitutional: appearance: well developed, well nourished, normal habitus, no deformities, in no acute distress. skin: inspection: no rashes, ulcers, icterus or other lesions; no clubbing or telangiectasias. palpation: no induration or subcutaneos nodules. eyes: inspection: normal conjunctivae and lids; no jaundice pupils: symmetrical, normoreactive to light, normal accommodation and size. ENMT: mouth: normal oral mucosa,lips and gums; good dentition. oropharynx: normal tongue, hard and soft palate; posterior pharynx without erythema, exudate or lesions. neck: no masses organomegaly or tenderness. respiratory: effort: normal chest excursion; no intercostal retraction or accessory muscle use. cardiovascular: abdominal aorta: normal size and position; no bruits. palpation: PMI of normal size and position; normal rhythm; no thrill or murmurs. abdominal: abdomen: normal consistency; no tenderness or masses. hernias: no hernias appreciated. liver: normal size and consistency. spleen: not palpable. rectal: hemoccult/guaiac: not performed. musculoskeletal: no deformities or muscle wasting   lymphatic: axilae: not palpable. groin: not palpable. neck: within normal limits. other: not palpable. neurologic: cranial nerves: II-XII normal.   psychiatric: judgement/insight: within normal limits. memory: within normal limits for recent and remote events. mood and affect: no evidence of depression, anxiety or agitation. orientation: oriented to time, space and person.         Basic Metabolic Profile   Recent Labs      04/30/17 0218   NA  143   K  4.0   CL  111*   CO2  24   BUN  25*   GLU  169*   CA  8.0*   MG  1.8         CBC w/Diff    Recent Labs      04/30/17   1300  04/30/17 0218   WBC   --   7.5   RBC   --   3.19*   HGB  7.2*  8.0*   HCT  22.8*  25.6*   MCV   --   80.3   MCH   --   25.1   MCHC   --   31.3   RDW   --   15.0*   PLT   --   266    Recent Labs      04/30/17 0218   GRANS  64 LYMPH  19*   EOS  2        Hepatic Function   No results for input(s): ALB, TP, TBILI, GPT, SGOT, AP, AML, LPSE in the last 72 hours. No lab exists for component: Mic Saunders MD, M.D. [facility], [address] [place] [address]  [email or web address]

## 2017-04-30 NOTE — ROUTINE PROCESS
Bedside and Verbal shift change report given to CHUCK Shea (oncoming nurse) by Medardo Chamorro RN (offgoing nurse). Report included the following information SBAR, Kardex, Intake/Output, MAR and Recent Results.

## 2017-04-30 NOTE — ROUTINE PROCESS
Bedside and Verbal shift change report given to Basil (oncoming nurse) by Wendy Carlson   (offgoing nurse). Report included the following information SBAR, Kardex and MAR.

## 2017-04-30 NOTE — PROGRESS NOTES
Blood pressure 161/85, pulse 74, temperature 96.9 °F (36.1 °C), resp. rate 20, height 5' 2\" (1.575 m), weight 91.1 kg (200 lb 14.4 oz), SpO2 99 %, not currently breastfeeding.     Lab Results   Component Value Date/Time    WBC 7.5 04/30/2017 02:18 AM    HGB 8.0 04/30/2017 02:18 AM    HCT 25.6 04/30/2017 02:18 AM    PLATELET 852 54/94/5306 02:18 AM    MCV 80.3 04/30/2017 02:18 AM     Pain ok  No sob or cp  p-cont abx

## 2017-05-01 LAB
ABO + RH BLD: NORMAL
ANION GAP BLD CALC-SCNC: 8 MMOL/L (ref 3–18)
BASOPHILS # BLD AUTO: 0 K/UL (ref 0–0.1)
BASOPHILS # BLD: 0 % (ref 0–2)
BLD PROD TYP BPU: NORMAL
BLOOD GROUP ANTIBODIES SERPL: NORMAL
BPU ID: NORMAL
BUN SERPL-MCNC: 17 MG/DL (ref 7–18)
BUN/CREAT SERPL: 19 (ref 12–20)
CALCIUM SERPL-MCNC: 8 MG/DL (ref 8.5–10.1)
CHLORIDE SERPL-SCNC: 112 MMOL/L (ref 100–108)
CO2 SERPL-SCNC: 23 MMOL/L (ref 21–32)
CREAT SERPL-MCNC: 0.88 MG/DL (ref 0.6–1.3)
CROSSMATCH RESULT,%XM: NORMAL
DIFFERENTIAL METHOD BLD: ABNORMAL
EOSINOPHIL # BLD: 0.1 K/UL (ref 0–0.4)
EOSINOPHIL NFR BLD: 2 % (ref 0–5)
ERYTHROCYTE [DISTWIDTH] IN BLOOD BY AUTOMATED COUNT: 14.8 % (ref 11.6–14.5)
GLUCOSE BLD STRIP.AUTO-MCNC: 174 MG/DL (ref 70–110)
GLUCOSE BLD STRIP.AUTO-MCNC: 192 MG/DL (ref 70–110)
GLUCOSE BLD STRIP.AUTO-MCNC: 251 MG/DL (ref 70–110)
GLUCOSE BLD STRIP.AUTO-MCNC: 271 MG/DL (ref 70–110)
GLUCOSE SERPL-MCNC: 240 MG/DL (ref 74–99)
HCT VFR BLD AUTO: 25.9 % (ref 35–45)
HCT VFR BLD AUTO: 26.7 % (ref 35–45)
HCT VFR BLD AUTO: 27.6 % (ref 35–45)
HGB BLD-MCNC: 8.3 G/DL (ref 12–16)
HGB BLD-MCNC: 8.5 G/DL (ref 12–16)
HGB BLD-MCNC: 8.9 G/DL (ref 12–16)
INR PPP: 1 (ref 0.8–1.2)
LYMPHOCYTES # BLD AUTO: 15 % (ref 21–52)
LYMPHOCYTES # BLD: 1.1 K/UL (ref 0.9–3.6)
MAGNESIUM SERPL-MCNC: 1.6 MG/DL (ref 1.6–2.6)
MCH RBC QN AUTO: 25.7 PG (ref 24–34)
MCHC RBC AUTO-ENTMCNC: 32 G/DL (ref 31–37)
MCV RBC AUTO: 80.2 FL (ref 74–97)
MONOCYTES # BLD: 0.8 K/UL (ref 0.05–1.2)
MONOCYTES NFR BLD AUTO: 11 % (ref 3–10)
NEUTS SEG # BLD: 5.1 K/UL (ref 1.8–8)
NEUTS SEG NFR BLD AUTO: 72 % (ref 40–73)
PLATELET # BLD AUTO: 308 K/UL (ref 135–420)
PLATELET COMMENTS,PCOM: ABNORMAL
PMV BLD AUTO: 10.2 FL (ref 9.2–11.8)
POTASSIUM SERPL-SCNC: 3.5 MMOL/L (ref 3.5–5.5)
PROTHROMBIN TIME: 12.9 SEC (ref 11.5–15.2)
RBC # BLD AUTO: 3.23 M/UL (ref 4.2–5.3)
RBC MORPH BLD: ABNORMAL
SODIUM SERPL-SCNC: 143 MMOL/L (ref 136–145)
SPECIMEN EXP DATE BLD: NORMAL
STATUS OF UNIT,%ST: NORMAL
UNIT DIVISION, %UDIV: 0
WBC # BLD AUTO: 7.1 K/UL (ref 4.6–13.2)

## 2017-05-01 PROCEDURE — 36415 COLL VENOUS BLD VENIPUNCTURE: CPT | Performed by: HOSPITALIST

## 2017-05-01 PROCEDURE — 74011250637 HC RX REV CODE- 250/637: Performed by: INTERNAL MEDICINE

## 2017-05-01 PROCEDURE — 83735 ASSAY OF MAGNESIUM: CPT | Performed by: HOSPITALIST

## 2017-05-01 PROCEDURE — 74011636637 HC RX REV CODE- 636/637: Performed by: HOSPITALIST

## 2017-05-01 PROCEDURE — 85018 HEMOGLOBIN: CPT | Performed by: INTERNAL MEDICINE

## 2017-05-01 PROCEDURE — 74011250636 HC RX REV CODE- 250/636: Performed by: SPECIALIST

## 2017-05-01 PROCEDURE — 85610 PROTHROMBIN TIME: CPT | Performed by: INTERNAL MEDICINE

## 2017-05-01 PROCEDURE — 97110 THERAPEUTIC EXERCISES: CPT

## 2017-05-01 PROCEDURE — 74011250637 HC RX REV CODE- 250/637: Performed by: HOSPITALIST

## 2017-05-01 PROCEDURE — 97116 GAIT TRAINING THERAPY: CPT

## 2017-05-01 PROCEDURE — 92610 EVALUATE SWALLOWING FUNCTION: CPT

## 2017-05-01 PROCEDURE — 74011250637 HC RX REV CODE- 250/637: Performed by: NURSE PRACTITIONER

## 2017-05-01 PROCEDURE — 74011250636 HC RX REV CODE- 250/636: Performed by: INTERNAL MEDICINE

## 2017-05-01 PROCEDURE — 80048 BASIC METABOLIC PNL TOTAL CA: CPT | Performed by: HOSPITALIST

## 2017-05-01 PROCEDURE — 85025 COMPLETE CBC W/AUTO DIFF WBC: CPT | Performed by: HOSPITALIST

## 2017-05-01 PROCEDURE — 97165 OT EVAL LOW COMPLEX 30 MIN: CPT

## 2017-05-01 PROCEDURE — 74011000258 HC RX REV CODE- 258: Performed by: INTERNAL MEDICINE

## 2017-05-01 PROCEDURE — 82962 GLUCOSE BLOOD TEST: CPT

## 2017-05-01 PROCEDURE — 74011636637 HC RX REV CODE- 636/637: Performed by: INTERNAL MEDICINE

## 2017-05-01 PROCEDURE — 97161 PT EVAL LOW COMPLEX 20 MIN: CPT

## 2017-05-01 PROCEDURE — 65660000000 HC RM CCU STEPDOWN

## 2017-05-01 RX ORDER — LISINOPRIL 10 MG/1
10 TABLET ORAL DAILY
Status: DISCONTINUED | OUTPATIENT
Start: 2017-05-01 | End: 2017-05-02 | Stop reason: HOSPADM

## 2017-05-01 RX ORDER — WARFARIN 7.5 MG/1
7.5 TABLET ORAL EVERY EVENING
Status: DISCONTINUED | OUTPATIENT
Start: 2017-05-01 | End: 2017-05-01

## 2017-05-01 RX ORDER — MAGNESIUM SULFATE 1 G/100ML
1 INJECTION INTRAVENOUS ONCE
Status: COMPLETED | OUTPATIENT
Start: 2017-05-01 | End: 2017-05-01

## 2017-05-01 RX ADMIN — FAMOTIDINE 20 MG: 20 TABLET ORAL at 08:26

## 2017-05-01 RX ADMIN — DILTIAZEM HYDROCHLORIDE 60 MG: 60 TABLET, FILM COATED ORAL at 00:49

## 2017-05-01 RX ADMIN — AMIODARONE HYDROCHLORIDE 200 MG: 200 TABLET ORAL at 08:26

## 2017-05-01 RX ADMIN — DIPHENHYDRAMINE HYDROCHLORIDE AND LIDOCAINE HYDROCHLORIDE AND ALUMINUM HYDROXIDE AND MAGNESIUM HYDRO 10 ML: KIT at 08:34

## 2017-05-01 RX ADMIN — PREGABALIN 75 MG: 75 CAPSULE ORAL at 08:26

## 2017-05-01 RX ADMIN — Medication 10 ML: at 00:52

## 2017-05-01 RX ADMIN — MULTIPLE VITAMIN LIQUID 30 ML: LIQUID at 08:26

## 2017-05-01 RX ADMIN — Medication 10 ML: at 21:43

## 2017-05-01 RX ADMIN — DIPHENHYDRAMINE HYDROCHLORIDE AND LIDOCAINE HYDROCHLORIDE AND ALUMINUM HYDROXIDE AND MAGNESIUM HYDRO 10 ML: KIT at 17:27

## 2017-05-01 RX ADMIN — LACTOBACILLUS TAB 1 TABLET: TAB at 08:26

## 2017-05-01 RX ADMIN — DULOXETINE HYDROCHLORIDE 60 MG: 60 CAPSULE, DELAYED RELEASE ORAL at 08:27

## 2017-05-01 RX ADMIN — FOLIC ACID 1 MG: 1 TABLET ORAL at 08:38

## 2017-05-01 RX ADMIN — LACTOBACILLUS TAB 1 TABLET: TAB at 17:25

## 2017-05-01 RX ADMIN — DILTIAZEM HYDROCHLORIDE 60 MG: 60 TABLET, FILM COATED ORAL at 17:25

## 2017-05-01 RX ADMIN — SODIUM CHLORIDE 100 ML/HR: 900 INJECTION, SOLUTION INTRAVENOUS at 13:24

## 2017-05-01 RX ADMIN — SIMVASTATIN 40 MG: 40 TABLET, FILM COATED ORAL at 00:50

## 2017-05-01 RX ADMIN — FAMOTIDINE 20 MG: 20 TABLET ORAL at 17:25

## 2017-05-01 RX ADMIN — FAMOTIDINE 20 MG: 20 TABLET ORAL at 00:50

## 2017-05-01 RX ADMIN — INSULIN GLARGINE 14 UNITS: 100 INJECTION, SOLUTION SUBCUTANEOUS at 00:51

## 2017-05-01 RX ADMIN — DIPHENHYDRAMINE HYDROCHLORIDE AND LIDOCAINE HYDROCHLORIDE AND ALUMINUM HYDROXIDE AND MAGNESIUM HYDRO 10 ML: KIT at 13:21

## 2017-05-01 RX ADMIN — AMIODARONE HYDROCHLORIDE 200 MG: 200 TABLET ORAL at 17:25

## 2017-05-01 RX ADMIN — HYDROMORPHONE HYDROCHLORIDE 1 MG: 1 INJECTION, SOLUTION INTRAMUSCULAR; INTRAVENOUS; SUBCUTANEOUS at 21:53

## 2017-05-01 RX ADMIN — PREGABALIN 75 MG: 75 CAPSULE ORAL at 17:25

## 2017-05-01 RX ADMIN — SODIUM CHLORIDE 3 G: 900 INJECTION INTRAVENOUS at 13:31

## 2017-05-01 RX ADMIN — AMPICILLIN SODIUM AND SULBACTAM SODIUM 1.5 G: 1; .5 INJECTION, POWDER, FOR SOLUTION INTRAMUSCULAR; INTRAVENOUS at 06:21

## 2017-05-01 RX ADMIN — AMPICILLIN SODIUM AND SULBACTAM SODIUM 1.5 G: 1; .5 INJECTION, POWDER, FOR SOLUTION INTRAMUSCULAR; INTRAVENOUS at 00:46

## 2017-05-01 RX ADMIN — DILTIAZEM HYDROCHLORIDE 60 MG: 60 TABLET, FILM COATED ORAL at 23:58

## 2017-05-01 RX ADMIN — SIMVASTATIN 40 MG: 40 TABLET, FILM COATED ORAL at 21:41

## 2017-05-01 RX ADMIN — DILTIAZEM HYDROCHLORIDE 60 MG: 60 TABLET, FILM COATED ORAL at 06:21

## 2017-05-01 RX ADMIN — HYDROMORPHONE HYDROCHLORIDE 1 MG: 1 INJECTION, SOLUTION INTRAMUSCULAR; INTRAVENOUS; SUBCUTANEOUS at 01:19

## 2017-05-01 RX ADMIN — INSULIN LISPRO 2 UNITS: 100 INJECTION, SOLUTION INTRAVENOUS; SUBCUTANEOUS at 21:42

## 2017-05-01 RX ADMIN — SODIUM CHLORIDE 100 ML/HR: 900 INJECTION, SOLUTION INTRAVENOUS at 00:46

## 2017-05-01 RX ADMIN — INSULIN LISPRO 6 UNITS: 100 INJECTION, SOLUTION INTRAVENOUS; SUBCUTANEOUS at 13:20

## 2017-05-01 RX ADMIN — LISINOPRIL 10 MG: 10 TABLET ORAL at 15:46

## 2017-05-01 RX ADMIN — HYDROMORPHONE HYDROCHLORIDE 1 MG: 1 INJECTION, SOLUTION INTRAMUSCULAR; INTRAVENOUS; SUBCUTANEOUS at 17:26

## 2017-05-01 RX ADMIN — INSULIN GLARGINE 14 UNITS: 100 INJECTION, SOLUTION SUBCUTANEOUS at 21:41

## 2017-05-01 RX ADMIN — Medication 10 ML: at 08:33

## 2017-05-01 RX ADMIN — MAGNESIUM SULFATE IN DEXTROSE 1 G: 10 INJECTION, SOLUTION INTRAVENOUS at 13:20

## 2017-05-01 RX ADMIN — DILTIAZEM HYDROCHLORIDE 60 MG: 60 TABLET, FILM COATED ORAL at 13:21

## 2017-05-01 RX ADMIN — Medication 10 ML: at 13:31

## 2017-05-01 RX ADMIN — INSULIN LISPRO 6 UNITS: 100 INJECTION, SOLUTION INTRAVENOUS; SUBCUTANEOUS at 17:29

## 2017-05-01 RX ADMIN — DIPHENHYDRAMINE HYDROCHLORIDE AND LIDOCAINE HYDROCHLORIDE AND ALUMINUM HYDROXIDE AND MAGNESIUM HYDRO 10 ML: KIT at 00:44

## 2017-05-01 RX ADMIN — INSULIN LISPRO 2 UNITS: 100 INJECTION, SOLUTION INTRAVENOUS; SUBCUTANEOUS at 08:33

## 2017-05-01 RX ADMIN — SODIUM CHLORIDE 3 G: 900 INJECTION INTRAVENOUS at 18:55

## 2017-05-01 RX ADMIN — HYDROMORPHONE HYDROCHLORIDE 1 MG: 1 INJECTION, SOLUTION INTRAMUSCULAR; INTRAVENOUS; SUBCUTANEOUS at 10:55

## 2017-05-01 NOTE — PROGRESS NOTES
Problem: Dysphagia (Adult)  Goal: *Acute Goals and Plan of Care (Insert Text)  Continue regular diet with thin liquids  Meds as tolerated   General safe swallow precautions  Outcome: Resolved/Met Date Met:  05/01/17  SPEECH LANGUAGE PATHOLOGY BEDSIDE SWALLOW EVALUATION AND DISCHARGE     Patient: Amy Connor (89 y.o. female)  Date: 5/1/2017  Primary Diagnosis: Sepsis ([facility] [address]  Cellulitis  dx  Procedure(s) (LRB):  INCISION AND DRAINAGE/ WASH OUT  /RIGHT SHOULDER  (Right) 4 Days Post-Op   Precautions: None on file         ASSESSMENT :  Clinical beside swallow eval completed per MD orders. Pt A&Ox4. Functional communication with speech intelligibility judged to be 100%. Pt reporting pain on tongue that has been resolving since use of prescribed mouthwash but denying any associated dysphagia. Cognitive-linguistic function appears intact. OM examination revealed oral motor structures functional for mastication and deglutition. Presented with thin liquid + straw and regular solid trials. Exhibited positive bolus cohesion, manipulation and A-P transit. Further exhibited timely swallow reflex and adequate hyolaryngeal excursion. Pt able to manipulate and clear with 0 clinical s/s aspiration and/or oropharyngeal dysphagia. Pt safe for regular solid, thin liquid diet. 0 formal ST needs for dysphagia indicated at this time. SLP educated pt on role of speech therapist in current setting with re: speech/swallow; verbalized comprehension. SLP available for re-evaluation if indicated by MD. Results d/w RN. PLAN :  Recommendations and Planned Interventions:  No formal ST needs ID'd for dysphagia. Eval only. Discharge Recommendations: None       SUBJECTIVE:   Patient stated Ryann Kim had a pain pill before I went to sleep.       OBJECTIVE:       Past Medical History:   Diagnosis Date    Diabetes ([facility] [address]      Fibromyalgia      Hypercholesteremia      Hypertension       Past Surgical History:   Procedure Laterality Date    HX BREAST LUMPECTOMY        HX CARPAL TUNNEL RELEASE        HX KNEE REPLACEMENT         bilater    HX PARTIAL THYROIDECTOMY        HX SHOULDER ARTHROSCOPY        HX TUBAL LIGATION         Prior Level of Function/Home Situation: Home  Home Situation  Home Environment: Private residence  # Steps to Enter: 3  One/Two Story Residence: Two story  # of Interior Steps: 12  Height of Each Step (in): 8 inches  Interior Rails: Right  Lift Chair Available: No  Living Alone: No  Support Systems: Child(ham)  Patient Expects to be Discharged to[de-identified] Private residence  Current DME Used/Available at Home: Glucometer, CPAP  Diet prior to admission: Regular, thin liquids  Current Diet:  Regular, thin liquids    Cognitive and Communication Status:  Neurologic State: Alert  Orientation Level: Oriented X4  Cognition: Appropriate decision making, Follows commands  Oral Assessment:  Oral Assessment  Labial: No impairment  Dentition: Edentulous  Oral Hygiene: Good  Lingual: No impairment  Velum: No impairment  Mandible: No impairment  P.O. Trials:  Patient Position: 45 at [facility]  Vocal quality prior to P.O.: No impairment  Consistency Presented: Thin liquid; Solid  How Presented: Self-fed/presented;Cup/sip;Straw;Successive swallows  Bolus Acceptance: No impairment  Bolus Formation/Control: No impairment  Propulsion: No impairment  Oral Residue: None  Initiation of Swallow: No impairment  Laryngeal Elevation: Functional  Aspiration Signs/Symptoms: None  Pharyngeal Phase Characteristics: No impairment, issues, or problems   Oral Phase Severity: No impairment  Pharyngeal Phase Severity : No impairment     GCODESwallowing:  Swallow Current Status CH= 0%   Swallow Goal Status CH= 0%   Swallow D/C Status CH= 0%     The severity rating is based on the following outcomes:             Clinical judgment     Pain:  Pt reports 0/10 pain or discomfort prior to eval.   Pt reports 0/10 pain or discomfort post eval.      After treatment:   [ ] Patient left in no apparent distress sitting up in chair  [X]            Patient left in no apparent distress in bed  [X]            Call bell left within reach  [ ]            Nursing notified  [ ]            Caregiver present  [ ]            Bed alarm activated      COMMUNICATION/EDUCATION:   [X]            SLP educated pt with regard to role of SLP, s/sx aspiration and to alert MD/RN if symptoms arise. Pt able to verbalize understanding.      Thank you for this referral.  NICKIE Garcia.S., [id number] [facility]  Speech-Language Pathologist

## 2017-05-01 NOTE — PROGRESS NOTES
Patient found walking in hallway with PT/OT    Minimally moving right shoulder, per ID, PICC placement and LTA,    Right UE, elbow 90 degrees minus 15 degrees,    Pain with shoulder shrug right shoulder, AquaCel bandage noted    Distal NVI RUE    Plan: PT /OT RUE per Ortho

## 2017-05-01 NOTE — PROGRESS NOTES
Problem: Self Care Deficits Care Plan (Adult)  Goal: *Acute Goals and Plan of Care (Insert Text)  Occupational Therapy Goals  Initiated 5/1/2017 within 7 day(s). 1. Patient will perform grooming with modified independence   2. Patient will perform upper body dressing with modified independence. 3. Patient will perform lower body dressing with modified independence. 4. Patient will participate in AROM/edema management RUE upper extremity therapeutic exercise/activities with supervision/set-up in preparation for dressing tasks. Outcome: Progressing Towards Goal  OCCUPATIONAL THERAPY EVALUATION     Patient: Thuy Mcnamara (49 y.o. female)  Date: 5/1/2017  Primary Diagnosis: Sepsis ([facility] [address]  Cellulitis  dx  Procedure(s) (LRB):  INCISION AND DRAINAGE/ WASH OUT  /RIGHT SHOULDER  (Right) 4 Days Post-Op   Precautions:   Fall (AROM of shoulder, elbow, wrist/digits RUE per Baljeet Dozier, ortho PA)      ASSESSMENT :  Based on the objective data described below, the patient was mod I with functional mobility without an AD. Patient noted to have decreased AROM of R shoulder and elbow, secondary to pain. Patient has WFL LUE AROM and strength. Patient educated on gentle AROM in shoulder flexion, elbow flexion/extnsion, wrist flexion/extension; patient reported and demonstrated understanding. Patient educated on edema management for R hand; patient reported understanding and elevated on pillow. Patient left comfortable in no distress in bed. Patient will benefit from skilled intervention to address the above impairments.   Patients rehabilitation potential is considered to be Good  Factors which may influence rehabilitation potential include:   [ ]             None noted  [ ]             Mental ability/status  [X]             Medical condition  [ ]             Home/family situation and support systems  [ ]             Safety awareness  [ ]             Pain tolerance/management  [ ]             Other:        PLAN :  Recommendations and Planned Interventions:  [X]               Self Care Training                  [X]        Therapeutic Activities  [X]               Functional Mobility Training    [ ]        Cognitive Retraining  [X]               Therapeutic Exercises           [X]        Endurance Activities  [X]               Balance Training                   [ ]        Neuromuscular Re-Education  [ ]               Visual/Perceptual Training     [X]   Home Safety Training  [X]               Patient Education                 [ ]        Family Training/Education  [ ]               Other (comment):     Frequency/Duration: Patient will be followed by occupational therapy 1-2 times per day/4-7 days per week to address goals. Discharge Recommendations: Home Health  Further Equipment Recommendations for Discharge: N/A       PATIENT COMPLEXITY      Eval Complexity: History: LOW Complexity : Brief history review ; Examination: LOW Complexity : 1-3 performance deficits relating to physical, cognitive , or psychosocial skils that result in activity limitations and / or participation restrictions ; Decision Making:LOW Complexity : No comorbidities that affect functional and no verbal or physical assistance needed to complete eval tasks  Assessment: Low Complexity       G-CODES:      Self Care  Current  CJ= 20-39%   Goal  CI= 1-19%. The severity rating is based on the Level of Assistance required for Functional Mobility and ADLs. SUBJECTIVE:   Patient stated I can't move it that much.       OBJECTIVE DATA SUMMARY:       Past Medical History:   Diagnosis Date    Diabetes ([facility] [address]      Fibromyalgia      Hypercholesteremia      Hypertension       Past Surgical History:   Procedure Laterality Date    HX BREAST LUMPECTOMY        HX CARPAL TUNNEL RELEASE        HX KNEE REPLACEMENT         bilater    HX PARTIAL THYROIDECTOMY        HX SHOULDER ARTHROSCOPY        HX TUBAL LIGATION         Prior Level of Function/Home Situation: Patient reported she was independent in basic self care tasks and functional mobility PTA. Home Situation  Home Environment: Private residence  # Steps to Enter: 3  One/Two Story Residence: Two story  # of Interior Steps: 12  Height of Each Step (in): 8 inches  Interior Rails: Right  Lift Chair Available: No  Living Alone: No  Support Systems: Child(ham)  Patient Expects to be Discharged to[de-identified] Private residence  Current DME Used/Available at Home: None  [X]  Right hand dominant          [ ]  Left hand dominant  Cognitive/Behavioral Status:  Neurologic State: Alert  Orientation Level: Oriented X4  Cognition: Follows commands     Skin: No skin changes noted     Edema: 1+ edema noted to R dorsum of the hand     Vision/Perceptual:    Acuity: Within Defined Limits       Coordination:  Coordination: WFL (LUE)     Balance:  Sitting: Intact  Standing: Intact     Strength:  LUE strength WFL  RUE strength: not formally assessed     Tone & Sensation:  Tone: Normal (BUEs)  Sensation:  (Intact LUE, Intact RUE/ diminished sensation to dorsum R ha)     Range of Motion:  LUE AROM: WFL   RUE AROM: 0-20 shoulder flexion, 0-50 elbow flexion, WFL digits and wrist     Functional Mobility and Transfers for ADLs:  Bed Mobility:    Patient sitting on EOB eating lunch upon arrival  Scooting: Modified independent  Transfers:  Sit to Stand: Modified independent              Toilet Transfer : Modified independent (simulated)                 ADL Assessment:(clinical judgement based on functional mobility)  Feeding: Modified independent     Oral Facial Hygiene/Grooming: Contact guard assistance     Bathing: Minimum assistance     Upper Body Dressing: Minimum assistance     Lower Body Dressing: Minimum assistance     Toileting: Minimum assistance     Pain:  Pt reports 2/10 pain or discomfort prior to treatment. Pt reports 2/10 pain or discomfort post treatment.       Activity Tolerance:   Good     Please refer to the flowsheet for vital signs taken during this treatment. After treatment:   [ ] Patient left in no apparent distress sitting up in chair  [X] Patient left in no apparent distress in bed  [X] Call bell left within reach  [X] Nursing notified  [X] Caregiver present  [ ] Bed alarm activated      COMMUNICATION/EDUCATION:   [ ] Home safety education was provided and the patient/caregiver indicated understanding. [X] Patient/family have participated as able in goal setting and plan of care. [X] Patient/family agree to work toward stated goals and plan of care. [ ] Patient understands intent and goals of therapy, but is neutral about his/her participation. [ ] Patient is unable to participate in goal setting and plan of care.      Thank you for this referral.  Shaunna Stephenson OTR/L  Time Calculation: 26 mins

## 2017-05-01 NOTE — ROUTINE PROCESS
Bedside and Verbal shift change report given to CHESTER Guevara (oncoming nurse) by Sarina Rae RN (offgoing nurse). Report included the following information SBAR, Kardex, ED Summary, Procedure Summary, Intake/Output, MAR and Recent Results.

## 2017-05-01 NOTE — PROGRESS NOTES
Met with pt and her son , Bonita Castillo his wife, Gil Woods, with pt's permission. They live 2 blocks for pt's home and are a primary source of support, mentioned they cook dinner for pt every night at their home and she resides with 2 adult grandchildren, is independent and drove self to all appointments PTA. Discussed  need for home health care and IV antibiotics- give [facility] list and pt chose [facility], called referral to office and entered into EPIC. Received a call back confirming the cost is covered at 100%- informed Dr. Alanis Ac.       Addendum: informed son of IV antibiotic coverage and reviewed the fact his mother has received a glucometer for home use from diabetic educator her in facility

## 2017-05-01 NOTE — HOME CARE
[facility] received referral for SN/IV antibiotic infusion at home. This nurse sent demographic information, orders and insurance information over to HCP for coverage check. Per Faraz Chavira, in intake at HCP, the patient has 100% coverage thru November. This nurse has left a voicemail for Tatiana Obrien, , to inform - also, need to know when PICC to be placed and if any wound care or therapy orders are to be added to the referral. Per Faraz Chavira with HCP, if patient discharging tomorrow, Jazmin Bobby or Sandrita Land to possibly hook patient up here at [facility] prior to discharge - CECILLE Sampson LPN

## 2017-05-01 NOTE — HOME CARE
[facility] received referral for SN/IV antibiotic therapy at home. Sent demographic, insurance and referral order to [facility] for coverage verification. Per Yasmin Jaquez in intake, the patient is covered at 100% thru November and will put a nurse on hold to hook up continuous infusion here at [facility] prior to discharge - possibly Radha Paz or Thelma Corley. This nurse spoke to the patient to inform of this and per patient, she states that her grandson and granddaughter live with her. Her daughter-in-law John Salamanca will be willing to learn the infusion and the patient told this nurse she wasn't sure if her granddaughter, Wili Mc, could assist or not since she goes to school and works. The patient advised to have Sharri Osorio called to set up the visits - the patient has a new phone and can't remember the numbers at this time. New orders also noted that were written by hospitalist that need to be added to referral prior to completion. Last PCP visit noted on 4/12/17. DME: has CPAP - ? Glucometer in storage per notes. Liaison nurses will continue to follow for home care needs thru discharge - CECILLE Marr LPN

## 2017-05-01 NOTE — PROGRESS NOTES
Infectious Disease progress Note    Requested by: Dr. Antunez Flow    Reason: right leg cellulitis    Current abx Prior abx   Ampicillin/sulbactam since 4/26  Vancomycin since 4/24 Ceftriaxone x 1 in ED  Pip/tazo 4/25     Lines:       Assessment :    74 y.o. female with a PMH of DM-2 (uncontrolled), HTN, chronic pain and fibromyalgia who presented to ED on 4/24/17 with c/c of Right leg swelling and pain. Clinical picture consistent with sepsis (POA) due to group A streptococcus bloodstream infection. Most likely source of bloodstream infection is acute right leg cellulitis. Increased right shoulder pain last few days - CT scan 4/26 reveals large right glenohumeral joint effusion s/p bedside aspiration with finding of purulent fluid suggestive of septic arthritis. Ortho consult appreciated. s/p I&D 4/28    Preceding h/o dog bite 2 weeks ago - but no erythema or pain up until 4/23 makes cellulitis due to indolent pathogens related to dog bite less likely. Increasing creatinine - acute renal failure likely due to sepsis - improved    Tongue pain/erythematous tongue likely streptococcal toxin mediated strawberry tongue - improved tongue pain on today's exam    Intermittent loose stools - likely abx associated. No abdominal pain, tenderness or leukocytosis argues against c.diff    Recommendations:    1. continue iv ampicillin/sulbactam - ideally would recommend to continue this till 4/28 looking at the virulence of group a strep and severity of infection. 2. D/c vancomycin  3. Will start making arrangements for oupt iv abx. If copay is too high and patient can't afford, other option is po clindamycin  4. Continue probiotics     Above plan was discussed in details with patient, dr. Flori Mckeon. All questions answered to their full satisfaction. Spent additional 35 minutes in management and evaluation of this patient. >50% time spent in counselling and coordination of care.    Please call me if any further questions or concerns. Will continue to participate in the care of this patient. subjective:    Improved pain and swelling right leg, persistent pain right shoulder, raw tongue. Patient denies headaches, visual disturbances, sore throat, runny nose, earaches, cp, sob, chills, cough, abdominal pain, diarrhea, burning micturition, or weakness in extremities. she denies back pain/flank pain. Home Medication List    Details   !! clindamycin (CLEOCIN) 300 mg capsule Take 1 Cap by mouth four (4) times daily for 10 days. Qty: 40 Cap, Refills: 0      !! clindamycin (CLEOCIN) 300 mg capsule Take 1 Cap by mouth four (4) times daily for 10 days. Qty: 40 Cap, Refills: 0       Details   lisinopril (PRINIVIL, ZESTRIL) 10 mg tablet Take 10 mg by mouth daily. methocarbamol (ROBAXIN) 750 mg tablet Take 1 Tab by mouth four (4) times daily. Qty: 20 Tab, Refills: 0      HYDROcodone-acetaminophen (NORCO) 5-325 mg per tablet Take  by mouth. simvastatin (ZOCOR) 40 mg tablet 40 mg.      pregabalin (LYRICA) 75 mg capsule 75 mg. INSULIN GLARGINE,HUM. REC. ANLOG (LANTUS SC)       DULoxetine (CYMBALTA) 60 mg capsule 60 mg.       ALPRAZolam (XANAX) 1 mg tablet              Current Facility-Administered Medications   Medication Dose Route Frequency    magnesium sulfate 1 g/100 ml IVPB (premix or compounded)  1 g IntraVENous ONCE    amiodarone (CORDARONE) tablet 200 mg  200 mg Oral BID    acetaminophen (TYLENOL) tablet 500 mg  500 mg Oral Z7J PRN    folic acid (FOLVITE) tablet 1 mg  1 mg Oral DAILY    vancomycin (VANCOCIN) 1,250 mg in 0.9% sodium chloride 250 mL IVPB  1,250 mg IntraVENous Q24H    [START ON 5/2/2017] Vancomycin Lab Information  1 Each Other Rx Dosing/Monitoring    0.9% sodium chloride infusion 250 mL  250 mL IntraVENous PRN    famotidine (PEPCID) tablet 20 mg  20 mg Oral BID    insulin glargine (LANTUS) injection 14 Units  14 Units SubCUTAneous QHS    sodium chloride (NS) flush 5-10 mL  5-10 mL IntraVENous Q8H    sodium chloride (NS) flush 5-10 mL  5-10 mL IntraVENous PRN    oxyCODONE-acetaminophen (PERCOCET) 5-325 mg per tablet 1 Tab  1 Tab Oral Q4H PRN    HYDROmorphone (PF) (DILAUDID) injection 1 mg  1 mg IntraVENous Q4H PRN    VANCOMYCIN INFORMATION NOTE   Other CONTINUOUS    dilTIAZem (CARDIZEM) IR tablet 60 mg  60 mg Oral Q6H    Lactobacillus Acidoph & Bulgar (FLORANEX) tablet 1 Tab  1 Tab Oral BID    DULoxetine (CYMBALTA) capsule 60 mg  60 mg Oral DAILY    acetaminophen (TYLENOL) tablet 325 mg  325 mg Oral Q4H PRN    ampicillin-sulbactam (UNASYN) 1.5 g in 0.9% sodium chloride (MBP/ADV) 50 mL MBP  1.5 g IntraVENous Q6H    magic mouthwash (FIRST-MOUTHWASH BLM) oral suspension 10 mL  10 mL Oral TIDAC    multivitamin (MULTI-DELYN, WELLESSE) oral liquid 30 mL  30 mL Oral DAILY    0.9% sodium chloride infusion  100 mL/hr IntraVENous CONTINUOUS    ALPRAZolam (XANAX) tablet 0.5 mg  0.5 mg Oral TID PRN    HYDROcodone-acetaminophen (NORCO) 5-325 mg per tablet 1 Tab  1 Tab Oral Q6H PRN    pregabalin (LYRICA) capsule 75 mg  75 mg Oral BID    simvastatin (ZOCOR) tablet 40 mg  40 mg Oral QHS    ondansetron (ZOFRAN) injection 4 mg  4 mg IntraVENous Q4H PRN    insulin lispro (HUMALOG) injection   SubCUTAneous AC&HS    glucose chewable tablet 16 g  16 g Oral PRN    glucagon (GLUCAGEN) injection 1 mg  1 mg IntraMUSCular PRN    dextrose (D50W) injection syrg 12.5-25 g  25-50 mL IntraVENous PRN    sodium chloride (NS) flush 5-10 mL  5-10 mL IntraVENous PRN    sodium chloride (NS) flush 5-10 mL  5-10 mL IntraVENous PRN       Allergies: Review of patient's allergies indicates no known allergies.     Temp (24hrs), Av.6 °F (36.4 °C), Min:97 °F (36.1 °C), Max:98.5 °F (36.9 °C)    Visit Vitals    /85 (BP 1 Location: Left arm, BP Patient Position: At rest)    Pulse 68    Temp 97.7 °F (36.5 °C)    Resp 18    Ht 5' 2\" (1.575 m)    Wt 87.5 kg (192 lb 12.8 oz)    SpO2 99%    Breastfeeding No  BMI 35.26 kg/m2       ROS: 12 point ROS obtained in details. Pertinent positives as mentioned in HPI,   otherwise negative    Physical Exam:    General: Well developed, well nourished female laying on the bed AAOx3 in no acute distress. General:   awake alert and oriented   HEENT:  Normocephalic, atraumatic, PERRL, EOMI, no scleral icterus or pallor; no conjunctival hemmohage;  nasal and oral mucous are moist and without evidence of lesions. No thrush. Neck supple, no bruits. Lymph Nodes:   no cervical, axillary or inguinal adenopathy   Lungs:   non-labored, bilaterally clear to auscultation- no crackles wheezes rales or rhonchi   Heart:  RRR, s1 and s2; no murmurs rubs or gallops, no edema, + pedal pulses   Abdomen:  soft, non-distended, active bowel sounds, no hepatomegaly, no splenomegaly. Non-tender   Genitourinary:  deferred   Extremities:   no clubbing, cyanosis; no joint effusions or swelling; pain on active and passive movements right shoulder, tenderness on palpation of shoulder; muscle mass appropriate for age, erythema and tenderness right leg   Neurologic:  No gross focal sensory abnormalities; 5/5 muscle strength to upper and lower extremities. Speech appropriate.  Cranial nerves intact                        Skin:  Scab right leg, decreased erythema of skin of right leg, surgical scar both knees well healed   Back:  no paraspinal muscle tenderness or rigidity, no CVA tenderness, minimal cervical spine tenderness     Psychiatric:  No suicidal or homicidal ideations, appropriate mood and affect         Labs: Results:   Chemistry Recent Labs      05/01/17 0213 04/30/17 0218 04/29/17   0005   GLU  240*  169*  189*   NA  143  143  142   K  3.5  4.0  3.9   CL  112*  111*  111*   CO2  23  24  22   BUN  17  25*  34*   CREA  0.88  1.05  1.10   CA  8.0*  8.0*  8.2*   AGAP  8  8  9   BUCR  19  24*  31*      CBC w/Diff Recent Labs      05/01/17 0213 04/30/17   1300  04/30/17   0218  04/29/17 0005  04/28/17   1244   WBC  7.1   --   7.5  6.6  9.0   RBC  3.23*   --   3.19*  3.42*  4.13*   HGB  8.3*  7.2*  8.0*  8.6*  10.6*   HCT  25.9*  22.8*  25.6*  27.4*  34.0*   PLT  308   --   266  249  306   GRANS  72   --   64   --   79*   LYMPH  15*   --   19*   --   13*   EOS  2   --   2   --   0      Microbiology No results for input(s): CULT in the last 72 hours.        RADIOLOGY:    All available imaging studies/reports in [facility] for this admission were reviewed    Dr. Harish Solares, Infectious Disease Specialist  [phone]  May 1, 2017  9:12 AM

## 2017-05-01 NOTE — ROUTINE PROCESS
Bedside and Verbal shift change report given to [facility] YONG RN (oncoming nurse) by Kristan Galeano RN (offgoing nurse). Report included the following information SBAR, Kardex, MAR and Recent Results.     SITUATION:    Code Status: Full Code   Reason for Admission: Sepsis ([facility]   Cellulitis   dx    [facility] day: 7   Problem List:       Hospital Problems  Date Reviewed: 1/23/2015          Codes Class Noted POA    Atrial fibrillation ([facility] [facility] ICD-10-CM: I48.91  ICD-9-CM: 427.31  4/28/2017 Unknown        Gastric bypass status for obesity ICD-10-CM: Z98.84  ICD-9-CM: V45.86  4/28/2017 Unknown    Overview Signed 4/28/2017  1:09 PM by María Bingham NP     Done 10/2016             History of pulmonary embolism ICD-10-CM: Z86.711  ICD-9-CM: V12.55  4/28/2017 Unknown        Fibromyalgia ICD-10-CM: M79.7  ICD-9-CM: 729.1  4/27/2017 Unknown        HTN (hypertension) (Chronic) ICD-10-CM: I10  ICD-9-CM: 401.9  4/27/2017 Unknown        Septic joint of right shoulder region [facility]) ICD-10-CM: M00.9  ICD-9-CM: 711.01  4/27/2017 Yes        Cellulitis ICD-10-CM: L03.90  ICD-9-CM: 682.9  4/24/2017 Unknown        Sepsis ([facility] ICD-10-CM: A41.9  ICD-9-CM: 038.9, 995.91  4/24/2017 Unknown        Sleep apnea ICD-10-CM: G47.30  ICD-9-CM: 780.57  1/25/2012 Yes        Type II diabetes mellitus ([facility] ICD-10-CM: E11.9  ICD-9-CM: 250.00  1/25/2012 Yes              BACKGROUND:    Past Medical History:   Past Medical History:   Diagnosis Date    Diabetes ([facility]     Fibromyalgia     Hypercholesteremia     Hypertension          Patient taking anticoagulants yes     ASSESSMENT:    Changes in Assessment Throughout Shift: GIB     Patient has Central Line: no Reasons if yes: na   Patient has Fuentes Cath: no Reasons if yes: na      Last Vitals:     Vitals:    04/30/17 1845 04/30/17 2109 04/30/17 2112 05/01/17 0041   BP: 160/88 188/81 169/76 188/88   Pulse:  64 72 69   Resp:  18 20 19   Temp:  97.8 °F (36.6 °C) 97.1 °F (36.2 °C) 97.2 °F (36.2 °C) SpO2: 97% 99% 98% 99%   Weight:       Height:            IV and DRAINS (will only show if present)   [REMOVED] Peripheral IV 04/24/17 Left;Upper Arm-Site Assessment: Clean, dry, & intact  Peripheral IV 04/26/17 Right Arm-Site Assessment: Clean, dry, & intact  [REMOVED] Peripheral IV 04/27/17 Left Arm-Site Assessment: Clean, dry, & intact, Clean  Peripheral IV 04/27/17 Right Wrist-Site Assessment: Clean, dry, & intact     WOUND (if present)   Wound Type:  surgical   Dressing present yes   Wound Concerns/Notes:  none     PAIN    Pain Assessment    Pain Intensity 1: 0 (05/01/17 0200)    Pain Location 1: Back, Shoulder    Pain Intervention(s) 1: Medication (see MAR)    Patient Stated Pain Goal: 0  o Interventions for Pain:  none, yes  o Intervention effective: yes  o Time of last intervention:  See MAR   o Reassessment Completed: yes      Last 3 Weights:  Last 3 Recorded Weights in this Encounter    04/28/17 0400 04/29/17 0638 04/30/17 0339   Weight: 88.9 kg (196 lb) 96.8 kg (213 lb 8 oz) 91.1 kg (200 lb 14.4 oz)     Weight change:      INTAKE/OUPUT    Current Shift: 04/30 1901 - 05/01 0700  In: 1022.1 [I.V.:661.7]  Out: 0     Last three shifts: 04/29 0701 - 04/30 1900  In: 3848.3 [P.O.:720; I.V.:3128.3]  Out: 600 [Urine:600]     LAB RESULTS     Recent Labs      05/01/17 0213 04/30/17   1300  04/30/17 0218 04/29/17   0005   WBC  7.1   --   7.5  6.6   HGB  8.3*  7.2*  8.0*  8.6*   HCT  25.9*  22.8*  25.6*  27.4*   PLT  308   --   266  249        Recent Labs      05/01/17 0213 04/30/17   1300  04/30/17 0218 04/29/17   0005   NA  143   --   143  142   K  3.5   --   4.0  3.9   GLU  240*   --   169*  189*   BUN  17   --   25*  34*   CREA  0.88   --   1.05  1.10   CA  8.0*   --   8.0*  8.2*   MG  1.6   --   1.8   --    INR   --   1.0   --    --        RECOMMENDATIONS AND DISCHARGE PLANNING     1. Pending tests/procedures/ Plan of Care or Other Needs:  none     2.  Discharge plan for patient and Needs/Barriers: none    3. Estimated Discharge Date: pending Posted on Whiteboard in Patients Room: yes      4. The patient's care plan was reviewed with the oncoming nurse. \"HEALS\" SAFETY CHECK      Fall Risk    Total Score: 5    Safety Measures: Safety Measures: Bed/Chair alarm on, Bed/Chair-Wheels locked, Bed in low position, Call light within reach, Fall prevention (comment)    A safety check occurred in the patient's room between off going nurse and oncoming nurse listed above. The safety check included the below items  Area Items   H  High Alert Medications - Verify all high alert medication drips (heparin, PCA, etc.)   E  Equipment - Suction is set up for ALL patients (with tonny)  - Red plugs utilized for all equipment (IV pumps, etc.)  - WOWs wiped down at end of shift.  - Room stocked with oxygen, suction, and other unit-specific supplies   A  Alarms - Bed alarm is set for fall risk patients  - Ensure chair alarm is in place and activated if patient is up in a chair   L  Lines - Check IV for any infiltration  - Fuentes bag is empty if patient has a Fuentes   - Tubing and IV bags are labeled   S  Safety   - Room is clean, patient is clean, and equipment is clean. - Hallways are clear from equipment besides carts. - Fall bracelet on for fall risk patients  - Ensure room is clear and free of clutter  - Suction is set up for ALL patients (with tonny)  - Hallways are clear from equipment besides carts.    - Isolation precautions followed, supplies available outside room, sign posted     Nir Yarbrough RN

## 2017-05-01 NOTE — PROGRESS NOTES
Problem: Mobility Impaired (Adult and Pediatric)  Goal: *Acute Goals and Plan of Care (Insert Text)  Outcome: Resolved/Met Date Met:  05/01/17  PHYSICAL THERAPY EVALUATION & DISCHARGE     Patient: Ward Graff (17 y.o. female)  Date: 5/1/2017  Primary Diagnosis: Sepsis ([facility]  Cellulitis  dx  Procedure(s) (LRB):  INCISION AND DRAINAGE/ WASH OUT  /RIGHT SHOULDER  (Right) 4 Days Post-Op   Precautions: Fall      ASSESSMENT AND RECOMMENDATIONS:  Pt is 72yo F admitted to hospital for I&D of R shoulder and presents today alert and agreeable to co-eval of PT/OT. Pt sitting up EOB eating lunch and demonstrated good balance for objective assessment. Pt then stood and amb in hallway 90ft with supervision prior to transferring back into bed. Pt denied chest pain, SOB, or dizziness with ambulation. As patient is functioning at [facility], skilled physical therapy is not indicated at this time. Pt is agreeable to D/C from PT at this time. Discharge Recommendations: Home Health  Further Equipment Recommendations for Discharge: N/A        G-:CODES      Mobility  Current  CI= 1-19%   Goal  CI= 1-19%  D/C  CI= 1-19%. The severity rating is based on the Level of Assistance required for Functional Mobility and ADLs. Evaluation Complexity      Eval Complexity: History: MEDIUM  Complexity : 1-2 comorbidities / personal factors will impact the outcome/ POC Exam:LOW Complexity : 1-2 Standardized tests and measures addressing body structure, function, activity limitation and / or participation in recreation  Presentation: LOW Complexity : Stable, uncomplicated  Clinical Decision Making:Low Complexity   Overall Complexity:LOW       SUBJECTIVE:   Patient stated I'm hoping to go home tomorrow.       OBJECTIVE DATA SUMMARY:       Past Medical History:   Diagnosis Date    Diabetes ([facility] [facility]      Fibromyalgia      Hypercholesteremia      Hypertension       Past Surgical History:   Procedure Laterality Date  HX BREAST LUMPECTOMY        HX CARPAL TUNNEL RELEASE        HX KNEE REPLACEMENT         bilater    HX PARTIAL THYROIDECTOMY        HX SHOULDER ARTHROSCOPY        HX TUBAL LIGATION         Barriers to Learning/Limitations: None  Compensate with: visual, verbal, tactile, kinesthetic cues/model  Prior Level of Function/Home Situation: Pt lives in 2 story home with 3STE and lives with her family. Pt was independent with mobility and I/ADL's. Home Situation  Home Environment: Private residence  # Steps to Enter: 3  One/Two Story Residence: Two story  # of Interior Steps: 12  Height of Each Step (in): 8 inches  Interior Rails: Right  Lift Chair Available: No  Living Alone: No  Support Systems: Child(ham)  Patient Expects to be Discharged to[de-identified] Private residence  Current DME Used/Available at Home: Glucometer, CPAP  Critical Behavior:   Pleasant, cooperative, A&Ox4    Strength:    Strength: Within functional limits (BLE)   Tone & Sensation:    Sensation: Intact (BLE to LT)   Range Of Motion:  AROM: Within functional limits (BLE)   Functional Mobility:  Bed Mobility:   Scooting: Modified independent  Transfers:  Sit to Stand: Modified independent  Stand to Sit: Modified independent      Balance:   Sitting: Intact  Standing: Intact  Ambulation/Gait Training:  Distance (ft): 90 Feet (ft)  Assistive Device:  (none)  Ambulation - Level of Assistance: Supervision   Gait Abnormalities: Decreased step clearance   Base of Support: Widened   Speed/Angelica: Slow  Step Length: Left shortened;Right shortened   Interventions: Verbal cues     Pain:  Pt reports 4/10 pain or discomfort prior to treatment. (R shoulder)  Pt reports 4/10 pain or discomfort post treatment. Activity Tolerance:   Pt tolerated activity well and was left sitting at EOB to finish her meal with call bell and family by her side. Please refer to the flowsheet for vital signs taken during this treatment.   After treatment:   [ ]         Patient left in no apparent distress sitting up in chair  [X]         Patient left in no apparent distress in bed  [X]         Call bell left within reach  [X]         Nursing notified  [X]         Caregiver present  [ ]         Bed alarm activated      COMMUNICATION/EDUCATION:   [X]         Fall prevention education was provided and the patient/caregiver indicated understanding. [X]         Patient/family have participated as able in goal setting and plan of care. [X]         Patient/family agree to work toward stated goals and plan of care. [ ]         Patient understands intent and goals of therapy, but is neutral about his/her participation. [ ]         Patient is unable to participate in goal setting and plan of care.      Thank you for this referral.  Anam Beach, PT   Time Calculation: 23 mins

## 2017-05-01 NOTE — ROUTINE PROCESS
Bedside and Verbal shift change report given to Basil (oncoming nurse) by Lorna Pugh (offgoing nurse). Report included the following information SBAR, Kardex and MAR.

## 2017-05-01 NOTE — INTERDISCIPLINARY ROUNDS
IDR/SLIDR Summary          Patient: Ras Heading MRN: [id number]    Age: 74 y.o. YOB: 1950 Room/Bed: 206/01   Admit Diagnosis: Sepsis ([address]  Cellulitis  dx  Principal Diagnosis: <principal problem not specified>   Goals:   Readmission: NO  Quality Measure: SEPSIS and COPD  VTE Prophylaxis: Mechanical  Influenza Vaccine screening completed? YES  Pneumococcal Vaccine screening completed? YES  Mobility needs: Yes   Nutrition plan:Yes  Consults:P.T, O.T. and Case Management    Financial concerns:No  Escalated to CM? NO  RRAT Score:    Interventions:Home Health  Testing due for pt today?  NO  LOS: 7 days Expected length of stay  days  Discharge plan: home   PCP: Richard Boggs MD  Transportation needs: Yes    Days before discharge:two or more days before discharge   Discharge disposition: Home    Signed:     Faina Banks RN  5/1/2017  5:48 PM

## 2017-05-01 NOTE — PROGRESS NOTES
[facility] Hospitalist Group  Progress Note    Patient: Jesi Heaton Age: 74 y.o. : 1950 MR#: [id number] SSN: [id number]  Date/Time: 2017     Subjective:     Review of systems  General: No fevers or chills. Cardiovascular: No chest pain or pressure. No palpitations. Pulmonary: No shortness of breath, cough or wheeze. Gastrointestinal: No abdominal pain, nausea, vomiting or diarrhea. Genitourinary: No urinary frequency, urgency, hesitancy or dysuria. Musculoskeletal:right shoulder pain or muscle pain, no back pain, no recent trauma. Right leg swelling    Neurologic: No headache, numbness, tingling or weakness.      Assessment/Plan:   Admitted with h/o Right leg cellulitis blood culture positive , streptococci , on CT right shoulder - effusion , ortho consulted , I&D and wash out , antibiotics managed by ID , Developed new afib on Lovenox (  may hold if surgery is planned ) cardiology following , H/o DVT and PE in remote past .     1. Cellulitis Right Leg / h/o dog bite 2 weeks before cellulitis   - Cellulitis and septic right shoulder arthritis s/p I&D   - on Unasyn    - Out patient need for antibiotics - IV Vs PO - ID is considering       2 DM2  - Continue SSI     3 JAMES   - Probably prerenal , IVF - improving     4 Leucocytosis - related to infection - improved     5 H/o Dog bite 2 weeks ago - unlikely cause of current condition , if timing of bote is 2 weeks ago      6 HTN  - Continue home meds     7 HLD- on statin       8 anxiety disorder  - continue Xanax     9  Afib - RVR-   - on Amiodarone , po Cardizem ,   - Rate controlled   - Start on Warfarin not NOAC agents  - with h/o rectal bleed - watch for drop in H/H - D/W Patient     10 - Anemia - acute blood loss - GI bleed   - Thought to be from ischemic colitis , with brief ? low BP , Associated wit Afib with RVR   - Continue to monitor H/H   - No plan for Endoscopy   - BP is stable     11 Hypomagnesemia   - replacement ordered     Full CODE       Case discussed with:  [x]Patient  []Family  [x]Nursing  []Case Management  DVT Prophylaxis:  []Lovenox  [x]Hep SQ  []SCDs  []Coumadin   []On Heparin gtt    Patient Active Problem List   Diagnosis Code    Sleep apnea G47.30    Type II diabetes mellitus ([facility] E11.9    Enthesopathy of hip region M76.899    Acquired trigger finger M65.30    Primary localized osteoarthrosis, hand M19.049    Myalgia and myositis [id number]    Osteoarthrosis M19.90    Incomplete bladder emptying R33.9    Stress incontinence N39.3    Cellulitis L03.90    Sepsis ([facility] A41.9    Fibromyalgia M79.7    HTN (hypertension) I10    Septic joint of right shoulder region ([facility] [facility] M00.9    Atrial fibrillation ([facility]) I48.91    Gastric bypass status for obesity Z98.84    History of pulmonary embolism Z86.711       Objective:   VS:   Visit Vitals    /85 (BP 1 Location: Left arm, BP Patient Position: At rest)    Pulse 68    Temp 97.7 °F (36.5 °C)    Resp 18    Ht 5' 2\" (1.575 m)    Wt 87.5 kg (192 lb 12.8 oz)    SpO2 99%    Breastfeeding No    BMI 35.26 kg/m2      Tmax/24hrs: Temp (24hrs), Av.6 °F (36.4 °C), Min:97 °F (36.1 °C), Max:98.5 °F (36.9 °C)  IOBRIEF    Intake/Output Summary (Last 24 hours) at 17 0933  Last data filed at 17 0300   Gross per 24 hour   Intake          2535.39 ml   Output                0 ml   Net          2535.39 ml       General:  Alert, cooperative, no acute distress    HEENT:  NC, Atraumatic. PERRLA, anicteric sclerae. Pulmonary:  CTA Bilaterally. No Wheezing/Rhonchi/Rales. Cardiovascular: Regular rate and Rhythm. GI:  Soft, Non distended, Non tender. + Bowel sounds. Extremities: RLL redness and swelling better   Psych:  Not anxious or agitated. Neurologic: Grossly - Motor and Sensory functions are intact .  No Anxiety , calm , no Agitation         Medications:   Current Facility-Administered Medications   Medication Dose Route Frequency    magnesium sulfate 1 g/100 ml IVPB (premix or compounded)  1 g IntraVENous ONCE    amiodarone (CORDARONE) tablet 200 mg  200 mg Oral BID    acetaminophen (TYLENOL) tablet 500 mg  500 mg Oral V4Z PRN    folic acid (FOLVITE) tablet 1 mg  1 mg Oral DAILY    0.9% sodium chloride infusion 250 mL  250 mL IntraVENous PRN    famotidine (PEPCID) tablet 20 mg  20 mg Oral BID    insulin glargine (LANTUS) injection 14 Units  14 Units SubCUTAneous QHS    sodium chloride (NS) flush 5-10 mL  5-10 mL IntraVENous Q8H    sodium chloride (NS) flush 5-10 mL  5-10 mL IntraVENous PRN    oxyCODONE-acetaminophen (PERCOCET) 5-325 mg per tablet 1 Tab  1 Tab Oral Q4H PRN    HYDROmorphone (PF) (DILAUDID) injection 1 mg  1 mg IntraVENous Q4H PRN    dilTIAZem (CARDIZEM) IR tablet 60 mg  60 mg Oral Q6H    Lactobacillus Acidoph & Bulgar (FLORANEX) tablet 1 Tab  1 Tab Oral BID    DULoxetine (CYMBALTA) capsule 60 mg  60 mg Oral DAILY    acetaminophen (TYLENOL) tablet 325 mg  325 mg Oral Q4H PRN    ampicillin-sulbactam (UNASYN) 1.5 g in 0.9% sodium chloride (MBP/ADV) 50 mL MBP  1.5 g IntraVENous Q6H    magic mouthwash (FIRST-MOUTHWASH BLM) oral suspension 10 mL  10 mL Oral TIDAC    multivitamin (MULTI-DELYN, WELLESSE) oral liquid 30 mL  30 mL Oral DAILY    0.9% sodium chloride infusion  100 mL/hr IntraVENous CONTINUOUS    ALPRAZolam (XANAX) tablet 0.5 mg  0.5 mg Oral TID PRN    HYDROcodone-acetaminophen (NORCO) 5-325 mg per tablet 1 Tab  1 Tab Oral Q6H PRN    pregabalin (LYRICA) capsule 75 mg  75 mg Oral BID    simvastatin (ZOCOR) tablet 40 mg  40 mg Oral QHS    ondansetron (ZOFRAN) injection 4 mg  4 mg IntraVENous Q4H PRN    insulin lispro (HUMALOG) injection   SubCUTAneous AC&HS    glucose chewable tablet 16 g  16 g Oral PRN    glucagon (GLUCAGEN) injection 1 mg  1 mg IntraMUSCular PRN    dextrose (D50W) injection syrg 12.5-25 g  25-50 mL IntraVENous PRN    sodium chloride (NS) flush 5-10 mL  5-10 mL IntraVENous PRN    sodium chloride (NS) flush 5-10 mL  5-10 mL IntraVENous PRN       Labs:    Recent Results (from the past 24 hour(s))   GLUCOSE, POC    Collection Time: 04/30/17 11:46 AM   Result Value Ref Range    Glucose (POC) 272 (H) 70 - 110 mg/dL   HGB & HCT    Collection Time: 04/30/17  1:00 PM   Result Value Ref Range    HGB 7.2 (L) 12.0 - 16.0 g/dL    HCT 22.8 (L) 35.0 - 45.0 %   FERRITIN    Collection Time: 04/30/17  1:00 PM   Result Value Ref Range    Ferritin 98 8 - 388 NG/ML   IRON PROFILE    Collection Time: 04/30/17  1:00 PM   Result Value Ref Range    Iron 20 (L) 50 - 175 ug/dL    TIBC 201 (L) 250 - 450 ug/dL    Iron % saturation 10 %   PROTHROMBIN TIME + INR    Collection Time: 04/30/17  1:00 PM   Result Value Ref Range    Prothrombin time 12.7 11.5 - 15.2 sec    INR 1.0 0.8 - 1.2     PTT    Collection Time: 04/30/17  1:00 PM   Result Value Ref Range    aPTT 30.6 23.0 - 36.4 SEC   GLUCOSE, POC    Collection Time: 04/30/17  3:08 PM   Result Value Ref Range    Glucose (POC) 227 (H) 70 - 110 mg/dL   TYPE & CROSSMATCH    Collection Time: 04/30/17  4:50 PM   Result Value Ref Range    Crossmatch Expiration 05/03/2017     ABO/Rh(D) O POSITIVE     Antibody screen NEG     Unit number [id number]     Blood component type RC LR AS3,1     Unit division 00     Status of unit TRANSFUSED     Crossmatch result Compatible    GLUCOSE, POC    Collection Time: 04/30/17  9:21 PM   Result Value Ref Range    Glucose (POC) 130 (H) 70 - 110 mg/dL   CBC WITH AUTOMATED DIFF    Collection Time: 05/01/17  2:13 AM   Result Value Ref Range    WBC 7.1 4.6 - 13.2 K/uL    RBC 3.23 (L) 4.20 - 5.30 M/uL    HGB 8.3 (L) 12.0 - 16.0 g/dL    HCT 25.9 (L) 35.0 - 45.0 %    MCV 80.2 74.0 - 97.0 FL    MCH 25.7 24.0 - 34.0 PG    MCHC 32.0 31.0 - 37.0 g/dL    RDW 14.8 (H) 11.6 - 14.5 %    PLATELET 605 204 - 413 K/uL    MPV 10.2 9.2 - 11.8 FL    NEUTROPHILS 72 40 - 73 %    LYMPHOCYTES 15 (L) 21 - 52 %    MONOCYTES 11 (H) 3 - 10 %    EOSINOPHILS 2 0 - 5 % BASOPHILS 0 0 - 2 %    ABS. NEUTROPHILS 5.1 1.8 - 8.0 K/UL    ABS. LYMPHOCYTES 1.1 0.9 - 3.6 K/UL    ABS. MONOCYTES 0.8 0.05 - 1.2 K/UL    ABS. EOSINOPHILS 0.1 0.0 - 0.4 K/UL    ABS.  BASOPHILS 0.0 0.0 - 0.1 K/UL    DF AUTOMATED      PLATELET COMMENTS ADEQUATE PLATELETS      RBC COMMENTS ANISOCYTOSIS  1+       MAGNESIUM    Collection Time: 05/01/17  2:13 AM   Result Value Ref Range    Magnesium 1.6 1.6 - 2.6 mg/dL   METABOLIC PANEL, BASIC    Collection Time: 05/01/17  2:13 AM   Result Value Ref Range    Sodium 143 136 - 145 mmol/L    Potassium 3.5 3.5 - 5.5 mmol/L    Chloride 112 (H) 100 - 108 mmol/L    CO2 23 21 - 32 mmol/L    Anion gap 8 3.0 - 18 mmol/L    Glucose 240 (H) 74 - 99 mg/dL    BUN 17 7.0 - 18 MG/DL    Creatinine 0.88 0.6 - 1.3 MG/DL    BUN/Creatinine ratio 19 12 - 20      GFR est AA >60 >60 ml/min/1.73m2    GFR est non-AA >60 >60 ml/min/1.73m2    Calcium 8.0 (L) 8.5 - 10.1 MG/DL   GLUCOSE, POC    Collection Time: 05/01/17  8:07 AM   Result Value Ref Range    Glucose (POC) 174 (H) 70 - 110 mg/dL       Signed By: Kirstin Lopes MD     May 1, 2017

## 2017-05-02 ENCOUNTER — APPOINTMENT (OUTPATIENT)
Dept: INTERVENTIONAL RADIOLOGY/VASCULAR | Age: 67
DRG: 853 | End: 2017-05-02
Attending: INTERNAL MEDICINE
Payer: COMMERCIAL

## 2017-05-02 ENCOUNTER — HOME HEALTH ADMISSION (OUTPATIENT)
Dept: HOME HEALTH SERVICES | Facility: HOME HEALTH | Age: 67
End: 2017-05-02
Payer: COMMERCIAL

## 2017-05-02 VITALS
HEIGHT: 62 IN | SYSTOLIC BLOOD PRESSURE: 179 MMHG | WEIGHT: 200 LBS | BODY MASS INDEX: 36.8 KG/M2 | DIASTOLIC BLOOD PRESSURE: 77 MMHG | OXYGEN SATURATION: 99 % | HEART RATE: 73 BPM | RESPIRATION RATE: 20 BRPM | TEMPERATURE: 98.2 F

## 2017-05-02 LAB
BACTERIA SPEC CULT: NORMAL
GLUCOSE BLD STRIP.AUTO-MCNC: 144 MG/DL (ref 70–110)
GRAM STN SPEC: NORMAL
GRAM STN SPEC: NORMAL
HCT VFR BLD AUTO: 24.7 % (ref 35–45)
HGB BLD-MCNC: 7.9 G/DL (ref 12–16)
INR PPP: 1 (ref 0.8–1.2)
PROTHROMBIN TIME: 13.1 SEC (ref 11.5–15.2)
SERVICE CMNT-IMP: NORMAL

## 2017-05-02 PROCEDURE — 74011000258 HC RX REV CODE- 258: Performed by: INTERNAL MEDICINE

## 2017-05-02 PROCEDURE — 74011250636 HC RX REV CODE- 250/636: Performed by: INTERNAL MEDICINE

## 2017-05-02 PROCEDURE — 74011250636 HC RX REV CODE- 250/636: Performed by: SPECIALIST

## 2017-05-02 PROCEDURE — 74011250637 HC RX REV CODE- 250/637: Performed by: INTERNAL MEDICINE

## 2017-05-02 PROCEDURE — 74011250637 HC RX REV CODE- 250/637: Performed by: NURSE PRACTITIONER

## 2017-05-02 PROCEDURE — 82962 GLUCOSE BLOOD TEST: CPT

## 2017-05-02 PROCEDURE — 36415 COLL VENOUS BLD VENIPUNCTURE: CPT | Performed by: INTERNAL MEDICINE

## 2017-05-02 PROCEDURE — 74011250637 HC RX REV CODE- 250/637: Performed by: HOSPITALIST

## 2017-05-02 PROCEDURE — 02HV33Z INSERTION OF INFUSION DEVICE INTO SUPERIOR VENA CAVA, PERCUTANEOUS APPROACH: ICD-10-PCS | Performed by: RADIOLOGY

## 2017-05-02 PROCEDURE — 85610 PROTHROMBIN TIME: CPT | Performed by: INTERNAL MEDICINE

## 2017-05-02 PROCEDURE — 77001 FLUOROGUIDE FOR VEIN DEVICE: CPT

## 2017-05-02 PROCEDURE — 85018 HEMOGLOBIN: CPT | Performed by: INTERNAL MEDICINE

## 2017-05-02 RX ORDER — HYDROCODONE BITARTRATE AND ACETAMINOPHEN 5; 325 MG/1; MG/1
1 TABLET ORAL
Qty: 15 TAB | Refills: 0 | Status: ON HOLD | OUTPATIENT
Start: 2017-05-02 | End: 2017-05-14

## 2017-05-02 RX ORDER — UREA 10 %
1 LOTION (ML) TOPICAL 2 TIMES DAILY
Qty: 60 TAB | Refills: 0 | Status: SHIPPED | OUTPATIENT
Start: 2017-05-02 | End: 2018-09-22

## 2017-05-02 RX ORDER — AMIODARONE HYDROCHLORIDE 200 MG/1
200 TABLET ORAL DAILY
Status: DISCONTINUED | OUTPATIENT
Start: 2017-05-03 | End: 2017-05-02 | Stop reason: HOSPADM

## 2017-05-02 RX ORDER — DULOXETIN HYDROCHLORIDE 60 MG/1
60 CAPSULE, DELAYED RELEASE ORAL DAILY
Qty: 30 CAP | Refills: 0 | Status: SHIPPED | OUTPATIENT
Start: 2017-05-02 | End: 2019-10-12

## 2017-05-02 RX ORDER — CLINDAMYCIN HYDROCHLORIDE 300 MG/1
300 CAPSULE ORAL 4 TIMES DAILY
Qty: 40 CAP | Refills: 0 | Status: SHIPPED | OUTPATIENT
Start: 2017-05-02 | End: 2017-05-02

## 2017-05-02 RX ORDER — LISINOPRIL 10 MG/1
10 TABLET ORAL DAILY
Qty: 15 TAB | Refills: 0 | Status: SHIPPED | OUTPATIENT
Start: 2017-05-02 | End: 2017-05-22 | Stop reason: SDUPTHER

## 2017-05-02 RX ORDER — AMIODARONE HYDROCHLORIDE 200 MG/1
200 TABLET ORAL 2 TIMES DAILY
Qty: 30 TAB | Refills: 0 | Status: ON HOLD | OUTPATIENT
Start: 2017-05-02 | End: 2017-05-14

## 2017-05-02 RX ORDER — AMIODARONE HYDROCHLORIDE 200 MG/1
200 TABLET ORAL DAILY
Qty: 30 TAB | Refills: 0 | Status: SHIPPED | OUTPATIENT
Start: 2017-05-03 | End: 2017-05-22 | Stop reason: SDUPTHER

## 2017-05-02 RX ORDER — FAMOTIDINE 20 MG/1
20 TABLET, FILM COATED ORAL 2 TIMES DAILY
Qty: 60 TAB | Refills: 0 | Status: SHIPPED | OUTPATIENT
Start: 2017-05-02 | End: 2018-09-22

## 2017-05-02 RX ORDER — ALPRAZOLAM 1 MG/1
1 TABLET ORAL
Qty: 10 TAB | Refills: 0 | Status: SHIPPED | OUTPATIENT
Start: 2017-05-02 | End: 2018-09-22

## 2017-05-02 RX ORDER — DILTIAZEM HYDROCHLORIDE 180 MG/1
180 CAPSULE, COATED, EXTENDED RELEASE ORAL DAILY
Qty: 30 CAP | Refills: 0 | Status: SHIPPED | OUTPATIENT
Start: 2017-05-02 | End: 2017-11-21 | Stop reason: SDUPTHER

## 2017-05-02 RX ADMIN — HYDROMORPHONE HYDROCHLORIDE 1 MG: 1 INJECTION, SOLUTION INTRAMUSCULAR; INTRAVENOUS; SUBCUTANEOUS at 06:00

## 2017-05-02 RX ADMIN — Medication 10 ML: at 06:00

## 2017-05-02 RX ADMIN — SODIUM CHLORIDE 3 G: 900 INJECTION INTRAVENOUS at 00:00

## 2017-05-02 RX ADMIN — FAMOTIDINE 20 MG: 20 TABLET ORAL at 10:16

## 2017-05-02 RX ADMIN — DIPHENHYDRAMINE HYDROCHLORIDE AND LIDOCAINE HYDROCHLORIDE AND ALUMINUM HYDROXIDE AND MAGNESIUM HYDRO 10 ML: KIT at 10:14

## 2017-05-02 RX ADMIN — LACTOBACILLUS TAB 1 TABLET: TAB at 10:15

## 2017-05-02 RX ADMIN — MULTIPLE VITAMIN LIQUID 30 ML: LIQUID at 10:15

## 2017-05-02 RX ADMIN — SODIUM CHLORIDE 3 G: 900 INJECTION INTRAVENOUS at 06:00

## 2017-05-02 RX ADMIN — PREGABALIN 75 MG: 75 CAPSULE ORAL at 10:16

## 2017-05-02 RX ADMIN — FOLIC ACID 1 MG: 1 TABLET ORAL at 10:15

## 2017-05-02 RX ADMIN — HYDROMORPHONE HYDROCHLORIDE 1 MG: 1 INJECTION, SOLUTION INTRAMUSCULAR; INTRAVENOUS; SUBCUTANEOUS at 02:03

## 2017-05-02 RX ADMIN — HYDROCODONE BITARTRATE AND ACETAMINOPHEN 1 TABLET: 5; 325 TABLET ORAL at 10:44

## 2017-05-02 RX ADMIN — SODIUM CHLORIDE 100 ML/HR: 900 INJECTION, SOLUTION INTRAVENOUS at 04:16

## 2017-05-02 RX ADMIN — DILTIAZEM HYDROCHLORIDE 60 MG: 60 TABLET, FILM COATED ORAL at 06:07

## 2017-05-02 RX ADMIN — AMIODARONE HYDROCHLORIDE 200 MG: 200 TABLET ORAL at 10:16

## 2017-05-02 RX ADMIN — DULOXETINE HYDROCHLORIDE 60 MG: 60 CAPSULE, DELAYED RELEASE ORAL at 10:15

## 2017-05-02 RX ADMIN — LISINOPRIL 10 MG: 10 TABLET ORAL at 10:15

## 2017-05-02 NOTE — PROGRESS NOTES
Infectious Disease progress Note    Requested by: Dr. Merlin Jorge    Reason: right leg cellulitis    Current abx Prior abx   Ampicillin/sulbactam since 4/26   Ceftriaxone x 1 in ED  Pip/tazo 4/25  Vancomycin 4/24- 5/1     Lines:   picc LUE 5/1    Assessment :    74 y.o. female with a PMH of DM-2 (uncontrolled), HTN, chronic pain and fibromyalgia who presented to ED on 4/24/17 with c/c of Right leg swelling and pain. Clinical picture consistent with sepsis (POA) due to group A streptococcus bloodstream infection. Most likely source of bloodstream infection is acute right leg cellulitis. Increased right shoulder pain last few days - CT scan 4/26 reveals large right glenohumeral joint effusion s/p bedside aspiration with finding of purulent fluid suggestive of septic arthritis. Ortho consult appreciated. s/p I&D 4/28    Preceding h/o dog bite 2 weeks ago - but no erythema or pain up until 4/23 makes cellulitis due to indolent pathogens related to dog bite less likely. Increasing creatinine - acute renal failure likely due to sepsis - improved    Tongue pain/erythematous tongue likely streptococcal toxin mediated strawberry tongue - improved tongue pain on today's exam    Intermittent loose stools - likely abx associated. No abdominal pain, tenderness or leukocytosis argues against c.diff    Recommendations:    1. continue iv ampicillin/sulbactam  till 4/28   2. Continue probiotics       Above plan was discussed in details with patient, dr. Laila Sweeney. All questions answered to their full satisfaction. Please call me if any further questions or concerns. Will continue to participate in the care of this patient. subjective:    Improved pain and swelling right leg, decreased pain right shoulder, raw tongue. Patient denies headaches, visual disturbances, sore throat, runny nose, earaches, cp, sob, chills, cough, abdominal pain, diarrhea, burning micturition, or weakness in extremities.  she denies back pain/flank pain. Home Medication List    Details   !! clindamycin (CLEOCIN) 300 mg capsule Take 1 Cap by mouth four (4) times daily for 10 days. Qty: 40 Cap, Refills: 0      !! clindamycin (CLEOCIN) 300 mg capsule Take 1 Cap by mouth four (4) times daily for 10 days. Qty: 40 Cap, Refills: 0       Details   lisinopril (PRINIVIL, ZESTRIL) 10 mg tablet Take 10 mg by mouth daily. methocarbamol (ROBAXIN) 750 mg tablet Take 1 Tab by mouth four (4) times daily. Qty: 20 Tab, Refills: 0      HYDROcodone-acetaminophen (NORCO) 5-325 mg per tablet Take  by mouth. simvastatin (ZOCOR) 40 mg tablet 40 mg.      pregabalin (LYRICA) 75 mg capsule 75 mg. INSULIN GLARGINE,HUM. REC. ANLOG (LANTUS SC)       DULoxetine (CYMBALTA) 60 mg capsule 60 mg.       ALPRAZolam (XANAX) 1 mg tablet              Current Facility-Administered Medications   Medication Dose Route Frequency    ampicillin-sulbactam (UNASYN) 3 g in 0.9% sodium chloride (MBP/ADV) 100 mL MBP  3 g IntraVENous Q6H    lisinopril (PRINIVIL, ZESTRIL) tablet 10 mg  10 mg Oral DAILY    amiodarone (CORDARONE) tablet 200 mg  200 mg Oral BID    acetaminophen (TYLENOL) tablet 500 mg  500 mg Oral K4F PRN    folic acid (FOLVITE) tablet 1 mg  1 mg Oral DAILY    0.9% sodium chloride infusion 250 mL  250 mL IntraVENous PRN    famotidine (PEPCID) tablet 20 mg  20 mg Oral BID    insulin glargine (LANTUS) injection 14 Units  14 Units SubCUTAneous QHS    sodium chloride (NS) flush 5-10 mL  5-10 mL IntraVENous Q8H    sodium chloride (NS) flush 5-10 mL  5-10 mL IntraVENous PRN    oxyCODONE-acetaminophen (PERCOCET) 5-325 mg per tablet 1 Tab  1 Tab Oral Q4H PRN    HYDROmorphone (PF) (DILAUDID) injection 1 mg  1 mg IntraVENous Q4H PRN    dilTIAZem (CARDIZEM) IR tablet 60 mg  60 mg Oral Q6H    Lactobacillus Acidoph & Bulgar (FLORANEX) tablet 1 Tab  1 Tab Oral BID    DULoxetine (CYMBALTA) capsule 60 mg  60 mg Oral DAILY    acetaminophen (TYLENOL) tablet 325 mg  325 mg Oral Q4H PRN    magic mouthwash (FIRST-MOUTHWASH BLM) oral suspension 10 mL  10 mL Oral TIDAC    multivitamin (MULTI-DELYN, WELLESSE) oral liquid 30 mL  30 mL Oral DAILY    0.9% sodium chloride infusion  100 mL/hr IntraVENous CONTINUOUS    ALPRAZolam (XANAX) tablet 0.5 mg  0.5 mg Oral TID PRN    HYDROcodone-acetaminophen (NORCO) 5-325 mg per tablet 1 Tab  1 Tab Oral Q6H PRN    pregabalin (LYRICA) capsule 75 mg  75 mg Oral BID    simvastatin (ZOCOR) tablet 40 mg  40 mg Oral QHS    ondansetron (ZOFRAN) injection 4 mg  4 mg IntraVENous Q4H PRN    insulin lispro (HUMALOG) injection   SubCUTAneous AC&HS    glucose chewable tablet 16 g  16 g Oral PRN    glucagon (GLUCAGEN) injection 1 mg  1 mg IntraMUSCular PRN    dextrose (D50W) injection syrg 12.5-25 g  25-50 mL IntraVENous PRN    sodium chloride (NS) flush 5-10 mL  5-10 mL IntraVENous PRN    sodium chloride (NS) flush 5-10 mL  5-10 mL IntraVENous PRN       Allergies: Review of patient's allergies indicates no known allergies. Temp (24hrs), Av.7 °F (36.5 °C), Min:96.8 °F (36 °C), Max:98.4 °F (36.9 °C)    Visit Vitals    /77 (BP 1 Location: Left arm, BP Patient Position: Sitting)    Pulse 73    Temp 98.2 °F (36.8 °C)    Resp 20    Ht 5' 2\" (1.575 m)    Wt 90.7 kg (200 lb)    SpO2 99%    Breastfeeding No    BMI 36.58 kg/m2       ROS: 12 point ROS obtained in details. Pertinent positives as mentioned in HPI,   otherwise negative    Physical Exam:    General: Well developed, well nourished female laying on the bed AAOx3 in no acute distress. General:   awake alert and oriented   HEENT:  Normocephalic, atraumatic, PERRL, EOMI, no scleral icterus or pallor; no conjunctival hemmohage;  nasal and oral mucous are moist and without evidence of lesions. No thrush. Neck supple, no bruits.    Lymph Nodes:   no cervical, axillary or inguinal adenopathy   Lungs:   non-labored, bilaterally clear to auscultation- no crackles wheezes rales or rhonchi   Heart:  RRR, s1 and s2; no murmurs rubs or gallops, no edema, + pedal pulses   Abdomen:  soft, non-distended, active bowel sounds, no hepatomegaly, no splenomegaly. Non-tender   Genitourinary:  deferred   Extremities:   no clubbing, cyanosis; no joint effusions or swelling; muscle mass appropriate for age, resolved erythema and tenderness right leg, dressing right shoulder not removed, PICC LUE   Neurologic:  No gross focal sensory abnormalities; 5/5 muscle strength to upper and lower extremities. Speech appropriate. Cranial nerves intact                        Skin:  Scab right leg, decreased erythema of skin of right leg, surgical scar both knees well healed   Back:  no paraspinal muscle tenderness or rigidity, no CVA tenderness, minimal cervical spine tenderness     Psychiatric:  No suicidal or homicidal ideations, appropriate mood and affect         Labs: Results:   Chemistry Recent Labs      05/01/17 0213 04/30/17   0218   GLU  240*  169*   NA  143  143   K  3.5  4.0   CL  112*  111*   CO2  23  24   BUN  17  25*   CREA  0.88  1.05   CA  8.0*  8.0*   AGAP  8  8   BUCR  19  24*      CBC w/Diff Recent Labs      05/02/17   0340  05/01/17   2055  05/01/17   1310  05/01/17   0213   04/30/17   0218   WBC   --    --    --   7.1   --   7.5   RBC   --    --    --   3.23*   --   3.19*   HGB  7.9*  8.9*  8.5*  8.3*   < >  8.0*   HCT  24.7*  27.6*  26.7*  25.9*   < >  25.6*   PLT   --    --    --   308   --   266   GRANS   --    --    --   72   --   64   LYMPH   --    --    --   15*   --   19*   EOS   --    --    --   2   --   2    < > = values in this interval not displayed. Microbiology No results for input(s): CULT in the last 72 hours.        RADIOLOGY:    All available imaging studies/reports in [facility] for this admission were reviewed    Dr. Catherine Sánchez, Infectious Disease Specialist  [phone]  May 2, 2017  9:12 AM

## 2017-05-02 NOTE — PROGRESS NOTES
Reviewed discharge plan for [facility] with pt and daughter-in law, Denisa Robison ( with pt's  permission) - requested Rx be filled by bedside Rx-  Prescriptions faxed to pharmacy, reviewed need for home health nurse to visit pt this afternoon before her release to set up  PICC line & review IV antibiotic administration schedule , confirm address- (pt to stay with son and daughter-in -law initially post discharge) , ect,. Daughter -in-law to transport home, Nurse Mars Romero aware.

## 2017-05-02 NOTE — DISCHARGE SUMMARY
Discharge Summary     Patient ID:  Tosha Ray  [id number]  74 y.o.  1950    PCP on record: Zoe Tinoco MD    Admit date: 4/24/2017  Discharge date and time: 5/2/2017    Discharge Diagnoses:                                           1 Sepsis   2 Cellulitis Leg   3 HTN  4 Afib ( New onset ) - paroxysmal   5 Right shoulder joint septic arthritis   6 anemia acute blood loss       Consults: Cardiology, ID and [address] Course by problems:  Per admitting MD - HPI   \"Ms. Miley Pugh is a 74 y.o. female with a PMH of DM-2, HTN, chronic pain and fibromyalgia who presents with c/c of Right leg swelling and pain. She was also complaining of right shoulder and back pain. She denies fever or chills. According to the patient she had dog bite on her right leg about 2 weeks ago but she wasn't complaining anything until yesterday when she started to have swelling of the right leg, pain and erythema. Here in ED she was found to have fever, tachycardia and elevated WBC with diagnosis of cellulitis of her right leg and started on IV antibiotics with vancomycin and ceftriaxone. Blood culture has been ordered and admitted for further evaluation and management. \"     Hospital Course :     Admitted with right LL cellulitis , sepsis , blood culture Positive STREPTOCOCCI, BETA HEMOLYTIC GROUP A - with rpt culture negative , now on IV Unasyn - details per ID orders .  Stable from infection point of view     Right shoulder septic arthritis - S/P I&D - Healing well     New onset Afib - paroxysmal   - need out patient starting anticoagulation if H/H remains stable , as she had H/o one time rectal bleed , LTAC is not started in hospital , D/W Patient and D/W PCP .  - Patient on Amiodarone and Cardizem - Needs out patient PFT , monitoring of heart rate /ECG , Cardiology follow up - Dr Abdoul Frank   - Patient has H/o Gastric bypass surgery so she is at risk for GI complications from ASA /AC - patient is started on ASA . Out patient care is arranged         Patient seen and examined by me on discharge day. Pertinent Findings:  Alert awake and oriented   HR - Controlled , Afib   RS- Clear   CVS- Irregular     Stable     Significant Diagnostic Studies:  Right upper Extremity Without Contrast     HISTORY: Right shoulder pain for 2 days. Septic arthritis.     COMPARISON: Shoulder films 4/24/2017.     TECHNIQUE: Serial axial CT images of the right shoulder were obtained without  contrast. Coronal and sagittal MIP reformatted images of the right shoulder were  obtained for better evaluation of fracture and bony anatomy and to decrease  radiation dose. All CT scans are performed using dose optimization techniques  as appropriate to the performed exam including the following: Automated exposure  control, adjustment of mA and/or kV according to patient size, and use of  iterative reconstructive technique.      FINDINGS:     Large right glenohumeral joint effusion with at least mild synovial thickening. No associated gas. No bone erosions. Degenerative mild joint space narrowing  with minimal spurring at the glenohumeral joint. Mild subchondral degenerative  cysts at the acromioclavicular joint. Partially visualized degenerative change  at the sternoclavicular joint.     Small supraclavicular lymph nodes and axillary lymph nodes without significant  adenopathy, likely reactive. Patchy perihilar groundglass opacity in the  partially imaged right upper lung, not fully evaluated. No pleural effusion at  the included portion of the upper lung.     IMPRESSION  IMPRESSION:     Large right glenohumeral joint effusion without bony erosions. Differential  includes septic joint, arthritic effusion, or sequela of prior trauma.  Septic  joint cannot be excluded via aspiration.     Mild degenerative joint disease at the acromioclavicular and clinical humeral  joint, more notable at the sternoclavicular joint.     Patchy incompletely evaluated right perihilar lung opacities could be secondary  to mild edema or infection. Considered dedicated CT chest for evaluation.         Portable chest x-ray, semierect AP view, time 2227 hours on 2017:           INDICATION:     Sepsis. Pain in right shoulder.     History of hypertension and diabetes.     COMPARISON STUDY: None.     FINDINGS:     Lungs are mildly hypoventilated. There is borderline cardiac size. Pulmonary  vascularity is minimally engorged. In right upper lobe there are hazy and mild  patchy opacities, indicating pneumonic infiltrates. In left perihilar area there  are mild accentuated markings. Bases of lungs are clear. CP angles are sharp  bilaterally.     There are postoperative changes in left shoulder with orthopedic screws in head  of left humerus.           IMPRESSION  IMPRESSION:     Mild inhomogeneous pneumonic infiltrates in right upper lobe.     Borderline cardiac size with minimal pulmonary vascular congestion without  obvious pulmonary edema. [address] [place] [address] Dr  Two Frank[place], [phone]    Transthoracic Echocardiogram    Patient: Paolo Chahal  MRN: [id number]  [id number]  [facility] #: [de-identified]  : 1950  Age: 74 years  Gender: Female  Height: 62 in  Weight: 195.6 lb  BSA: 1.9 m-sq  BP: 139 / 93 mmHg  Study date: 2017  Status: Routine  Location: [facility] #: [id number]    Allergies: NO KNOWN ALLERGIES    Referring_Ordering Physician: HEMANT Salazar  Interpreting Group: [facility]  Interpreting Physician: Zackery Rush. Leny Gifford MD  Technologist: Sarwat Calderón. Jakob Best    SUMMARY:  Left ventricle: Systolic function was normal. Ejection fraction was  estimated in the range of 55 % to 60 %. No obvious wall motion  abnormalities identified in the views obtained. Wall thickness was  moderately increased.  Doppler parameters were consistent with abnormal  left ventricular relaxation (grade 1 diastolic dysfunction). Right ventricle: Systolic pressure was mildly increased. Estimated peak  pressure was 47 mmHg. Mitral valve: There was mild to moderate annular calcification. The  findings were consistent with mild mitral stenosis. Mean transmitral  gradient was 5 mmHg. COMPARISONS:  Comparison was made with the previous study of 19-Feb-2016. Mitral  stenosis has worsened. Otherwise no significant change. INDICATIONS: Atrial fibrillation. HISTORY: Prior history: Sepsis Atrial fibrillation. Risk factors:  hypertension and oral hypoglycemic-treated diabetes. PROCEDURE: This was a routine study. The study included complete 2D  imaging, M-mode, complete spectral Doppler, and color Doppler. The heart  rate was 70 bpm, at the start of the study. Systolic blood pressure was  139 mmHg, at the start of the study. Diastolic blood pressure was 93 mmHg,  at the start of the study. Images were obtained from the parasternal,  apical, subcostal, and suprasternal notch acoustic windows. Image quality  was adequate. LEFT VENTRICLE: Size was normal. Systolic function was normal. Ejection  fraction was estimated in the range of 55 % to 60 %. No obvious wall  motion abnormalities identified in the views obtained. Wall thickness was  moderately increased. DOPPLER: Doppler parameters were consistent with  abnormal left ventricular relaxation (grade 1 diastolic dysfunction). RIGHT VENTRICLE: The size was normal. Systolic function was normal.  DOPPLER: Systolic pressure was mildly increased. Estimated peak pressure  was 47 mmHg. LEFT ATRIUM: Size was normal.    RIGHT ATRIUM: Size was normal.    MITRAL VALVE: There was mild to moderate annular calcification. Normal  valve structure. There was mild diffuse thickening. DOPPLER: The findings  were consistent with mild mitral stenosis. There was no significant  regurgitation. AORTIC VALVE: The valve was trileaflet.  Leaflets exhibited normal  thickness, mild calcification, and normal cuspal separation. DOPPLER:  There was no stenosis. There was no regurgitation. TRICUSPID VALVE: Normal valve structure. There was normal leaflet  separation. DOPPLER: There was no evidence for tricuspid stenosis. There  was trivial regurgitation. PULMONIC VALVE: Leaflets exhibited normal thickness, no calcification, and  normal cuspal separation. DOPPLER: The transpulmonic velocity was within  the normal range. There was no regurgitation. AORTA: The root exhibited normal size. PULMONARY ARTERY: The size was normal.    SYSTEMIC VEINS: IVC: The inferior vena cava was normal in size. PERICARDIUM: There was no pericardial effusion. MEASUREMENT TABLES    2D measurements  Right atrium   (Reference normals)  Area sys   10 cm-sq   (8.3-19. 5)    Doppler measurements  Left ventricle   (Reference normals)  Ea, lat kelsea, tiss DP   8 cm/s   (--)  Ea, med kelsea, tiss DP   4 cm/s   (--)  Mitral valve   (Reference normals)  Mean gradient   5 mmHg   (--)    SYSTEM MEASUREMENT TABLES    2D  Ao Diam: 2.94 cm  IVSd: 1.78 cm  LVIDd: 4.6 cm  LVIDs: 2.82 cm  LVPWd: 1.47 cm  SV(Teich): 67.41 ml  AVC: 415 ms  EDV(Teich): 97.49 ml  ESV(Cube): 22.43 ml  ESV(Teich): 30.08 ml  LAAs A2C: 30.17 cm2  LAAs A4C: 32.32 cm2  LAESV A-L A2C: 135.22 ml  LAESV A-L A4C: 130.02 ml  LAESV Index (A-L): 76.24 ml/m2  LAESV MOD A2C: 129.5 ml  LAESV MOD A4C: 123.5 ml  LAESV(A-L): 144.86 ml  LALs A2C: 5.71 cm  LALs A4C: 6.82 cm  LVOT Diam: 1.87 cm    CW  TR Vmax: 3.34 m/s  MV PHT: 98.78 ms  MV VTI: 50.03 cm  MV Vmax: 1.66 m/s  MV Vmean: 1.06 m/s  MV maxP.01 mmHG  MV meanP.96 mmHG  MVA By PHT: 2.23 cm2  TR maxP.59 mmHG    PW  LVOT VTI: 27.38 cm  LVOT Vmax: 1.27 m/s  LVOT Vmean: 0.9 m/s  MV A Brent: 1.47 m/s  MV Dec Elk: 3.84 m/s2  MV DecT: 295.53 ms  MV E Brent: 1.13 m/s  MV E/A Ratio: 0.77  LVOT Env. Ti: 304.82 ms  LVOT maxP.48 mmHG  LVOT meanPG: 3.66 mmHG  LVSI Dopp: 39.75 ml/m2  LVSV Dopp: 75.52 ml  MVA (VTI): 1.51 cm2    Prepared and E-signed by    Neli Read. Armando Fajardo MD  Signed 29-Apr-2017 12:31:53               Pertinent Lab Data:  Recent Labs      05/02/17 0340 05/01/17   2055  05/01/17   1310  05/01/17   0213   04/30/17   0218   WBC   --    --    --   7.1   --   7.5   HGB  7.9*  8.9*  8.5*  8.3*   < >  8.0*   HCT  24.7*  27.6*  26.7*  25.9*   < >  25.6*   PLT   --    --    --   308   --   266    < > = values in this interval not displayed. Recent Labs      05/02/17 0340 05/01/17   1015  05/01/17 0213 04/30/17   1300  04/30/17 0218   NA   --    --   143   --   143   K   --    --   3.5   --   4.0   CL   --    --   112*   --   111*   CO2   --    --   23   --   24   GLU   --    --   240*   --   169*   BUN   --    --   17   --   25*   CREA   --    --   0.88   --   1.05   CA   --    --   8.0*   --   8.0*   MG   --    --   1.6   --   1.8   INR  1.0  1.0   --   1.0   --        DISCHARGE MEDICATIONS:   @  Current Discharge Medication List      START taking these medications    Details   !! amiodarone (CORDARONE) 200 mg tablet Take 1 Tab by mouth two (2) times a day. Qty: 30 Tab, Refills: 0    Comments: Refill will be given by PCP or cardiology with dose adjustment      dilTIAZem CD (CARDIZEM CD) 180 mg ER capsule Take 1 Cap by mouth daily. Qty: 30 Cap, Refills: 0      famotidine (PEPCID) 20 mg tablet Take 1 Tab by mouth two (2) times a day. Qty: 60 Tab, Refills: 0      Lactobacillus Acidoph & Bulgar (FLORANEX) 1 million cell tab tablet Take 1 Tab by mouth two (2) times a day. Qty: 60 Tab, Refills: 0      !! amiodarone (CORDARONE) 200 mg tablet Take 1 Tab by mouth daily. Qty: 30 Tab, Refills: 0       !! - Potential duplicate medications found. Please discuss with provider. CONTINUE these medications which have CHANGED    Details   ALPRAZolam (XANAX) 1 mg tablet Take 1 Tab by mouth nightly as needed for Anxiety. Max Daily Amount: 1 mg.   Qty: 10 Tab, Refills: 0      DULoxetine (CYMBALTA) 60 mg capsule Take 1 Cap by mouth daily. Qty: 30 Cap, Refills: 0      HYDROcodone-acetaminophen (NORCO) 5-325 mg per tablet Take 1 Tab by mouth every six (6) hours as needed for Pain. Max Daily Amount: 4 Tabs. Qty: 15 Tab, Refills: 0      lisinopril (PRINIVIL, ZESTRIL) 10 mg tablet Take 1 Tab by mouth daily. Qty: 15 Tab, Refills: 0         CONTINUE these medications which have NOT CHANGED    Details   methocarbamol (ROBAXIN) 750 mg tablet Take 1 Tab by mouth four (4) times daily. Qty: 20 Tab, Refills: 0      simvastatin (ZOCOR) 40 mg tablet 40 mg.      pregabalin (LYRICA) 75 mg capsule 75 mg. INSULIN GLARGINE,HUM. REC. ANLOG (LANTUS SC)                My Recommended Diet, Activity, Wound Care, and follow-up labs are listed in the patient's Discharge Insturctions which I have personally completed and reviewed. Disposition:     [] Home with family     [x]  PT/RN   [] SNF/NH   [] Inpatient Rehab/GIGI  Condition at Discharge:  Stable    Follow up with:   PCP : Jason Finley MD      Please follow-up tests/labs that are still pendin.  None  2.    >30 minutes spent coordinating this discharge (review instructions/follow-up, prescriptions, preparing report for sign off)    Signed:  Pamela Sinclair MD  2017  10:04 AM

## 2017-05-02 NOTE — DISCHARGE INSTRUCTIONS
DISCHARGE SUMMARY from Nurse    The following personal items are in your possession at time of discharge:    Dental Appliances: Uppers, At home  Visual Aid: Glasses, At home     Home Medications: None  Jewelry: Watch, Bracelet  Clothing: Pants, Other (comment), Footwear (sweat shirt)  Other Valuables: Cell Phone  Personal Items Sent to Safe: no          PATIENT INSTRUCTIONS:    After general anesthesia or intravenous sedation, for 24 hours or while taking prescription Narcotics:  · Limit your activities  · Do not drive and operate hazardous machinery  · Do not make important personal or business decisions  · Do  not drink alcoholic beverages  · If you have not urinated within 8 hours after discharge, please contact your surgeon on call. Report the following to your surgeon:  · Excessive pain, swelling, redness or odor of or around the surgical area  · Temperature over 100.5  · Nausea and vomiting lasting longer than 4 hours or if unable to take medications  · Any signs of decreased circulation or nerve impairment to extremity: change in color, persistent  numbness, tingling, coldness or increase pain  · Any questions        What to do at Home:  Recommended activity: Activity as tolerated    If you experience any of the following symptoms chest pain, chest tightness, nausea, vomiting, spontaneous bleeding, please follow up with primary care physician or emergency department. *  Please give a list of your current medications to your Primary Care Provider. *  Please update this list whenever your medications are discontinued, doses are      changed, or new medications (including over-the-counter products) are added. *  Please carry medication information at all times in case of emergency situations.           These are general instructions for a healthy lifestyle:    No smoking/ No tobacco products/ Avoid exposure to second hand smoke    Surgeon General's Warning:  Quitting smoking now greatly reduces serious risk to your health. Obesity, smoking, and sedentary lifestyle greatly increases your risk for illness    A healthy diet, regular physical exercise & weight monitoring are important for maintaining a healthy lifestyle    You may be retaining fluid if you have a history of heart failure or if you experience any of the following symptoms:  Weight gain of 3 pounds or more overnight or 5 pounds in a week, increased swelling in our hands or feet or shortness of breath while lying flat in bed. Please call your doctor as soon as you notice any of these symptoms; do not wait until your next office visit. Recognize signs and symptoms of STROKE:    F-face looks uneven    A-arms unable to move or move unevenly    S-speech slurred or non-existent    T-time-call 911 as soon as signs and symptoms begin-DO NOT go       Back to bed or wait to see if you get better-TIME IS BRAIN. Warning Signs of HEART ATTACK     Call 911 if you have these symptoms:   Chest discomfort. Most heart attacks involve discomfort in the center of the chest that lasts more than a few minutes, or that goes away and comes back. It can feel like uncomfortable pressure, squeezing, fullness, or pain.  Discomfort in other areas of the upper body. Symptoms can include pain or discomfort in one or both arms, the back, neck, jaw, or stomach.  Shortness of breath with or without chest discomfort.  Other signs may include breaking out in a cold sweat, nausea, or lightheadedness. Don't wait more than five minutes to call 911 - MINUTES MATTER! Fast action can save your life. Calling 911 is almost always the fastest way to get lifesaving treatment. Emergency Medical Services staff can begin treatment when they arrive -- up to an hour sooner than if someone gets to the hospital by car. The discharge information has been reviewed with the patient. The patient verbalized understanding.     Discharge medications reviewed with the patient and appropriate educational materials and side effects teaching were provided. Patient armband removed and shredded. [email or web address] Activation    Thank you for requesting access to [employer]. Please follow the instructions below to securely access and download your online medical record. [employer] allows you to send messages to your doctor, view your test results, renew your prescriptions, schedule appointments, and more. How Do I Sign Up? 1. In your internet browser, go to [email or web address]  2. Click on the First Time User? Click Here link in the Sign In box. You will be redirect to the New Member Sign Up page. 3. Enter your [employer] Access Code exactly as it appears below. You will not need to use this code after youve completed the sign-up process. If you do not sign up before the expiration date, you must request a new code. [employer] Access Code: Activation code not generated  Current [employer] Status: Active (This is the date your [employer] access code will )    4. Enter the last four digits of your Social Security Number (xxxx) and Date of Birth (mm/dd/yyyy) as indicated and click Submit. You will be taken to the next sign-up page. 5. Create a [employer] ID. This will be your [employer] login ID and cannot be changed, so think of one that is secure and easy to remember. 6. Create a [employer] password. You can change your password at any time. 7. Enter your Password Reset Question and Answer. This can be used at a later time if you forget your password. 8. Enter your e-mail address. You will receive e-mail notification when new information is available in [address]. 9. Click Sign Up. You can now view and download portions of your medical record. 10. Click the Download Summary menu link to download a portable copy of your medical information.     Additional Information    If you have questions, please visit the Frequently Asked Questions section of the [employer] website at [email or web address] Remember, [employer] is NOT to be used for urgent needs. For medical emergencies, dial 911.

## 2017-05-02 NOTE — PROGRESS NOTES
[facility]      CARDIOLOGY PROGRESS NOTE  RECS:      1. Paroxymal atrial Fibrillation - on amiodarone 200 mg once a day  . Continue with  aspirin 81mg daily if okay with surgeon. Unable to anticoagulate due to drop in H&H   2. Mild mitral stenosis-monitor  3. Now being managed for sepsis. 4. Hypertension-controlled on Cardizem   5. Hx Pulmonary embolism on 2014  6. Hx DVT on 208  7. Hx normal NUC stress test in 02/17   8. S/p Irrigation, incision and drainage, septic right shoulder 04/27/17. 9. Rt arm weakness: pt concerned about poss CVA. May also be due to shoulder surgery.  W/u if needed per medicine              ASSESSMENT:  Hospital Problems  Date Reviewed: 1/23/2015          Codes Class Noted POA    Atrial fibrillation ([facility].) ICD-10-CM: I48.91  ICD-9-CM: 427.31  4/28/2017 Unknown        Gastric bypass status for obesity ICD-10-CM: Z98.84  ICD-9-CM: V45.86  4/28/2017 Unknown    Overview Signed 4/28/2017  1:09 PM by Zackery Lanier NP     Done 10/2016             History of pulmonary embolism ICD-10-CM: Z86.711  ICD-9-CM: V12.55  4/28/2017 Unknown        Fibromyalgia ICD-10-CM: M79.7  ICD-9-CM: 729.1  4/27/2017 Unknown        HTN (hypertension) (Chronic) ICD-10-CM: I10  ICD-9-CM: 401.9  4/27/2017 Unknown        Septic joint of right shoulder region [facility]) ICD-10-CM: M00.9  ICD-9-CM: 711.01  4/27/2017 Yes        Cellulitis ICD-10-CM: L03.90  ICD-9-CM: 682.9  4/24/2017 Unknown        Sepsis ([facility] ICD-10-CM: A41.9  ICD-9-CM: 038.9, 995.91  4/24/2017 Unknown        Sleep apnea ICD-10-CM: G47.30  ICD-9-CM: 780.57  1/25/2012 Yes        Type II diabetes mellitus ([facility] ICD-10-CM: E11.9  ICD-9-CM: 250.00  1/25/2012 Yes                SUBJECTIVE:    No CP or SOB    OBJECTIVE:    VS:   Visit Vitals    /77 (BP 1 Location: Left arm, BP Patient Position: Sitting)    Pulse 73    Temp 98.2 °F (36.8 °C)    Resp 20    Ht 5' 2\" (1.575 m)    Wt 90.7 kg (200 lb)    SpO2 99%    Breastfeeding No    BMI 36.58 kg/m2         Intake/Output Summary (Last 24 hours) at 05/02/17 1615  Last data filed at 05/02/17 2757   Gross per 24 hour   Intake             2745 ml   Output             1600 ml   Net             1145 ml     TELE: normal sinus rhythm    General: alert and in no apparent distress  HENT: Normocephalic, atraumatic. Normal external eye. Neck :  no JVD  Cardiac:  S1, S2 normal  Lungs: clear to auscultation bilaterally  Abdomen: Soft, nontender, no masses  Extremities:  No c/c/e, peripheral pulses present      Labs: Results:       Chemistry Recent Labs      05/01/17 0213 04/30/17   0218   GLU  240*  169*   NA  143  143   K  3.5  4.0   CL  112*  111*   CO2  23  24   BUN  17  25*   CREA  0.88  1.05   CA  8.0*  8.0*   AGAP  8  8   BUCR  19  24*      CBC w/Diff Recent Labs      05/02/17   0340  05/01/17   2055  05/01/17   1310  05/01/17   0213   04/30/17   0218   WBC   --    --    --   7.1   --   7.5   RBC   --    --    --   3.23*   --   3.19*   HGB  7.9*  8.9*  8.5*  8.3*   < >  8.0*   HCT  24.7*  27.6*  26.7*  25.9*   < >  25.6*   PLT   --    --    --   308   --   266   GRANS   --    --    --   72   --   64   LYMPH   --    --    --   15*   --   19*   EOS   --    --    --   2   --   2    < > = values in this interval not displayed. Cardiac Enzymes No results for input(s): CPK, CKND1, TANG in the last 72 hours. No lab exists for component: CKRMB, TROIP   Coagulation Recent Labs      05/02/17   0340  05/01/17   1015  04/30/17   1300   PTP  13.1  12.9  12.7   INR  1.0  1.0  1.0   APTT   --    --   30.6       Lipid Panel Lab Results   Component Value Date/Time    Cholesterol, total 222 10/31/2016 01:54 PM    HDL Cholesterol 62 10/31/2016 01:54 PM    LDL, calculated 115 10/31/2016 01:54 PM    VLDL, calculated 21.4 10/05/2010 08:36 AM    Triglyceride 227 10/31/2016 01:54 PM    CHOL/HDL Ratio 3.6 10/31/2016 01:54 PM      BNP No results for input(s): BNPP in the last 72 hours.    Liver Enzymes No results for input(s): TP, ALB, TBIL, AP, SGOT, GPT in the last 72 hours. No lab exists for component: DBIL   Thyroid Studies Lab Results   Component Value Date/Time    TSH 0.94 04/28/2017 04:15 PM              [address], NP   [phone]  Patient seen independently  Discussed the details with NP and patient.  Please see orders & recommendations  Mayra Bunch MD

## 2017-05-02 NOTE — PROGRESS NOTES
Care Management Interventions  PCP Verified by CM: Yes Dale Ly )  Last Visit to PCP: 04/12/17  Palliative Care Consult (Criteria: CHF and RRAT>21): No (No order RRAt = 15)  Reason for No Palliative Care Consult:  Other (see comment)  Mode of Transport at Discharge: [facility]  Transition of Care Consult (CM Consult): [address]: Yes  MyChart Signup: No  Discharge Durable Medical Equipment: No (has been falling recentlt at home - may need PT evaluation for DME)  Physical Therapy Consult: No  Occupational Therapy Consult: No  Speech Therapy Consult: No  Current Support Network: Own Home (recenlty diviorced, lives in one story home 3 steps to enter with 25 & 21 yr old grandchildren- son Abbi Rhodes is primary support ([phone])  )  Confirm Follow Up Transport: Family (son or daughter-in law )  Plan discussed with Pt/Family/Caregiver: Yes (with pt who was lying on back in bed with reddened left leg outside covers, Iv infusing)  Discharge Location  Discharge Placement: Home with home health (contingent on PT /Ot evaluations)

## 2017-05-02 NOTE — ROUTINE PROCESS
Bedside and Verbal shift change report given to Ofelia Prieto RN (oncoming nurse) by Guevara Alonso RN (offgoing nurse). Report included the following information SBAR, Kardex, ED Summary, Procedure Summary, Intake/Output, MAR and Recent Results.

## 2017-05-02 NOTE — HOME CARE
Rec HC order - d/c noted for today - pt has 100% coverage for home IV abx- - HCP will come to [facility] by 4 PM to hook patient up to pump that will give IV abx at home - [facility] will follow for SN/OT - spoke with Ezekiel Uribe, daughter-in-law - if she decides to take her home with her, she will notify [facility] of change of address.   Currently, patient plans to go back to her own home - daughter will return after 4 Pm for transport home - JANINE Jack RN

## 2017-05-03 ENCOUNTER — HOME CARE VISIT (OUTPATIENT)
Dept: HOME HEALTH SERVICES | Facility: HOME HEALTH | Age: 67
End: 2017-05-03

## 2017-05-03 ENCOUNTER — HOME CARE VISIT (OUTPATIENT)
Dept: SCHEDULING | Facility: HOME HEALTH | Age: 67
End: 2017-05-03
Payer: COMMERCIAL

## 2017-05-03 LAB
BACTERIA SPEC CULT: NORMAL
BACTERIA SPEC CULT: NORMAL
GRAM STN SPEC: NORMAL
GRAM STN SPEC: NORMAL
SERVICE CMNT-IMP: NORMAL
SERVICE CMNT-IMP: NORMAL

## 2017-05-03 PROCEDURE — 400013 HH SOC

## 2017-05-03 PROCEDURE — A4216 STERILE WATER/SALINE, 10 ML: HCPCS

## 2017-05-03 PROCEDURE — 3331090002 HH PPS REVENUE DEBIT

## 2017-05-03 PROCEDURE — A5120 SKIN BARRIER, WIPE OR SWAB: HCPCS

## 2017-05-03 PROCEDURE — G0299 HHS/HOSPICE OF RN EA 15 MIN: HCPCS

## 2017-05-03 PROCEDURE — A6199 ALGINATE DRSG WOUND FILLER: HCPCS

## 2017-05-03 PROCEDURE — 3331090001 HH PPS REVENUE CREDIT

## 2017-05-03 PROCEDURE — A6402 STERILE GAUZE <= 16 SQ IN: HCPCS

## 2017-05-03 PROCEDURE — A6212 FOAM DRG <=16 SQ IN W/BORDER: HCPCS

## 2017-05-04 ENCOUNTER — HOME CARE VISIT (OUTPATIENT)
Dept: HOME HEALTH SERVICES | Facility: HOME HEALTH | Age: 67
End: 2017-05-04
Payer: COMMERCIAL

## 2017-05-04 PROCEDURE — G0152 HHCP-SERV OF OT,EA 15 MIN: HCPCS

## 2017-05-04 PROCEDURE — 3331090001 HH PPS REVENUE CREDIT

## 2017-05-04 PROCEDURE — 3331090002 HH PPS REVENUE DEBIT

## 2017-05-05 ENCOUNTER — HOME CARE VISIT (OUTPATIENT)
Dept: HOME HEALTH SERVICES | Facility: HOME HEALTH | Age: 67
End: 2017-05-05
Payer: COMMERCIAL

## 2017-05-05 ENCOUNTER — HOME CARE VISIT (OUTPATIENT)
Dept: SCHEDULING | Facility: HOME HEALTH | Age: 67
End: 2017-05-05
Payer: COMMERCIAL

## 2017-05-05 VITALS — DIASTOLIC BLOOD PRESSURE: 78 MMHG | SYSTOLIC BLOOD PRESSURE: 124 MMHG | OXYGEN SATURATION: 93 % | HEART RATE: 67 BPM

## 2017-05-05 PROCEDURE — 3331090002 HH PPS REVENUE DEBIT

## 2017-05-05 PROCEDURE — 3331090001 HH PPS REVENUE CREDIT

## 2017-05-05 PROCEDURE — G0299 HHS/HOSPICE OF RN EA 15 MIN: HCPCS

## 2017-05-06 PROCEDURE — 3331090001 HH PPS REVENUE CREDIT

## 2017-05-06 PROCEDURE — 3331090002 HH PPS REVENUE DEBIT

## 2017-05-07 VITALS
TEMPERATURE: 97.2 F | HEART RATE: 63 BPM | SYSTOLIC BLOOD PRESSURE: 120 MMHG | OXYGEN SATURATION: 98 % | RESPIRATION RATE: 16 BRPM | DIASTOLIC BLOOD PRESSURE: 60 MMHG

## 2017-05-07 PROCEDURE — 3331090001 HH PPS REVENUE CREDIT

## 2017-05-07 PROCEDURE — 3331090002 HH PPS REVENUE DEBIT

## 2017-05-08 ENCOUNTER — HOME CARE VISIT (OUTPATIENT)
Dept: SCHEDULING | Facility: HOME HEALTH | Age: 67
End: 2017-05-08
Payer: COMMERCIAL

## 2017-05-08 VITALS
DIASTOLIC BLOOD PRESSURE: 60 MMHG | RESPIRATION RATE: 18 BRPM | TEMPERATURE: 97.6 F | SYSTOLIC BLOOD PRESSURE: 90 MMHG | HEART RATE: 76 BPM | OXYGEN SATURATION: 98 %

## 2017-05-08 PROCEDURE — G0151 HHCP-SERV OF PT,EA 15 MIN: HCPCS

## 2017-05-08 PROCEDURE — 3331090001 HH PPS REVENUE CREDIT

## 2017-05-08 PROCEDURE — 3331090002 HH PPS REVENUE DEBIT

## 2017-05-09 ENCOUNTER — HOME CARE VISIT (OUTPATIENT)
Dept: HOME HEALTH SERVICES | Facility: HOME HEALTH | Age: 67
End: 2017-05-09
Payer: COMMERCIAL

## 2017-05-09 VITALS
DIASTOLIC BLOOD PRESSURE: 80 MMHG | HEART RATE: 63 BPM | OXYGEN SATURATION: 98 % | SYSTOLIC BLOOD PRESSURE: 120 MMHG | TEMPERATURE: 97.3 F

## 2017-05-09 PROCEDURE — 3331090001 HH PPS REVENUE CREDIT

## 2017-05-09 PROCEDURE — G0157 HHC PT ASSISTANT EA 15: HCPCS

## 2017-05-09 PROCEDURE — 3331090002 HH PPS REVENUE DEBIT

## 2017-05-10 ENCOUNTER — HOME CARE VISIT (OUTPATIENT)
Dept: HOME HEALTH SERVICES | Facility: HOME HEALTH | Age: 67
End: 2017-05-10
Payer: COMMERCIAL

## 2017-05-10 PROCEDURE — 3331090002 HH PPS REVENUE DEBIT

## 2017-05-10 PROCEDURE — 3331090001 HH PPS REVENUE CREDIT

## 2017-05-11 ENCOUNTER — HOME CARE VISIT (OUTPATIENT)
Dept: SCHEDULING | Facility: HOME HEALTH | Age: 67
End: 2017-05-11
Payer: COMMERCIAL

## 2017-05-11 VITALS
HEART RATE: 79 BPM | TEMPERATURE: 97 F | SYSTOLIC BLOOD PRESSURE: 160 MMHG | RESPIRATION RATE: 21 BRPM | OXYGEN SATURATION: 91 % | DIASTOLIC BLOOD PRESSURE: 98 MMHG

## 2017-05-11 PROCEDURE — G0299 HHS/HOSPICE OF RN EA 15 MIN: HCPCS

## 2017-05-11 PROCEDURE — 3331090002 HH PPS REVENUE DEBIT

## 2017-05-11 PROCEDURE — G0157 HHC PT ASSISTANT EA 15: HCPCS

## 2017-05-11 PROCEDURE — 3331090001 HH PPS REVENUE CREDIT

## 2017-05-12 ENCOUNTER — HOME CARE VISIT (OUTPATIENT)
Dept: HOME HEALTH SERVICES | Facility: HOME HEALTH | Age: 67
End: 2017-05-12
Payer: COMMERCIAL

## 2017-05-12 VITALS
DIASTOLIC BLOOD PRESSURE: 90 MMHG | SYSTOLIC BLOOD PRESSURE: 190 MMHG | HEART RATE: 63 BPM | RESPIRATION RATE: 16 BRPM | TEMPERATURE: 97.5 F | OXYGEN SATURATION: 95 %

## 2017-05-12 PROBLEM — I50.9 CHF (CONGESTIVE HEART FAILURE) (HCC): Status: ACTIVE | Noted: 2017-05-12

## 2017-05-12 PROCEDURE — 3331090002 HH PPS REVENUE DEBIT

## 2017-05-12 PROCEDURE — 3331090001 HH PPS REVENUE CREDIT

## 2017-05-13 PROCEDURE — 3331090001 HH PPS REVENUE CREDIT

## 2017-05-13 PROCEDURE — 3331090002 HH PPS REVENUE DEBIT

## 2017-05-14 PROCEDURE — 3331090001 HH PPS REVENUE CREDIT

## 2017-05-14 PROCEDURE — 3331090002 HH PPS REVENUE DEBIT

## 2017-05-15 ENCOUNTER — HOME CARE VISIT (OUTPATIENT)
Dept: HOME HEALTH SERVICES | Facility: HOME HEALTH | Age: 67
End: 2017-05-15
Payer: COMMERCIAL

## 2017-05-15 VITALS — HEART RATE: 59 BPM | SYSTOLIC BLOOD PRESSURE: 176 MMHG | OXYGEN SATURATION: 95 % | DIASTOLIC BLOOD PRESSURE: 70 MMHG

## 2017-05-15 PROCEDURE — 3331090001 HH PPS REVENUE CREDIT

## 2017-05-15 PROCEDURE — 3331090002 HH PPS REVENUE DEBIT

## 2017-05-16 ENCOUNTER — HOME CARE VISIT (OUTPATIENT)
Dept: SCHEDULING | Facility: HOME HEALTH | Age: 67
End: 2017-05-16
Payer: COMMERCIAL

## 2017-05-16 VITALS
DIASTOLIC BLOOD PRESSURE: 72 MMHG | HEART RATE: 74 BPM | HEIGHT: 62 IN | BODY MASS INDEX: 36.8 KG/M2 | OXYGEN SATURATION: 95 % | RESPIRATION RATE: 20 BRPM | TEMPERATURE: 99 F | SYSTOLIC BLOOD PRESSURE: 136 MMHG | WEIGHT: 200 LBS

## 2017-05-16 PROCEDURE — 3331090001 HH PPS REVENUE CREDIT

## 2017-05-16 PROCEDURE — G0493 RN CARE EA 15 MIN HH/HOSPICE: HCPCS

## 2017-05-16 PROCEDURE — 3331090002 HH PPS REVENUE DEBIT

## 2017-05-17 ENCOUNTER — HOME CARE VISIT (OUTPATIENT)
Dept: HOME HEALTH SERVICES | Facility: HOME HEALTH | Age: 67
End: 2017-05-17
Payer: COMMERCIAL

## 2017-05-17 PROCEDURE — 3331090002 HH PPS REVENUE DEBIT

## 2017-05-17 PROCEDURE — A6212 FOAM DRG <=16 SQ IN W/BORDER: HCPCS

## 2017-05-17 PROCEDURE — 3331090001 HH PPS REVENUE CREDIT

## 2017-05-17 PROCEDURE — A6197 ALGINATE DRSG >16 <=48 SQ IN: HCPCS

## 2017-05-18 ENCOUNTER — HOME CARE VISIT (OUTPATIENT)
Dept: SCHEDULING | Facility: HOME HEALTH | Age: 67
End: 2017-05-18
Payer: COMMERCIAL

## 2017-05-18 ENCOUNTER — TELEPHONE (OUTPATIENT)
Dept: ORTHOPEDIC SURGERY | Facility: CLINIC | Age: 67
End: 2017-05-18

## 2017-05-18 ENCOUNTER — HOME CARE VISIT (OUTPATIENT)
Dept: HOME HEALTH SERVICES | Facility: HOME HEALTH | Age: 67
End: 2017-05-18
Payer: COMMERCIAL

## 2017-05-18 VITALS — HEART RATE: 77 BPM | OXYGEN SATURATION: 98 % | DIASTOLIC BLOOD PRESSURE: 78 MMHG | SYSTOLIC BLOOD PRESSURE: 136 MMHG

## 2017-05-18 PROCEDURE — G0152 HHCP-SERV OF OT,EA 15 MIN: HCPCS

## 2017-05-18 PROCEDURE — G0151 HHCP-SERV OF PT,EA 15 MIN: HCPCS

## 2017-05-18 PROCEDURE — G0299 HHS/HOSPICE OF RN EA 15 MIN: HCPCS

## 2017-05-18 PROCEDURE — 3331090001 HH PPS REVENUE CREDIT

## 2017-05-18 PROCEDURE — 3331090002 HH PPS REVENUE DEBIT

## 2017-05-18 NOTE — TELEPHONE ENCOUNTER
Rosie Strange with [facility] health called to schedule pt and appt. Pt had I & D washout (rt shoulder) on 4/27 and has not been seen since. She is scheduled for an appt on 5/22 with LUIS ALFREDO Santiago. Rosie Strange is requesting protocol/instructions for pt's Home Health PT (i.e. Strengthening, ROM, etc). Rosie Strange can be reached at . If any orders, etc need to be faxed, please fax them to [phone].

## 2017-05-19 PROCEDURE — 3331090001 HH PPS REVENUE CREDIT

## 2017-05-19 PROCEDURE — 3331090002 HH PPS REVENUE DEBIT

## 2017-05-20 PROCEDURE — 3331090002 HH PPS REVENUE DEBIT

## 2017-05-20 PROCEDURE — 3331090001 HH PPS REVENUE CREDIT

## 2017-05-21 PROCEDURE — 3331090001 HH PPS REVENUE CREDIT

## 2017-05-21 PROCEDURE — 3331090002 HH PPS REVENUE DEBIT

## 2017-05-22 ENCOUNTER — HOME CARE VISIT (OUTPATIENT)
Dept: HOME HEALTH SERVICES | Facility: HOME HEALTH | Age: 67
End: 2017-05-22
Payer: COMMERCIAL

## 2017-05-22 ENCOUNTER — TELEPHONE (OUTPATIENT)
Dept: CARDIOLOGY CLINIC | Age: 67
End: 2017-05-22

## 2017-05-22 ENCOUNTER — OFFICE VISIT (OUTPATIENT)
Dept: ORTHOPEDIC SURGERY | Facility: CLINIC | Age: 67
End: 2017-05-22

## 2017-05-22 ENCOUNTER — OFFICE VISIT (OUTPATIENT)
Dept: CARDIOLOGY CLINIC | Age: 67
End: 2017-05-22

## 2017-05-22 VITALS
WEIGHT: 183.8 LBS | HEIGHT: 62 IN | DIASTOLIC BLOOD PRESSURE: 88 MMHG | TEMPERATURE: 97.5 F | BODY MASS INDEX: 33.82 KG/M2 | HEART RATE: 69 BPM | SYSTOLIC BLOOD PRESSURE: 182 MMHG

## 2017-05-22 VITALS
HEIGHT: 62 IN | DIASTOLIC BLOOD PRESSURE: 85 MMHG | WEIGHT: 184 LBS | HEART RATE: 72 BPM | SYSTOLIC BLOOD PRESSURE: 169 MMHG | BODY MASS INDEX: 33.86 KG/M2

## 2017-05-22 DIAGNOSIS — Z48.02: ICD-10-CM

## 2017-05-22 DIAGNOSIS — I48.0 PAROXYSMAL ATRIAL FIBRILLATION (HCC): Primary | ICD-10-CM

## 2017-05-22 DIAGNOSIS — M00.9 PYOGENIC ARTHRITIS OF RIGHT SHOULDER REGION, DUE TO UNSPECIFIED ORGANISM (HCC): Primary | ICD-10-CM

## 2017-05-22 DIAGNOSIS — D64.9 ANEMIA, UNSPECIFIED TYPE: ICD-10-CM

## 2017-05-22 DIAGNOSIS — E11.9 TYPE 2 DIABETES MELLITUS WITHOUT COMPLICATION, WITH LONG-TERM CURRENT USE OF INSULIN (HCC): ICD-10-CM

## 2017-05-22 DIAGNOSIS — Z48.89 ENCOUNTER FOR POSTOPERATIVE WOUND CHECK: ICD-10-CM

## 2017-05-22 DIAGNOSIS — Z79.4 TYPE 2 DIABETES MELLITUS WITHOUT COMPLICATION, WITH LONG-TERM CURRENT USE OF INSULIN (HCC): ICD-10-CM

## 2017-05-22 DIAGNOSIS — Z98.84 GASTRIC BYPASS STATUS FOR OBESITY: ICD-10-CM

## 2017-05-22 DIAGNOSIS — I05.0 MILD MITRAL STENOSIS: ICD-10-CM

## 2017-05-22 DIAGNOSIS — L03.111 CELLULITIS OF RIGHT AXILLA: ICD-10-CM

## 2017-05-22 DIAGNOSIS — I10 ESSENTIAL HYPERTENSION: Chronic | ICD-10-CM

## 2017-05-22 DIAGNOSIS — Z86.711 HISTORY OF PULMONARY EMBOLISM: ICD-10-CM

## 2017-05-22 PROCEDURE — 3331090001 HH PPS REVENUE CREDIT

## 2017-05-22 PROCEDURE — 3331090002 HH PPS REVENUE DEBIT

## 2017-05-22 RX ORDER — HYDROCODONE BITARTRATE AND ACETAMINOPHEN 7.5; 325 MG/1; MG/1
1 TABLET ORAL
Qty: 44 TAB | Refills: 0 | Status: SHIPPED | OUTPATIENT
Start: 2017-05-22 | End: 2018-01-30 | Stop reason: ALTCHOICE

## 2017-05-22 RX ORDER — METOPROLOL TARTRATE 25 MG/1
12.5 TABLET, FILM COATED ORAL 2 TIMES DAILY
Qty: 60 TAB | Refills: 3 | Status: SHIPPED | OUTPATIENT
Start: 2017-05-22 | End: 2017-11-21 | Stop reason: SDUPTHER

## 2017-05-22 RX ORDER — FUROSEMIDE 40 MG/1
40 TABLET ORAL DAILY
Qty: 30 TAB | Status: SHIPPED | OUTPATIENT
Start: 2017-05-22 | End: 2017-10-07 | Stop reason: SDUPTHER

## 2017-05-22 RX ORDER — LISINOPRIL 10 MG/1
10 TABLET ORAL DAILY
Qty: 30 TAB | Refills: 3 | Status: SHIPPED | OUTPATIENT
Start: 2017-05-22 | End: 2017-09-28 | Stop reason: SDUPTHER

## 2017-05-22 RX ORDER — AMIODARONE HYDROCHLORIDE 200 MG/1
200 TABLET ORAL DAILY
Qty: 30 TAB | Refills: 3 | Status: SHIPPED | OUTPATIENT
Start: 2017-05-22 | End: 2017-09-28 | Stop reason: SDUPTHER

## 2017-05-22 NOTE — PROGRESS NOTES
Medications confirmed by patient verbally      1. Have you been to the ER, urgent care clinic since your last visit? Hospitalized since your last visit? 4/24/17  [address] [place] [address]     5/11 [facility]     2. Have you seen or consulted any other health care providers outside of the [address] [place] [address] since your last visit? Include any pap smears or colon screening. No     3. Since your last visit, have you had any of the following symptoms? SOB, swelling in legs     4. Have you had any blood work, X-rays or cardiac testing? Yes   5. Where do you normally have your labs drawn? Home health comes out to home at this time      6.  Do you need any refills today?  yes      Medications confirmed by patient verbally

## 2017-05-22 NOTE — TELEPHONE ENCOUNTER
Returned phone call to [facility] and informed the pharmacist per Dr. Wyatt simmons to take both medications

## 2017-05-22 NOTE — MR AVS SNAPSHOT
Visit Information Date & Time Provider Department Dept. Phone Encounter #  
 5/22/2017 12:45 PM Rosa Tompkins MD [facility] [address] [place] [address] [id number] Follow-up Instructions Return in about 4 months (around 9/22/2017). Your Appointments 9/26/2017  8:45 AM  
ESTABLISHED PATIENT with Rosa Tompkins MD  
[facility]) Appt Note: 4 months [address] [facility], [address] [facility] [address] [address], [address] [place] [address] Upcoming Health Maintenance Date Due Hepatitis C Screening 1950 FOOT EXAM Q1 3/5/1962 EYE EXAM RETINAL OR DILATED Q1 3/5/1962 MICROALBUMIN Q1 8/23/2011 ZOSTER VACCINE AGE 60> 3/5/2012 DTaP/Tdap/Td series (1 - Tdap) 9/6/2016 BREAST CANCER SCRN MAMMOGRAM 1/14/2017 GLAUCOMA SCREENING Q2Y 3/5/2017 Pneumococcal 65+ Low/Medium Risk (1 of 2 - PCV13) 3/5/2017 MEDICARE YEARLY EXAM 3/5/2017 INFLUENZA AGE 9 TO ADULT 8/1/2017 HEMOGLOBIN A1C Q6M 10/25/2017 FOBT Q 1 YEAR AGE 50-75 4/30/2018 LIPID PANEL Q1 5/12/2018 Allergies as of 5/22/2017  Review Complete On: 5/22/2017 By: Rosa Tompkins MD  
 No Known Allergies Current Immunizations  Never Reviewed Name Date Td, Adsorbed PF 9/5/2016  6:33 PM  
  
 Not reviewed this visit You Were Diagnosed With   
  
 Codes Comments Paroxysmal atrial fibrillation (HCC)    -  Primary ICD-10-CM: I48.0 ICD-9-CM: 427.31 Type 2 diabetes mellitus without complication, with long-term current use of insulin (HCC)     ICD-10-CM: E11.9, Z79.4 ICD-9-CM: 250.00, V58.67 History of pulmonary embolism     ICD-10-CM: Z86.711 ICD-9-CM: V12.55 Gastric bypass status for obesity     ICD-10-CM: Z98.84 ICD-9-CM: V45.86 Essential hypertension     ICD-10-CM: I10 
ICD-9-CM: 401.9 Mild mitral stenosis     ICD-10-CM: I05.0 ICD-9-CM: 394.0 Anemia, unspecified type     ICD-10-CM: D64.9 ICD-9-CM: 602. 9 Vitals BP Pulse Height(growth percentile) Weight(growth percentile) BMI OB Status 169/85 72 5' 2\" (1.575 m) 184 lb (83.5 kg) 33.65 kg/m2 Hysterectomy Smoking Status Never Smoker Vitals History BMI and BSA Data Body Mass Index Body Surface Area  
 33.65 kg/m 2 1.91 m 2 [facility] [address] [phone] Your Updated Medication List  
  
   
This list is accurate as of: 5/22/17  1:01 PM.  Always use your most recent med list.  
  
  
  
  
 ALPRAZolam 1 mg tablet Commonly known as:  Garcia Coup Take 1 Tab by mouth nightly as needed for Anxiety. Max Daily Amount: 1 mg.  
  
 amiodarone 200 mg tablet Commonly known as:  CORDARONE Take 1 Tab by mouth daily. AMPICILLIN-SULBACTAM IV  
12 g by IntraVENous route continuous. aspirin 81 mg chewable tablet Take 1 Tab by mouth daily. dilTIAZem  mg ER capsule Commonly known as:  CARDIZEM CD Take 1 Cap by mouth daily. DULoxetine 60 mg capsule Commonly known as:  CYMBALTA Take 1 Cap by mouth daily. famotidine 20 mg tablet Commonly known as:  PEPCID Take 1 Tab by mouth two (2) times a day. FLONASE 50 mcg/actuation nasal spray Generic drug:  fluticasone 2 Sprays by Both Nostrils route daily. as needed for Allergies  
  
 furosemide 40 mg tablet Commonly known as:  LASIX Take 1 Tab by mouth daily. HEPARIN FLUSH(PORCINE)-0.9NACL IV  
3-5 mL by IntraVENous route daily. * HYDROcodone-acetaminophen 5-325 mg per tablet Commonly known as:  Albaro Dus Take 1 Tab by mouth every six (6) hours as needed for Pain. Max Daily Amount: 4 Tabs. * HYDROcodone-acetaminophen 7.5-325 mg per tablet Commonly known as:  Albaro Dus Take 1 Tab by mouth every six (6) hours as needed for Pain.  Max Daily Amount: 4 Tabs. Indications: Pain Lactobacillus Acidoph & Bulgar 1 million cell Tab tablet Commonly known as:  Anurag Liu Take 1 Tab by mouth two (2) times a day. LANTUS 100 unit/mL injection Generic drug:  insulin glargine 20 Units by SubCUTAneous route nightly. lisinopril 10 mg tablet Commonly known as:  Miky Banner Take 1 Tab by mouth daily. LYRICA 150 mg capsule Generic drug:  pregabalin Take 150 mg by mouth three (3) times daily. 1 cap 3 x day  
  
 metoprolol tartrate 25 mg tablet Commonly known as:  LOPRESSOR Take 0.5 Tabs by mouth two (2) times a day. omeprazole 40 mg capsule Commonly known as:  PRILOSEC Take 40 mg by mouth daily. 1 tab  
  
 simvastatin 40 mg tablet Commonly known as:  ZOCOR Take 40 mg by mouth nightly. 1 tab  
  
 sodium chloride 0.9 % Commonly known as:  NS  
5-10 mL by IntraVENous route daily. * Notice: This list has 2 medication(s) that are the same as other medications prescribed for you. Read the directions carefully, and ask your doctor or other care provider to review them with you. Prescriptions Printed Refills  
 furosemide (LASIX) 40 mg tablet 3r Sig: Take 1 Tab by mouth daily. Class: Print Route: Oral  
  
Prescriptions Sent to Pharmacy Refills  
 metoprolol tartrate (LOPRESSOR) 25 mg tablet 3 Sig: Take 0.5 Tabs by mouth two (2) times a day. Class: Normal  
 Pharmacy: [address] #: [phone] Route: Oral  
 lisinopril (PRINIVIL, ZESTRIL) 10 mg tablet 3 Sig: Take 1 Tab by mouth daily. Class: Normal  
 Pharmacy: [address] #: [phone] Route: Oral  
 amiodarone (CORDARONE) 200 mg tablet 3 Sig: Take 1 Tab by mouth daily. Class: Normal  
 Pharmacy: [address] #: [phone]  Route: Oral  
  
 Follow-up Instructions Return in about 4 months (around 9/22/2017). To-Do List   
 05/22/2017 2:00 PM  
  Appointment with Jeremy Campbell PTA at [facility] CTR  
  
 05/23/2017 To Be Determined Appointment with DEL Olivas at [address] [place] [address] SCHEDULING/INTAKE  
  
 05/24/2017 2:00 PM  
  Appointment with Jeremy Campbell PTA at [address] [place] [address] CTR  
  
 05/25/2017 To Be Determined Appointment with DEL Olivas at [address] [place] [address] CTR  
  
 05/25/2017 To Be Determined Appointment with Saw Curry RN at [address] [place] [address] CTR  
  
 05/25/2017 2:00 PM  
  Appointment with Jeremy Campbell PTA at [address] [place] [address] CTR  
  
 05/29/2017 To Be Determined Appointment with Jeremy Campbell PTA at [address] [place] [address] CTR  
  
 05/29/2017 To Be Determined Appointment with DEL Olivas at [address] [place] [address] CTR  
  
 05/29/2017 PFT:  PFT [facility]CO   
  
 05/31/2017 To Be Determined Appointment with Jeremy Campbell PTA at [address] [place] [address] CTR  
  
 05/31/2017 To Be Determined Appointment with DEL Olivas at [address] [place] [address] SCHEDULING/INTAKE  
  
 06/01/2017 To Be Determined Appointment with Saw Curry RN at [address] [place] [address] CTR  
  
 06/02/2017 To Be Determined Appointment with Jeremy Campbell PTA at [address] [place] [address] CTR  
  
 06/05/2017 To Be Determined Appointment with Jeremy Campbell PTA at [address] [place] [address] CTR  
  
 06/07/2017 To Be Determined   Appointment with Jeremy Campbell PTA at [address] [place] [address] CARE SCHEDULING/INTAKE  
  
 06/08/2017 To Be Determined Appointment with Harjit Tam RN at [address] [facility] CTR  
  
 06/09/2017 To Be Determined Appointment with Annia Pappas PTA at [address] [facility] CTR  
  
 06/12/2017 To Be Determined Appointment with Renny Malin PT at [address] [facility] SERVICES! Dear Salima Rodriguez: Thank you for requesting a [employer] account. Our records indicate that you already have an active [employer] account. You can access your account anytime at [email or web address] Did you know that you can access your hospital and ER discharge instructions at any time in [employer]? You can also review all of your test results from your hospital stay or ER visit. Additional Information If you have questions, please visit the Frequently Asked Questions section of the [employer] website at [email or web address] Remember, [employer] is NOT to be used for urgent needs. For medical emergencies, dial 911. Now available from your iPhone and Android! Please provide this summary of care documentation to your next provider. Your primary care clinician is listed as [address]. If you have any questions after today's visit, please call [phone].

## 2017-05-22 NOTE — LETTER
Amy Connor 1950 5/22/2017 Dear Suzan Waller MD 
 
I had the pleasure of evaluating  Ms. Jennifer Tran in office today. Below are the relevant portions of my assessment and plan of care. ICD-10-CM ICD-9-CM 1. Paroxysmal atrial fibrillation (HCC) I48.0 427.31 PFT DLCO 2. Type 2 diabetes mellitus without complication, with long-term current use of insulin (HCC) E11.9 250.00   
 Z79.4 V58.67   
3. History of pulmonary embolism Z86.711 V12.55   
4. Gastric bypass status for obesity Z98.84 V45.86   
5. Essential hypertension I10 401.9 6. Mild mitral stenosis I05.0 394.0 7. Anemia, unspecified type D64.9 285.9 Current Outpatient Prescriptions Medication Sig Dispense Refill  
 HYDROcodone-acetaminophen (NORCO) 7.5-325 mg per tablet Take 1 Tab by mouth every six (6) hours as needed for Pain. Max Daily Amount: 4 Tabs. Indications: Pain 44 Tab 0  
 metoprolol tartrate (LOPRESSOR) 25 mg tablet Take 0.5 Tabs by mouth two (2) times a day. 60 Tab 3  furosemide (LASIX) 40 mg tablet Take 1 Tab by mouth daily. 30 Tab 3r  
 lisinopril (PRINIVIL, ZESTRIL) 10 mg tablet Take 1 Tab by mouth daily. 30 Tab 3  
 amiodarone (CORDARONE) 200 mg tablet Take 1 Tab by mouth daily. 30 Tab 3  
 omeprazole (PRILOSEC) 40 mg capsule Take 40 mg by mouth daily. 1 tab  pregabalin (LYRICA) 150 mg capsule Take 150 mg by mouth three (3) times daily. 1 cap 3 x day  fluticasone (FLONASE) 50 mcg/actuation nasal spray 2 Sprays by Both Nostrils route daily. as needed for Allergies  HYDROcodone-acetaminophen (NORCO) 5-325 mg per tablet Take 1 Tab by mouth every six (6) hours as needed for Pain. Max Daily Amount: 4 Tabs. 15 Tab 0  
 insulin glargine (LANTUS) 100 unit/mL injection 20 Units by SubCUTAneous route nightly. 1 Vial 0  
 aspirin 81 mg chewable tablet Take 1 Tab by mouth daily. 30 Tab 0  
 sodium chloride (NS) 0.9 % 5-10 mL by IntraVENous route daily.  HEPARIN SOD,PORCINE/0.9 % NACL (HEPARIN FLUSH,PORCINE,-0.9NACL IV) 3-5 mL by IntraVENous route daily.  AMPICILLIN SODIUM/SULBACTAM NA (AMPICILLIN-SULBACTAM IV) 12 g by IntraVENous route continuous.  ALPRAZolam (XANAX) 1 mg tablet Take 1 Tab by mouth nightly as needed for Anxiety. Max Daily Amount: 1 mg. 10 Tab 0  
 DULoxetine (CYMBALTA) 60 mg capsule Take 1 Cap by mouth daily. 30 Cap 0  
 dilTIAZem CD (CARDIZEM CD) 180 mg ER capsule Take 1 Cap by mouth daily. 30 Cap 0  
 famotidine (PEPCID) 20 mg tablet Take 1 Tab by mouth two (2) times a day. 60 Tab 0  
 Lactobacillus Acidoph & Bulgar (FLORANEX) 1 million cell tab tablet Take 1 Tab by mouth two (2) times a day. 60 Tab 0  
 simvastatin (ZOCOR) 40 mg tablet Take 40 mg by mouth nightly. 1 tab Orders Placed This Encounter  PFT DLCO Standing Status:   Future Standing Expiration Date:   11/19/2017  
 metoprolol tartrate (LOPRESSOR) 25 mg tablet Sig: Take 0.5 Tabs by mouth two (2) times a day. Dispense:  60 Tab Refill:  3  
 furosemide (LASIX) 40 mg tablet Sig: Take 1 Tab by mouth daily. Dispense:  30 Tab Refill:  3r  
 lisinopril (PRINIVIL, ZESTRIL) 10 mg tablet Sig: Take 1 Tab by mouth daily. Dispense:  30 Tab Refill:  3  
 amiodarone (CORDARONE) 200 mg tablet Sig: Take 1 Tab by mouth daily. Dispense:  30 Tab Refill:  3 If you have questions, please do not hesitate to call me. I look forward to following Ms. Jennifer Tran along with you.  
 
Sincerely, 
Arvind Keita MD
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
## 2017-05-22 NOTE — TELEPHONE ENCOUNTER
[facility] pharmacy called and stated Amiodarone and Zocor have interactions together    Pharmacist wanted to confirm if patient okay with taking both medications together       [phone]

## 2017-05-22 NOTE — MR AVS SNAPSHOT
Visit Information Date & Time Provider Department Dept. Phone Encounter #  
 5/22/2017  8:30 AM Christian Avendano, [address] [facility] [place] [phone] Your Appointments 5/22/2017 12:45 PM  
ESTABLISHED PATIENT with Rosario Lozano MD  
[facility]) Appt Note: h/f  
 [address] [facility], [address] [place] [id number], [address] [place] [address] Upcoming Health Maintenance Date Due Hepatitis C Screening 1950 FOOT EXAM Q1 3/5/1962 EYE EXAM RETINAL OR DILATED Q1 3/5/1962 MICROALBUMIN Q1 8/23/2011 ZOSTER VACCINE AGE 60> 3/5/2012 DTaP/Tdap/Td series (1 - Tdap) 9/6/2016 BREAST CANCER SCRN MAMMOGRAM 1/14/2017 GLAUCOMA SCREENING Q2Y 3/5/2017 Pneumococcal 65+ Low/Medium Risk (1 of 2 - PCV13) 3/5/2017 MEDICARE YEARLY EXAM 3/5/2017 INFLUENZA AGE 9 TO ADULT 8/1/2017 HEMOGLOBIN A1C Q6M 10/25/2017 FOBT Q 1 YEAR AGE 50-75 4/30/2018 LIPID PANEL Q1 5/12/2018 Allergies as of 5/22/2017  Review Complete On: 5/22/2017 By: Jaylon Phillips No Known Allergies Current Immunizations  Never Reviewed Name Date Td, Adsorbed PF 9/5/2016  6:33 PM  
  
 Not reviewed this visit You Were Diagnosed With   
  
 Codes Comments Pyogenic arthritis of right shoulder region, due to unspecified organism [facility])    -  Primary ICD-10-CM: M00.9 ICD-9-CM: 711.01 Cellulitis of right axilla     ICD-10-CM: L03.111 ICD-9-CM: 682.3 Encounter for postoperative wound check     ICD-10-CM: Z48.89 ICD-9-CM: V58.49 Encounter for removal of staples     ICD-10-CM: Z48.02 
ICD-9-CM: V58.32 Vitals BP Pulse Temp Height(growth percentile) Weight(growth percentile) BMI  
 182/88 69 97.5 °F (36.4 °C) (Oral) 5' 2\" (1.575 m) 183 lb 12.8 oz (83.4 kg) 33.62 kg/m2 OB Status Smoking Status Hysterectomy Never Smoker BMI and BSA Data Body Mass Index Body Surface Area  
 33.62 kg/m 2 1.91 m 2 Preferred Pharmacy Pharmacy Name Phone [facility] [address] [facility], [address] [phone] Your Updated Medication List  
  
   
This list is accurate as of: 5/22/17  9:30 AM.  Always use your most recent med list.  
  
  
  
  
 ALPRAZolam 1 mg tablet Commonly known as:  Amanda [place] Take 1 Tab by mouth nightly as needed for Anxiety. Max Daily Amount: 1 mg.  
  
 amiodarone 200 mg tablet Commonly known as:  CORDARONE Take 1 Tab by mouth daily. AMPICILLIN-SULBACTAM IV  
12 g by IntraVENous route continuous. aspirin 81 mg chewable tablet Take 1 Tab by mouth daily. dilTIAZem  mg ER capsule Commonly known as:  CARDIZEM CD Take 1 Cap by mouth daily. DULoxetine 60 mg capsule Commonly known as:  CYMBALTA Take 1 Cap by mouth daily. famotidine 20 mg tablet Commonly known as:  PEPCID Take 1 Tab by mouth two (2) times a day. FLONASE 50 mcg/actuation nasal spray Generic drug:  fluticasone 2 Sprays by Both Nostrils route daily. as needed for Allergies  
  
 furosemide 40 mg tablet Commonly known as:  LASIX Take 1 Tab by mouth daily. HEPARIN FLUSH(PORCINE)-0.9NACL IV  
3-5 mL by IntraVENous route daily. HYDROcodone-acetaminophen 5-325 mg per tablet Commonly known as:  Simmie Blonder Take 1 Tab by mouth every six (6) hours as needed for Pain. Max Daily Amount: 4 Tabs. Lactobacillus Acidoph & Bulgar 1 million cell Tab tablet Commonly known as:  Delphina Lever Take 1 Tab by mouth two (2) times a day. LANTUS 100 unit/mL injection Generic drug:  insulin glargine 20 Units by SubCUTAneous route nightly. lisinopril 10 mg tablet Commonly known as:  Sydna Lies Take 1 Tab by mouth daily. LYRICA 150 mg capsule Generic drug:  pregabalin Take 150 mg by mouth three (3) times daily. 1 cap 3 x day  
  
 metoprolol tartrate 25 mg tablet Commonly known as:  LOPRESSOR Take 12.5 mg by mouth two (2) times a day. 1/2 tab BID  
  
 omeprazole 40 mg capsule Commonly known as:  PRILOSEC Take 40 mg by mouth daily. 1 tab  
  
 simvastatin 40 mg tablet Commonly known as:  ZOCOR Take 40 mg by mouth nightly. 1 tab  
  
 sodium chloride 0.9 % Commonly known as:  NS  
5-10 mL by IntraVENous route daily. To-Do List   
 05/22/2017 2:00 PM  
  Appointment with Cece Feldman PTA at [address] [place] [address] MED CTR  
  
 05/23/2017 To Be Determined Appointment with DEL Sanders at [address] [place] [address] SCHEDULING/INTAKE  
  
 05/24/2017 2:00 PM  
  Appointment with Cece Feldman PTA at [address] [place] [address] MED CTR  
  
 05/25/2017 To Be Determined Appointment with DEL Sanders at [address] [place] [address] MED CTR  
  
 05/25/2017 To Be Determined Appointment with Edil Warner RN at [address] [place] [address] MED CTR  
  
 05/25/2017 2:00 PM  
  Appointment with Cece Feldman PTA at [address] [place] [address] MED CTR  
  
 05/29/2017 To Be Determined Appointment with Cece Feldman PTA at [address] [place] [address] MED CTR  
  
 05/29/2017 To Be Determined Appointment with DEL Sanders at [address] [place] [address] SCHEDULING/INTAKE  
  
 05/31/2017 To Be Determined Appointment with Cece Feldman PTA at [address] [place] [address] MED CTR  
  
 05/31/2017 To Be Determined Appointment with DEL Sanders at [address] [place] [address] SCHEDULING/INTAKE  
  
 06/01/2017 To Be Determined   Appointment with Edil Warner RN at [address] [place] [address]  
  
 06/02/2017 To Be Determined Appointment with Quincy Harrison PTA at [address] [facility]  
  
 06/05/2017 To Be Determined Appointment with Quincy Harrison PTA at [address] [facility]  
  
 06/07/2017 To Be Determined Appointment with Quincy Harrison PTA at [address] [facility]  
  
 06/08/2017 To Be Determined Appointment with Preet Betts RN at [address] [facility]  
  
 06/09/2017 To Be Determined Appointment with Quincy Harrison PTA at [address] [facility]  
  
 06/12/2017 To Be Determined Appointment with Last Alberto PT at [address] [facility] SERVICES! Dear Candi Gilmore: Thank you for requesting a [employer] account. Our records indicate that you already have an active [employer] account. You can access your account anytime at [email or web address] Did you know that you can access your hospital and ER discharge instructions at any time in [employer] You can also review all of your test results from your hospital stay or ER visit. Additional Information If you have questions, please visit the Frequently Asked Questions section of the [employer] website at [email or web address] Remember, [employer] is NOT to be used for urgent needs. For medical emergencies, dial 911. Now available from your iPhone and Android! Please provide this summary of care documentation to your next provider. Your primary care clinician is listed as [address] [facility] [address] [facility]. If you have any questions after today's visit, please call [phone].

## 2017-05-22 NOTE — PROGRESS NOTES
HISTORY OF PRESENT ILLNESS  Mikael Ortiz is a 74 y.o. female. Hospital Follow Up   The history is provided by the patient. This is a chronic problem. The current episode started more than 1 week ago. The problem occurs every several days. The problem has not changed since onset. Associated symptoms include shortness of breath. Pertinent negatives include no chest pain, no abdominal pain and no headaches. Irregular Heart Beat    The history is provided by the patient. This is a chronic problem. The current episode started more than 1 week ago. The problem has not changed since onset. The problem occurs every several days. Associated symptoms include shortness of breath. Pertinent negatives include no diaphoresis, no fever, no numbness, no chest pain, no claudication, no near-syncope, no orthopnea, no PND, no syncope, no abdominal pain, no nausea, no vomiting, no headaches, no lower extremity edema, no dizziness, no weakness, no cough, no hemoptysis and no sputum production. Risk factors include dyslipidemia, diabetes mellitus and hypertension. Her past medical history is significant for DM, hypertension and atrial fibrillation. Shortness of Breath   The history is provided by the patient. This is a chronic problem. The problem occurs intermittently. The current episode started more than 1 week ago. Pertinent negatives include no fever, no headaches, no ear pain, no neck pain, no cough, no sputum production, no hemoptysis, no wheezing, no PND, no orthopnea, no chest pain, no syncope, no vomiting, no abdominal pain, no rash, no leg swelling and no claudication. Associated medical issues do not include CAD or heart failure. Review of Systems   Constitutional: Negative for chills, diaphoresis, fever and weight loss. HENT: Negative for ear pain and hearing loss. Eyes: Negative for blurred vision. Respiratory: Positive for shortness of breath.  Negative for cough, hemoptysis, sputum production, wheezing and stridor. Cardiovascular: Positive for palpitations. Negative for chest pain, orthopnea, claudication, leg swelling, syncope, PND and near-syncope. Gastrointestinal: Negative for abdominal pain, heartburn, nausea and vomiting. Musculoskeletal: Negative for myalgias and neck pain. Skin: Negative for rash. Neurological: Negative for dizziness, tingling, tremors, focal weakness, loss of consciousness, weakness, numbness and headaches. Psychiatric/Behavioral: Negative for depression and suicidal ideas. Family History   Problem Relation Age of Onset   Erika Stark Asthma Mother     Hypertension Mother     Kidney Disease Mother     Thyroid Disease Mother     Diabetes Maternal Grandmother     Thyroid Disease Maternal Grandmother        Past Medical History:   Diagnosis Date    Anxiety     [facility]best[facility]     Depression     Diabetes ([facility]     Fibromyalgia     GERD (gastroesophageal reflux disease)     Hypercholesteremia     Hypertension     Renal insufficiency     Skin abscess     Sleep apnea        Past Surgical History:   Procedure Laterality Date    HX BREAST LUMPECTOMY      HX CARPAL TUNNEL RELEASE      HX KNEE REPLACEMENT      bilater    HX PARTIAL THYROIDECTOMY      HX SHOULDER ARTHROSCOPY      HX TUBAL LIGATION         Social History   Substance Use Topics    Smoking status: Never Smoker    Smokeless tobacco: Never Used    Alcohol use No       No Known Allergies    Outpatient Prescriptions Marked as Taking for the 5/22/17 encounter (Office Visit) with Rudolph Kruse MD   Medication Sig Dispense Refill    HYDROcodone-acetaminophen (NORCO) 7.5-325 mg per tablet Take 1 Tab by mouth every six (6) hours as needed for Pain. Max Daily Amount: 4 Tabs. Indications: Pain 44 Tab 0    metoprolol tartrate (LOPRESSOR) 25 mg tablet Take 0.5 Tabs by mouth two (2) times a day. 60 Tab 3    furosemide (LASIX) 40 mg tablet Take 1 Tab by mouth daily.  30 Tab 3r    lisinopril (PRINIVIL, ZESTRIL) 10 mg tablet Take 1 Tab by mouth daily. 30 Tab 3    amiodarone (CORDARONE) 200 mg tablet Take 1 Tab by mouth daily. 30 Tab 3    omeprazole (PRILOSEC) 40 mg capsule Take 40 mg by mouth daily. 1 tab      pregabalin (LYRICA) 150 mg capsule Take 150 mg by mouth three (3) times daily. 1 cap 3 x day      fluticasone (FLONASE) 50 mcg/actuation nasal spray 2 Sprays by Both Nostrils route daily. as needed for Allergies      HYDROcodone-acetaminophen (NORCO) 5-325 mg per tablet Take 1 Tab by mouth every six (6) hours as needed for Pain. Max Daily Amount: 4 Tabs. 15 Tab 0    insulin glargine (LANTUS) 100 unit/mL injection 20 Units by SubCUTAneous route nightly. 1 Vial 0    aspirin 81 mg chewable tablet Take 1 Tab by mouth daily. 30 Tab 0    sodium chloride (NS) 0.9 % 5-10 mL by IntraVENous route daily.  HEPARIN SOD,PORCINE/0.9 % NACL (HEPARIN FLUSH,PORCINE,-0.9NACL IV) 3-5 mL by IntraVENous route daily.  AMPICILLIN SODIUM/SULBACTAM NA (AMPICILLIN-SULBACTAM IV) 12 g by IntraVENous route continuous.  ALPRAZolam (XANAX) 1 mg tablet Take 1 Tab by mouth nightly as needed for Anxiety. Max Daily Amount: 1 mg. 10 Tab 0    DULoxetine (CYMBALTA) 60 mg capsule Take 1 Cap by mouth daily. 30 Cap 0    dilTIAZem CD (CARDIZEM CD) 180 mg ER capsule Take 1 Cap by mouth daily. 30 Cap 0    famotidine (PEPCID) 20 mg tablet Take 1 Tab by mouth two (2) times a day. 60 Tab 0    Lactobacillus Acidoph & Bulgar (FLORANEX) 1 million cell tab tablet Take 1 Tab by mouth two (2) times a day. 60 Tab 0    simvastatin (ZOCOR) 40 mg tablet Take 40 mg by mouth nightly. 1 tab          Visit Vitals    /85    Pulse 72    Ht 5' 2\" (1.575 m)    Wt 83.5 kg (184 lb)    BMI 33.65 kg/m2     Physical Exam   Constitutional: She is oriented to person, place, and time. She appears well-developed and well-nourished. No distress. HENT:   Head: Atraumatic. Mouth/Throat: No oropharyngeal exudate.    Eyes: Conjunctivae are normal. Right eye exhibits no discharge. Left eye exhibits no discharge. No scleral icterus. Neck: Neck supple. No JVD present. No tracheal deviation present. No thyromegaly present. Cardiovascular: Normal rate and regular rhythm. Exam reveals no gallop. Murmur (2/6 holosystolic murmur best heard at apex) heard. Pulmonary/Chest: Effort normal and breath sounds normal. No stridor. She has no wheezes. She has no rales. Abdominal: Soft. There is no tenderness. There is no rebound and no guarding. Musculoskeletal: Normal range of motion. She exhibits no edema or tenderness. Lymphadenopathy:     She has no cervical adenopathy. Neurological: She is alert and oriented to person, place, and time. She exhibits normal muscle tone. Skin: Skin is warm. She is not diaphoretic. Psychiatric: She has a normal mood and affect. Her behavior is normal.     Echo 04/2017:  SUMMARY:  Left ventricle: Systolic function was normal. Ejection fraction was  estimated in the range of 55 % to 60 %. No obvious wall motion  abnormalities identified in the views obtained. Wall thickness was  moderately increased. Doppler parameters were consistent with abnormal  left ventricular relaxation (grade 1 diastolic dysfunction). Right ventricle: Systolic pressure was mildly increased. Estimated peak  pressure was 47 mmHg. Mitral valve: There was mild to moderate annular calcification. The  findings were consistent with mild mitral stenosis. Mean transmitral  gradient was 5 mmHg. ASSESSMENT and PLAN    ICD-10-CM ICD-9-CM    1. Paroxysmal atrial fibrillation (HCC) I48.0 427.31 PFT DLCO   2. Type 2 diabetes mellitus without complication, with long-term current use of insulin (HCC) E11.9 250.00     Z79.4 V58.67    3. History of pulmonary embolism Z86.711 V12.55    4. Gastric bypass status for obesity Z98.84 V45.86    5. Essential hypertension I10 401.9    6. Mild mitral stenosis I05.0 394.0    7.  Anemia, unspecified type D64.9 285.9      Orders Placed This Encounter    PFT DLCO     Standing Status:   Future     Standing Expiration Date:   11/19/2017    metoprolol tartrate (LOPRESSOR) 25 mg tablet     Sig: Take 0.5 Tabs by mouth two (2) times a day. Dispense:  60 Tab     Refill:  3    furosemide (LASIX) 40 mg tablet     Sig: Take 1 Tab by mouth daily. Dispense:  30 Tab     Refill:  3r    lisinopril (PRINIVIL, ZESTRIL) 10 mg tablet     Sig: Take 1 Tab by mouth daily. Dispense:  30 Tab     Refill:  3    amiodarone (CORDARONE) 200 mg tablet     Sig: Take 1 Tab by mouth daily. Dispense:  30 Tab     Refill:  3     Follow-up Disposition:  Return in about 4 months (around 9/22/2017). current treatment plan is effective, no change in therapy  reviewed diet, exercise and weight control  cardiovascular risk and specific lipid/LDL goals reviewed  use of aspirin to prevent MI and TIA's discussed. Patient with PAF on amiodarone and aspirin. She is not on oral anticoagulants due to profound anemia. She remains in normal sinus rhythm. Will obtain PFT as she is taking amiodarone.

## 2017-05-23 ENCOUNTER — HOME CARE VISIT (OUTPATIENT)
Dept: HOME HEALTH SERVICES | Facility: HOME HEALTH | Age: 67
End: 2017-05-23
Payer: COMMERCIAL

## 2017-05-23 ENCOUNTER — HOME CARE VISIT (OUTPATIENT)
Dept: SCHEDULING | Facility: HOME HEALTH | Age: 67
End: 2017-05-23
Payer: COMMERCIAL

## 2017-05-23 VITALS
TEMPERATURE: 98.4 F | OXYGEN SATURATION: 97 % | DIASTOLIC BLOOD PRESSURE: 80 MMHG | HEART RATE: 64 BPM | SYSTOLIC BLOOD PRESSURE: 160 MMHG

## 2017-05-23 PROCEDURE — G0157 HHC PT ASSISTANT EA 15: HCPCS

## 2017-05-23 PROCEDURE — 3331090001 HH PPS REVENUE CREDIT

## 2017-05-23 PROCEDURE — 3331090002 HH PPS REVENUE DEBIT

## 2017-05-23 PROCEDURE — G0158 HHC OT ASSISTANT EA 15: HCPCS

## 2017-05-24 ENCOUNTER — HOME CARE VISIT (OUTPATIENT)
Dept: SCHEDULING | Facility: HOME HEALTH | Age: 67
End: 2017-05-24
Payer: COMMERCIAL

## 2017-05-24 VITALS
SYSTOLIC BLOOD PRESSURE: 142 MMHG | HEART RATE: 72 BPM | DIASTOLIC BLOOD PRESSURE: 70 MMHG | TEMPERATURE: 99 F | OXYGEN SATURATION: 98 %

## 2017-05-24 VITALS — SYSTOLIC BLOOD PRESSURE: 139 MMHG | HEART RATE: 88 BPM | DIASTOLIC BLOOD PRESSURE: 99 MMHG | OXYGEN SATURATION: 98 %

## 2017-05-24 PROCEDURE — 3331090001 HH PPS REVENUE CREDIT

## 2017-05-24 PROCEDURE — G0157 HHC PT ASSISTANT EA 15: HCPCS

## 2017-05-24 PROCEDURE — 3331090002 HH PPS REVENUE DEBIT

## 2017-05-25 ENCOUNTER — HOSPITAL ENCOUNTER (OUTPATIENT)
Dept: RESPIRATORY THERAPY | Age: 67
Discharge: HOME | End: 2017-05-25
Attending: INTERNAL MEDICINE
Payer: COMMERCIAL

## 2017-05-25 ENCOUNTER — HOME CARE VISIT (OUTPATIENT)
Dept: SCHEDULING | Facility: HOME HEALTH | Age: 67
End: 2017-05-25
Payer: COMMERCIAL

## 2017-05-25 VITALS
TEMPERATURE: 99 F | OXYGEN SATURATION: 98 % | DIASTOLIC BLOOD PRESSURE: 80 MMHG | HEART RATE: 97 BPM | SYSTOLIC BLOOD PRESSURE: 120 MMHG

## 2017-05-25 VITALS — HEART RATE: 71 BPM | OXYGEN SATURATION: 95 % | SYSTOLIC BLOOD PRESSURE: 148 MMHG | DIASTOLIC BLOOD PRESSURE: 80 MMHG

## 2017-05-25 DIAGNOSIS — I48.0 PAROXYSMAL ATRIAL FIBRILLATION (HCC): ICD-10-CM

## 2017-05-25 PROCEDURE — 94010 BREATHING CAPACITY TEST: CPT

## 2017-05-25 PROCEDURE — 94729 DIFFUSING CAPACITY: CPT

## 2017-05-25 PROCEDURE — G0157 HHC PT ASSISTANT EA 15: HCPCS

## 2017-05-25 PROCEDURE — 94726 PLETHYSMOGRAPHY LUNG VOLUMES: CPT

## 2017-05-25 PROCEDURE — 3331090001 HH PPS REVENUE CREDIT

## 2017-05-25 PROCEDURE — G0158 HHC OT ASSISTANT EA 15: HCPCS

## 2017-05-25 PROCEDURE — 3331090002 HH PPS REVENUE DEBIT

## 2017-05-26 VITALS
SYSTOLIC BLOOD PRESSURE: 120 MMHG | OXYGEN SATURATION: 98 % | RESPIRATION RATE: 18 BRPM | HEART RATE: 97 BPM | TEMPERATURE: 99 F | DIASTOLIC BLOOD PRESSURE: 80 MMHG

## 2017-05-26 PROCEDURE — 3331090001 HH PPS REVENUE CREDIT

## 2017-05-26 PROCEDURE — 3331090002 HH PPS REVENUE DEBIT

## 2017-05-27 PROCEDURE — 3331090001 HH PPS REVENUE CREDIT

## 2017-05-27 PROCEDURE — 3331090002 HH PPS REVENUE DEBIT

## 2017-05-28 PROCEDURE — 3331090001 HH PPS REVENUE CREDIT

## 2017-05-28 PROCEDURE — 3331090002 HH PPS REVENUE DEBIT

## 2017-05-29 PROCEDURE — 3331090002 HH PPS REVENUE DEBIT

## 2017-05-29 PROCEDURE — 3331090001 HH PPS REVENUE CREDIT

## 2017-05-30 ENCOUNTER — HOME CARE VISIT (OUTPATIENT)
Dept: SCHEDULING | Facility: HOME HEALTH | Age: 67
End: 2017-05-30
Payer: COMMERCIAL

## 2017-05-30 VITALS — OXYGEN SATURATION: 97 % | HEART RATE: 72 BPM | DIASTOLIC BLOOD PRESSURE: 80 MMHG | SYSTOLIC BLOOD PRESSURE: 140 MMHG

## 2017-05-30 PROCEDURE — G0157 HHC PT ASSISTANT EA 15: HCPCS

## 2017-05-30 PROCEDURE — 3331090002 HH PPS REVENUE DEBIT

## 2017-05-30 PROCEDURE — 3331090001 HH PPS REVENUE CREDIT

## 2017-05-31 ENCOUNTER — HOME CARE VISIT (OUTPATIENT)
Dept: SCHEDULING | Facility: HOME HEALTH | Age: 67
End: 2017-05-31
Payer: COMMERCIAL

## 2017-05-31 VITALS
TEMPERATURE: 97 F | DIASTOLIC BLOOD PRESSURE: 80 MMHG | OXYGEN SATURATION: 98 % | RESPIRATION RATE: 16 BRPM | SYSTOLIC BLOOD PRESSURE: 140 MMHG | HEART RATE: 83 BPM

## 2017-05-31 VITALS
OXYGEN SATURATION: 97 % | SYSTOLIC BLOOD PRESSURE: 140 MMHG | DIASTOLIC BLOOD PRESSURE: 80 MMHG | HEART RATE: 63 BPM | TEMPERATURE: 98.8 F

## 2017-05-31 PROCEDURE — 3331090001 HH PPS REVENUE CREDIT

## 2017-05-31 PROCEDURE — 3331090002 HH PPS REVENUE DEBIT

## 2017-05-31 PROCEDURE — G0157 HHC PT ASSISTANT EA 15: HCPCS

## 2017-06-01 ENCOUNTER — HOME CARE VISIT (OUTPATIENT)
Dept: SCHEDULING | Facility: HOME HEALTH | Age: 67
End: 2017-06-01
Payer: COMMERCIAL

## 2017-06-01 VITALS
TEMPERATURE: 98.8 F | OXYGEN SATURATION: 98 % | SYSTOLIC BLOOD PRESSURE: 130 MMHG | HEART RATE: 59 BPM | DIASTOLIC BLOOD PRESSURE: 80 MMHG

## 2017-06-01 VITALS — DIASTOLIC BLOOD PRESSURE: 92 MMHG | SYSTOLIC BLOOD PRESSURE: 141 MMHG | OXYGEN SATURATION: 96 % | HEART RATE: 87 BPM

## 2017-06-01 PROCEDURE — G0157 HHC PT ASSISTANT EA 15: HCPCS

## 2017-06-01 PROCEDURE — G0299 HHS/HOSPICE OF RN EA 15 MIN: HCPCS

## 2017-06-01 PROCEDURE — 3331090001 HH PPS REVENUE CREDIT

## 2017-06-01 PROCEDURE — 3331090002 HH PPS REVENUE DEBIT

## 2017-06-01 PROCEDURE — G0152 HHCP-SERV OF OT,EA 15 MIN: HCPCS

## 2017-06-02 PROCEDURE — 3331090002 HH PPS REVENUE DEBIT

## 2017-06-02 PROCEDURE — 3331090001 HH PPS REVENUE CREDIT

## 2017-06-03 PROCEDURE — 3331090002 HH PPS REVENUE DEBIT

## 2017-06-03 PROCEDURE — 3331090001 HH PPS REVENUE CREDIT

## 2017-06-04 ENCOUNTER — HOME CARE VISIT (OUTPATIENT)
Dept: SCHEDULING | Facility: HOME HEALTH | Age: 67
End: 2017-06-04
Payer: COMMERCIAL

## 2017-06-04 VITALS
SYSTOLIC BLOOD PRESSURE: 120 MMHG | RESPIRATION RATE: 16 BRPM | DIASTOLIC BLOOD PRESSURE: 80 MMHG | TEMPERATURE: 98 F | HEART RATE: 72 BPM

## 2017-06-04 PROCEDURE — G0151 HHCP-SERV OF PT,EA 15 MIN: HCPCS

## 2017-06-04 PROCEDURE — 3331090002 HH PPS REVENUE DEBIT

## 2017-06-04 PROCEDURE — 3331090001 HH PPS REVENUE CREDIT

## 2017-06-04 PROCEDURE — 3331090003 HH PPS REVENUE ADJ

## 2017-06-05 PROCEDURE — 3331090001 HH PPS REVENUE CREDIT

## 2017-06-05 PROCEDURE — 3331090002 HH PPS REVENUE DEBIT

## 2017-06-06 PROCEDURE — 3331090001 HH PPS REVENUE CREDIT

## 2017-06-06 PROCEDURE — 3331090002 HH PPS REVENUE DEBIT

## 2017-06-07 PROCEDURE — 3331090002 HH PPS REVENUE DEBIT

## 2017-06-07 PROCEDURE — 3331090001 HH PPS REVENUE CREDIT

## 2017-06-08 PROCEDURE — 3331090001 HH PPS REVENUE CREDIT

## 2017-06-08 PROCEDURE — 3331090002 HH PPS REVENUE DEBIT

## 2017-06-09 PROCEDURE — 3331090002 HH PPS REVENUE DEBIT

## 2017-06-09 PROCEDURE — 3331090001 HH PPS REVENUE CREDIT

## 2017-06-10 PROCEDURE — 3331090002 HH PPS REVENUE DEBIT

## 2017-06-10 PROCEDURE — 3331090001 HH PPS REVENUE CREDIT

## 2017-06-11 PROCEDURE — 3331090002 HH PPS REVENUE DEBIT

## 2017-06-11 PROCEDURE — 3331090001 HH PPS REVENUE CREDIT

## 2017-06-12 VITALS
TEMPERATURE: 98 F | SYSTOLIC BLOOD PRESSURE: 110 MMHG | OXYGEN SATURATION: 97 % | RESPIRATION RATE: 16 BRPM | DIASTOLIC BLOOD PRESSURE: 80 MMHG | HEART RATE: 78 BPM

## 2017-06-12 PROCEDURE — 3331090002 HH PPS REVENUE DEBIT

## 2017-06-12 PROCEDURE — 3331090001 HH PPS REVENUE CREDIT

## 2017-06-13 PROCEDURE — 3331090001 HH PPS REVENUE CREDIT

## 2017-06-13 PROCEDURE — 3331090002 HH PPS REVENUE DEBIT

## 2017-06-14 PROCEDURE — 3331090001 HH PPS REVENUE CREDIT

## 2017-06-14 PROCEDURE — 3331090002 HH PPS REVENUE DEBIT

## 2017-07-06 ENCOUNTER — IMPORTED ENCOUNTER (OUTPATIENT)
Dept: URBAN - METROPOLITAN AREA CLINIC 1 | Facility: CLINIC | Age: 67
End: 2017-07-06

## 2017-07-06 PROBLEM — Z79.4: Noted: 2017-07-06

## 2017-07-06 PROBLEM — H01.004: Noted: 2017-07-06

## 2017-07-06 PROBLEM — H01.001: Noted: 2017-07-06

## 2017-07-06 PROBLEM — Z79.899: Noted: 2017-07-06

## 2017-07-06 PROBLEM — M05.80: Noted: 2017-07-06

## 2017-07-06 PROBLEM — H25.813: Noted: 2017-07-06

## 2017-07-06 PROBLEM — H16.143: Noted: 2017-07-06

## 2017-07-06 PROBLEM — E11.3393: Noted: 2017-07-06

## 2017-07-06 PROBLEM — H04.123: Noted: 2017-07-06

## 2017-07-06 PROCEDURE — 92015 DETERMINE REFRACTIVE STATE: CPT

## 2017-07-06 PROCEDURE — 92083 EXTENDED VISUAL FIELD XM: CPT

## 2017-07-06 PROCEDURE — 92014 COMPRE OPH EXAM EST PT 1/>: CPT

## 2017-07-06 NOTE — PATIENT DISCUSSION
1.  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 Stressed the importance of strong glucose control. Advised of importance of at least yearly dilated examinations but to contact us immediately for any problems or concerns. 2. Plaquenil without evidence of Toxicity or Plaquenil retinopathy on High Risk Medication for [place]. [address] [place] for patient to begin Plaquenil. 3.  NARESH w/ PEK OU-The use/continuation of artificial tears were recommended. 4.  Cataract OU: Observe for now without intervention. The patient was advised to contact us if any change or worsening of vision5. Blepharitis anterior type OU - Daily warm compresses and lid scrubs were recommended. MRX for glasses givenReturn for an appointment in 6 months 10/DFE/repeat Plaq VF due to poor reliability with Dr. Shanika Fontenot.

## 2017-09-26 ENCOUNTER — OFFICE VISIT (OUTPATIENT)
Dept: CARDIOLOGY CLINIC | Age: 67
End: 2017-09-26

## 2017-09-26 VITALS
DIASTOLIC BLOOD PRESSURE: 65 MMHG | SYSTOLIC BLOOD PRESSURE: 135 MMHG | HEART RATE: 62 BPM | HEIGHT: 62 IN | BODY MASS INDEX: 31.1 KG/M2 | WEIGHT: 169 LBS

## 2017-09-26 DIAGNOSIS — Z98.84 GASTRIC BYPASS STATUS FOR OBESITY: ICD-10-CM

## 2017-09-26 DIAGNOSIS — I10 ESSENTIAL HYPERTENSION: Chronic | ICD-10-CM

## 2017-09-26 DIAGNOSIS — I05.0 MILD MITRAL STENOSIS: ICD-10-CM

## 2017-09-26 DIAGNOSIS — D64.9 ANEMIA, UNSPECIFIED TYPE: ICD-10-CM

## 2017-09-26 DIAGNOSIS — I48.0 PAROXYSMAL ATRIAL FIBRILLATION (HCC): Primary | ICD-10-CM

## 2017-09-26 DIAGNOSIS — E11.9 TYPE 2 DIABETES MELLITUS WITHOUT COMPLICATION, UNSPECIFIED LONG TERM INSULIN USE STATUS: ICD-10-CM

## 2017-09-26 DIAGNOSIS — Z86.711 HISTORY OF PULMONARY EMBOLISM: ICD-10-CM

## 2017-09-26 RX ORDER — HYDROCODONE BITARTRATE AND ACETAMINOPHEN 10; 325 MG/1; MG/1
1 TABLET ORAL
COMMUNITY
End: 2018-09-22

## 2017-09-26 NOTE — LETTER
Neda Bowser 1950 
 
9/26/2017 Dear No Recipients I had the pleasure of evaluating  Ms. Conway Sandhoff in office today. Below are the relevant portions of my assessment and plan of care. ICD-10-CM ICD-9-CM 1. Paroxysmal atrial fibrillation (HCC) I48.0 427.31 PFT DLCO 2. Essential hypertension I10 401.9 3. Mild mitral stenosis I05.0 394.0 4. Type 2 diabetes mellitus without complication, unspecified long term insulin use status (HCC) E11.9 250.00   
5. Anemia, unspecified type D64.9 285.9 6. Gastric bypass status for obesity Z98.84 V45.86   
7. History of pulmonary embolism Z86.711 V12.55 Current Outpatient Prescriptions Medication Sig Dispense Refill  
 HYDROcodone-acetaminophen (NORCO)  mg tablet Take 1 Tab by mouth. Take 1-2 tabs by mouth every 8 hours as needed for pain  metoprolol tartrate (LOPRESSOR) 25 mg tablet Take 0.5 Tabs by mouth two (2) times a day. 60 Tab 3  furosemide (LASIX) 40 mg tablet Take 1 Tab by mouth daily. 30 Tab 3r  
 lisinopril (PRINIVIL, ZESTRIL) 10 mg tablet Take 1 Tab by mouth daily. 30 Tab 3  
 amiodarone (CORDARONE) 200 mg tablet Take 1 Tab by mouth daily. 30 Tab 3  
 omeprazole (PRILOSEC) 40 mg capsule Take 40 mg by mouth daily. 1 tab  pregabalin (LYRICA) 150 mg capsule Take 150 mg by mouth three (3) times daily. 1 cap 3 x day  fluticasone (FLONASE) 50 mcg/actuation nasal spray 2 Sprays by Both Nostrils route daily. as needed for Allergies  insulin glargine (LANTUS) 100 unit/mL injection 20 Units by SubCUTAneous route nightly. 1 Vial 0  
 aspirin 81 mg chewable tablet Take 1 Tab by mouth daily. 30 Tab 0  ALPRAZolam (XANAX) 1 mg tablet Take 1 Tab by mouth nightly as needed for Anxiety. Max Daily Amount: 1 mg. 10 Tab 0  
 DULoxetine (CYMBALTA) 60 mg capsule Take 1 Cap by mouth daily. 30 Cap 0  
 dilTIAZem CD (CARDIZEM CD) 180 mg ER capsule Take 1 Cap by mouth daily.  30 Cap 0  
  Lactobacillus Acidoph & Bulgar (FLORANEX) 1 million cell tab tablet Take 1 Tab by mouth two (2) times a day. 60 Tab 0  
 simvastatin (ZOCOR) 40 mg tablet Take 40 mg by mouth nightly. 1 tab    
 HYDROcodone-acetaminophen (NORCO) 7.5-325 mg per tablet Take 1 Tab by mouth every six (6) hours as needed for Pain. Max Daily Amount: 4 Tabs. Indications: Pain 44 Tab 0  
 HYDROcodone-acetaminophen (NORCO) 5-325 mg per tablet Take 1 Tab by mouth every six (6) hours as needed for Pain. Max Daily Amount: 4 Tabs. 15 Tab 0  
 famotidine (PEPCID) 20 mg tablet Take 1 Tab by mouth two (2) times a day. 60 Tab 0 Orders Placed This Encounter  PFT DLCO Standing Status:   Future Standing Expiration Date:   3/26/2018  
 HYDROcodone-acetaminophen (NORCO)  mg tablet Sig: Take 1 Tab by mouth. Take 1-2 tabs by mouth every 8 hours as needed for pain If you have questions, please do not hesitate to call me. I look forward to following Ms. Tadeo Jerome along with you.  
 
Sincerely, 
Leena Mac MD

## 2017-09-26 NOTE — MR AVS SNAPSHOT
Visit Information Date & Time Provider Department Dept. Phone Encounter #  
 9/26/2017  8:45 AM Rigo Cantu MD [facility] [address] [place] [address] [id number] Follow-up Instructions Return in about 4 months (around 1/26/2018). Your Appointments 1/23/2018  9:30 AM  
ESTABLISHED PATIENT with Rigo Cantu MD  
[facility]) Appt Note: 4 months [address] [facility], [address] [place] [id number]  
[address] [facility], [address] [place] [address] Upcoming Health Maintenance Date Due Hepatitis C Screening 1950 FOOT EXAM Q1 3/5/1962 EYE EXAM RETINAL OR DILATED Q1 3/5/1962 MICROALBUMIN Q1 8/23/2011 ZOSTER VACCINE AGE 60> 1/5/2012 DTaP/Tdap/Td series (1 - Tdap) 9/6/2016 BREAST CANCER SCRN MAMMOGRAM 1/14/2017 GLAUCOMA SCREENING Q2Y 3/5/2017 Pneumococcal 65+ Low/Medium Risk (1 of 2 - PCV13) 3/5/2017 MEDICARE YEARLY EXAM 3/5/2017 INFLUENZA AGE 9 TO ADULT 8/1/2017 HEMOGLOBIN A1C Q6M 10/25/2017 FOBT Q 1 YEAR AGE 50-75 4/30/2018 LIPID PANEL Q1 5/12/2018 Allergies as of 9/26/2017  Review Complete On: 9/26/2017 By: Myesha Mao LPN No Known Allergies Current Immunizations  Never Reviewed Name Date Td, Adsorbed PF 9/5/2016  6:33 PM  
  
 Not reviewed this visit You Were Diagnosed With   
  
 Codes Comments Paroxysmal atrial fibrillation (HCC)    -  Primary ICD-10-CM: I48.0 ICD-9-CM: 427.31 Essential hypertension     ICD-10-CM: I10 
ICD-9-CM: 401.9 Mild mitral stenosis     ICD-10-CM: I05.0 ICD-9-CM: 394.0 Type 2 diabetes mellitus without complication, unspecified long term insulin use status (HCC)     ICD-10-CM: E11.9 ICD-9-CM: 250.00 Anemia, unspecified type     ICD-10-CM: D64.9 ICD-9-CM: 285.9 Gastric bypass status for obesity     ICD-10-CM: Z98.84 ICD-9-CM: V45.86   
 History of pulmonary embolism     ICD-10-CM: Z86.711 ICD-9-CM: V12.55 Vitals BP Pulse Height(growth percentile) Weight(growth percentile) BMI OB Status 135/65 62 5' 2\" (1.575 m) 169 lb (76.7 kg) 30.91 kg/m2 Hysterectomy Smoking Status Never Smoker Vitals History BMI and BSA Data Body Mass Index Body Surface Area 30.91 kg/m 2 1.83 m 2 Preferred Pharmacy Pharmacy Name Phone [id number] Your Updated Medication List  
  
   
This list is accurate as of: 9/26/17  9:45 AM.  Always use your most recent med list.  
  
  
  
  
 ALPRAZolam 1 mg tablet Commonly known as:  Trula Crick Take 1 Tab by mouth nightly as needed for Anxiety. Max Daily Amount: 1 mg.  
  
 amiodarone 200 mg tablet Commonly known as:  CORDARONE Take 1 Tab by mouth daily. aspirin 81 mg chewable tablet Take 1 Tab by mouth daily. dilTIAZem  mg ER capsule Commonly known as:  CARDIZEM CD Take 1 Cap by mouth daily. DULoxetine 60 mg capsule Commonly known as:  CYMBALTA Take 1 Cap by mouth daily. famotidine 20 mg tablet Commonly known as:  PEPCID Take 1 Tab by mouth two (2) times a day. FLONASE 50 mcg/actuation nasal spray Generic drug:  fluticasone 2 Sprays by Both Nostrils route daily. as needed for Allergies  
  
 furosemide 40 mg tablet Commonly known as:  LASIX Take 1 Tab by mouth daily. * HYDROcodone-acetaminophen  mg tablet Commonly known as:  Vanessa Leyland Take 1 Tab by mouth. Take 1-2 tabs by mouth every 8 hours as needed for pain  
  
 * HYDROcodone-acetaminophen 5-325 mg per tablet Commonly known as:  Vanessa Leyland Take 1 Tab by mouth every six (6) hours as needed for Pain. Max Daily Amount: 4 Tabs. * HYDROcodone-acetaminophen 7.5-325 mg per tablet Commonly known as:  Vanessa Seals  
 Take 1 Tab by mouth every six (6) hours as needed for Pain. Max Daily Amount: 4 Tabs. Indications: Pain Lactobacillus Acidoph & Bulgar 1 million cell Tab tablet Commonly known as:  Mullen Reynaga Take 1 Tab by mouth two (2) times a day. LANTUS 100 unit/mL injection Generic drug:  insulin glargine 20 Units by SubCUTAneous route nightly. lisinopril 10 mg tablet Commonly known as:  Andrew Handing Take 1 Tab by mouth daily. LYRICA 150 mg capsule Generic drug:  pregabalin Take 150 mg by mouth three (3) times daily. 1 cap 3 x day  
  
 metoprolol tartrate 25 mg tablet Commonly known as:  LOPRESSOR Take 0.5 Tabs by mouth two (2) times a day. omeprazole 40 mg capsule Commonly known as:  PRILOSEC Take 40 mg by mouth daily. 1 tab  
  
 simvastatin 40 mg tablet Commonly known as:  ZOCOR Take 40 mg by mouth nightly. 1 tab * Notice: This list has 3 medication(s) that are the same as other medications prescribed for you. Read the directions carefully, and ask your doctor or other care provider to review them with you. Follow-up Instructions Return in about 4 months (around 1/26/2018). To-Do List   
 10/03/2017 PFT:  PFT DLCO [facility]! Dear Pawan Steven: Thank you for requesting a [employer] account. Our records indicate that you already have an active [employer] account. You can access your account anytime at [email or web address] Did you know that you can access your hospital and ER discharge instructions at any time in [employer]? You can also review all of your test results from your hospital stay or ER visit. Additional Information If you have questions, please visit the Frequently Asked Questions section of the [employer] website at [email or web address] Remember, [employer] is NOT to be used for urgent needs. For medical emergencies, dial 911. Now available from your iPhone and Android! Please provide this summary of care documentation to your next provider. Your primary care clinician is listed as [address]. If you have any questions after today's visit, please call [phone].

## 2017-09-26 NOTE — PROGRESS NOTES
1. Have you been to the ER, urgent care clinic since your last visit? Hospitalized since your last visit? No     2. Have you seen or consulted any other health care providers outside of the [address] [place] [address] since your last visit? Include any pap smears or colon screening. No    3. Since your last visit, have you had any of the following symptoms? Chest pain, dizziness, Sob, possible swelling     4. Have you had any blood work, X-rays or cardiac testing?    none  5. Where do you normally have your labs drawn? [facility]     6. Do you need any refills today?    No

## 2017-09-26 NOTE — PROGRESS NOTES
HISTORY OF PRESENT ILLNESS  Keven Mcburney is a 74 y.o. female. Palpitations    The history is provided by the patient. This is a chronic problem. The current episode started more than 1 week ago. The problem has not changed since onset. The problem occurs rarely. Associated symptoms include shortness of breath. Pertinent negatives include no diaphoresis, no fever, no numbness, no chest pain, no claudication, no near-syncope, no orthopnea, no PND, no syncope, no nausea, no vomiting, no lower extremity edema, no dizziness, no weakness, no cough, no hemoptysis and no sputum production. Risk factors include dyslipidemia, diabetes mellitus and hypertension. Her past medical history is significant for DM, hypertension and atrial fibrillation. Hypertension   The history is provided by the patient. This is a chronic problem. Associated symptoms include shortness of breath. Pertinent negatives include no chest pain. Shortness of Breath   The history is provided by the patient. This is a chronic problem. The problem occurs intermittently. The current episode started more than 1 week ago. The problem has been gradually improving. Pertinent negatives include no fever, no ear pain, no neck pain, no cough, no sputum production, no hemoptysis, no wheezing, no PND, no orthopnea, no chest pain, no syncope, no vomiting, no rash, no leg swelling and no claudication. Associated medical issues do not include CAD or heart failure. Review of Systems   Constitutional: Negative for chills, diaphoresis, fever and weight loss. HENT: Negative for ear pain and hearing loss. Eyes: Negative for blurred vision. Respiratory: Positive for shortness of breath. Negative for cough, hemoptysis, sputum production, wheezing and stridor. Cardiovascular: Positive for palpitations. Negative for chest pain, orthopnea, claudication, leg swelling, syncope, PND and near-syncope.    Gastrointestinal: Negative for heartburn, nausea and vomiting. Musculoskeletal: Negative for myalgias and neck pain. Skin: Negative for rash. Neurological: Negative for dizziness, tingling, tremors, focal weakness, loss of consciousness, weakness and numbness. Psychiatric/Behavioral: Negative for depression and suicidal ideas. Family History   Problem Relation Age of Onset   Aury Lucas Asthma Mother     Hypertension Mother     Kidney Disease Mother     Thyroid Disease Mother     Diabetes Maternal Grandmother     Thyroid Disease Maternal Grandmother        Past Medical History:   Diagnosis Date    Anxiety     [facility] [address]     Depression     Diabetes ([facility] [address] [facility]     Fibromyalgia     GERD (gastroesophageal reflux disease)     Hypercholesteremia     Hypertension     Renal insufficiency     Skin abscess     Sleep apnea        Past Surgical History:   Procedure Laterality Date    HX BREAST LUMPECTOMY      HX CARPAL TUNNEL RELEASE      HX KNEE REPLACEMENT      bilater    HX PARTIAL THYROIDECTOMY      HX SHOULDER ARTHROSCOPY      HX TUBAL LIGATION         Social History   Substance Use Topics    Smoking status: Never Smoker    Smokeless tobacco: Never Used    Alcohol use No       No Known Allergies    Outpatient Prescriptions Marked as Taking for the 9/26/17 encounter (Office Visit) with Cuauhtemoc Ramos MD   Medication Sig Dispense Refill    HYDROcodone-acetaminophen (NORCO)  mg tablet Take 1 Tab by mouth. Take 1-2 tabs by mouth every 8 hours as needed for pain      metoprolol tartrate (LOPRESSOR) 25 mg tablet Take 0.5 Tabs by mouth two (2) times a day. 60 Tab 3    furosemide (LASIX) 40 mg tablet Take 1 Tab by mouth daily. 30 Tab 3r    lisinopril (PRINIVIL, ZESTRIL) 10 mg tablet Take 1 Tab by mouth daily. 30 Tab 3    amiodarone (CORDARONE) 200 mg tablet Take 1 Tab by mouth daily. 30 Tab 3    omeprazole (PRILOSEC) 40 mg capsule Take 40 mg by mouth daily.  1 tab      pregabalin (LYRICA) 150 mg capsule Take 150 mg by mouth three (3) times daily. 1 cap 3 x day      fluticasone (FLONASE) 50 mcg/actuation nasal spray 2 Sprays by Both Nostrils route daily. as needed for Allergies      insulin glargine (LANTUS) 100 unit/mL injection 20 Units by SubCUTAneous route nightly. 1 Vial 0    aspirin 81 mg chewable tablet Take 1 Tab by mouth daily. 30 Tab 0    ALPRAZolam (XANAX) 1 mg tablet Take 1 Tab by mouth nightly as needed for Anxiety. Max Daily Amount: 1 mg. 10 Tab 0    DULoxetine (CYMBALTA) 60 mg capsule Take 1 Cap by mouth daily. 30 Cap 0    dilTIAZem CD (CARDIZEM CD) 180 mg ER capsule Take 1 Cap by mouth daily. 30 Cap 0    Lactobacillus Acidoph & Bulgar (FLORANEX) 1 million cell tab tablet Take 1 Tab by mouth two (2) times a day. 60 Tab 0    simvastatin (ZOCOR) 40 mg tablet Take 40 mg by mouth nightly. 1 tab          Visit Vitals    /65    Pulse 62    Ht 5' 2\" (1.575 m)    Wt 76.7 kg (169 lb)    BMI 30.91 kg/m2     Physical Exam   Constitutional: She is oriented to person, place, and time. She appears well-developed and well-nourished. No distress. HENT:   Head: Atraumatic. Mouth/Throat: No oropharyngeal exudate. Eyes: Conjunctivae are normal. Right eye exhibits no discharge. Left eye exhibits no discharge. No scleral icterus. Neck: Neck supple. No JVD present. No tracheal deviation present. No thyromegaly present. Cardiovascular: Normal rate and regular rhythm. Exam reveals no gallop. Murmur: 2/6 diastolic murmur best heard at apex. Pulmonary/Chest: Effort normal and breath sounds normal. No stridor. She has no wheezes. She has no rales. Abdominal: Soft. There is no tenderness. There is no rebound and no guarding. Musculoskeletal: Normal range of motion. She exhibits no edema or tenderness. Lymphadenopathy:     She has no cervical adenopathy. Neurological: She is alert and oriented to person, place, and time. She exhibits normal muscle tone. Skin: Skin is warm. She is not diaphoretic.    Psychiatric: She has a normal mood and affect. Her behavior is normal.     Echo 04/2017:  SUMMARY:  Left ventricle: Systolic function was normal. Ejection fraction was  estimated in the range of 55 % to 60 %. No obvious wall motion  abnormalities identified in the views obtained. Wall thickness was  moderately increased. Doppler parameters were consistent with abnormal  left ventricular relaxation (grade 1 diastolic dysfunction). Right ventricle: Systolic pressure was mildly increased. Estimated peak  pressure was 47 mmHg. Mitral valve: There was mild to moderate annular calcification. The  findings were consistent with mild mitral stenosis. Mean transmitral  gradient was 5 mmHg. ASSESSMENT and PLAN    ICD-10-CM ICD-9-CM    1. Paroxysmal atrial fibrillation (HCC) I48.0 427.31 PFT DLCO   2. Essential hypertension I10 401.9    3. Mild mitral stenosis I05.0 394.0    4. Type 2 diabetes mellitus without complication, unspecified long term insulin use status (HCC) E11.9 250.00    5. Anemia, unspecified type D64.9 285.9    6. Gastric bypass status for obesity Z98.84 V45.86    7. History of pulmonary embolism Z86.711 V12.55      Orders Placed This Encounter    PFT DLCO     Standing Status:   Future     Standing Expiration Date:   3/26/2018     Follow-up Disposition:  Return in about 4 months (around 1/26/2018). current treatment plan is effective, no change in therapy  reviewed diet, exercise and weight control  cardiovascular risk and specific lipid/LDL goals reviewed  use of aspirin to prevent MI and TIA's discussed. Patient with PAF on amiodarone and aspirin. She is not on oral anticoagulants due to profound anemia. She remains in normal sinus rhythm. Will obtain PFT as she is taking amiodarone.   Has occasional palpitations lasting few seconds

## 2017-09-28 RX ORDER — AMIODARONE HYDROCHLORIDE 200 MG/1
TABLET ORAL
Qty: 30 TAB | Refills: 3 | Status: SHIPPED | OUTPATIENT
Start: 2017-09-28 | End: 2018-01-22 | Stop reason: SDUPTHER

## 2017-09-28 RX ORDER — LISINOPRIL 10 MG/1
TABLET ORAL
Qty: 30 TAB | Refills: 3 | Status: SHIPPED | OUTPATIENT
Start: 2017-09-28 | End: 2018-01-22 | Stop reason: SDUPTHER

## 2017-10-10 RX ORDER — FUROSEMIDE 40 MG/1
TABLET ORAL
Qty: 30 TAB | Refills: 3 | Status: SHIPPED | OUTPATIENT
Start: 2017-10-10 | End: 2017-11-21 | Stop reason: SDUPTHER

## 2017-11-21 DIAGNOSIS — I50.9 CONGESTIVE HEART FAILURE, UNSPECIFIED CONGESTIVE HEART FAILURE CHRONICITY, UNSPECIFIED CONGESTIVE HEART FAILURE TYPE: Primary | ICD-10-CM

## 2017-11-21 RX ORDER — METOPROLOL TARTRATE 25 MG/1
12.5 TABLET, FILM COATED ORAL 2 TIMES DAILY
Qty: 60 TAB | Refills: 6 | Status: SHIPPED | OUTPATIENT
Start: 2017-11-21 | End: 2017-11-24 | Stop reason: SDUPTHER

## 2017-11-21 RX ORDER — DILTIAZEM HYDROCHLORIDE 180 MG/1
180 CAPSULE, COATED, EXTENDED RELEASE ORAL DAILY
Qty: 30 CAP | Refills: 6 | Status: SHIPPED | OUTPATIENT
Start: 2017-11-21 | End: 2017-11-24 | Stop reason: SDUPTHER

## 2017-11-21 RX ORDER — FUROSEMIDE 40 MG/1
40 TABLET ORAL
Qty: 30 TAB | Refills: 6 | Status: SHIPPED | OUTPATIENT
Start: 2017-11-21 | End: 2017-11-24 | Stop reason: SDUPTHER

## 2017-11-24 RX ORDER — FUROSEMIDE 40 MG/1
40 TABLET ORAL
Qty: 90 TAB | Refills: 1 | Status: SHIPPED | OUTPATIENT
Start: 2017-11-24 | End: 2018-01-30 | Stop reason: SDUPTHER

## 2017-11-24 RX ORDER — DILTIAZEM HYDROCHLORIDE 180 MG/1
180 CAPSULE, COATED, EXTENDED RELEASE ORAL DAILY
Qty: 90 CAP | Refills: 1 | Status: SHIPPED | OUTPATIENT
Start: 2017-11-24 | End: 2018-05-07 | Stop reason: SDUPTHER

## 2017-11-24 RX ORDER — METOPROLOL TARTRATE 25 MG/1
12.5 TABLET, FILM COATED ORAL 2 TIMES DAILY
Qty: 180 TAB | Refills: 1 | Status: SHIPPED | OUTPATIENT
Start: 2017-11-24 | End: 2018-09-30

## 2018-01-03 DIAGNOSIS — I50.9 CONGESTIVE HEART FAILURE, UNSPECIFIED CONGESTIVE HEART FAILURE CHRONICITY, UNSPECIFIED CONGESTIVE HEART FAILURE TYPE: Primary | ICD-10-CM

## 2018-01-03 RX ORDER — PRAVASTATIN SODIUM 20 MG/1
20 TABLET ORAL
Qty: 30 TAB | Refills: 3 | Status: SHIPPED | OUTPATIENT
Start: 2018-01-03 | End: 2018-04-23 | Stop reason: SDUPTHER

## 2018-01-03 NOTE — TELEPHONE ENCOUNTER
Per Dr. Vickie Kelly change simvastatin to Pravastatin 20 mg daily. Called patient and She voices understanding and acceptance of this advice and will call back if any further questions or concerns. Requested Prescriptions     Pending Prescriptions Disp Refills    pravastatin (PRAVACHOL) 20 mg tablet 30 Tab 3     Sig: Take 1 Tab by mouth nightly.

## 2018-01-23 RX ORDER — LISINOPRIL 10 MG/1
TABLET ORAL
Qty: 30 TAB | Refills: 3 | Status: SHIPPED | OUTPATIENT
Start: 2018-01-23 | End: 2018-04-23 | Stop reason: SDUPTHER

## 2018-01-23 RX ORDER — AMIODARONE HYDROCHLORIDE 200 MG/1
TABLET ORAL
Qty: 30 TAB | Refills: 3 | Status: SHIPPED | OUTPATIENT
Start: 2018-01-23 | End: 2018-06-05 | Stop reason: SDUPTHER

## 2018-01-30 ENCOUNTER — OFFICE VISIT (OUTPATIENT)
Dept: CARDIOLOGY CLINIC | Age: 68
End: 2018-01-30

## 2018-01-30 VITALS
OXYGEN SATURATION: 100 % | BODY MASS INDEX: 32.57 KG/M2 | HEIGHT: 62 IN | SYSTOLIC BLOOD PRESSURE: 143 MMHG | HEART RATE: 61 BPM | WEIGHT: 177 LBS | DIASTOLIC BLOOD PRESSURE: 65 MMHG

## 2018-01-30 DIAGNOSIS — Z86.711 HISTORY OF PULMONARY EMBOLISM: ICD-10-CM

## 2018-01-30 DIAGNOSIS — E11.9 TYPE 2 DIABETES MELLITUS WITHOUT COMPLICATION, UNSPECIFIED LONG TERM INSULIN USE STATUS: ICD-10-CM

## 2018-01-30 DIAGNOSIS — I50.32 CHRONIC DIASTOLIC CONGESTIVE HEART FAILURE (HCC): ICD-10-CM

## 2018-01-30 DIAGNOSIS — Z98.84 GASTRIC BYPASS STATUS FOR OBESITY: ICD-10-CM

## 2018-01-30 DIAGNOSIS — I05.0 MILD MITRAL STENOSIS: ICD-10-CM

## 2018-01-30 DIAGNOSIS — I48.0 PAROXYSMAL ATRIAL FIBRILLATION (HCC): Primary | ICD-10-CM

## 2018-01-30 DIAGNOSIS — I10 ESSENTIAL HYPERTENSION: Chronic | ICD-10-CM

## 2018-01-30 RX ORDER — FUROSEMIDE 40 MG/1
40 TABLET ORAL DAILY
Qty: 90 TAB | Refills: 1 | Status: SHIPPED | OUTPATIENT
Start: 2018-01-30

## 2018-01-30 NOTE — PROGRESS NOTES
1. Have you been to the ER, urgent care clinic since your last visit? Hospitalized since your last visit? No    2. Have you seen or consulted any other health care providers outside of the [address] [place] [address] since your last visit? Include any pap smears or colon screening.  No    Patient did not bring meds or list but states everything is the same

## 2018-01-30 NOTE — PROGRESS NOTES
HISTORY OF PRESENT ILLNESS  Ava Woods is a 74 y.o. female. CHF   The history is provided by the patient. This is a chronic problem. The problem occurs daily. The problem has been gradually worsening. Associated symptoms include shortness of breath. Pertinent negatives include no chest pain. Shortness of Breath   The history is provided by the patient. This is a chronic problem. The problem occurs intermittently. The current episode started more than 1 week ago. The problem has been gradually worsening. Associated symptoms include leg swelling. Pertinent negatives include no ear pain, no neck pain, no wheezing, no chest pain and no rash. Associated medical issues do not include CAD or heart failure. Review of Systems   Constitutional: Negative for chills and weight loss. HENT: Negative for ear pain and hearing loss. Eyes: Negative for blurred vision. Respiratory: Positive for shortness of breath. Negative for wheezing and stridor. Cardiovascular: Positive for leg swelling. Negative for chest pain. Gastrointestinal: Negative for heartburn. Musculoskeletal: Negative for myalgias and neck pain. Skin: Negative for rash. Neurological: Negative for tingling, tremors, focal weakness and loss of consciousness. Psychiatric/Behavioral: Negative for depression and suicidal ideas.      Family History   Problem Relation Age of Onset   [address] [facility] [address] Asthma Mother     Hypertension Mother     Kidney Disease Mother     Thyroid Disease Mother     Diabetes Maternal Grandmother     Thyroid Disease Maternal Grandmother        Past Medical History:   Diagnosis Date    Anxiety     Asbestosis ([address]     Depression     Diabetes ([address]     Fibromyalgia     GERD (gastroesophageal reflux disease)     Hypercholesteremia     Hypertension     Renal insufficiency     Skin abscess     Sleep apnea        Past Surgical History:   Procedure Laterality Date    HX BREAST LUMPECTOMY      HX CARPAL TUNNEL RELEASE      HX KNEE REPLACEMENT      bilater    HX PARTIAL THYROIDECTOMY      HX SHOULDER ARTHROSCOPY      HX TUBAL LIGATION         Social History   Substance Use Topics    Smoking status: Never Smoker    Smokeless tobacco: Never Used    Alcohol use No       No Known Allergies    Outpatient Prescriptions Marked as Taking for the 1/30/18 encounter (Office Visit) with Sanna Vidal MD   Medication Sig Dispense Refill    furosemide (LASIX) 40 mg tablet Take 1 Tab by mouth daily. Indications: Peripheral Edema due to Chronic Heart Failure, For leg edema or SOB 90 Tab 1    lisinopril (PRINIVIL, ZESTRIL) 10 mg tablet TAKE 1 TAB BY MOUTH DAILY. 30 Tab 3    amiodarone (CORDARONE) 200 mg tablet TAKE 1 TAB BY MOUTH DAILY. 30 Tab 3    dilTIAZem CD (CARDIZEM CD) 180 mg ER capsule Take 1 Cap by mouth daily. 90 Cap 1    metoprolol tartrate (LOPRESSOR) 25 mg tablet Take 0.5 Tabs by mouth two (2) times a day. 180 Tab 1    HYDROcodone-acetaminophen (NORCO)  mg tablet Take 1 Tab by mouth. Take 1-2 tabs by mouth every 8 hours as needed for pain      pregabalin (LYRICA) 150 mg capsule Take 150 mg by mouth three (3) times daily. 1 cap 3 x day      fluticasone (FLONASE) 50 mcg/actuation nasal spray 2 Sprays by Both Nostrils route daily. as needed for Allergies      insulin glargine (LANTUS) 100 unit/mL injection 20 Units by SubCUTAneous route nightly. 1 Vial 0    aspirin 81 mg chewable tablet Take 1 Tab by mouth daily. 30 Tab 0    ALPRAZolam (XANAX) 1 mg tablet Take 1 Tab by mouth nightly as needed for Anxiety. Max Daily Amount: 1 mg. 10 Tab 0    DULoxetine (CYMBALTA) 60 mg capsule Take 1 Cap by mouth daily. 30 Cap 0    Lactobacillus Acidoph & Bulgar (FLORANEX) 1 million cell tab tablet Take 1 Tab by mouth two (2) times a day. 60 Tab 0    simvastatin (ZOCOR) 40 mg tablet Take 40 mg by mouth nightly.  1 tab          Visit Vitals    /65    Pulse 61    Ht 5' 2\" (1.575 m)    Wt 80.3 kg (177 lb)    SpO2 100%  BMI 32.37 kg/m2     Physical Exam   Constitutional: She is oriented to person, place, and time. She appears well-developed and well-nourished. No distress. HENT:   Head: Atraumatic. Mouth/Throat: No oropharyngeal exudate. Eyes: Conjunctivae are normal. Right eye exhibits no discharge. Left eye exhibits no discharge. No scleral icterus. Neck: Neck supple. No JVD present. No tracheal deviation present. No thyromegaly present. Cardiovascular: Normal rate and regular rhythm. Exam reveals no gallop. Murmur: 2/6 diastolic murmur best heard at apex. Pulmonary/Chest: Effort normal and breath sounds normal. No stridor. She has no wheezes. She has no rales. Abdominal: Soft. There is no tenderness. There is no rebound and no guarding. Musculoskeletal: Normal range of motion. She exhibits edema (ble edema). She exhibits no tenderness. Lymphadenopathy:     She has no cervical adenopathy. Neurological: She is alert and oriented to person, place, and time. She exhibits normal muscle tone. Skin: Skin is warm. She is not diaphoretic. Psychiatric: She has a normal mood and affect. Her behavior is normal.     Echo 04/2017:  SUMMARY:  Left ventricle: Systolic function was normal. Ejection fraction was  estimated in the range of 55 % to 60 %. No obvious wall motion  abnormalities identified in the views obtained. Wall thickness was  moderately increased. Doppler parameters were consistent with abnormal  left ventricular relaxation (grade 1 diastolic dysfunction). Right ventricle: Systolic pressure was mildly increased. Estimated peak  pressure was 47 mmHg. Mitral valve: There was mild to moderate annular calcification. The  findings were consistent with mild mitral stenosis. Mean transmitral  gradient was 5 mmHg. ASSESSMENT and PLAN    ICD-10-CM ICD-9-CM    1.  Paroxysmal atrial fibrillation (HCC) I48.0 427.31 AMB POC EKG ROUTINE W/ 12 LEADS, INTER & REP      PFT DLCO   2. Essential hypertension I10 401.9    3. Mild mitral stenosis I05.0 394.0    4. Chronic diastolic congestive heart failure (HCC) I50.32 428.32 2D ECHO COMPLETE ADULT (TTE)     313.1 METABOLIC PANEL, BASIC      MAGNESIUM   5. Type 2 diabetes mellitus without complication, unspecified long term insulin use status (HCC) E11.9 250.00    6. Gastric bypass status for obesity Z98.84 V45.86    7. History of pulmonary embolism Z86.711 V12.55      Orders Placed This Encounter    METABOLIC PANEL, BASIC     Standing Status:   Future     Standing Expiration Date:   1/31/2019    MAGNESIUM     Standing Status:   Future     Standing Expiration Date:   1/31/2019    2D ECHO COMPLETE ADULT (TTE)     Standing Status:   Future     Standing Expiration Date:   7/30/2018     Order Specific Question:   Reason for Exam:     Answer:   CHF    AMB POC EKG ROUTINE W/ 12 LEADS, INTER & REP     Order Specific Question:   Reason for Exam:     Answer:   CHF    PFT DLCO     Standing Status:   Future     Standing Expiration Date:   7/30/2018    furosemide (LASIX) 40 mg tablet     Sig: Take 1 Tab by mouth daily. Indications: Peripheral Edema due to Chronic Heart Failure, For leg edema or SOB     Dispense:  90 Tab     Refill:  1     Follow-up Disposition:  Return in about 4 months (around 5/30/2018). current treatment plan is effective, no change in therapy  reviewed diet, exercise and weight control  cardiovascular risk and specific lipid/LDL goals reviewed  use of aspirin to prevent MI and TIA's discussed. Patient with PAF on amiodarone and aspirin. She is not on oral anticoagulants due to profound anemia. She remains in normal sinus rhythm. Will obtain PFT as she is taking amiodarone. C/o worsening sob and has mild decompensated CHF- will resume lasix daily and check weight. Follow up in 4 months with echo and PFT.   Will discontinue lopressor and continue cardizem CD

## 2018-01-30 NOTE — MR AVS SNAPSHOT
[address] [facility] [address] 
 
 
 [address] [facility] [address], [address] [facility] [address] 
[phone] Patient: Ava Woods MRN: [id number] CEU:[id number] Visit Information Date & Time Provider Department Dept. Phone Encounter #  
 1/30/2018  2:45 PM Saurabh Nice MD [facility] [address] [place] [address] [id number] Follow-up Instructions Return in about 4 months (around 5/30/2018). Your Appointments 5/2/2018 10:45 AM  
PROCEDURE with [place] ECHO [facility] [place] ([address]) Appt Note: román [address] [facility], [address] [facility] [address]  
[address], [facility] [address] [place] [address] 5/29/2018 10:30 AM  
ESTABLISHED PATIENT with Saurabh Nice MD  
[facility]) Appt Note: post echo/pft/labs [address] [facility], [address] [facility] [address]  
[address], [address] [address] Upcoming Health Maintenance Date Due Hepatitis C Screening 1950 FOOT EXAM Q1 3/5/1962 EYE EXAM RETINAL OR DILATED Q1 3/5/1962 MICROALBUMIN Q1 8/23/2011 ZOSTER VACCINE AGE 60> 1/5/2012 DTaP/Tdap/Td series (1 - Tdap) 9/6/2016 BREAST CANCER SCRN MAMMOGRAM 1/14/2017 GLAUCOMA SCREENING Q2Y 3/5/2017 Pneumococcal 65+ Low/Medium Risk (1 of 2 - PCV13) 3/5/2017 MEDICARE YEARLY EXAM 3/5/2017 Influenza Age 5 to Adult 8/1/2017 HEMOGLOBIN A1C Q6M 10/25/2017 FOBT Q 1 YEAR AGE 50-75 4/30/2018 LIPID PANEL Q1 5/12/2018 Allergies as of 1/30/2018  Review Complete On: 1/30/2018 By: Jett Lowry LPN No Known Allergies Current Immunizations  Never Reviewed Name Date Td, Adsorbed PF 9/5/2016  6:33 PM  
  
 Not reviewed this visit You Were Diagnosed With   
  
 Codes Comments Paroxysmal atrial fibrillation (HCC)    -  Primary ICD-10-CM: I48.0 ICD-9-CM: 427.31   
 Essential hypertension     ICD-10-CM: I10 
ICD-9-CM: 401.9 Mild mitral stenosis     ICD-10-CM: I05.0 ICD-9-CM: 394.0 Chronic diastolic congestive heart failure (HCC)     ICD-10-CM: I50.32 
ICD-9-CM: 428.32, 428.0 Type 2 diabetes mellitus without complication, unspecified long term insulin use status (HCC)     ICD-10-CM: E11.9 ICD-9-CM: 250.00 Gastric bypass status for obesity     ICD-10-CM: Z98.84 ICD-9-CM: V45.86 History of pulmonary embolism     ICD-10-CM: Z86.711 ICD-9-CM: V12.55 Vitals BP Pulse Height(growth percentile) Weight(growth percentile) SpO2 BMI  
 143/65 61 5' 2\" (1.575 m) 177 lb (80.3 kg) 100% 32.37 kg/m2 OB Status Smoking Status Hysterectomy Never Smoker Vitals History BMI and BSA Data Body Mass Index Body Surface Area  
 32.37 kg/m 2 1.87 m 2 Preferred Pharmacy Pharmacy Name Phone [facility] #[id number] Gan, [address] [place] [address] Your Updated Medication List  
  
   
This list is accurate as of: 1/30/18  3:27 PM.  Always use your most recent med list.  
  
  
  
  
 ALPRAZolam 1 mg tablet Commonly known as:  Jovanna Polo Take 1 Tab by mouth nightly as needed for Anxiety. Max Daily Amount: 1 mg.  
  
 amiodarone 200 mg tablet Commonly known as:  CORDARONE  
TAKE 1 TAB BY MOUTH DAILY. aspirin 81 mg chewable tablet Take 1 Tab by mouth daily. dilTIAZem  mg ER capsule Commonly known as:  CARDIZEM CD Take 1 Cap by mouth daily. DULoxetine 60 mg capsule Commonly known as:  CYMBALTA Take 1 Cap by mouth daily. famotidine 20 mg tablet Commonly known as:  PEPCID Take 1 Tab by mouth two (2) times a day. FLONASE 50 mcg/actuation nasal spray Generic drug:  fluticasone 2 Sprays by Both Nostrils route daily. as needed for Allergies  
  
 furosemide 40 mg tablet Commonly known as:  LASIX Take 1 Tab by mouth daily. Indications: Peripheral Edema due to Chronic Heart Failure, For leg edema or SOB HYDROcodone-acetaminophen  mg tablet Commonly known as:  Rogel Ravens Take 1 Tab by mouth. Take 1-2 tabs by mouth every 8 hours as needed for pain Lactobacillus Acidoph & Bulgar 1 million cell Tab tablet Commonly known as:  Dalia Manners Take 1 Tab by mouth two (2) times a day. LANTUS 100 unit/mL injection Generic drug:  insulin glargine 20 Units by SubCUTAneous route nightly. lisinopril 10 mg tablet Commonly known as:  PRINIVIL, ZESTRIL  
TAKE 1 TAB BY MOUTH DAILY. LYRICA 150 mg capsule Generic drug:  pregabalin Take 150 mg by mouth three (3) times daily. 1 cap 3 x day  
  
 metoprolol tartrate 25 mg tablet Commonly known as:  LOPRESSOR Take 0.5 Tabs by mouth two (2) times a day. omeprazole 40 mg capsule Commonly known as:  PRILOSEC Take 40 mg by mouth daily. 1 tab  
  
 pravastatin 20 mg tablet Commonly known as:  PRAVACHOL Take 1 Tab by mouth nightly. simvastatin 40 mg tablet Commonly known as:  ZOCOR Take 40 mg by mouth nightly. 1 tab Prescriptions Sent to Pharmacy Refills  
 furosemide (LASIX) 40 mg tablet 1 Sig: Take 1 Tab by mouth daily. Indications: Peripheral Edema due to Chronic Heart Failure, For leg edema or SOB Class: Normal  
 Pharmacy: [facility]/pharmacy #[id number] [facility], [address] [place] [address] Ph #: [phone] Route: Oral  
  
We Performed the Following AMB POC EKG ROUTINE W/ 12 LEADS, INTER & REP [83059 CPT(R)] Follow-up Instructions Return in about 4 months (around 5/30/2018). To-Do List   
 02/13/2018 Lab:  MAGNESIUM   
  
 02/13/2018 Lab:  METABOLIC PANEL, BASIC   
  
 05/05/2018 Cardiac Services:  2D ECHO COMPLETE ADULT (TTE) 05/06/2018 PFT:  PFT DLCO Introducing [facility]! Dear Giancarlo Fetch: Thank you for requesting a [employer] account. Our records indicate that you already have an active [employer] account. You can access your account anytime at [email or web address] Did you know that you can access your hospital and ER discharge instructions at any time in [employer]? You can also review all of your test results from your hospital stay or ER visit. Additional Information If you have questions, please visit the Frequently Asked Questions section of the [employer] website at [email or web address] Remember, [employer] is NOT to be used for urgent needs. For medical emergencies, dial 911. Now available from your iPhone and Android! Please provide this summary of care documentation to your next provider. Your primary care clinician is listed as [address] If you have any questions after today's visit, please call [phone].

## 2018-01-30 NOTE — LETTER
Kirby Song 1950 
 
1/30/2018 Dear Carmencita Howell MD 
 
I had the pleasure of evaluating  Ms. Shama Ricketts in office today. Below are the relevant portions of my assessment and plan of care. ICD-10-CM ICD-9-CM 1. Paroxysmal atrial fibrillation (HCC) I48.0 427.31 AMB POC EKG ROUTINE W/ 12 LEADS, INTER & REP  
   PFT DLCO 2. Essential hypertension I10 401.9 3. Mild mitral stenosis I05.0 394.0 4. Chronic diastolic congestive heart failure (HCC) I50.32 428.32 2D ECHO COMPLETE ADULT (TTE) 804.2 METABOLIC PANEL, BASIC MAGNESIUM 5. Type 2 diabetes mellitus without complication, unspecified long term insulin use status (HCC) E11.9 250.00   
6. Gastric bypass status for obesity Z98.84 V45.86   
7. History of pulmonary embolism Z86.711 V12.55 Current Outpatient Prescriptions Medication Sig Dispense Refill  furosemide (LASIX) 40 mg tablet Take 1 Tab by mouth daily. Indications: Peripheral Edema due to Chronic Heart Failure, For leg edema or SOB 90 Tab 1  
 lisinopril (PRINIVIL, ZESTRIL) 10 mg tablet TAKE 1 TAB BY MOUTH DAILY. 30 Tab 3  
 amiodarone (CORDARONE) 200 mg tablet TAKE 1 TAB BY MOUTH DAILY. 30 Tab 3  
 dilTIAZem CD (CARDIZEM CD) 180 mg ER capsule Take 1 Cap by mouth daily. 90 Cap 1  
 metoprolol tartrate (LOPRESSOR) 25 mg tablet Take 0.5 Tabs by mouth two (2) times a day. 180 Tab 1  
 HYDROcodone-acetaminophen (NORCO)  mg tablet Take 1 Tab by mouth. Take 1-2 tabs by mouth every 8 hours as needed for pain  pregabalin (LYRICA) 150 mg capsule Take 150 mg by mouth three (3) times daily. 1 cap 3 x day  fluticasone (FLONASE) 50 mcg/actuation nasal spray 2 Sprays by Both Nostrils route daily. as needed for Allergies  insulin glargine (LANTUS) 100 unit/mL injection 20 Units by SubCUTAneous route nightly. 1 Vial 0  
 aspirin 81 mg chewable tablet Take 1 Tab by mouth daily.  30 Tab 0  
  ALPRAZolam (XANAX) 1 mg tablet Take 1 Tab by mouth nightly as needed for Anxiety. Max Daily Amount: 1 mg. 10 Tab 0  
 DULoxetine (CYMBALTA) 60 mg capsule Take 1 Cap by mouth daily. 30 Cap 0  
 Lactobacillus Acidoph & Bulgar (FLORANEX) 1 million cell tab tablet Take 1 Tab by mouth two (2) times a day. 60 Tab 0  
 simvastatin (ZOCOR) 40 mg tablet Take 40 mg by mouth nightly. 1 tab  pravastatin (PRAVACHOL) 20 mg tablet Take 1 Tab by mouth nightly. 30 Tab 3  
 omeprazole (PRILOSEC) 40 mg capsule Take 40 mg by mouth daily. 1 tab  famotidine (PEPCID) 20 mg tablet Take 1 Tab by mouth two (2) times a day. 60 Tab 0 Orders Placed This Encounter  METABOLIC PANEL, BASIC Standing Status:   Future Standing Expiration Date:   1/31/2019  MAGNESIUM Standing Status:   Future Standing Expiration Date:   1/31/2019  2D ECHO COMPLETE ADULT (TTE) Standing Status:   Future Standing Expiration Date:   7/30/2018 Order Specific Question:   Reason for Exam: Answer:   CHF  AMB POC EKG ROUTINE W/ 12 LEADS, INTER & REP Order Specific Question:   Reason for Exam: Answer:   CHF  PFT DLCO Standing Status:   Future Standing Expiration Date:   7/30/2018  furosemide (LASIX) 40 mg tablet Sig: Take 1 Tab by mouth daily. Indications: Peripheral Edema due to Chronic Heart Failure, For leg edema or SOB Dispense:  90 Tab Refill:  1 If you have questions, please do not hesitate to call me. I look forward to following Ms. Gus Herrmann along with you.  
 
Sincerely, 
Saurabh Nice MD

## 2018-04-23 RX ORDER — LISINOPRIL 10 MG/1
10 TABLET ORAL DAILY
Qty: 90 TAB | Refills: 1 | Status: SHIPPED | OUTPATIENT
Start: 2018-04-23 | End: 2018-10-05

## 2018-04-23 RX ORDER — PRAVASTATIN SODIUM 20 MG/1
20 TABLET ORAL
Qty: 90 TAB | Refills: 1 | Status: SHIPPED | OUTPATIENT
Start: 2018-04-23 | End: 2018-09-22

## 2018-05-07 RX ORDER — DILTIAZEM HYDROCHLORIDE 180 MG/1
CAPSULE, COATED, EXTENDED RELEASE ORAL
Qty: 30 CAP | Refills: 6 | Status: SHIPPED | OUTPATIENT
Start: 2018-05-07 | End: 2018-09-30

## 2018-05-12 ENCOUNTER — HOSPITAL ENCOUNTER (EMERGENCY)
Age: 68
Discharge: HOME | End: 2018-05-12
Attending: EMERGENCY MEDICINE | Admitting: EMERGENCY MEDICINE
Payer: MEDICARE

## 2018-05-12 ENCOUNTER — APPOINTMENT (OUTPATIENT)
Dept: GENERAL RADIOLOGY | Age: 68
End: 2018-05-12
Attending: EMERGENCY MEDICINE
Payer: MEDICARE

## 2018-05-12 VITALS
OXYGEN SATURATION: 97 % | HEART RATE: 70 BPM | BODY MASS INDEX: 32.47 KG/M2 | SYSTOLIC BLOOD PRESSURE: 112 MMHG | TEMPERATURE: 98.6 F | RESPIRATION RATE: 18 BRPM | HEIGHT: 61 IN | DIASTOLIC BLOOD PRESSURE: 62 MMHG | WEIGHT: 172 LBS

## 2018-05-12 DIAGNOSIS — M25.511 PAIN OF BOTH SHOULDER JOINTS: ICD-10-CM

## 2018-05-12 DIAGNOSIS — R07.9 CHEST PAIN, UNSPECIFIED TYPE: ICD-10-CM

## 2018-05-12 DIAGNOSIS — M25.551 PAIN OF RIGHT HIP JOINT: ICD-10-CM

## 2018-05-12 DIAGNOSIS — M25.512 PAIN OF BOTH SHOULDER JOINTS: ICD-10-CM

## 2018-05-12 DIAGNOSIS — N30.00 ACUTE CYSTITIS WITHOUT HEMATURIA: Primary | ICD-10-CM

## 2018-05-12 LAB
ALBUMIN SERPL-MCNC: 3 G/DL (ref 3.4–5)
ALBUMIN/GLOB SERPL: 0.8 {RATIO} (ref 0.8–1.7)
ALP SERPL-CCNC: 285 U/L (ref 45–117)
ALT SERPL-CCNC: 44 U/L (ref 13–56)
ANION GAP SERPL CALC-SCNC: 9 MMOL/L (ref 3–18)
APPEARANCE UR: ABNORMAL
AST SERPL-CCNC: 33 U/L (ref 15–37)
BACTERIA URNS QL MICRO: ABNORMAL /HPF
BASOPHILS # BLD: 0 K/UL (ref 0–0.06)
BASOPHILS NFR BLD: 0 % (ref 0–2)
BILIRUB SERPL-MCNC: 0.3 MG/DL (ref 0.2–1)
BILIRUB UR QL: NEGATIVE
BUN SERPL-MCNC: 65 MG/DL (ref 7–18)
BUN/CREAT SERPL: 28 (ref 12–20)
CALCIUM SERPL-MCNC: 8.1 MG/DL (ref 8.5–10.1)
CHLORIDE SERPL-SCNC: 111 MMOL/L (ref 100–108)
CK MB CFR SERPL CALC: 2.8 % (ref 0–4)
CK MB CFR SERPL CALC: 2.9 % (ref 0–4)
CK MB SERPL-MCNC: 8.5 NG/ML (ref 5–25)
CK MB SERPL-MCNC: 8.5 NG/ML (ref 5–25)
CK SERPL-CCNC: 293 U/L (ref 26–192)
CK SERPL-CCNC: 307 U/L (ref 26–192)
CO2 SERPL-SCNC: 20 MMOL/L (ref 21–32)
COLOR UR: YELLOW
CREAT SERPL-MCNC: 2.35 MG/DL (ref 0.6–1.3)
DIFFERENTIAL METHOD BLD: ABNORMAL
EOSINOPHIL # BLD: 0.1 K/UL (ref 0–0.4)
EOSINOPHIL NFR BLD: 1 % (ref 0–5)
EPITH CASTS URNS QL MICRO: ABNORMAL /LPF (ref 0–5)
ERYTHROCYTE [DISTWIDTH] IN BLOOD BY AUTOMATED COUNT: 15 % (ref 11.6–14.5)
GLOBULIN SER CALC-MCNC: 3.7 G/DL (ref 2–4)
GLUCOSE SERPL-MCNC: 152 MG/DL (ref 74–99)
GLUCOSE UR STRIP.AUTO-MCNC: NEGATIVE MG/DL
HCT VFR BLD AUTO: 32 % (ref 35–45)
HGB BLD-MCNC: 10.6 G/DL (ref 12–16)
HGB UR QL STRIP: NEGATIVE
HYALINE CASTS URNS QL MICRO: ABNORMAL /LPF (ref 0–2)
KETONES UR QL STRIP.AUTO: NEGATIVE MG/DL
LEUKOCYTE ESTERASE UR QL STRIP.AUTO: ABNORMAL
LIPASE SERPL-CCNC: 119 U/L (ref 73–393)
LYMPHOCYTES # BLD: 0.9 K/UL (ref 0.9–3.6)
LYMPHOCYTES NFR BLD: 11 % (ref 21–52)
MAGNESIUM SERPL-MCNC: 1.8 MG/DL (ref 1.6–2.6)
MCH RBC QN AUTO: 26.4 PG (ref 24–34)
MCHC RBC AUTO-ENTMCNC: 33.1 G/DL (ref 31–37)
MCV RBC AUTO: 79.8 FL (ref 74–97)
MONOCYTES # BLD: 0.8 K/UL (ref 0.05–1.2)
MONOCYTES NFR BLD: 9 % (ref 3–10)
NEUTS SEG # BLD: 6.6 K/UL (ref 1.8–8)
NEUTS SEG NFR BLD: 79 % (ref 40–73)
NITRITE UR QL STRIP.AUTO: NEGATIVE
PH UR STRIP: 5 [PH] (ref 5–8)
PLATELET # BLD AUTO: 287 K/UL (ref 135–420)
PMV BLD AUTO: 10.3 FL (ref 9.2–11.8)
POTASSIUM SERPL-SCNC: 4.5 MMOL/L (ref 3.5–5.5)
PROT SERPL-MCNC: 6.7 G/DL (ref 6.4–8.2)
PROT UR STRIP-MCNC: 100 MG/DL
RBC # BLD AUTO: 4.01 M/UL (ref 4.2–5.3)
RBC #/AREA URNS HPF: ABNORMAL /HPF (ref 0–5)
SODIUM SERPL-SCNC: 140 MMOL/L (ref 136–145)
SP GR UR REFRACTOMETRY: 1.02 (ref 1–1.03)
TROPONIN I SERPL-MCNC: <0.02 NG/ML (ref 0–0.04)
TROPONIN I SERPL-MCNC: <0.02 NG/ML (ref 0–0.04)
UROBILINOGEN UR QL STRIP.AUTO: 0.2 EU/DL (ref 0.2–1)
WBC # BLD AUTO: 8.5 K/UL (ref 4.6–13.2)
WBC URNS QL MICRO: ABNORMAL /HPF (ref 0–5)

## 2018-05-12 PROCEDURE — 82550 ASSAY OF CK (CPK): CPT | Performed by: PHYSICIAN ASSISTANT

## 2018-05-12 PROCEDURE — 83735 ASSAY OF MAGNESIUM: CPT | Performed by: PHYSICIAN ASSISTANT

## 2018-05-12 PROCEDURE — 85025 COMPLETE CBC W/AUTO DIFF WBC: CPT | Performed by: PHYSICIAN ASSISTANT

## 2018-05-12 PROCEDURE — 93005 ELECTROCARDIOGRAM TRACING: CPT

## 2018-05-12 PROCEDURE — 74011250637 HC RX REV CODE- 250/637: Performed by: EMERGENCY MEDICINE

## 2018-05-12 PROCEDURE — 80053 COMPREHEN METABOLIC PANEL: CPT | Performed by: PHYSICIAN ASSISTANT

## 2018-05-12 PROCEDURE — 71045 X-RAY EXAM CHEST 1 VIEW: CPT

## 2018-05-12 PROCEDURE — 74011000250 HC RX REV CODE- 250: Performed by: PHYSICIAN ASSISTANT

## 2018-05-12 PROCEDURE — 96374 THER/PROPH/DIAG INJ IV PUSH: CPT

## 2018-05-12 PROCEDURE — 87086 URINE CULTURE/COLONY COUNT: CPT | Performed by: PHYSICIAN ASSISTANT

## 2018-05-12 PROCEDURE — 87186 SC STD MICRODIL/AGAR DIL: CPT | Performed by: PHYSICIAN ASSISTANT

## 2018-05-12 PROCEDURE — 74011250637 HC RX REV CODE- 250/637: Performed by: PHYSICIAN ASSISTANT

## 2018-05-12 PROCEDURE — 99284 EMERGENCY DEPT VISIT MOD MDM: CPT

## 2018-05-12 PROCEDURE — 74011250636 HC RX REV CODE- 250/636: Performed by: PHYSICIAN ASSISTANT

## 2018-05-12 PROCEDURE — 87077 CULTURE AEROBIC IDENTIFY: CPT | Performed by: PHYSICIAN ASSISTANT

## 2018-05-12 PROCEDURE — 81001 URINALYSIS AUTO W/SCOPE: CPT | Performed by: PHYSICIAN ASSISTANT

## 2018-05-12 PROCEDURE — 96361 HYDRATE IV INFUSION ADD-ON: CPT

## 2018-05-12 PROCEDURE — 83690 ASSAY OF LIPASE: CPT | Performed by: PHYSICIAN ASSISTANT

## 2018-05-12 RX ORDER — HYDROCODONE BITARTRATE AND ACETAMINOPHEN 5; 325 MG/1; MG/1
1 TABLET ORAL
Status: COMPLETED | OUTPATIENT
Start: 2018-05-12 | End: 2018-05-12

## 2018-05-12 RX ORDER — GUAIFENESIN 100 MG/5ML
324 LIQUID (ML) ORAL
Status: COMPLETED | OUTPATIENT
Start: 2018-05-12 | End: 2018-05-12

## 2018-05-12 RX ORDER — SODIUM CHLORIDE 9 MG/ML
1000 INJECTION, SOLUTION INTRAVENOUS ONCE
Status: COMPLETED | OUTPATIENT
Start: 2018-05-12 | End: 2018-05-12

## 2018-05-12 RX ORDER — CEPHALEXIN 500 MG/1
500 CAPSULE ORAL 2 TIMES DAILY
Qty: 10 CAP | Refills: 0 | Status: SHIPPED | OUTPATIENT
Start: 2018-05-12 | End: 2018-05-17

## 2018-05-12 RX ORDER — OXYCODONE AND ACETAMINOPHEN 5; 325 MG/1; MG/1
1 TABLET ORAL
Status: COMPLETED | OUTPATIENT
Start: 2018-05-12 | End: 2018-05-12

## 2018-05-12 RX ADMIN — SODIUM CHLORIDE 1000 ML: 9 INJECTION, SOLUTION INTRAVENOUS at 15:00

## 2018-05-12 RX ADMIN — SODIUM CHLORIDE 1000 ML: 900 INJECTION, SOLUTION INTRAVENOUS at 16:18

## 2018-05-12 RX ADMIN — OXYCODONE HYDROCHLORIDE AND ACETAMINOPHEN 1 TABLET: 5; 325 TABLET ORAL at 19:22

## 2018-05-12 RX ADMIN — WATER 1 G: 1 INJECTION INTRAMUSCULAR; INTRAVENOUS; SUBCUTANEOUS at 14:56

## 2018-05-12 RX ADMIN — HYDROCODONE BITARTRATE AND ACETAMINOPHEN 1 TABLET: 5; 325 TABLET ORAL at 15:13

## 2018-05-12 RX ADMIN — ASPIRIN 81 MG 324 MG: 81 TABLET ORAL at 17:16

## 2018-05-12 NOTE — ED PROVIDER NOTES
EMERGENCY DEPARTMENT HISTORY AND PHYSICAL EXAM    4:17 PM      Date: 5/12/2018  Patient Name: Keven Mcburney    History of Presenting Illness     Chief Complaint   Patient presents with    Shoulder Pain    Neck Pain    Back Pain    Abdominal Pain         History Provided By: Patient    Chief Complaint: Joint pain  Duration: 1  Days  Timing:  Acute  Location: Neck, bilateral shoulders, right lower back  Quality: Aching and Sharp  Severity: Moderate  Modifying Factors: Not relieved with Vicodin at home. Associated Symptoms: denies any other associated signs or symptoms      Additional History (Context): Keven Mcburney is a 79 y.o. female with DM, HTN, osteoporosis who presents with worsening diffuse joint pain onset yesterday. Pt states pain was not relieved with vicodin at home. Pt also reporting diarrhea onset yesterday that resolved prior to arrival. Not currently experiencing abdominal pain, nausea or vomiting. Pt also reporting urinary frequency for the past several days. No fevers. PCP: Umberto Flanagan MD    Current Outpatient Prescriptions   Medication Sig Dispense Refill    cephALEXin (KEFLEX) 500 mg capsule Take 1 Cap by mouth two (2) times a day for 5 days. 10 Cap 0    dilTIAZem CD (CARDIZEM CD) 180 mg ER capsule TAKE ONE CAPSULE BY MOUTH EVERY DAY 30 Cap 6    pravastatin (PRAVACHOL) 20 mg tablet Take 1 Tab by mouth nightly. 90 Tab 1    lisinopril (PRINIVIL, ZESTRIL) 10 mg tablet Take 1 Tab by mouth daily. 90 Tab 1    furosemide (LASIX) 40 mg tablet Take 1 Tab by mouth daily. Indications: Peripheral Edema due to Chronic Heart Failure, For leg edema or SOB 90 Tab 1    amiodarone (CORDARONE) 200 mg tablet TAKE 1 TAB BY MOUTH DAILY. 30 Tab 3    metoprolol tartrate (LOPRESSOR) 25 mg tablet Take 0.5 Tabs by mouth two (2) times a day. 180 Tab 1    HYDROcodone-acetaminophen (NORCO)  mg tablet Take 1 Tab by mouth.  Take 1-2 tabs by mouth every 8 hours as needed for pain      omeprazole (PRILOSEC) 40 mg capsule Take 40 mg by mouth daily. 1 tab      pregabalin (LYRICA) 150 mg capsule Take 150 mg by mouth three (3) times daily. 1 cap 3 x day      fluticasone (FLONASE) 50 mcg/actuation nasal spray 2 Sprays by Both Nostrils route daily. as needed for Allergies      insulin glargine (LANTUS) 100 unit/mL injection 20 Units by SubCUTAneous route nightly. 1 Vial 0    aspirin 81 mg chewable tablet Take 1 Tab by mouth daily. 30 Tab 0    ALPRAZolam (XANAX) 1 mg tablet Take 1 Tab by mouth nightly as needed for Anxiety. Max Daily Amount: 1 mg. 10 Tab 0    DULoxetine (CYMBALTA) 60 mg capsule Take 1 Cap by mouth daily. 30 Cap 0    famotidine (PEPCID) 20 mg tablet Take 1 Tab by mouth two (2) times a day. 60 Tab 0    Lactobacillus Acidoph & Bulgar (FLORANEX) 1 million cell tab tablet Take 1 Tab by mouth two (2) times a day. 60 Tab 0    simvastatin (ZOCOR) 40 mg tablet Take 40 mg by mouth nightly.  1 tab         Past History     Past Medical History:  Past Medical History:   Diagnosis Date    Anxiety     Asbestosis ([facility] [address]     Depression     Diabetes ([facility] [address]     Fibromyalgia     GERD (gastroesophageal reflux disease)     Hypercholesteremia     Hypertension     Renal insufficiency     Skin abscess     Sleep apnea        Past Surgical History:  Past Surgical History:   Procedure Laterality Date    HX BREAST LUMPECTOMY      HX CARPAL TUNNEL RELEASE      HX KNEE REPLACEMENT      bilater    HX PARTIAL THYROIDECTOMY      HX SHOULDER ARTHROSCOPY      HX TUBAL LIGATION         Family History:  Family History   Problem Relation Age of Onset   26 [facility] Asthma Mother     Hypertension Mother     Kidney Disease Mother     Thyroid Disease Mother     Diabetes Maternal Grandmother     Thyroid Disease Maternal Grandmother        Social History:  Social History   Substance Use Topics    Smoking status: Never Smoker    Smokeless tobacco: Never Used    Alcohol use No       Allergies:  No Known Allergies      Review of Systems     Review of Systems   Constitutional: Negative for fever. Respiratory: Negative for cough. Cardiovascular: Negative for chest pain. Gastrointestinal: Positive for diarrhea. Genitourinary: Positive for frequency. Musculoskeletal: Positive for arthralgias. All other systems reviewed and are negative. Physical Exam     Visit Vitals    /62 (BP 1 Location: Left arm, BP Patient Position: At rest)    Pulse 70    Temp 98.6 °F (37 °C)    Resp 18    Ht 5' 1\" (1.549 m)    Wt 78 kg (172 lb)    SpO2 97%    BMI 32.5 kg/m2       Physical Exam   Constitutional: She appears well-developed and well-nourished. No distress. HENT:   Head: Normocephalic and atraumatic. Right Ear: External ear normal.   Left Ear: External ear normal.   Nose: Nose normal.   Eyes: Conjunctivae are normal.   Neck: Normal range of motion. Cardiovascular: Normal rate and regular rhythm. Pulmonary/Chest: Effort normal. No respiratory distress. Abdominal: Soft. Bowel sounds are normal. She exhibits no distension. There is no tenderness. There is no rebound and no guarding. Musculoskeletal:   Diffuse tenderness along the anterior and posterior shoulder joint lines on the left and the right. Pain with passive ROM of the shoulder. Reproducible tenderness with palpation along the cervical spine and the paracervical muscle. Reproducible tenderness along the lower back on the right along the SI joint. Neurological: She is alert. Skin: Skin is warm and dry. She is not diaphoretic. Psychiatric: She has a normal mood and affect. Vitals reviewed.         Diagnostic Study Results     Labs -  Recent Results (from the past 12 hour(s))   CBC WITH AUTOMATED DIFF    Collection Time: 05/12/18  2:05 PM   Result Value Ref Range    WBC 8.5 4.6 - 13.2 K/uL    RBC 4.01 (L) 4.20 - 5.30 M/uL    HGB 10.6 (L) 12.0 - 16.0 g/dL    HCT 32.0 (L) 35.0 - 45.0 %    MCV 79.8 74.0 - 97.0 FL    MCH 26.4 24.0 - 34.0 PG    MCHC 33.1 31.0 - 37.0 g/dL    RDW 15.0 (H) 11.6 - 14.5 %    PLATELET 235 281 - 381 K/uL    MPV 10.3 9.2 - 11.8 FL    NEUTROPHILS 79 (H) 40 - 73 %    LYMPHOCYTES 11 (L) 21 - 52 %    MONOCYTES 9 3 - 10 %    EOSINOPHILS 1 0 - 5 %    BASOPHILS 0 0 - 2 %    ABS. NEUTROPHILS 6.6 1.8 - 8.0 K/UL    ABS. LYMPHOCYTES 0.9 0.9 - 3.6 K/UL    ABS. MONOCYTES 0.8 0.05 - 1.2 K/UL    ABS. EOSINOPHILS 0.1 0.0 - 0.4 K/UL    ABS. BASOPHILS 0.0 0.0 - 0.06 K/UL    DF AUTOMATED     MAGNESIUM    Collection Time: 05/12/18  2:05 PM   Result Value Ref Range    Magnesium 1.8 1.6 - 2.6 mg/dL   LIPASE    Collection Time: 05/12/18  2:05 PM   Result Value Ref Range    Lipase 119 73 - 971 U/L   METABOLIC PANEL, COMPREHENSIVE    Collection Time: 05/12/18  2:05 PM   Result Value Ref Range    Sodium 140 136 - 145 mmol/L    Potassium 4.5 3.5 - 5.5 mmol/L    Chloride 111 (H) 100 - 108 mmol/L    CO2 20 (L) 21 - 32 mmol/L    Anion gap 9 3.0 - 18 mmol/L    Glucose 152 (H) 74 - 99 mg/dL    BUN 65 (H) 7.0 - 18 MG/DL    Creatinine 2.35 (H) 0.6 - 1.3 MG/DL    BUN/Creatinine ratio 28 (H) 12 - 20      GFR est AA 25 (L) >60 ml/min/1.73m2    GFR est non-AA 21 (L) >60 ml/min/1.73m2    Calcium 8.1 (L) 8.5 - 10.1 MG/DL    Bilirubin, total 0.3 0.2 - 1.0 MG/DL    ALT (SGPT) 44 13 - 56 U/L    AST (SGOT) 33 15 - 37 U/L    Alk.  phosphatase 285 (H) 45 - 117 U/L    Protein, total 6.7 6.4 - 8.2 g/dL    Albumin 3.0 (L) 3.4 - 5.0 g/dL    Globulin 3.7 2.0 - 4.0 g/dL    A-G Ratio 0.8 0.8 - 1.7     URINALYSIS W/ RFLX MICROSCOPIC    Collection Time: 05/12/18  2:05 PM   Result Value Ref Range    Color YELLOW      Appearance CLOUDY      Specific gravity 1.017 1.005 - 1.030      pH (UA) 5.0 5.0 - 8.0      Protein 100 (A) NEG mg/dL    Glucose NEGATIVE  NEG mg/dL    Ketone NEGATIVE  NEG mg/dL    Bilirubin NEGATIVE  NEG      Blood NEGATIVE  NEG      Urobilinogen 0.2 0.2 - 1.0 EU/dL    Nitrites NEGATIVE  NEG      Leukocyte Esterase MODERATE (A) NEG     URINE MICROSCOPIC ONLY    Collection Time: 05/12/18  2:05 PM   Result Value Ref Range    WBC 30 to 45 0 - 5 /hpf    RBC 0 to 1 0 - 5 /hpf    Epithelial cells 3+ 0 - 5 /lpf    Bacteria 2+ (A) NEG /hpf    Hyaline cast 1 to 4 0 - 2 /lpf   EKG, 12 LEAD, INITIAL    Collection Time: 05/12/18  5:18 PM   Result Value Ref Range    Ventricular Rate 62 BPM    Atrial Rate 62 BPM    P-R Interval 198 ms    QRS Duration 92 ms    Q-T Interval 458 ms    QTC Calculation (Bezet) 464 ms    Calculated P Axis 55 degrees    Calculated R Axis 48 degrees    Calculated T Axis 70 degrees    Diagnosis       Normal sinus rhythm  Normal ECG  When compared with ECG of 28-APR-2017 08:40,  Sinus rhythm has replaced Atrial fibrillation  Vent. rate has decreased BY  64 BPM  ST no longer depressed in Inferior leads  ST no longer depressed in Anterolateral leads  T wave inversion no longer evident in Inferior leads  T wave inversion no longer evident in Lateral leads     CARDIAC PANEL,(CK, CKMB & TROPONIN)    Collection Time: 05/12/18  5:25 PM   Result Value Ref Range     (H) 26 - 192 U/L    CK - MB 8.5 (H) <3.6 ng/ml    CK-MB Index 2.8 0.0 - 4.0 %    Troponin-I, Qt. <0.02 0.0 - 0.045 NG/ML   CARDIAC PANEL,(CK, CKMB & TROPONIN)    Collection Time: 05/12/18  7:27 PM   Result Value Ref Range     (H) 26 - 192 U/L    CK - MB 8.5 (H) <3.6 ng/ml    CK-MB Index 2.9 0.0 - 4.0 %    Troponin-I, Qt. <0.02 0.0 - 0.045 NG/ML       Radiologic Studies -   XR CHEST PORT   Final Result        CT Results  (Last 48 hours)    None        CXR Results  (Last 48 hours)               05/12/18 1840  XR CHEST PORT Final result    Impression:  IMPRESSION:       No radiographic evidence of acute cardiopulmonary process. Narrative:  Chest Single View        CPT CODE: 73516       HISTORY: As above. COMPARISON: April 24, 2017. FINDINGS:       The lungs are clear. The heart and mediastinum are unremarkable. There is no evidence of pleural effusion.  Degenerative changes in the right   shoulder. 2 screws in the left humeral head                 Medical Decision Making   I am the first provider for this patient. I reviewed the vital signs, available nursing notes, past medical history, past surgical history, family history and social history. Vital Signs- Reviewed the patient's vital signs. Records Reviewed: Nursing Notes (Time of Review: 4:17 PM)    Provider Notes (Medical Decision Making): Pt with diffuse joint pain, which appears to be chronic in nature after reviewing prior charts. Pt also c/o urinary frequency, urine does appear to be infected, and Cr is elevated. Will treat urine, give bolus for kidney function. 6:41 PM at 1720 pt began c/o chest pain radiating to the left arm. Aspirin, EKG and cardiac panel ordered. Repeat cardiac panel ordered. 6:44 PM : Pt care transferred to Tarah DouglasED provider. History of patient complaint(s), available diagnostic reports and current treatment plan has been discussed thoroughly. Bedside rounding on patient occured : no . Intended disposition of patient : TBD  Pending diagnostics reports and/or labs (please list): repeat cardiac panel    7:58 PM  Second CE shows lowering of CK and no troponin elevation. D/c home. UTI treated w/Keflex. Procedures: Procedures      Diagnosis     Clinical Impression:   1. Acute cystitis without hematuria    2. Pain of both shoulder joints    3. Pain of right hip joint    4. Chest pain, unspecified type        Disposition: Discharge.     Follow-up Information     Follow up With Details Comments [address] Pritchett MD Schedule an appointment as soon as possible for a visit in 2 days  [facility] [address] [facility] EMERGENCY DEPT  If symptoms worsen return immediately [address]  [phone]           Patient's Medications   Start Taking    CEPHALEXIN (KEFLEX) 500 MG CAPSULE Take 1 Cap by mouth two (2) times a day for 5 days. Continue Taking    ALPRAZOLAM (XANAX) 1 MG TABLET    Take 1 Tab by mouth nightly as needed for Anxiety. Max Daily Amount: 1 mg. AMIODARONE (CORDARONE) 200 MG TABLET    TAKE 1 TAB BY MOUTH DAILY. ASPIRIN 81 MG CHEWABLE TABLET    Take 1 Tab by mouth daily. DILTIAZEM CD (CARDIZEM CD) 180 MG ER CAPSULE    TAKE ONE CAPSULE BY MOUTH EVERY DAY    DULOXETINE (CYMBALTA) 60 MG CAPSULE    Take 1 Cap by mouth daily. FAMOTIDINE (PEPCID) 20 MG TABLET    Take 1 Tab by mouth two (2) times a day. FLUTICASONE (FLONASE) 50 MCG/ACTUATION NASAL SPRAY    2 Sprays by Both Nostrils route daily. as needed for Allergies    FUROSEMIDE (LASIX) 40 MG TABLET    Take 1 Tab by mouth daily. Indications: Peripheral Edema due to Chronic Heart Failure, For leg edema or SOB    HYDROCODONE-ACETAMINOPHEN (NORCO)  MG TABLET    Take 1 Tab by mouth. Take 1-2 tabs by mouth every 8 hours as needed for pain    INSULIN GLARGINE (LANTUS) 100 UNIT/ML INJECTION    20 Units by SubCUTAneous route nightly. LACTOBACILLUS ACIDOPH & BULGAR (FLORANEX) 1 MILLION CELL TAB TABLET    Take 1 Tab by mouth two (2) times a day. LISINOPRIL (PRINIVIL, ZESTRIL) 10 MG TABLET    Take 1 Tab by mouth daily. METOPROLOL TARTRATE (LOPRESSOR) 25 MG TABLET    Take 0.5 Tabs by mouth two (2) times a day. OMEPRAZOLE (PRILOSEC) 40 MG CAPSULE    Take 40 mg by mouth daily. 1 tab    PRAVASTATIN (PRAVACHOL) 20 MG TABLET    Take 1 Tab by mouth nightly. PREGABALIN (LYRICA) 150 MG CAPSULE    Take 150 mg by mouth three (3) times daily. 1 cap 3 x day    SIMVASTATIN (ZOCOR) 40 MG TABLET    Take 40 mg by mouth nightly.  1 tab   These Medications have changed    No medications on file   Stop Taking    No medications on file

## 2018-05-12 NOTE — ED TRIAGE NOTES
Patient complains of abd pain , back pain , left and right shoulder pain and neck pain that started last night.

## 2018-05-13 LAB
ATRIAL RATE: 62 BPM
CALCULATED P AXIS, ECG09: 55 DEGREES
CALCULATED R AXIS, ECG10: 48 DEGREES
CALCULATED T AXIS, ECG11: 70 DEGREES
DIAGNOSIS, 93000: NORMAL
P-R INTERVAL, ECG05: 198 MS
Q-T INTERVAL, ECG07: 458 MS
QRS DURATION, ECG06: 92 MS
QTC CALCULATION (BEZET), ECG08: 464 MS
VENTRICULAR RATE, ECG03: 62 BPM

## 2018-05-14 LAB
BACTERIA SPEC CULT: ABNORMAL
SERVICE CMNT-IMP: ABNORMAL

## 2018-05-14 NOTE — PROGRESS NOTES
Urine culture positive. Bacteria sensitive to antibiotics that were started in ED. Intermediate susceptibility, should still be effective. No further treatment necessary.       Tristan Sandoval PA-C

## 2018-06-05 RX ORDER — AMIODARONE HYDROCHLORIDE 200 MG/1
TABLET ORAL
Qty: 30 TAB | Refills: 3 | Status: SHIPPED | OUTPATIENT
Start: 2018-06-05 | End: 2018-09-30

## 2018-09-22 ENCOUNTER — APPOINTMENT (OUTPATIENT)
Dept: GENERAL RADIOLOGY | Age: 68
DRG: 563 | End: 2018-09-22
Attending: ORTHOPAEDIC SURGERY
Payer: MEDICARE

## 2018-09-22 ENCOUNTER — APPOINTMENT (OUTPATIENT)
Dept: GENERAL RADIOLOGY | Age: 68
DRG: 563 | End: 2018-09-22
Attending: PHYSICIAN ASSISTANT
Payer: MEDICARE

## 2018-09-22 ENCOUNTER — ANESTHESIA (OUTPATIENT)
Dept: SURGERY | Age: 68
DRG: 563 | End: 2018-09-22
Payer: MEDICARE

## 2018-09-22 ENCOUNTER — HOSPITAL ENCOUNTER (INPATIENT)
Age: 68
LOS: 1 days | Discharge: HOME | DRG: 563 | End: 2018-09-22
Attending: EMERGENCY MEDICINE | Admitting: ORTHOPAEDIC SURGERY
Payer: MEDICARE

## 2018-09-22 ENCOUNTER — ANESTHESIA EVENT (OUTPATIENT)
Dept: SURGERY | Age: 68
DRG: 563 | End: 2018-09-22
Payer: MEDICARE

## 2018-09-22 VITALS
HEART RATE: 82 BPM | DIASTOLIC BLOOD PRESSURE: 58 MMHG | SYSTOLIC BLOOD PRESSURE: 122 MMHG | HEIGHT: 61 IN | TEMPERATURE: 98 F | RESPIRATION RATE: 16 BRPM | OXYGEN SATURATION: 98 %
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
## 2018-09-22 DIAGNOSIS — S61.452A DOG BITE OF LEFT HAND, INITIAL ENCOUNTER: Primary | ICD-10-CM

## 2018-09-22 DIAGNOSIS — S62.391B OPEN DISPLACED FRACTURE OF OTHER PART OF SECOND METACARPAL BONE OF LEFT HAND, INITIAL ENCOUNTER: ICD-10-CM

## 2018-09-22 PROBLEM — T14.8XXA OPEN FRACTURE: Status: ACTIVE | Noted: 2018-09-22

## 2018-09-22 LAB
ALBUMIN SERPL-MCNC: 2.8 G/DL (ref 3.4–5)
ALBUMIN/GLOB SERPL: 0.8 {RATIO} (ref 0.8–1.7)
ALP SERPL-CCNC: 318 U/L (ref 45–117)
ALT SERPL-CCNC: 54 U/L (ref 13–56)
ANION GAP SERPL CALC-SCNC: 11 MMOL/L (ref 3–18)
AST SERPL-CCNC: 39 U/L (ref 15–37)
BASOPHILS # BLD: 0 K/UL (ref 0–0.1)
BASOPHILS NFR BLD: 0 % (ref 0–2)
BILIRUB SERPL-MCNC: 0.2 MG/DL (ref 0.2–1)
BUN SERPL-MCNC: 40 MG/DL (ref 7–18)
BUN/CREAT SERPL: 25 (ref 12–20)
CALCIUM SERPL-MCNC: 8.2 MG/DL (ref 8.5–10.1)
CHLORIDE SERPL-SCNC: 106 MMOL/L (ref 100–108)
CO2 SERPL-SCNC: 24 MMOL/L (ref 21–32)
CREAT SERPL-MCNC: 1.59 MG/DL (ref 0.6–1.3)
DIFFERENTIAL METHOD BLD: ABNORMAL
EOSINOPHIL # BLD: 0.1 K/UL (ref 0–0.4)
EOSINOPHIL NFR BLD: 1 % (ref 0–5)
ERYTHROCYTE [DISTWIDTH] IN BLOOD BY AUTOMATED COUNT: 14.1 % (ref 11.6–14.5)
GLOBULIN SER CALC-MCNC: 3.3 G/DL (ref 2–4)
GLUCOSE BLD STRIP.AUTO-MCNC: 129 MG/DL (ref 70–110)
GLUCOSE BLD STRIP.AUTO-MCNC: 136 MG/DL (ref 70–110)
GLUCOSE SERPL-MCNC: 236 MG/DL (ref 74–99)
HCT VFR BLD AUTO: 33.1 % (ref 35–45)
HGB BLD-MCNC: 10.2 G/DL (ref 12–16)
INR PPP: 0.9 (ref 0.8–1.2)
LYMPHOCYTES # BLD: 1.5 K/UL (ref 0.9–3.6)
LYMPHOCYTES NFR BLD: 16 % (ref 21–52)
MCH RBC QN AUTO: 25.7 PG (ref 24–34)
MCHC RBC AUTO-ENTMCNC: 30.8 G/DL (ref 31–37)
MCV RBC AUTO: 83.4 FL (ref 74–97)
MONOCYTES # BLD: 0.9 K/UL (ref 0.05–1.2)
MONOCYTES NFR BLD: 10 % (ref 3–10)
NEUTS SEG # BLD: 6.9 K/UL (ref 1.8–8)
NEUTS SEG NFR BLD: 73 % (ref 40–73)
PLATELET # BLD AUTO: 288 K/UL (ref 135–420)
PMV BLD AUTO: 11 FL (ref 9.2–11.8)
POTASSIUM SERPL-SCNC: 4.1 MMOL/L (ref 3.5–5.5)
PROT SERPL-MCNC: 6.1 G/DL (ref 6.4–8.2)
PROTHROMBIN TIME: 12.1 SEC (ref 11.5–15.2)
RBC # BLD AUTO: 3.97 M/UL (ref 4.2–5.3)
SODIUM SERPL-SCNC: 141 MMOL/L (ref 136–145)
WBC # BLD AUTO: 9.3 K/UL (ref 4.6–13.2)

## 2018-09-22 PROCEDURE — 71045 X-RAY EXAM CHEST 1 VIEW: CPT

## 2018-09-22 PROCEDURE — 90715 TDAP VACCINE 7 YRS/> IM: CPT | Performed by: PHYSICIAN ASSISTANT

## 2018-09-22 PROCEDURE — 85610 PROTHROMBIN TIME: CPT | Performed by: PHYSICIAN ASSISTANT

## 2018-09-22 PROCEDURE — 93005 ELECTROCARDIOGRAM TRACING: CPT

## 2018-09-22 PROCEDURE — 74011250636 HC RX REV CODE- 250/636: Performed by: PHYSICIAN ASSISTANT

## 2018-09-22 PROCEDURE — 0HDGXZZ EXTRACTION OF LEFT HAND SKIN, EXTERNAL APPROACH: ICD-10-PCS | Performed by: ORTHOPAEDIC SURGERY

## 2018-09-22 PROCEDURE — 85025 COMPLETE CBC W/AUTO DIFF WBC: CPT | Performed by: PHYSICIAN ASSISTANT

## 2018-09-22 PROCEDURE — 99283 EMERGENCY DEPT VISIT LOW MDM: CPT

## 2018-09-22 PROCEDURE — 77030011265 HC ELECTRD BLD HEX COVD -A: Performed by: ORTHOPAEDIC SURGERY

## 2018-09-22 PROCEDURE — 76010000149 HC OR TIME 1 TO 1.5 HR: Performed by: ORTHOPAEDIC SURGERY

## 2018-09-22 PROCEDURE — 74011000250 HC RX REV CODE- 250

## 2018-09-22 PROCEDURE — 82962 GLUCOSE BLOOD TEST: CPT

## 2018-09-22 PROCEDURE — 77030002933 HC SUT MCRYL J&J -A: Performed by: ORTHOPAEDIC SURGERY

## 2018-09-22 PROCEDURE — 96375 TX/PRO/DX INJ NEW DRUG ADDON: CPT

## 2018-09-22 PROCEDURE — 74011250636 HC RX REV CODE- 250/636: Performed by: ANESTHESIOLOGY

## 2018-09-22 PROCEDURE — 77030003029 HC SUT VCRL J&J -B: Performed by: ORTHOPAEDIC SURGERY

## 2018-09-22 PROCEDURE — 80053 COMPREHEN METABOLIC PANEL: CPT | Performed by: PHYSICIAN ASSISTANT

## 2018-09-22 PROCEDURE — 0PSQXZZ REPOSITION LEFT METACARPAL, EXTERNAL APPROACH: ICD-10-PCS | Performed by: ORTHOPAEDIC SURGERY

## 2018-09-22 PROCEDURE — 76210000006 HC OR PH I REC 0.5 TO 1 HR: Performed by: ORTHOPAEDIC SURGERY

## 2018-09-22 PROCEDURE — 74011250636 HC RX REV CODE- 250/636

## 2018-09-22 PROCEDURE — 77030018836 HC SOL IRR NACL ICUM -A: Performed by: ORTHOPAEDIC SURGERY

## 2018-09-22 PROCEDURE — 76060000033 HC ANESTHESIA 1 TO 1.5 HR: Performed by: ORTHOPAEDIC SURGERY

## 2018-09-22 PROCEDURE — 76210000021 HC REC RM PH II 0.5 TO 1 HR: Performed by: ORTHOPAEDIC SURGERY

## 2018-09-22 PROCEDURE — 77030020782 HC GWN BAIR PAWS FLX 3M -B: Performed by: ORTHOPAEDIC SURGERY

## 2018-09-22 PROCEDURE — 74011000258 HC RX REV CODE- 258: Performed by: PHYSICIAN ASSISTANT

## 2018-09-22 PROCEDURE — 96365 THER/PROPH/DIAG IV INF INIT: CPT

## 2018-09-22 PROCEDURE — 90471 IMMUNIZATION ADMIN: CPT

## 2018-09-22 PROCEDURE — 2W3FX1Z IMMOBILIZATION OF LEFT HAND USING SPLINT: ICD-10-PCS | Performed by: EMERGENCY MEDICINE

## 2018-09-22 PROCEDURE — 65270000029 HC RM PRIVATE

## 2018-09-22 PROCEDURE — 73130 X-RAY EXAM OF HAND: CPT

## 2018-09-22 PROCEDURE — 77030013708 HC HNDPC SUC IRR PULS STRY –B: Performed by: ORTHOPAEDIC SURGERY

## 2018-09-22 RX ORDER — FENTANYL CITRATE 50 UG/ML
INJECTION, SOLUTION INTRAMUSCULAR; INTRAVENOUS AS NEEDED
Status: DISCONTINUED | OUTPATIENT
Start: 2018-09-22 | End: 2018-09-22 | Stop reason: HOSPADM

## 2018-09-22 RX ORDER — HYDROMORPHONE HYDROCHLORIDE 2 MG/ML
0.2 INJECTION, SOLUTION INTRAMUSCULAR; INTRAVENOUS; SUBCUTANEOUS AS NEEDED
Status: DISCONTINUED | OUTPATIENT
Start: 2018-09-22 | End: 2018-09-23 | Stop reason: HOSPADM

## 2018-09-22 RX ORDER — SODIUM CHLORIDE 0.9 % (FLUSH) 0.9 %
5-10 SYRINGE (ML) INJECTION AS NEEDED
Status: DISCONTINUED | OUTPATIENT
Start: 2018-09-22 | End: 2018-09-22 | Stop reason: HOSPADM

## 2018-09-22 RX ORDER — MORPHINE SULFATE 4 MG/ML
4 INJECTION, SOLUTION INTRAMUSCULAR; INTRAVENOUS ONCE
Status: COMPLETED | OUTPATIENT
Start: 2018-09-22 | End: 2018-09-22

## 2018-09-22 RX ORDER — GLYCOPYRROLATE 0.2 MG/ML
INJECTION INTRAMUSCULAR; INTRAVENOUS AS NEEDED
Status: DISCONTINUED | OUTPATIENT
Start: 2018-09-22 | End: 2018-09-22 | Stop reason: HOSPADM

## 2018-09-22 RX ORDER — CEFAZOLIN SODIUM 2 G/50ML
2 SOLUTION INTRAVENOUS
Status: DISCONTINUED | OUTPATIENT
Start: 2018-09-22 | End: 2018-09-22 | Stop reason: HOSPADM

## 2018-09-22 RX ORDER — LORATADINE 10 MG/1
10 TABLET ORAL
COMMUNITY
End: 2019-08-09 | Stop reason: DRUGHIGH

## 2018-09-22 RX ORDER — AMOXICILLIN AND CLAVULANATE POTASSIUM 500; 125 MG/1; MG/1
1 TABLET, FILM COATED ORAL 3 TIMES DAILY
Qty: 21 TAB | Refills: 0 | Status: SHIPPED | OUTPATIENT
Start: 2018-09-22 | End: 2018-10-05

## 2018-09-22 RX ORDER — OMEPRAZOLE 10 MG/1
20 CAPSULE, DELAYED RELEASE ORAL DAILY
COMMUNITY
End: 2019-08-09 | Stop reason: DRUGHIGH

## 2018-09-22 RX ORDER — MAGNESIUM SULFATE 100 %
4 CRYSTALS MISCELLANEOUS AS NEEDED
Status: DISCONTINUED | OUTPATIENT
Start: 2018-09-22 | End: 2018-09-23 | Stop reason: HOSPADM

## 2018-09-22 RX ORDER — OXYCODONE AND ACETAMINOPHEN 7.5; 325 MG/1; MG/1
TABLET ORAL
Qty: 35 TAB | Refills: 0 | Status: SHIPPED | OUTPATIENT
Start: 2018-09-22 | End: 2018-10-05

## 2018-09-22 RX ORDER — DEXTROSE 50 % IN WATER (D50W) INTRAVENOUS SYRINGE
25-50 AS NEEDED
Status: DISCONTINUED | OUTPATIENT
Start: 2018-09-22 | End: 2018-09-23 | Stop reason: HOSPADM

## 2018-09-22 RX ORDER — SODIUM CHLORIDE 0.9 % (FLUSH) 0.9 %
5-10 SYRINGE (ML) INJECTION EVERY 8 HOURS
Status: DISCONTINUED | OUTPATIENT
Start: 2018-09-22 | End: 2018-09-22 | Stop reason: HOSPADM

## 2018-09-22 RX ORDER — MIDAZOLAM HYDROCHLORIDE 1 MG/ML
INJECTION, SOLUTION INTRAMUSCULAR; INTRAVENOUS AS NEEDED
Status: DISCONTINUED | OUTPATIENT
Start: 2018-09-22 | End: 2018-09-22 | Stop reason: HOSPADM

## 2018-09-22 RX ORDER — MONTELUKAST SODIUM 10 MG/1
10 TABLET ORAL DAILY
COMMUNITY
End: 2018-12-13

## 2018-09-22 RX ORDER — INSULIN LISPRO 100 [IU]/ML
INJECTION, SOLUTION INTRAVENOUS; SUBCUTANEOUS ONCE
Status: DISCONTINUED | OUTPATIENT
Start: 2018-09-22 | End: 2018-09-23 | Stop reason: HOSPADM

## 2018-09-22 RX ORDER — ACETAMINOPHEN 325 MG/1
TABLET ORAL
COMMUNITY
End: 2018-12-13

## 2018-09-22 RX ORDER — ONDANSETRON 2 MG/ML
INJECTION INTRAMUSCULAR; INTRAVENOUS AS NEEDED
Status: DISCONTINUED | OUTPATIENT
Start: 2018-09-22 | End: 2018-09-22 | Stop reason: HOSPADM

## 2018-09-22 RX ORDER — SODIUM CHLORIDE, SODIUM LACTATE, POTASSIUM CHLORIDE, CALCIUM CHLORIDE 600; 310; 30; 20 MG/100ML; MG/100ML; MG/100ML; MG/100ML
INJECTION, SOLUTION INTRAVENOUS
Status: DISCONTINUED | OUTPATIENT
Start: 2018-09-22 | End: 2018-09-22 | Stop reason: HOSPADM

## 2018-09-22 RX ORDER — EPHEDRINE SULFATE 50 MG/ML
INJECTION, SOLUTION INTRAVENOUS AS NEEDED
Status: DISCONTINUED | OUTPATIENT
Start: 2018-09-22 | End: 2018-09-22 | Stop reason: HOSPADM

## 2018-09-22 RX ORDER — CEFAZOLIN SODIUM IN 0.9 % NACL 2 G/100 ML
PLASTIC BAG, INJECTION (ML) INTRAVENOUS AS NEEDED
Status: DISCONTINUED | OUTPATIENT
Start: 2018-09-22 | End: 2018-09-22 | Stop reason: HOSPADM

## 2018-09-22 RX ORDER — DEXAMETHASONE SODIUM PHOSPHATE 4 MG/ML
INJECTION, SOLUTION INTRA-ARTICULAR; INTRALESIONAL; INTRAMUSCULAR; INTRAVENOUS; SOFT TISSUE AS NEEDED
Status: DISCONTINUED | OUTPATIENT
Start: 2018-09-22 | End: 2018-09-22 | Stop reason: HOSPADM

## 2018-09-22 RX ORDER — SODIUM CHLORIDE 0.9 % (FLUSH) 0.9 %
5-10 SYRINGE (ML) INJECTION AS NEEDED
Status: DISCONTINUED | OUTPATIENT
Start: 2018-09-22 | End: 2018-09-23 | Stop reason: HOSPADM

## 2018-09-22 RX ORDER — SODIUM CHLORIDE, SODIUM LACTATE, POTASSIUM CHLORIDE, CALCIUM CHLORIDE 600; 310; 30; 20 MG/100ML; MG/100ML; MG/100ML; MG/100ML
75 INJECTION, SOLUTION INTRAVENOUS CONTINUOUS
Status: DISCONTINUED | OUTPATIENT
Start: 2018-09-22 | End: 2018-09-22 | Stop reason: HOSPADM

## 2018-09-22 RX ORDER — PROPOFOL 10 MG/ML
INJECTION, EMULSION INTRAVENOUS AS NEEDED
Status: DISCONTINUED | OUTPATIENT
Start: 2018-09-22 | End: 2018-09-22 | Stop reason: HOSPADM

## 2018-09-22 RX ORDER — SODIUM CHLORIDE 9 MG/ML
1000 INJECTION, SOLUTION INTRAVENOUS ONCE
Status: COMPLETED | OUTPATIENT
Start: 2018-09-22 | End: 2018-09-22

## 2018-09-22 RX ORDER — MORPHINE SULFATE 4 MG/ML
2 INJECTION, SOLUTION INTRAMUSCULAR; INTRAVENOUS ONCE
Status: DISCONTINUED | OUTPATIENT
Start: 2018-09-22 | End: 2018-09-23 | Stop reason: HOSPADM

## 2018-09-22 RX ORDER — HYDROXYCHLOROQUINE SULFATE 200 MG/1
200 TABLET, FILM COATED ORAL 2 TIMES DAILY
COMMUNITY
End: 2018-11-21

## 2018-09-22 RX ORDER — LIDOCAINE HYDROCHLORIDE 10 MG/ML
0.1 INJECTION, SOLUTION EPIDURAL; INFILTRATION; INTRACAUDAL; PERINEURAL AS NEEDED
Status: DISCONTINUED | OUTPATIENT
Start: 2018-09-22 | End: 2018-09-22 | Stop reason: HOSPADM

## 2018-09-22 RX ORDER — LIDOCAINE HYDROCHLORIDE 20 MG/ML
INJECTION, SOLUTION EPIDURAL; INFILTRATION; INTRACAUDAL; PERINEURAL AS NEEDED
Status: DISCONTINUED | OUTPATIENT
Start: 2018-09-22 | End: 2018-09-22 | Stop reason: HOSPADM

## 2018-09-22 RX ORDER — ONDANSETRON 2 MG/ML
4 INJECTION INTRAMUSCULAR; INTRAVENOUS ONCE
Status: DISCONTINUED | OUTPATIENT
Start: 2018-09-22 | End: 2018-09-23 | Stop reason: HOSPADM

## 2018-09-22 RX ADMIN — DEXAMETHASONE SODIUM PHOSPHATE 4 MG: 4 INJECTION, SOLUTION INTRA-ARTICULAR; INTRALESIONAL; INTRAMUSCULAR; INTRAVENOUS; SOFT TISSUE at 21:36

## 2018-09-22 RX ADMIN — EPHEDRINE SULFATE 5 MG: 50 INJECTION, SOLUTION INTRAVENOUS at 21:37

## 2018-09-22 RX ADMIN — ONDANSETRON 4 MG: 2 INJECTION INTRAMUSCULAR; INTRAVENOUS at 21:36

## 2018-09-22 RX ADMIN — PROPOFOL 150 MG: 10 INJECTION, EMULSION INTRAVENOUS at 21:09

## 2018-09-22 RX ADMIN — GLYCOPYRROLATE 0.1 MG: 0.2 INJECTION INTRAMUSCULAR; INTRAVENOUS at 21:24

## 2018-09-22 RX ADMIN — MIDAZOLAM HYDROCHLORIDE 2 MG: 1 INJECTION, SOLUTION INTRAMUSCULAR; INTRAVENOUS at 20:58

## 2018-09-22 RX ADMIN — LIDOCAINE HYDROCHLORIDE 100 MG: 20 INJECTION, SOLUTION EPIDURAL; INFILTRATION; INTRACAUDAL; PERINEURAL at 21:09

## 2018-09-22 RX ADMIN — FENTANYL CITRATE 50 MCG: 50 INJECTION, SOLUTION INTRAMUSCULAR; INTRAVENOUS at 21:09

## 2018-09-22 RX ADMIN — EPHEDRINE SULFATE 10 MG: 50 INJECTION, SOLUTION INTRAVENOUS at 21:27

## 2018-09-22 RX ADMIN — PROPOFOL 50 MG: 10 INJECTION, EMULSION INTRAVENOUS at 21:11

## 2018-09-22 RX ADMIN — TETANUS TOXOID, REDUCED DIPHTHERIA TOXOID AND ACELLULAR PERTUSSIS VACCINE, ADSORBED 0.5 ML: 5; 2.5; 8; 8; 2.5 SUSPENSION INTRAMUSCULAR at 15:38

## 2018-09-22 RX ADMIN — SODIUM CHLORIDE, SODIUM LACTATE, POTASSIUM CHLORIDE, AND CALCIUM CHLORIDE 75 ML/HR: 600; 310; 30; 20 INJECTION, SOLUTION INTRAVENOUS at 20:28

## 2018-09-22 RX ADMIN — Medication 2 G: at 21:15

## 2018-09-22 RX ADMIN — PIPERACILLIN SODIUM,TAZOBACTAM SODIUM 3.38 G: 3; .375 INJECTION, POWDER, FOR SOLUTION INTRAVENOUS at 16:51

## 2018-09-22 RX ADMIN — EPHEDRINE SULFATE 5 MG: 50 INJECTION, SOLUTION INTRAVENOUS at 21:33

## 2018-09-22 RX ADMIN — SODIUM CHLORIDE, SODIUM LACTATE, POTASSIUM CHLORIDE, CALCIUM CHLORIDE: 600; 310; 30; 20 INJECTION, SOLUTION INTRAVENOUS at 21:00

## 2018-09-22 RX ADMIN — MORPHINE SULFATE 4 MG: 4 INJECTION, SOLUTION INTRAMUSCULAR; INTRAVENOUS at 17:43

## 2018-09-22 RX ADMIN — GLYCOPYRROLATE 0.1 MG: 0.2 INJECTION INTRAMUSCULAR; INTRAVENOUS at 21:21

## 2018-09-22 RX ADMIN — FENTANYL CITRATE 25 MCG: 50 INJECTION, SOLUTION INTRAMUSCULAR; INTRAVENOUS at 21:34

## 2018-09-22 RX ADMIN — EPHEDRINE SULFATE 10 MG: 50 INJECTION, SOLUTION INTRAVENOUS at 21:50

## 2018-09-22 RX ADMIN — FENTANYL CITRATE 25 MCG: 50 INJECTION, SOLUTION INTRAMUSCULAR; INTRAVENOUS at 21:44

## 2018-09-22 RX ADMIN — MORPHINE SULFATE 4 MG: 4 INJECTION, SOLUTION INTRAMUSCULAR; INTRAVENOUS at 16:11

## 2018-09-22 RX ADMIN — SODIUM CHLORIDE 1000 ML: 900 INJECTION, SOLUTION INTRAVENOUS at 16:52

## 2018-09-22 NOTE — ED PROVIDER NOTES
EMERGENCY DEPARTMENT HISTORY AND PHYSICAL EXAM 
 
Date: 9/22/2018 Patient Name: Jose Cabrera History of Presenting Illness Chief Complaint Patient presents with  Dog Bite History Provided By: Patient Chief Complaint: dog bite Duration: 2-3  Hours Timing:  Acute Location: left hand Quality: Aching Severity: Severe Modifying Factors:  
Associated Symptoms: bleeding Additional History (Context): Jose Cabrera is a 79 y.o. female with DM, HTN, renal insufficeincy, anxiety, depression, GERD who presents with dog bite to left hand. She was attempting to separate her poodle and a pit bull from fighting. Pit bull belongs to patient's son's girlfriend. She believes the pit bull is up to date on shots. Pt is unsure of her tetanus status. Last PO intake was a peanut butter and jelly sandwich at 11am today; has been chewing ice since. PCP: Margret Valladares MD 
 
Current Facility-Administered Medications Medication Dose Route Frequency Provider Last Rate Last Dose  morphine injection 4 mg  4 mg IntraVENous ONCE RJ Briggs Current Outpatient Prescriptions Medication Sig Dispense Refill  amiodarone (CORDARONE) 200 mg tablet TAKE 1 TAB BY MOUTH DAILY. 30 Tab 3  
 dilTIAZem CD (CARDIZEM CD) 180 mg ER capsule TAKE ONE CAPSULE BY MOUTH EVERY DAY 30 Cap 6  
 lisinopril (PRINIVIL, ZESTRIL) 10 mg tablet Take 1 Tab by mouth daily. 90 Tab 1  
 furosemide (LASIX) 40 mg tablet Take 1 Tab by mouth daily. Indications: Peripheral Edema due to Chronic Heart Failure, For leg edema or SOB 90 Tab 1  
 metoprolol tartrate (LOPRESSOR) 25 mg tablet Take 0.5 Tabs by mouth two (2) times a day. 180 Tab 1  pregabalin (LYRICA) 150 mg capsule Take 150 mg by mouth three (3) times daily. 1 cap 3 x day  insulin glargine (LANTUS) 100 unit/mL injection 20 Units by SubCUTAneous route nightly. 1 Vial 0  
 aspirin 81 mg chewable tablet Take 1 Tab by mouth daily.  30 Tab 0  
  DULoxetine (CYMBALTA) 60 mg capsule Take 1 Cap by mouth daily. 30 Cap 0  
 HYDROcodone-acetaminophen (NORCO)  mg tablet Take 1 Tab by mouth. Take 1-2 tabs by mouth every 8 hours as needed for pain  fluticasone (FLONASE) 50 mcg/actuation nasal spray 2 Sprays by Both Nostrils route daily. as needed for Allergies  Lactobacillus Acidoph & Bulgar (FLORANEX) 1 million cell tab tablet Take 1 Tab by mouth two (2) times a day. 60 Tab 0  
 simvastatin (ZOCOR) 40 mg tablet Take 40 mg by mouth nightly. 1 tab Past History Past Medical History: 
Past Medical History:  
Diagnosis Date  Anxiety  Arthritis  Asbestosis ([facility] [address]  Depression  Diabetes ([facility] [address]  Fibromyalgia  GERD (gastroesophageal reflux disease)  Hypercholesteremia  Hypertension  Renal insufficiency  Skin abscess  Sleep apnea Past Surgical History: 
Past Surgical History:  
Procedure Laterality Date  HX BREAST LUMPECTOMY  HX CARPAL TUNNEL RELEASE  HX KNEE REPLACEMENT    
 bilater  HX PARTIAL THYROIDECTOMY  HX SHOULDER ARTHROSCOPY    
 HX TUBAL LIGATION Family History: 
Family History Problem Relation Age of Onset  Asthma Mother  Hypertension Mother  Kidney Disease Mother  Thyroid Disease Mother  Diabetes Maternal Grandmother  Thyroid Disease Maternal Grandmother Social History: 
Social History Substance Use Topics  Smoking status: Never Smoker  Smokeless tobacco: Never Used  Alcohol use No  
 
 
Allergies: 
No Known Allergies Review of Systems Review of Systems Constitutional: Negative for chills and fever. Musculoskeletal: Positive for arthralgias and joint swelling. Skin: Positive for wound. All other systems reviewed and are negative. All Other Systems Negative Physical Exam  
 
Vitals:  
 09/22/18 1517 09/22/18 1645 BP: 173/90 Pulse: 60 Resp: 20 Temp: 98 °F (36.7 °C) SpO2: 98% Height:  5' 1.02\" (1.55 m) Physical Exam  
Constitutional: She appears well-developed and well-nourished. No distress. HENT:  
Head: Normocephalic and atraumatic. Right Ear: External ear normal.  
Left Ear: External ear normal.  
Mouth/Throat: Oropharynx is clear and moist.  
Eyes: Conjunctivae are normal.  
Neck: Normal range of motion. Musculoskeletal:  
     Left hand: She exhibits decreased range of motion, tenderness, bony tenderness, deformity, laceration and swelling. She exhibits normal capillary refill. Normal sensation noted. Normal strength noted. She exhibits no finger abduction and no thumb/finger opposition. Hands: 
Neurological: She is alert. Skin: Skin is warm and dry. She is not diaphoretic. Psychiatric: She has a normal mood and affect. Diagnostic Study Results Labs - Recent Results (from the past 12 hour(s)) CBC WITH AUTOMATED DIFF Collection Time: 09/22/18  4:00 PM  
Result Value Ref Range WBC 9.3 4.6 - 13.2 K/uL  
 RBC 3.97 (L) 4.20 - 5.30 M/uL  
 HGB 10.2 (L) 12.0 - 16.0 g/dL HCT 33.1 (L) 35.0 - 45.0 % MCV 83.4 74.0 - 97.0 FL  
 MCH 25.7 24.0 - 34.0 PG  
 MCHC 30.8 (L) 31.0 - 37.0 g/dL  
 RDW 14.1 11.6 - 14.5 % PLATELET 678 981 - 167 K/uL MPV 11.0 9.2 - 11.8 FL  
 NEUTROPHILS 73 40 - 73 % LYMPHOCYTES 16 (L) 21 - 52 % MONOCYTES 10 3 - 10 % EOSINOPHILS 1 0 - 5 % BASOPHILS 0 0 - 2 %  
 ABS. NEUTROPHILS 6.9 1.8 - 8.0 K/UL  
 ABS. LYMPHOCYTES 1.5 0.9 - 3.6 K/UL  
 ABS. MONOCYTES 0.9 0.05 - 1.2 K/UL  
 ABS. EOSINOPHILS 0.1 0.0 - 0.4 K/UL  
 ABS. BASOPHILS 0.0 0.0 - 0.1 K/UL  
 DF AUTOMATED METABOLIC PANEL, COMPREHENSIVE Collection Time: 09/22/18  4:00 PM  
Result Value Ref Range Sodium 141 136 - 145 mmol/L Potassium 4.1 3.5 - 5.5 mmol/L Chloride 106 100 - 108 mmol/L  
 CO2 24 21 - 32 mmol/L Anion gap 11 3.0 - 18 mmol/L Glucose 236 (H) 74 - 99 mg/dL  BUN 40 (H) 7.0 - 18 MG/DL  
 Creatinine 1.59 (H) 0.6 - 1.3 MG/DL  
 BUN/Creatinine ratio 25 (H) 12 - 20 GFR est AA 39 (L) >60 ml/min/1.73m2 GFR est non-AA 32 (L) >60 ml/min/1.73m2 Calcium 8.2 (L) 8.5 - 10.1 MG/DL Bilirubin, total 0.2 0.2 - 1.0 MG/DL  
 ALT (SGPT) 54 13 - 56 U/L  
 AST (SGOT) 39 (H) 15 - 37 U/L Alk. phosphatase 318 (H) 45 - 117 U/L Protein, total 6.1 (L) 6.4 - 8.2 g/dL Albumin 2.8 (L) 3.4 - 5.0 g/dL Globulin 3.3 2.0 - 4.0 g/dL A-G Ratio 0.8 0.8 - 1.7 PROTHROMBIN TIME + INR Collection Time: 09/22/18  4:00 PM  
Result Value Ref Range Prothrombin time 12.1 11.5 - 15.2 sec INR 0.9 0.8 - 1.2 Radiologic Studies -  
XR HAND LT MIN 3 V Final Result XR CHEST SNGL V    (Results Pending) CT Results  (Last 48 hours) None CXR Results  (Last 48 hours) None Medical Decision Making I am the first provider for this patient. I reviewed the vital signs, available nursing notes, past medical history, past surgical history, family history and social history. Vital Signs-Reviewed the patient's vital signs. Pulse Oximetry Analysis - 98% on RA Records Reviewed: Nursing Notes Procedures: 
Procedures Provider Notes (Medical Decision Making): Pt here with dog bite to left hand as she was  her poodle and a pit bull fighting. Extensive swelling, ecchymosis, puncture and tear wounds to dorsal hand over 2nd and 3rd metacarpals. Xray with comminuted displaced fracture to 2nd metacarpal head. Will discuss with ortho. Tetanus updated. Morphine for pain. Awaiting kidney function results for abx. 
5:36 PM Discussed with Dr. Fatimah Rollins, will take pt to OR this evening. Pt updated on plan, agrees to proceed. MED RECONCILIATION: 
Current Facility-Administered Medications Medication Dose Route Frequency  morphine injection 4 mg  4 mg IntraVENous ONCE Current Outpatient Prescriptions Medication Sig  
  amiodarone (CORDARONE) 200 mg tablet TAKE 1 TAB BY MOUTH DAILY.  dilTIAZem CD (CARDIZEM CD) 180 mg ER capsule TAKE ONE CAPSULE BY MOUTH EVERY DAY  lisinopril (PRINIVIL, ZESTRIL) 10 mg tablet Take 1 Tab by mouth daily.  furosemide (LASIX) 40 mg tablet Take 1 Tab by mouth daily. Indications: Peripheral Edema due to Chronic Heart Failure, For leg edema or SOB  metoprolol tartrate (LOPRESSOR) 25 mg tablet Take 0.5 Tabs by mouth two (2) times a day.  pregabalin (LYRICA) 150 mg capsule Take 150 mg by mouth three (3) times daily. 1 cap 3 x day  insulin glargine (LANTUS) 100 unit/mL injection 20 Units by SubCUTAneous route nightly.  aspirin 81 mg chewable tablet Take 1 Tab by mouth daily.  DULoxetine (CYMBALTA) 60 mg capsule Take 1 Cap by mouth daily.  HYDROcodone-acetaminophen (NORCO)  mg tablet Take 1 Tab by mouth. Take 1-2 tabs by mouth every 8 hours as needed for pain  fluticasone (FLONASE) 50 mcg/actuation nasal spray 2 Sprays by Both Nostrils route daily. as needed for Allergies  Lactobacillus Acidoph & Bulgar (FLORANEX) 1 million cell tab tablet Take 1 Tab by mouth two (2) times a day.  simvastatin (ZOCOR) 40 mg tablet Take 40 mg by mouth nightly. 1 tab Disposition: To OR Follow-up Information None Current Discharge Medication List  
  
 
 
Diagnosis Clinical Impression: 1. Dog bite of left hand, initial encounter 2. Open displaced fracture of other part of second metacarpal bone of left hand, initial encounter

## 2018-09-22 NOTE — ROUTINE PROCESS
TRANSFER - OUT REPORT: 
 
Verbal report given to Jairo RN(name) on Bri Drop  being transferred to [facility](unit) for routine progression of care Report consisted of patients Situation, Background, Assessment and  
Recommendations(SBAR). Information from the following report(s) SBAR was reviewed with the receiving nurse. Lines:  
Peripheral IV 09/22/18 Right Antecubital (Active) Opportunity for questions and clarification was provided.    
 
Patient transported with:

## 2018-09-22 NOTE — ED NOTES
Pt  Wound cleaned and nonstick dressing applied   Pt undressed and personal belongings  Placed in pt  Bag.

## 2018-09-22 NOTE — ED NOTES
I performed a brief evaluation, including history and physical, of the patient here in triage and I have determined that pt will need further treatment and evaluation from the main side ER physician. I have placed initial orders to help in expediting patients care. September 22, 2018 at 3:23 PM - [facility]  /90 (BP 1 Location: Left arm)  Pulse 60  Temp 98 °F (36.7 °C)  Resp 20  SpO2 98% She was attempting to separate her poodle and a pit bull from fighting. Pit bull belongs to patient's son's girlfriend. She believes the dog is up to date on shots. Pt is unsure of her tetanus status. Deep tear lacerations to left 2nd webspace, 2nd and 3rd fingers.

## 2018-09-22 NOTE — IP AVS SNAPSHOT
[address] [address] 
[phone] Patient: Bassam Blandon MRN: [id number] [facility]:[birth year] A check linus indicates which time of day the medication should be taken. My Medications START taking these medications Instructions Each Dose to Equal  
 Morning Noon Evening Bedtime  
 amoxicillin-clavulanate 500-125 mg per tablet Commonly known as:  AUGMENTIN Your last dose was: Your next dose is: Take 1 Tab by mouth three (3) times daily. 1 Tab  
    
   
   
   
  
 oxyCODONE-acetaminophen 7.5-325 mg per tablet Commonly known as:  PERCOCET 7.5 Your last dose was: Your next dose is:    
   
   
 1-2 po q 4-6h prn pain CONTINUE taking these medications Instructions Each Dose to Equal  
 Morning Noon Evening Bedtime  
 amiodarone 200 mg tablet Commonly known as:  CORDARONE Your last dose was: Your next dose is: TAKE 1 TAB BY MOUTH DAILY. aspirin 81 mg chewable tablet Your last dose was: Your next dose is: Take 1 Tab by mouth daily. 81 mg CLARITIN 10 mg tablet Generic drug:  loratadine Your last dose was: Your next dose is: Take 10 mg by mouth. 10 mg  
    
   
   
   
  
 dilTIAZem  mg ER capsule Commonly known as:  CARDIZEM CD Your last dose was: Your next dose is: TAKE ONE CAPSULE BY MOUTH EVERY DAY DULoxetine 60 mg capsule Commonly known as:  CYMBALTA Your last dose was: Your next dose is: Take 1 Cap by mouth daily. 60 mg  
    
   
   
   
  
 furosemide 40 mg tablet Commonly known as:  LASIX Your last dose was: Your next dose is: Take 1 Tab by mouth daily.  Indications: Peripheral Edema due to Chronic Heart Failure, For leg edema or SOB 40 mg  
    
   
   
   
  
 LANTUS U-100 INSULIN 100 unit/mL injection Generic drug:  insulin glargine Your last dose was: Your next dose is:    
   
   
 20 Units by SubCUTAneous route nightly. 20 Units  
    
   
   
   
  
 lisinopril 10 mg tablet Commonly known as:  Robertha Roup Your last dose was: Your next dose is: Take 1 Tab by mouth daily. 10 mg  
    
   
   
   
  
 LYRICA 150 mg capsule Generic drug:  pregabalin Your last dose was: Your next dose is: Take 150 mg by mouth three (3) times daily. 1 cap 3 x day 150 mg  
    
   
   
   
  
 metoprolol tartrate 25 mg tablet Commonly known as:  LOPRESSOR Your last dose was: Your next dose is: Take 0.5 Tabs by mouth two (2) times a day. 12.5 mg  
    
   
   
   
  
 PLAQUENIL 200 mg tablet Generic drug:  hydroxychloroquine Your last dose was: Your next dose is: Take 200 mg by mouth two (2) times a day. 200 mg PRAVASTATIN PO Your last dose was: Your next dose is: Take  by mouth. PriLOSEC 10 mg capsule Generic drug:  omeprazole Your last dose was: Your next dose is: Take 10 mg by mouth daily. 10 mg  
    
   
   
   
  
 SINGULAIR 10 mg tablet Generic drug:  montelukast  
   
Your last dose was: Your next dose is: Take 10 mg by mouth daily. 10 mg  
    
   
   
   
  
 TYLENOL 325 mg tablet Generic drug:  acetaminophen Your last dose was: Your next dose is: Take  by mouth every four (4) hours as needed for Pain. Where to Get Your Medications Information on where to get these meds will be given to you by the nurse or doctor. ! Ask your nurse or doctor about these medications amoxicillin-clavulanate 500-125 mg per tablet  
 oxyCODONE-acetaminophen 7.5-325 mg per tablet

## 2018-09-22 NOTE — ED TRIAGE NOTES
Pt states a pit bull attacked her poodle. Was bitten on both hands trying to break dogs up. Has puncture wounds and swelling to left hand. Some wounds to right hand. Does not know when her last Td was

## 2018-09-22 NOTE — IP AVS SNAPSHOT
[address] [address] 
[phone] Patient: Neptali Prabhakar MRN: [id number] AIE:[id number]/0/1251 About your hospitalization You were admitted on:  September 22, 2018 You last received care in the:  [facility] PAC[facility] You were discharged on:  September 22, 2018 Why you were hospitalized Your primary diagnosis was:  Not on File Your diagnoses also included:  Open Fracture, Dog Bite Follow-up Information Follow up With Details Comments Contact Info Tarah Colon MD   [address] [place] [address] 100 4384 [facility] [address] [phone] Chai Up, DO Schedule an appointment as soon as possible for a visit today 3-5 days 27 [employer] 100 200 [facility] 
[phone] Discharge Orders None A check linus indicates which time of day the medication should be taken. My Medications START taking these medications Instructions Each Dose to Equal  
 Morning Noon Evening Bedtime  
 amoxicillin-clavulanate 500-125 mg per tablet Commonly known as:  AUGMENTIN Your last dose was: Your next dose is: Take 1 Tab by mouth three (3) times daily. 1 Tab  
    
   
   
   
  
 oxyCODONE-acetaminophen 7.5-325 mg per tablet Commonly known as:  PERCOCET 7.5 Your last dose was: Your next dose is:    
   
   
 1-2 po q 4-6h prn pain CONTINUE taking these medications Instructions Each Dose to Equal  
 Morning Noon Evening Bedtime  
 amiodarone 200 mg tablet Commonly known as:  CORDARONE Your last dose was: Your next dose is: TAKE 1 TAB BY MOUTH DAILY. aspirin 81 mg chewable tablet Your last dose was: Your next dose is: Take 1 Tab by mouth daily. 81 mg CLARITIN 10 mg tablet Generic drug:  loratadine Your last dose was: Your next dose is: Take 10 mg by mouth. 10 mg  
    
   
   
   
  
 dilTIAZem  mg ER capsule Commonly known as:  CARDIZEM CD Your last dose was: Your next dose is: TAKE ONE CAPSULE BY MOUTH EVERY DAY DULoxetine 60 mg capsule Commonly known as:  CYMBALTA Your last dose was: Your next dose is: Take 1 Cap by mouth daily. 60 mg  
    
   
   
   
  
 furosemide 40 mg tablet Commonly known as:  LASIX Your last dose was: Your next dose is: Take 1 Tab by mouth daily. Indications: Peripheral Edema due to Chronic Heart Failure, For leg edema or SOB 40 mg  
    
   
   
   
  
 LANTUS U-100 INSULIN 100 unit/mL injection Generic drug:  insulin glargine Your last dose was: Your next dose is:    
   
   
 20 Units by SubCUTAneous route nightly. 20 Units  
    
   
   
   
  
 lisinopril 10 mg tablet Commonly known as:  Mo Shuttmiesha Your last dose was: Your next dose is: Take 1 Tab by mouth daily. 10 mg  
    
   
   
   
  
 LYRICA 150 mg capsule Generic drug:  pregabalin Your last dose was: Your next dose is: Take 150 mg by mouth three (3) times daily. 1 cap 3 x day 150 mg  
    
   
   
   
  
 metoprolol tartrate 25 mg tablet Commonly known as:  LOPRESSOR Your last dose was: Your next dose is: Take 0.5 Tabs by mouth two (2) times a day. 12.5 mg  
    
   
   
   
  
 PLAQUENIL 200 mg tablet Generic drug:  hydroxychloroquine Your last dose was: Your next dose is: Take 200 mg by mouth two (2) times a day. 200 mg PRAVASTATIN PO Your last dose was: Your next dose is: Take  by mouth. PriLOSEC 10 mg capsule Generic drug:  omeprazole Your last dose was: Your next dose is: Take 10 mg by mouth daily. 10 mg  
    
   
   
   
  
 SINGULAIR 10 mg tablet Generic drug:  montelukast  
   
Your last dose was: Your next dose is: Take 10 mg by mouth daily. 10 mg  
    
   
   
   
  
 TYLENOL 325 mg tablet Generic drug:  acetaminophen Your last dose was: Your next dose is: Take  by mouth every four (4) hours as needed for Pain. Where to Get Your Medications Information on where to get these meds will be given to you by the nurse or doctor. ! Ask your nurse or doctor about these medications  
  amoxicillin-clavulanate 500-125 mg per tablet  
 oxyCODONE-acetaminophen 7.5-325 mg per tablet Opioid Education Prescription Opioids: What You Need to Know: 
 
Prescription opioids can be used to help relieve moderate-to-severe pain and are often prescribed following a surgery or injury, or for certain health conditions. These medications can be an important part of treatment but also come with serious risks. Opioids are strong pain medicines. Examples include hydrocodone, oxycodone, fentanyl, and morphine. Heroin is an example of an illegal opioid. It is important to work with your health care provider to make sure you are getting the safest, most effective care. WHAT ARE THE RISKS AND SIDE EFFECTS OF OPIOID USE? Prescription opioids carry serious risks of addiction and overdose, especially with prolonged use. An opioid overdose, often marked by slow breathing, can cause sudden death. The use of prescription opioids can have a number of side effects as well, even when taken as directed. · Tolerance-meaning you might need to take more of a medication for the same pain relief · Physical dependence-meaning you have symptoms of withdrawal when the medication is stopped.   Withdrawal symptoms can include nausea, sweating, chills, diarrhea, stomach cramps, and muscle aches. Withdrawal can last up to several weeks, depending on which drug you took and how long you took it. · Increased sensitivity to pain · Constipation · Nausea, vomiting, and dry mouth · Sleepiness and dizziness · Confusion · Depression · Low levels of testosterone that can result in lower sex drive, energy, and strength · Itching and sweating RISKS ARE GREATER WITH:      
· History of drug misuse, substance use disorder, or overdose · Mental health conditions (such as depression or anxiety) · Sleep apnea · Older age (72 years or older) · Pregnancy Avoid alcohol while taking prescription opioids. Also, unless specifically advised by your health care provider, medications to avoid include: · Benzodiazepines (such as Xanax or Valium) · Muscle relaxants (such as Soma or Flexeril) · Hypnotics (such as Ambien or Lunesta) · Other prescription opioids KNOW YOUR OPTIONS Talk to your health care provider about ways to manage your pain that don't involve prescription opioids. Some of these options may actually work better and have fewer risks and side effects. Consult your physician before adding or stopping any medications, treatments, or physical activity. Options may include: 
· Pain relievers such as acetaminophen, ibuprofen, and naproxen · Some medications that are also used for depression or seizures · Physical therapy and exercise · Counseling to help patients learn how to cope better with triggers of pain and stress. · Application of heat or cold compress · Massage therapy · Relaxation techniques Be Informed Make sure you know the name of your medication, how much and how often to take it, and its potential risks & side effects. IF YOU ARE PRESCRIBED OPIOIDS FOR PAIN: 
· Never take opioids in greater amounts or more often than prescribed. Remember the goal is not to be pain-free but to manage your pain at a tolerable level. · Follow up with your primary care provider to: · Work together to create a plan on how to manage your pain. · Talk about ways to help manage your pain that don't involve prescription opioids. · Talk about any and all concerns and side effects. · Help prevent misuse and abuse. · Never sell or share prescription opioids · Help prevent misuse and abuse. · Store prescription opioids in a secure place and out of reach of others (this may include visitors, children, friends, and family). · Safely dispose of unused/unwanted prescription opioids: Find your community drug take-back program or your pharmacy mail-back program, or flush them down the toilet, following guidance from the Food and Drug Administration (www.fda.gov/Drugs/ResourcesForYou). · Visit www.cdc.gov/drugoverdose to learn about the risks of opioid abuse and overdose. · If you believe you may be struggling with addiction, tell your health care provider and ask for guidance or call [employer] at 0-041-452-GFNF. Discharge Instructions Wound Debridement: What to Expect at [facility] Your Recovery After surgery to remove debris or dead tissue from your wound (debridement), you can expect some pain and swelling around your wound. This should get better within a few days after the procedure. You may have a bandage or a moist dressing over your wound. Your doctor will let you know how long to keep it on and how often to change it. The amount of time it will take for your wound to heal depends on how serious your wound is and whether you have any other health problems that may slow healing. You may need to have the wound debrided again. How soon you can return to work and your normal routine depends on the type of work you do and how you feel.  For example, if your wound is on your arm and your job requires you to do heavy lifting, you may need to wait until your wound has healed before you go back to work. This care sheet gives you a general idea about how long it will take for you to recover. But each person recovers at a different pace. Follow the steps below to feel better as quickly as possible. How can you care for yourself at home? Activity 
  · Rest when you feel tired. Getting enough sleep will help you recover.  
  · Avoid activities that put stress on the affected body part until your doctor says it's okay.  
  · Change positions often to keep pressure off your wound, and spread your body weight evenly with cushions, mattresses, foam wedges, or other pressure-relieving devices.  
  · If your wound is on your leg or foot, you may have to use crutches, a supportive boot, or a fitted shoe to keep pressure off your wound. If you need crutches, it may help to use a backpack or wear clothes with a lot of pockets to carry items.  
  · Do not shower for at least 24 hours after the procedure or for as long as your doctor tells you to. When you shower, keep your dressing and wound dry.  
  · Do not take a bath, swim, use a hot tub, or soak your affected body part until your wound has healed. Diet 
  · You can eat your normal diet. If your stomach is upset, try bland, low-fat foods like plain rice, broiled chicken, toast, and yogurt.  
  · Eat a well-balanced diet with enough protein to help the wound heal. Protein is a daniel nutrient in helping to repair damaged tissue and promote new tissue growth. Good sources of protein are milk, yogurt, cheese, meat, and beans. Medicines 
  · Your doctor will tell you if and when you can restart your medicines. He or she will also give you instructions about taking any new medicines.  
  · If you take blood thinners, such as warfarin (Coumadin), clopidogrel (Plavix), or aspirin, be sure to talk to your doctor. He or she will tell you if and when to start taking those medicines again.  Make sure that you understand exactly what your doctor wants you to do.  
  · Take pain medicines exactly as directed. ¨ If the doctor gave you a prescription medicine for pain, take it as prescribed. ¨ If you are not taking a prescription pain medicine, ask your doctor if you can take an over-the-counter medicine.  
  · If you think your pain medicine is making you sick to your stomach: 
¨ Take your medicine after meals (unless your doctor has told you not to). ¨ Ask your doctor for a different pain medicine.  
  · If your doctor prescribed antibiotics, take them as directed. Do not stop taking them just because you feel better. You need to take the full course of antibiotics.  
  · If your doctor prescribed an antibiotic ointment that you put on the wound, use it as directed.  
 Wound care 
  · A moist dressing may cover your wound. A dressing helps the wound heal and protects it. Your doctor will tell you how to take care of this.  
  · Leave dressing in place until follow up appointment. Other instructions 
  · Don't smoke. Smoking dries out the skin, reduces blood flow to the skin, and slows healing. If you need help quitting, talk to your doctor about stop-smoking programs and medicines. These can increase your chances of quitting for good. Follow-up care is a key part of your treatment and safety. Be sure to make and go to all appointments, and call your doctor if you are having problems. It's also a good idea to know your test results and keep a list of the medicines you take. When should you call for help? Call your doctor now or seek immediate medical care if: 
  · You have pain that does not get better after you take pain medicine.  
  · You have signs of infection, such as: 
¨ Increased pain, swelling, warmth, or redness. ¨ Red streaks leading from the wound. ¨ Pus draining from the wound. ¨ A fever.  
  · The wound starts to bleed, and blood soaks through the bandage.  Oozing small amounts of blood is normal.  
   · You have loose stitches, or your skin graft comes loose.  
 Watch closely for any changes in your health, and be sure to contact your doctor if: 
  · The wound is not getting better as expected. Where can you learn more? Go to http://niurka-michi.info/. Enter Z011 in the search box to learn more about \"Wound Debridement: What to Expect at Home. \" Current as of: November 21, 2017 Content Version: 11.7 © 5886-4520 [employer]. Care instructions adapted under license by [employer] (which disclaims liability or warranty for this information). If you have questions about a medical condition or this instruction, always ask your healthcare professional. [address] [address] any warranty or liability for your use of this information. DISCHARGE SUMMARY from Nurse PATIENT INSTRUCTIONS: 
 
After general anesthesia or intravenous sedation, for 24 hours or while taking prescription Narcotics: · Limit your activities · Do not drive and operate hazardous machinery · Do not make important personal or business decisions · Do  not drink alcoholic beverages · If you have not urinated within 8 hours after discharge, please contact your surgeon on call. Report the following to your surgeon: 
· Excessive pain, swelling, redness or odor of or around the surgical area · Temperature over 100.5 · Nausea and vomiting lasting longer than 4 hours or if unable to take medications · Any signs of decreased circulation or nerve impairment to extremity: change in color, persistent  numbness, tingling, coldness or increase pain · Any questions What to do at Home: 
Recommended activity: Activity as tolerated and no driving for today and No driving while on analgesics. *  Please give a list of your current medications to your Primary Care Provider.  
 
*  Please update this list whenever your medications are discontinued, doses are 
 changed, or new medications (including over-the-counter products) are added. *  Please carry medication information at all times in case of emergency situations. These are general instructions for a healthy lifestyle: No smoking/ No tobacco products/ Avoid exposure to second hand smoke Surgeon General's Warning:  Quitting smoking now greatly reduces serious risk to your health. Obesity, smoking, and sedentary lifestyle greatly increases your risk for illness A healthy diet, regular physical exercise & weight monitoring are important for maintaining a healthy lifestyle You may be retaining fluid if you have a history of heart failure or if you experience any of the following symptoms:  Weight gain of 3 pounds or more overnight or 5 pounds in a week, increased swelling in our hands or feet or shortness of breath while lying flat in bed. Please call your doctor as soon as you notice any of these symptoms; do not wait until your next office visit. Recognize signs and symptoms of STROKE: 
 
F-face looks uneven A-arms unable to move or move unevenly S-speech slurred or non-existent T-time-call 911 as soon as signs and symptoms begin-DO NOT go Back to bed or wait to see if you get better-TIME IS BRAIN. Warning Signs of HEART ATTACK Call 911 if you have these symptoms: 
? Chest discomfort. Most heart attacks involve discomfort in the center of the chest that lasts more than a few minutes, or that goes away and comes back. It can feel like uncomfortable pressure, squeezing, fullness, or pain. ? Discomfort in other areas of the upper body. Symptoms can include pain or discomfort in one or both arms, the back, neck, jaw, or stomach. ? Shortness of breath with or without chest discomfort. ? Other signs may include breaking out in a cold sweat, nausea, or lightheadedness. Don't wait more than five minutes to call [address]!  Fast action can save your life. Calling 911 is almost always the fastest way to get lifesaving treatment. Emergency Medical Services staff can begin treatment when they arrive  up to an hour sooner than if someone gets to the hospital by car. The discharge information has been reviewed with the patient and son. The patient and son verbalized understanding. Discharge medications reviewed with the patient and son and appropriate educational materials and side effects teaching were provided. Narcotic-Analgesic/Acetaminophen (Percocet, Norco, Lorcet HD, Lortab 10/325) - (By mouth) Why this medicine is used:  
Relieves pain. Contact a nurse or doctor right away if you have: 
· Extreme weakness, shallow breathing, slow heartbeat · Severe confusion, lightheadedness, dizziness, fainting · Yellow skin or eyes, dark urine or pale stools · Severe constipation, severe stomach pain, nausea, vomiting, loss of appetite · Sweating or cold, clammy skin Common side effects: · Mild constipation, nausea, vomiting · Sleepiness, tiredness · Itching, rash © 2017 [facility][note 3] Information is for End User's use only and may not be sold, redistributed or otherwise used for commercial purposes. Amoxicillin/Clavulanate Potassium (By mouth) Amoxicillin (j-mlu-w-SOURAV-in), Clavulanate Potassium (HVNW-am-yr-yesenia nhn-ZMZ-np-um) Treats infections. This medicine is a penicillin antibiotic. Brand Name(s): Augmentin, Augmentin ES-600, Augmentin XR There may be other brand names for this medicine. When This Medicine Should Not Be Used: This medicine is not right for everyone. Do not use it if you had an allergic reaction to amoxicillin, clavulanate, or a similar antibiotic (penicillin or cephalosporin), or if you had liver problems caused by Augmentin®. How to Use This Medicine:  
Liquid, Tablet, Chewable Tablet, Long Acting Tablet · Your doctor will tell you how much medicine to use. Do not use more than directed. · Take this medicine with a snack or at the beginning of a meal to help prevent nausea. · Chewable tablets: Chew the tablet completely before you swallow it. · Measure the oral liquid medicine with a marked measuring spoon, oral syringe, or medicine cup. Shake the medicine well just before you measure each dose. Rinse the spoon or dropper after each use. · Swallow the extended-release tablet whole. Do not crush, break, or chew it. · Take all of the medicine in your prescription to clear up your infection, even if you feel better after the first few doses. · Missed dose: Take a dose as soon as you remember. If it is almost time for your next dose, wait until then and take a regular dose. Do not take extra medicine to make up for a missed dose. · Tablet, extended-release tablet, chewable tablet: Store at room temperature, away from heat, moisture, and direct light. · Oral liquid: Store in the refrigerator. Do not freeze. · Throw away any unused oral liquid after 10 days. Drugs and Foods to Avoid: Ask your doctor or pharmacist before using any other medicine, including over-the-counter medicines, vitamins, and herbal products. · Some medicines can affect how this medicine works. Tell your doctor if you are taking a blood thinner (such as warfarin), allopurinol, or probenecid. Warnings While Using This Medicine: · Tell your doctor if you are pregnant or breastfeeding, or if you have kidney disease, liver disease, or mononucleosis (mono). · Birth control pills may not work as well while you are taking this medicine. Use another form of birth control to prevent pregnancy. · This medicine can cause diarrhea. Call your doctor if the diarrhea becomes severe, does not stop, or is bloody. Do not take any medicine to stop diarrhea until you have talked to your doctor.  Diarrhea can occur 2 months or more after you stop taking this medicine. · Tell any doctor or dentist who treats you that you are using this medicine. This medicine may affect certain medical test results. · Call your doctor if your symptoms do not improve or if they get worse. · The chewable tablet and oral liquid contain phenylalanine. Talk to your doctor before you use this medicine if you have phenylketonuria (PKU). · Keep all medicine out of the reach of children. Never share your medicine with anyone. Possible Side Effects While Using This Medicine:  
Call your doctor right away if you notice any of these side effects: · Allergic reaction: Itching or hives, swelling in your face or hands, swelling or tingling in your mouth or throat, chest tightness, trouble breathing · Blistering, peeling, red skin rash · Change in how much or how often you urinate · Dark urine or pale stools, nausea, vomiting, loss of appetite, stomach pain, yellow eyes or skin · Diarrhea that may contain blood, stomach cramps If you notice these less serious side effects, talk with your doctor: · Diaper rash · Mild diarrhea, nausea, vomiting · Tooth discoloration (in children) If you notice other side effects that you think are caused by this medicine, tell your doctor. Call your doctor for medical advice about side effects. You may report side effects to FDA at 6-633-FDA-0145 © 2017 [facility] Information is for End User's use only and may not be sold, redistributed or otherwise used for commercial purposes. The above information is an  only. It is not intended as medical advice for individual conditions or treatments. Talk to your doctor, nurse or pharmacist before following any medical regimen to see if it is safe and effective for you. 
 
___________________________________________________________________________________________________________________________________ Introducing [facility]! Dear Aden Resendez: Thank you for requesting a PingSome account. Our records indicate that you already have an active PingSome account. You can access your account anytime at [email or web address] Did you know that you can access your hospital and ER discharge instructions at any time in PingSome? You can also review all of your test results from your hospital stay or ER visit. Additional Information If you have questions, please visit the Frequently Asked Questions section of the PingSome website at [email or web address] Remember, PingSome is NOT to be used for urgent needs. For medical emergencies, dial 911. Now available from your iPhone and Android! Introducing Zackary Reyes As a [facility] patient, I wanted to make you aware of our electronic visit tool called Zackary Reyes. Apsalar 24/7 allows you to connect within minutes with a medical provider 24 hours a day, seven days a week via a mobile device or tablet or logging into a secure website from your computer. You can access Zackary Reyes from anywhere in the [place]. A virtual visit might be right for you when you have a simple condition and feel like you just dont want to get out of bed, or cant get away from work for an appointment, when your regular [facility]es provider is not available (evenings, weekends or holidays), or when youre out of town and need minor care. Electronic visits cost only $49 and if the Lovli/7 provider determines a prescription is needed to treat your condition, one can be electronically transmitted to a nearby pharmacy*. Please take a moment to enroll today if you have not already done so. The enrollment process is free and takes just a few minutes. To enroll, please download the Apsalar 24/7 shekhar to your tablet or phone, or visit [email or web address] to enroll on your computer. And, as an [address] [place] [address] patient with a [facility] account, the results of your visits will be scanned into your electronic medical record and your primary care provider will be able to view the scanned results. We urge you to continue to see your regular [facility] provider for your ongoing medical care. And while your primary care provider may not be the one available when you seek a [facility] virtual visit, the peace of mind you get from getting a real diagnosis real time can be priceless. For more information on [facility]helanfin, view our Frequently Asked Questions (FAQs) at [email or web address]. Sincerely, 
 
Ben Vazquez MD 
Chief Medical Officer Stevan8 Evon Orellana *:  certain medications cannot be prescribed via [facility] Unresulted Labs-Please follow up with your PCP about these lab tests Order Current Status NC XR TECHNOLOGIST SERVICE In process EKG, 12 LEAD, INITIAL Preliminary result Providers Seen During Your Hospitalization Provider Specialty Primary office phone [employer], DO Emergency Medicine [phone] Sonali Chavis, [address] [place] [address] Orthopedic Surgery [phone] Immunizations Administered for This Admission Name Date Tdap 9/22/2018 Your Primary Care Physician (PCP) Primary Care Physician Office Phone Office Fax Obdulia Engel [phone] You are allergic to the following No active allergies Recent Documentation Height OB Status Smoking Status 1.55 m Hysterectomy Never Smoker Emergency Contacts Name Discharge Info Relation Home Work Mobile Hood Baum DECLINED CAREGIVER [4] Son [22] [phone] Patient Belongings The following personal items are in your possession at time of discharge: 
  Dental Appliances: None  Visual Aid: None Please provide this summary of care documentation to your next provider. Signatures-by signing, you are acknowledging that this After Visit Summary has been reviewed with you and you have received a copy. Patient Signature:  ____________________________________________________________ Date:  ____________________________________________________________  
  
[place] Provider Signature:  ____________________________________________________________ Date:  ____________________________________________________________

## 2018-09-23 LAB
ATRIAL RATE: 65 BPM
CALCULATED P AXIS, ECG09: 44 DEGREES
CALCULATED R AXIS, ECG10: 48 DEGREES
CALCULATED T AXIS, ECG11: 58 DEGREES
DIAGNOSIS, 93000: NORMAL
P-R INTERVAL, ECG05: 184 MS
Q-T INTERVAL, ECG07: 462 MS
QRS DURATION, ECG06: 98 MS
QTC CALCULATION (BEZET), ECG08: 480 MS
VENTRICULAR RATE, ECG03: 65 BPM

## 2018-09-23 NOTE — DISCHARGE INSTRUCTIONS
Wound Debridement: What to Expect at Home  Your Recovery  After surgery to remove debris or dead tissue from your wound (debridement), you can expect some pain and swelling around your wound. This should get better within a few days after the procedure. You may have a bandage or a moist dressing over your wound. Your doctor will let you know how long to keep it on and how often to change it. The amount of time it will take for your wound to heal depends on how serious your wound is and whether you have any other health problems that may slow healing. You may need to have the wound debrided again. How soon you can return to work and your normal routine depends on the type of work you do and how you feel. For example, if your wound is on your arm and your job requires you to do heavy lifting, you may need to wait until your wound has healed before you go back to work. This care sheet gives you a general idea about how long it will take for you to recover. But each person recovers at a different pace. Follow the steps below to feel better as quickly as possible. How can you care for yourself at home? Activity    · Rest when you feel tired. Getting enough sleep will help you recover.     · Avoid activities that put stress on the affected body part until your doctor says it's okay.     · Change positions often to keep pressure off your wound, and spread your body weight evenly with cushions, mattresses, foam wedges, or other pressure-relieving devices.     · If your wound is on your leg or foot, you may have to use crutches, a supportive boot, or a fitted shoe to keep pressure off your wound. If you need crutches, it may help to use a backpack or wear clothes with a lot of pockets to carry items.     · Do not shower for at least 24 hours after the procedure or for as long as your doctor tells you to.  When you shower, keep your dressing and wound dry.     · Do not take a bath, swim, use a hot tub, or soak your affected body part until your wound has healed. Diet    · You can eat your normal diet. If your stomach is upset, try bland, low-fat foods like plain rice, broiled chicken, toast, and yogurt.     · Eat a well-balanced diet with enough protein to help the wound heal. Protein is a daniel nutrient in helping to repair damaged tissue and promote new tissue growth. Good sources of protein are milk, yogurt, cheese, meat, and beans. Medicines    · Your doctor will tell you if and when you can restart your medicines. He or she will also give you instructions about taking any new medicines.     · If you take blood thinners, such as warfarin (Coumadin), clopidogrel (Plavix), or aspirin, be sure to talk to your doctor. He or she will tell you if and when to start taking those medicines again. Make sure that you understand exactly what your doctor wants you to do.     · Take pain medicines exactly as directed. ¨ If the doctor gave you a prescription medicine for pain, take it as prescribed. ¨ If you are not taking a prescription pain medicine, ask your doctor if you can take an over-the-counter medicine.     · If you think your pain medicine is making you sick to your stomach:  ¨ Take your medicine after meals (unless your doctor has told you not to). ¨ Ask your doctor for a different pain medicine.     · If your doctor prescribed antibiotics, take them as directed. Do not stop taking them just because you feel better. You need to take the full course of antibiotics.     · If your doctor prescribed an antibiotic ointment that you put on the wound, use it as directed.    Wound care    · A moist dressing may cover your wound. A dressing helps the wound heal and protects it. Your doctor will tell you how to take care of this.     · Leave dressing in place until follow up appointment. Other instructions    · Don't smoke. Smoking dries out the skin, reduces blood flow to the skin, and slows healing.  If you need help quitting, talk to your doctor about stop-smoking programs and medicines. These can increase your chances of quitting for good. Follow-up care is a key part of your treatment and safety. Be sure to make and go to all appointments, and call your doctor if you are having problems. It's also a good idea to know your test results and keep a list of the medicines you take. When should you call for help? Call your doctor now or seek immediate medical care if:    · You have pain that does not get better after you take pain medicine.     · You have signs of infection, such as:  ¨ Increased pain, swelling, warmth, or redness. ¨ Red streaks leading from the wound. ¨ Pus draining from the wound. ¨ A fever.     · The wound starts to bleed, and blood soaks through the bandage. Oozing small amounts of blood is normal.     · You have loose stitches, or your skin graft comes loose.    Watch closely for any changes in your health, and be sure to contact your doctor if:    · The wound is not getting better as expected. Where can you learn more? Go to http://niurka-michi.info/. Enter C027 in the search box to learn more about \"Wound Debridement: What to Expect at Home. \"  Current as of: November 21, 2017  Content Version: 11.7  © 4051-5520 [employer]. Care instructions adapted under license by [employer] (which disclaims liability or warranty for this information). If you have questions about a medical condition or this instruction, always ask your healthcare professional. [place] [address] any warranty or liability for your use of this information.     DISCHARGE SUMMARY from Nurse    PATIENT INSTRUCTIONS:    After general anesthesia or intravenous sedation, for 24 hours or while taking prescription Narcotics:  · Limit your activities  · Do not drive and operate hazardous machinery  · Do not make important personal or business decisions  · Do  not drink alcoholic beverages  · If you have not urinated within 8 hours after discharge, please contact your surgeon on call. Report the following to your surgeon:  · Excessive pain, swelling, redness or odor of or around the surgical area  · Temperature over 100.5  · Nausea and vomiting lasting longer than 4 hours or if unable to take medications  · Any signs of decreased circulation or nerve impairment to extremity: change in color, persistent  numbness, tingling, coldness or increase pain  · Any questions    What to do at Home:  Recommended activity: Activity as tolerated and no driving for today and No driving while on analgesics. *  Please give a list of your current medications to your Primary Care Provider. *  Please update this list whenever your medications are discontinued, doses are      changed, or new medications (including over-the-counter products) are added. *  Please carry medication information at all times in case of emergency situations. These are general instructions for a healthy lifestyle:    No smoking/ No tobacco products/ Avoid exposure to second hand smoke  Surgeon General's Warning:  Quitting smoking now greatly reduces serious risk to your health. Obesity, smoking, and sedentary lifestyle greatly increases your risk for illness    A healthy diet, regular physical exercise & weight monitoring are important for maintaining a healthy lifestyle    You may be retaining fluid if you have a history of heart failure or if you experience any of the following symptoms:  Weight gain of 3 pounds or more overnight or 5 pounds in a week, increased swelling in our hands or feet or shortness of breath while lying flat in bed. Please call your doctor as soon as you notice any of these symptoms; do not wait until your next office visit.     Recognize signs and symptoms of STROKE:    F-face looks uneven    A-arms unable to move or move unevenly    S-speech slurred or non-existent    T-time-call 911 as soon as signs and symptoms begin-DO NOT go       Back to bed or wait to see if you get better-TIME IS BRAIN. Warning Signs of HEART ATTACK     Call 911 if you have these symptoms:   Chest discomfort. Most heart attacks involve discomfort in the center of the chest that lasts more than a few minutes, or that goes away and comes back. It can feel like uncomfortable pressure, squeezing, fullness, or pain.  Discomfort in other areas of the upper body. Symptoms can include pain or discomfort in one or both arms, the back, neck, jaw, or stomach.  Shortness of breath with or without chest discomfort.  Other signs may include breaking out in a cold sweat, nausea, or lightheadedness. Don't wait more than five minutes to call 911 - MINUTES MATTER! Fast action can save your life. Calling 911 is almost always the fastest way to get lifesaving treatment. Emergency Medical Services staff can begin treatment when they arrive -- up to an hour sooner than if someone gets to the hospital by car. The discharge information has been reviewed with the patient and son. The patient and son verbalized understanding. Discharge medications reviewed with the patient and son and appropriate educational materials and side effects teaching were provided. Narcotic-Analgesic/Acetaminophen (Percocet, Norco, Lorcet HD, Lortab 10/325) - (By mouth)   Why this medicine is used:   Relieves pain.   Contact a nurse or doctor right away if you have:  · Extreme weakness, shallow breathing, slow heartbeat  · Severe confusion, lightheadedness, dizziness, fainting  · Yellow skin or eyes, dark urine or pale stools  · Severe constipation, severe stomach pain, nausea, vomiting, loss of appetite  · Sweating or cold, clammy skin     Common side effects:  · Mild constipation, nausea, vomiting  · Sleepiness, tiredness  · Itching, rash  © 2017 [facility] Information is for End User's use only and may not be sold, redistributed or otherwise used for commercial purposes. Amoxicillin/Clavulanate Potassium (By mouth)   Amoxicillin (r-yho-l-SOURAV-in), Clavulanate Potassium (ZSFF-ib-om-yesenia zaq-QEX-pz-um)  Treats infections. This medicine is a penicillin antibiotic. Brand Name(s): Augmentin, Augmentin ES-600, Augmentin XR   There may be other brand names for this medicine. When This Medicine Should Not Be Used: This medicine is not right for everyone. Do not use it if you had an allergic reaction to amoxicillin, clavulanate, or a similar antibiotic (penicillin or cephalosporin), or if you had liver problems caused by Augmentin®. How to Use This Medicine:   Liquid, Tablet, Chewable Tablet, Long Acting Tablet  · Your doctor will tell you how much medicine to use. Do not use more than directed. · Take this medicine with a snack or at the beginning of a meal to help prevent nausea. · Chewable tablets: Chew the tablet completely before you swallow it. · Measure the oral liquid medicine with a marked measuring spoon, oral syringe, or medicine cup. Shake the medicine well just before you measure each dose. Rinse the spoon or dropper after each use. · Swallow the extended-release tablet whole. Do not crush, break, or chew it. · Take all of the medicine in your prescription to clear up your infection, even if you feel better after the first few doses. · Missed dose: Take a dose as soon as you remember. If it is almost time for your next dose, wait until then and take a regular dose. Do not take extra medicine to make up for a missed dose. · Tablet, extended-release tablet, chewable tablet: Store at room temperature, away from heat, moisture, and direct light. · Oral liquid: Store in the refrigerator. Do not freeze. · Throw away any unused oral liquid after 10 days. Drugs and Foods to Avoid:   Ask your doctor or pharmacist before using any other medicine, including over-the-counter medicines, vitamins, and herbal products.   · Some medicines can affect how this medicine works. Tell your doctor if you are taking a blood thinner (such as warfarin), allopurinol, or probenecid. Warnings While Using This Medicine:   · Tell your doctor if you are pregnant or breastfeeding, or if you have kidney disease, liver disease, or mononucleosis (mono). · Birth control pills may not work as well while you are taking this medicine. Use another form of birth control to prevent pregnancy. · This medicine can cause diarrhea. Call your doctor if the diarrhea becomes severe, does not stop, or is bloody. Do not take any medicine to stop diarrhea until you have talked to your doctor. Diarrhea can occur 2 months or more after you stop taking this medicine. · Tell any doctor or dentist who treats you that you are using this medicine. This medicine may affect certain medical test results. · Call your doctor if your symptoms do not improve or if they get worse. · The chewable tablet and oral liquid contain phenylalanine. Talk to your doctor before you use this medicine if you have phenylketonuria (PKU). · Keep all medicine out of the reach of children. Never share your medicine with anyone. Possible Side Effects While Using This Medicine:   Call your doctor right away if you notice any of these side effects:  · Allergic reaction: Itching or hives, swelling in your face or hands, swelling or tingling in your mouth or throat, chest tightness, trouble breathing  · Blistering, peeling, red skin rash  · Change in how much or how often you urinate  · Dark urine or pale stools, nausea, vomiting, loss of appetite, stomach pain, yellow eyes or skin  · Diarrhea that may contain blood, stomach cramps  If you notice these less serious side effects, talk with your doctor:   · Diaper rash  · Mild diarrhea, nausea, vomiting  · Tooth discoloration (in children)  If you notice other side effects that you think are caused by this medicine, tell your doctor.    Call your doctor for medical advice about side effects. You may report side effects to FDA at 9-675-FDA-1088  © 2017 [address] Information is for End User's use only and may not be sold, redistributed or otherwise used for commercial purposes. The above information is an  only. It is not intended as medical advice for individual conditions or treatments.  Talk to your doctor, nurse or pharmacist before following any medical regimen to see if it is safe and effective for you.    ___________________________________________________________________________________________________________________________________

## 2018-09-23 NOTE — ANESTHESIA POSTPROCEDURE EVALUATION
Post-Anesthesia Evaluation and Assessment Patient: Nic Delarosa MRN: [id number]  SSN: [id number] YOB: 1950  Age: 79 y.o. Sex: female Cardiovascular Function/Vital Signs Visit Vitals  /41  Pulse 90  Temp 36.4 °C (97.5 °F)  Resp 12  Ht 5' 1.02\" (1.55 m)  SpO2 100% Patient is status post general anesthesia for Procedure(s): 
left metatarsal fracture and washout. Nausea/Vomiting: None Postoperative hydration reviewed and adequate. Pain: 
Pain Scale 1: Numeric (0 - 10) (09/22/18 1517) Pain Intensity 1: 9 (09/22/18 1517) Managed Neurological Status: At baseline Mental Status and Level of Consciousness: Arousable Pulmonary Status:  
O2 Device: Oxygen mask (09/22/18 2205) Adequate oxygenation and airway patent Complications related to anesthesia: None Post-anesthesia assessment completed. No concerns Signed By: Andrew Danielle CRNA September 22, 2018

## 2018-09-23 NOTE — ANESTHESIA PREPROCEDURE EVALUATION
Anesthetic History No history of anesthetic complications Review of Systems / Medical History Patient summary reviewed, nursing notes reviewed and pertinent labs reviewed Pulmonary Sleep apnea: No treatment Neuro/Psych Within defined limits Cardiovascular Hypertension: poorly controlled CHF: dyspnea on exertion Dysrhythmias : atrial fibrillation GI/Hepatic/Renal 
  
GERD: well controlled Endo/Other Diabetes: poorly controlled, type 2, using insulin Morbid obesity and arthritis Other Findings Physical Exam 
 
Airway Mallampati: III 
TM Distance: 4 - 6 cm Neck ROM: normal range of motion Mouth opening: Normal 
 
 Cardiovascular Regular rate and rhythm,  S1 and S2 normal,  no murmur, click, rub, or gallop Rhythm: regular Rate: normal 
 
 
 
 Dental 
 
Dentition: Edentulous Pulmonary Breath sounds clear to auscultation Abdominal 
GI exam deferred Other Findings Anesthetic Plan ASA: 3 Anesthesia type: general 
 
 
 
 
Induction: Intravenous Anesthetic plan and risks discussed with: Patient

## 2018-09-23 NOTE — PERIOP NOTES
Patient discharged from facility via wheelchair. Patient has discharge instructions and prescriptions in hand. Patient armband removed and shredded.

## 2018-09-23 NOTE — OP NOTES
[facility]  OPERATIVE REPORT    Iwona Holder  MR#: [id number]  : 1950  ACCOUNT #: [de-identified]   DATE OF SERVICE: 2018    PREOPERATIVE DIAGNOSIS:  Dog bite, severe bite injury and open fracture to the 2nd metacarpal head. POSTOPERATIVE DIAGNOSIS:  Dog bite, severe bite injury and open fracture to the 2nd metacarpal head with significant digital nerve damage and skin loss to the 2nd dorsal webspace. PROCEDURES PERFORMED:  Irrigation, cleaning of skin edges and thorough irrigation, loose approximation, essentially leaving the 2nd dorsal web space open, and sterile dressing placement. COMPLICATIONS:  None. SPECIMENS REMOVED:  None. IMPLANTS:  None. ESTIMATED BLOOD LOSS:  2 mL    ASSISTANT:  Yoan Jj was the surgical assistant. ANESTHESIA:  Dr. Gris Ohara, anesthetist, with an LMA. DESCRIPTION OF PROCEDURE:  After the anesthetic was successfully induced, we manually scrubbed the hand first and then a standard prep and drape and then a time-out performed. For all the 3 main wounds on the hand, I debrided the skin edges. There was a little bit of degloving just in the 2nd dorsal webspace, and there was missing skin from that region. One of the digital nerves appeared to be cut actually on both the 2nd and 3rd digits, the 2nd on the ulnar side then the 3rd on the radial side. The compartments were soft and, with the aid of the C-arm, I used a Cope to gently manually reduce the 2nd metacarpal head. We then thoroughly lavaged out and debrided a little bit more skin, and then with some 3-0 nylon we placed a few tack sutures but left essentially all the wounds to drain. I checked the radial pulse, which was excellent, and we again checked cap refill in all the digits, slightly delayed on 2nd and 3rd but within acceptable limits.     A sterile dressing applied, a sterile Webril, and 4 x 4's, ABD and a loosely applied volar splint with the metacarpals flexed. Patient awoke in a sling applied and brought to recovery room in stable condition without complication.       Harry Young MD AM / Nikki Copeland  D: 09/22/2018 22:03     T: 09/22/2018 23:41  JOB #: [id number]

## 2018-09-23 NOTE — H&P
[address] [place] [address]  HISTORY AND PHYSICAL Ambrocio Carty 
MR#: [id number] : 1950 ACCOUNT #: [de-identified] ADMIT DATE: 2018 HISTORY OF PRESENT ILLNESS:  The patient broke up a fight between her dog and her daughter-in-law's pit bull and was bitten repeatedly over the left hand around lunchtime today. She finally came to the emergency department around 4 or 4:30 in the afternoon for assessment. Her tetanus was updated. She was given antibiotics in the emergency department and referred here for definitive management. She has a couple of superficial bites on the right hand, but the left hand is the main issue. She describes moderate pain and requested some pain medication 3 times. She has some history of COPD. She is diabetic. She denies smoking. FAMILY HISTORY:  Positive for arthritis and hypertension. ALLERGIES:  SHE DENIES ALLERGIES TO DRUGS. PHYSICAL EXAMINATION:  Today, she is in absolutely no acute distress. I had a good look at her arm, took the dressing down and her compartments are soft. A detailed neurological exam was performed. She does have cap refill in all of the 5 digits. It is slightly delayed on the 2nd and 3rd digit, but well within 2-1/2 seconds. She has significant numbness involving the digital nerves involving the ulnar side of the 2nd digit and the radial side of the third digit. She has 3 main lacerations in the dorsal webspace between 2nd and 3rd, a little bit more dorsal and a superficial one just in the 1st dorsal webspace. Good radial pulses intact. The wrist is nontender, although noncontributory. She has some missing pieces of skin up to about a centimeter or so from the dorsal webspace. Review of her x-rays reveals a comminuted metacarpal head fracture 2nd.  
 
ASSESSMENT AND PLAN:  I have explained to her that she is going to need at least 2 surgeries, the first one was just an irrigation and debridement, just loosely approximating, in fact leaving things open as necessary in the majority of the cases and then coming back later for definitive fixation. We discussed infection, other complications, and she also reminds me that she had a previous DVT many years ago. All risks and benefits described including stiffness, infection, possibility for skin grafts, amputation and other complications were discussed and the patient elects to proceed. I have reviewed the case with our hand surgeon and he will be following up with her next week. I will get her into the operating room immediately as it has been well over 6 hours, in fact, she presented well over 6 hours. Franki Miller MD AM/LIBBY 
D: 09/22/2018 22:00    
T: 09/22/2018 22:34 
JOB #: [id number]

## 2018-09-26 ENCOUNTER — APPOINTMENT (OUTPATIENT)
Dept: GENERAL RADIOLOGY | Age: 68
DRG: 481 | End: 2018-09-26
Attending: ORTHOPAEDIC SURGERY
Payer: MEDICARE

## 2018-09-26 ENCOUNTER — HOSPITAL ENCOUNTER (INPATIENT)
Age: 68
LOS: 3 days | Discharge: LEFT BEFORE DISCHARGE | DRG: 481 | End: 2018-09-29
Attending: EMERGENCY MEDICINE | Admitting: HOSPITALIST
Payer: MEDICARE

## 2018-09-26 ENCOUNTER — APPOINTMENT (OUTPATIENT)
Dept: GENERAL RADIOLOGY | Age: 68
DRG: 481 | End: 2018-09-26
Attending: PHYSICIAN ASSISTANT
Payer: MEDICARE

## 2018-09-26 ENCOUNTER — OFFICE VISIT (OUTPATIENT)
Dept: ORTHOPEDIC SURGERY | Facility: CLINIC | Age: 68
End: 2018-09-26

## 2018-09-26 ENCOUNTER — ANESTHESIA (OUTPATIENT)
Dept: SURGERY | Age: 68
DRG: 481 | End: 2018-09-26
Payer: MEDICARE

## 2018-09-26 ENCOUNTER — APPOINTMENT (OUTPATIENT)
Dept: GENERAL RADIOLOGY | Age: 68
DRG: 481 | End: 2018-09-26
Attending: NURSE PRACTITIONER
Payer: MEDICARE

## 2018-09-26 ENCOUNTER — ANESTHESIA EVENT (OUTPATIENT)
Dept: SURGERY | Age: 68
DRG: 481 | End: 2018-09-26
Payer: MEDICARE

## 2018-09-26 VITALS
OXYGEN SATURATION: 98 % | RESPIRATION RATE: 18 BRPM | SYSTOLIC BLOOD PRESSURE: 171 MMHG | DIASTOLIC BLOOD PRESSURE: 72 MMHG | TEMPERATURE: 97.2 F | HEART RATE: 61 BPM | HEIGHT: 61 IN | BODY MASS INDEX: 33.53 KG/M2 | WEIGHT: 177.6 LBS

## 2018-09-26 DIAGNOSIS — S62.391B OPEN DISPLACED FRACTURE OF OTHER PART OF SECOND METACARPAL BONE OF LEFT HAND, INITIAL ENCOUNTER: Primary | ICD-10-CM

## 2018-09-26 DIAGNOSIS — Z01.818 PREOP EXAMINATION: ICD-10-CM

## 2018-09-26 DIAGNOSIS — S61.452A DOG BITE, HAND, LEFT, INITIAL ENCOUNTER: ICD-10-CM

## 2018-09-26 DIAGNOSIS — M97.12XA PERIPROSTHETIC FRACTURE AROUND INTERNAL PROSTHETIC LEFT KNEE JOINT, INITIAL ENCOUNTER: Primary | ICD-10-CM
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
## 2018-09-26 DIAGNOSIS — Z01.812 PRE-OPERATIVE LABORATORY EXAMINATION: ICD-10-CM

## 2018-09-26 PROBLEM — E11.21 TYPE 2 DIABETES WITH NEPHROPATHY (HCC): Status: ACTIVE | Noted: 2018-09-26

## 2018-09-26 PROBLEM — S62.331D: Status: ACTIVE | Noted: 2018-09-26

## 2018-09-26 PROBLEM — S72.002A CLOSED LEFT HIP FRACTURE (HCC): Status: ACTIVE | Noted: 2018-09-26

## 2018-09-26 LAB
ALBUMIN SERPL-MCNC: 2.4 G/DL (ref 3.4–5)
ALBUMIN/GLOB SERPL: 0.6 {RATIO} (ref 0.8–1.7)
ALP SERPL-CCNC: 259 U/L (ref 45–117)
ALT SERPL-CCNC: 27 U/L (ref 13–56)
ANION GAP SERPL CALC-SCNC: 6 MMOL/L (ref 3–18)
AST SERPL-CCNC: 36 U/L (ref 15–37)
ATRIAL RATE: 56 BPM
BASOPHILS # BLD: 0 K/UL (ref 0–0.1)
BASOPHILS NFR BLD: 0 % (ref 0–2)
BILIRUB SERPL-MCNC: 0.3 MG/DL (ref 0.2–1)
BUN SERPL-MCNC: 40 MG/DL (ref 7–18)
BUN/CREAT SERPL: 23 (ref 12–20)
CALCIUM SERPL-MCNC: 8.5 MG/DL (ref 8.5–10.1)
CALCULATED P AXIS, ECG09: 28 DEGREES
CALCULATED R AXIS, ECG10: 59 DEGREES
CALCULATED T AXIS, ECG11: 37 DEGREES
CHLORIDE SERPL-SCNC: 108 MMOL/L (ref 100–108)
CO2 SERPL-SCNC: 25 MMOL/L (ref 21–32)
CREAT SERPL-MCNC: 1.73 MG/DL (ref 0.6–1.3)
DIAGNOSIS, 93000: NORMAL
DIFFERENTIAL METHOD BLD: ABNORMAL
EOSINOPHIL # BLD: 0.2 K/UL (ref 0–0.4)
EOSINOPHIL NFR BLD: 2 % (ref 0–5)
ERYTHROCYTE [DISTWIDTH] IN BLOOD BY AUTOMATED COUNT: 14.4 % (ref 11.6–14.5)
EST. AVERAGE GLUCOSE BLD GHB EST-MCNC: 192 MG/DL
GLOBULIN SER CALC-MCNC: 4.2 G/DL (ref 2–4)
GLUCOSE BLD STRIP.AUTO-MCNC: 101 MG/DL (ref 70–110)
GLUCOSE BLD STRIP.AUTO-MCNC: 98 MG/DL (ref 70–110)
GLUCOSE SERPL-MCNC: 155 MG/DL (ref 74–99)
HBA1C MFR BLD: 8.3 % (ref 4.2–5.6)
HCT VFR BLD AUTO: 29.5 % (ref 35–45)
HGB BLD-MCNC: 9.2 G/DL (ref 12–16)
INR PPP: 1 (ref 0.8–1.2)
LYMPHOCYTES # BLD: 1 K/UL (ref 0.9–3.6)
LYMPHOCYTES NFR BLD: 13 % (ref 21–52)
MCH RBC QN AUTO: 25.4 PG (ref 24–34)
MCHC RBC AUTO-ENTMCNC: 31.2 G/DL (ref 31–37)
MCV RBC AUTO: 81.5 FL (ref 74–97)
MONOCYTES # BLD: 1 K/UL (ref 0.05–1.2)
MONOCYTES NFR BLD: 13 % (ref 3–10)
NEUTS SEG # BLD: 5.4 K/UL (ref 1.8–8)
NEUTS SEG NFR BLD: 72 % (ref 40–73)
P-R INTERVAL, ECG05: 208 MS
PLATELET # BLD AUTO: 324 K/UL (ref 135–420)
PMV BLD AUTO: 10.9 FL (ref 9.2–11.8)
POTASSIUM SERPL-SCNC: 4.3 MMOL/L (ref 3.5–5.5)
PROT SERPL-MCNC: 6.6 G/DL (ref 6.4–8.2)
PROTHROMBIN TIME: 12.9 SEC (ref 11.5–15.2)
Q-T INTERVAL, ECG07: 486 MS
QRS DURATION, ECG06: 100 MS
QTC CALCULATION (BEZET), ECG08: 468 MS
RBC # BLD AUTO: 3.62 M/UL (ref 4.2–5.3)
SODIUM SERPL-SCNC: 139 MMOL/L (ref 136–145)
VENTRICULAR RATE, ECG03: 56 BPM
WBC # BLD AUTO: 7.6 K/UL (ref 4.6–13.2)

## 2018-09-26 PROCEDURE — 0QSC04Z REPOSITION LEFT LOWER FEMUR WITH INTERNAL FIXATION DEVICE, OPEN APPROACH: ICD-10-PCS | Performed by: ORTHOPAEDIC SURGERY

## 2018-09-26 PROCEDURE — 77030012935 HC DRSG AQUACEL BMS -B: Performed by: ORTHOPAEDIC SURGERY

## 2018-09-26 PROCEDURE — 75810000301 HC ER LEVEL 1 CLOSED TREATMNT FRACTURE/DISLOCATION

## 2018-09-26 PROCEDURE — 77030002933 HC SUT MCRYL J&J -A: Performed by: ORTHOPAEDIC SURGERY

## 2018-09-26 PROCEDURE — 74011250636 HC RX REV CODE- 250/636

## 2018-09-26 PROCEDURE — 74011250636 HC RX REV CODE- 250/636: Performed by: ORTHOPAEDIC SURGERY

## 2018-09-26 PROCEDURE — 74011250637 HC RX REV CODE- 250/637: Performed by: PHYSICIAN ASSISTANT

## 2018-09-26 PROCEDURE — 73560 X-RAY EXAM OF KNEE 1 OR 2: CPT

## 2018-09-26 PROCEDURE — 77030013708 HC HNDPC SUC IRR PULS STRY –B: Performed by: ORTHOPAEDIC SURGERY

## 2018-09-26 PROCEDURE — 72170 X-RAY EXAM OF PELVIS: CPT

## 2018-09-26 PROCEDURE — 64447 NJX AA&/STRD FEMORAL NRV IMG: CPT | Performed by: ANESTHESIOLOGY

## 2018-09-26 PROCEDURE — 74011000250 HC RX REV CODE- 250: Performed by: ORTHOPAEDIC SURGERY

## 2018-09-26 PROCEDURE — 76210000006 HC OR PH I REC 0.5 TO 1 HR: Performed by: ORTHOPAEDIC SURGERY

## 2018-09-26 PROCEDURE — C9290 INJ, BUPIVACAINE LIPOSOME: HCPCS | Performed by: ORTHOPAEDIC SURGERY

## 2018-09-26 PROCEDURE — 76942 ECHO GUIDE FOR BIOPSY: CPT | Performed by: ANESTHESIOLOGY

## 2018-09-26 PROCEDURE — 77030016470 HC BIT DRL QC1 SYNT -C: Performed by: ORTHOPAEDIC SURGERY

## 2018-09-26 PROCEDURE — 73562 X-RAY EXAM OF KNEE 3: CPT

## 2018-09-26 PROCEDURE — 85610 PROTHROMBIN TIME: CPT | Performed by: PHYSICIAN ASSISTANT

## 2018-09-26 PROCEDURE — 74011250636 HC RX REV CODE- 250/636: Performed by: PHYSICIAN ASSISTANT

## 2018-09-26 PROCEDURE — 93005 ELECTROCARDIOGRAM TRACING: CPT

## 2018-09-26 PROCEDURE — 74011250636 HC RX REV CODE- 250/636: Performed by: ANESTHESIOLOGY

## 2018-09-26 PROCEDURE — 76010000153 HC OR TIME 1.5 TO 2 HR: Performed by: ORTHOPAEDIC SURGERY

## 2018-09-26 PROCEDURE — 77030003862 HC BIT DRL SYNT -B: Performed by: ORTHOPAEDIC SURGERY

## 2018-09-26 PROCEDURE — C1713 ANCHOR/SCREW BN/BN,TIS/BN: HCPCS | Performed by: ORTHOPAEDIC SURGERY

## 2018-09-26 PROCEDURE — 77030008683 HC TU ET CUF COVD -A: Performed by: NURSE ANESTHETIST, CERTIFIED REGISTERED

## 2018-09-26 PROCEDURE — 65660000000 HC RM CCU STEPDOWN

## 2018-09-26 PROCEDURE — 80053 COMPREHEN METABOLIC PANEL: CPT | Performed by: PHYSICIAN ASSISTANT

## 2018-09-26 PROCEDURE — 96375 TX/PRO/DX INJ NEW DRUG ADDON: CPT

## 2018-09-26 PROCEDURE — 85025 COMPLETE CBC W/AUTO DIFF WBC: CPT | Performed by: PHYSICIAN ASSISTANT

## 2018-09-26 PROCEDURE — 96374 THER/PROPH/DIAG INJ IV PUSH: CPT

## 2018-09-26 PROCEDURE — 77030018836 HC SOL IRR NACL ICUM -A: Performed by: ORTHOPAEDIC SURGERY

## 2018-09-26 PROCEDURE — 99284 EMERGENCY DEPT VISIT MOD MDM: CPT

## 2018-09-26 PROCEDURE — 77030038020 HC MANFLD NEPTUNE STRY -B: Performed by: ORTHOPAEDIC SURGERY

## 2018-09-26 PROCEDURE — 96361 HYDRATE IV INFUSION ADD-ON: CPT

## 2018-09-26 PROCEDURE — 96376 TX/PRO/DX INJ SAME DRUG ADON: CPT

## 2018-09-26 PROCEDURE — 77030008462 HC STPLR SKN PROX J&J -A: Performed by: ORTHOPAEDIC SURGERY

## 2018-09-26 PROCEDURE — 83036 HEMOGLOBIN GLYCOSYLATED A1C: CPT | Performed by: HOSPITALIST

## 2018-09-26 PROCEDURE — 77030008467 HC STPLR SKN COVD -B: Performed by: ORTHOPAEDIC SURGERY

## 2018-09-26 PROCEDURE — 77030003029 HC SUT VCRL J&J -B: Performed by: ORTHOPAEDIC SURGERY

## 2018-09-26 PROCEDURE — 77030031139 HC SUT VCRL2 J&J -A: Performed by: ORTHOPAEDIC SURGERY

## 2018-09-26 PROCEDURE — 77030003601 HC NDL NRV BLK BBMI -A: Performed by: ORTHOPAEDIC SURGERY

## 2018-09-26 PROCEDURE — 77030019557 HC ELECTRD VES SEAL MEDT -F: Performed by: ORTHOPAEDIC SURGERY

## 2018-09-26 PROCEDURE — 0QSCXZZ REPOSITION LEFT LOWER FEMUR, EXTERNAL APPROACH: ICD-10-PCS | Performed by: PHYSICIAN ASSISTANT

## 2018-09-26 PROCEDURE — 82962 GLUCOSE BLOOD TEST: CPT

## 2018-09-26 PROCEDURE — L1830 KO IMMOB CANVAS LONG PRE OTS: HCPCS | Performed by: ORTHOPAEDIC SURGERY

## 2018-09-26 PROCEDURE — C1769 GUIDE WIRE: HCPCS | Performed by: ORTHOPAEDIC SURGERY

## 2018-09-26 PROCEDURE — 77030026438 HC STYL ET INTUB CARD -A: Performed by: NURSE ANESTHETIST, CERTIFIED REGISTERED

## 2018-09-26 PROCEDURE — 77030011265 HC ELECTRD BLD HEX COVD -A: Performed by: ORTHOPAEDIC SURGERY

## 2018-09-26 PROCEDURE — 77030020782 HC GWN BAIR PAWS FLX 3M -B: Performed by: ORTHOPAEDIC SURGERY

## 2018-09-26 PROCEDURE — 74011250637 HC RX REV CODE- 250/637: Performed by: ORTHOPAEDIC SURGERY

## 2018-09-26 PROCEDURE — 74011000250 HC RX REV CODE- 250

## 2018-09-26 PROCEDURE — 76060000034 HC ANESTHESIA 1.5 TO 2 HR: Performed by: ORTHOPAEDIC SURGERY

## 2018-09-26 PROCEDURE — 71045 X-RAY EXAM CHEST 1 VIEW: CPT

## 2018-09-26 PROCEDURE — 77030012890: Performed by: ORTHOPAEDIC SURGERY

## 2018-09-26 PROCEDURE — 77030032490 HC SLV COMPR SCD KNE COVD -B: Performed by: ORTHOPAEDIC SURGERY

## 2018-09-26 PROCEDURE — 74011250637 HC RX REV CODE- 250/637: Performed by: ANESTHESIOLOGY

## 2018-09-26 PROCEDURE — 74011000258 HC RX REV CODE- 258: Performed by: ORTHOPAEDIC SURGERY

## 2018-09-26 DEVICE — IMPLANTABLE DEVICE: Type: IMPLANTABLE DEVICE | Site: FEMUR | Status: FUNCTIONAL

## 2018-09-26 DEVICE — SCREW BNE L36MM DIA4.5MM PROX CORT TIB S STL ST LOK FULL: Type: IMPLANTABLE DEVICE | Site: FEMUR | Status: FUNCTIONAL

## 2018-09-26 DEVICE — SCREW BNE L42MM DIA5MM CNDYL S STL ST VAR ANG LOK T25: Type: IMPLANTABLE DEVICE | Site: FEMUR | Status: FUNCTIONAL

## 2018-09-26 DEVICE — SCREW BNE L40MM DIA4.5MM PROX CORT TIB S STL ST LOK FULL: Type: IMPLANTABLE DEVICE | Site: FEMUR | Status: FUNCTIONAL

## 2018-09-26 DEVICE — SCREW BNE L38MM DIA5MM CNDYL S STL ST VAR ANG LOK T25: Type: IMPLANTABLE DEVICE | Site: FEMUR | Status: FUNCTIONAL

## 2018-09-26 DEVICE — SCREW BNE L40MM DIA5MM CNDYL S STL ST VAR ANG LOK T25: Type: IMPLANTABLE DEVICE | Site: FEMUR | Status: FUNCTIONAL

## 2018-09-26 DEVICE — PLATE BNE L159MM 6 H ST L CNDYL S STL CRV LOK COMPR VAR ANG: Type: IMPLANTABLE DEVICE | Site: FEMUR | Status: FUNCTIONAL

## 2018-09-26 RX ORDER — LIDOCAINE HYDROCHLORIDE 20 MG/ML
INJECTION, SOLUTION EPIDURAL; INFILTRATION; INTRACAUDAL; PERINEURAL AS NEEDED
Status: DISCONTINUED | OUTPATIENT
Start: 2018-09-26 | End: 2018-09-26 | Stop reason: HOSPADM

## 2018-09-26 RX ORDER — SUCCINYLCHOLINE CHLORIDE 20 MG/ML
INJECTION INTRAMUSCULAR; INTRAVENOUS AS NEEDED
Status: DISCONTINUED | OUTPATIENT
Start: 2018-09-26 | End: 2018-09-26 | Stop reason: HOSPADM

## 2018-09-26 RX ORDER — SODIUM CHLORIDE 9 MG/ML
75 INJECTION, SOLUTION INTRAVENOUS CONTINUOUS
Status: DISCONTINUED | OUTPATIENT
Start: 2018-09-26 | End: 2018-09-27

## 2018-09-26 RX ORDER — ACETAMINOPHEN 325 MG/1
650 TABLET ORAL
Status: DISCONTINUED | OUTPATIENT
Start: 2018-09-26 | End: 2018-09-30 | Stop reason: HOSPADM

## 2018-09-26 RX ORDER — SODIUM CHLORIDE 0.9 % (FLUSH) 0.9 %
5-10 SYRINGE (ML) INJECTION EVERY 8 HOURS
Status: DISCONTINUED | OUTPATIENT
Start: 2018-09-26 | End: 2018-09-30 | Stop reason: HOSPADM

## 2018-09-26 RX ORDER — ACETAMINOPHEN 500 MG
1000 TABLET ORAL ONCE
Status: COMPLETED | OUTPATIENT
Start: 2018-09-26 | End: 2018-09-26

## 2018-09-26 RX ORDER — BUPIVACAINE HYDROCHLORIDE 5 MG/ML
INJECTION, SOLUTION EPIDURAL; INTRACAUDAL AS NEEDED
Status: DISCONTINUED | OUTPATIENT
Start: 2018-09-26 | End: 2018-09-26 | Stop reason: HOSPADM

## 2018-09-26 RX ORDER — INSULIN LISPRO 100 [IU]/ML
INJECTION, SOLUTION INTRAVENOUS; SUBCUTANEOUS ONCE
Status: DISCONTINUED | OUTPATIENT
Start: 2018-09-26 | End: 2018-09-26 | Stop reason: HOSPADM

## 2018-09-26 RX ORDER — GLYCOPYRROLATE 0.2 MG/ML
INJECTION INTRAMUSCULAR; INTRAVENOUS AS NEEDED
Status: DISCONTINUED | OUTPATIENT
Start: 2018-09-26 | End: 2018-09-26 | Stop reason: HOSPADM

## 2018-09-26 RX ORDER — ONDANSETRON 2 MG/ML
4 INJECTION INTRAMUSCULAR; INTRAVENOUS
Status: DISCONTINUED | OUTPATIENT
Start: 2018-09-26 | End: 2018-09-30 | Stop reason: HOSPADM

## 2018-09-26 RX ORDER — ENOXAPARIN SODIUM 100 MG/ML
30 INJECTION SUBCUTANEOUS EVERY 24 HOURS
Status: DISCONTINUED | OUTPATIENT
Start: 2018-09-27 | End: 2018-09-30 | Stop reason: HOSPADM

## 2018-09-26 RX ORDER — MAGNESIUM SULFATE 100 %
4 CRYSTALS MISCELLANEOUS AS NEEDED
Status: DISCONTINUED | OUTPATIENT
Start: 2018-09-26 | End: 2018-09-26 | Stop reason: HOSPADM

## 2018-09-26 RX ORDER — ONDANSETRON 2 MG/ML
4 INJECTION INTRAMUSCULAR; INTRAVENOUS ONCE
Status: DISCONTINUED | OUTPATIENT
Start: 2018-09-26 | End: 2018-09-26 | Stop reason: HOSPADM

## 2018-09-26 RX ORDER — SODIUM CHLORIDE 9 MG/ML
50 INJECTION, SOLUTION INTRAVENOUS CONTINUOUS
Status: DISCONTINUED | OUTPATIENT
Start: 2018-09-26 | End: 2018-09-30 | Stop reason: HOSPADM

## 2018-09-26 RX ORDER — DEXTROSE 50 % IN WATER (D50W) INTRAVENOUS SYRINGE
25-50 AS NEEDED
Status: DISCONTINUED | OUTPATIENT
Start: 2018-09-26 | End: 2018-09-30 | Stop reason: HOSPADM

## 2018-09-26 RX ORDER — DOCUSATE SODIUM 100 MG/1
100 CAPSULE, LIQUID FILLED ORAL 2 TIMES DAILY
Status: DISCONTINUED | OUTPATIENT
Start: 2018-09-26 | End: 2018-09-30 | Stop reason: HOSPADM

## 2018-09-26 RX ORDER — INSULIN LISPRO 100 [IU]/ML
INJECTION, SOLUTION INTRAVENOUS; SUBCUTANEOUS
Status: DISCONTINUED | OUTPATIENT
Start: 2018-09-27 | End: 2018-09-30 | Stop reason: HOSPADM

## 2018-09-26 RX ORDER — PROPOFOL 10 MG/ML
INJECTION, EMULSION INTRAVENOUS AS NEEDED
Status: DISCONTINUED | OUTPATIENT
Start: 2018-09-26 | End: 2018-09-26 | Stop reason: HOSPADM

## 2018-09-26 RX ORDER — MAGNESIUM SULFATE 100 %
4 CRYSTALS MISCELLANEOUS AS NEEDED
Status: DISCONTINUED | OUTPATIENT
Start: 2018-09-26 | End: 2018-09-30 | Stop reason: HOSPADM

## 2018-09-26 RX ORDER — MORPHINE SULFATE 4 MG/ML
2 INJECTION INTRAVENOUS
Status: DISCONTINUED | OUTPATIENT
Start: 2018-09-26 | End: 2018-09-30 | Stop reason: HOSPADM

## 2018-09-26 RX ORDER — POLYMYXIN B 500000 [USP'U]/1
INJECTION, POWDER, LYOPHILIZED, FOR SOLUTION INTRAMUSCULAR; INTRATHECAL; INTRAVENOUS; OPHTHALMIC AS NEEDED
Status: DISCONTINUED | OUTPATIENT
Start: 2018-09-26 | End: 2018-09-26 | Stop reason: HOSPADM

## 2018-09-26 RX ORDER — CEFAZOLIN SODIUM 2 G/50ML
2 SOLUTION INTRAVENOUS
Status: COMPLETED | OUTPATIENT
Start: 2018-09-26 | End: 2018-09-26

## 2018-09-26 RX ORDER — FENTANYL CITRATE 50 UG/ML
INJECTION, SOLUTION INTRAMUSCULAR; INTRAVENOUS AS NEEDED
Status: DISCONTINUED | OUTPATIENT
Start: 2018-09-26 | End: 2018-09-26 | Stop reason: HOSPADM

## 2018-09-26 RX ORDER — MORPHINE SULFATE 4 MG/ML
4 INJECTION, SOLUTION INTRAMUSCULAR; INTRAVENOUS
Status: COMPLETED | OUTPATIENT
Start: 2018-09-26 | End: 2018-09-26

## 2018-09-26 RX ORDER — INSULIN LISPRO 100 [IU]/ML
INJECTION, SOLUTION INTRAVENOUS; SUBCUTANEOUS
Status: DISCONTINUED | OUTPATIENT
Start: 2018-09-26 | End: 2018-09-26 | Stop reason: SDUPTHER

## 2018-09-26 RX ORDER — ROPIVACAINE HYDROCHLORIDE 5 MG/ML
30 INJECTION, SOLUTION EPIDURAL; INFILTRATION; PERINEURAL
Status: COMPLETED | OUTPATIENT
Start: 2018-09-26 | End: 2018-09-26

## 2018-09-26 RX ORDER — FENTANYL CITRATE 50 UG/ML
50 INJECTION, SOLUTION INTRAMUSCULAR; INTRAVENOUS
Status: DISCONTINUED | OUTPATIENT
Start: 2018-09-26 | End: 2018-09-26 | Stop reason: HOSPADM

## 2018-09-26 RX ORDER — FAMOTIDINE 20 MG/1
20 TABLET, FILM COATED ORAL ONCE
Status: COMPLETED | OUTPATIENT
Start: 2018-09-26 | End: 2018-09-26

## 2018-09-26 RX ORDER — ROCURONIUM BROMIDE 10 MG/ML
INJECTION, SOLUTION INTRAVENOUS AS NEEDED
Status: DISCONTINUED | OUTPATIENT
Start: 2018-09-26 | End: 2018-09-26 | Stop reason: HOSPADM

## 2018-09-26 RX ORDER — MORPHINE SULFATE 2 MG/ML
2 INJECTION, SOLUTION INTRAMUSCULAR; INTRAVENOUS
Status: DISCONTINUED | OUTPATIENT
Start: 2018-09-26 | End: 2018-09-26 | Stop reason: SDUPTHER

## 2018-09-26 RX ORDER — ONDANSETRON 2 MG/ML
INJECTION INTRAMUSCULAR; INTRAVENOUS AS NEEDED
Status: DISCONTINUED | OUTPATIENT
Start: 2018-09-26 | End: 2018-09-26 | Stop reason: HOSPADM

## 2018-09-26 RX ORDER — NEOSTIGMINE METHYLSULFATE 5 MG/5 ML
SYRINGE (ML) INTRAVENOUS AS NEEDED
Status: DISCONTINUED | OUTPATIENT
Start: 2018-09-26 | End: 2018-09-26 | Stop reason: HOSPADM

## 2018-09-26 RX ORDER — FENTANYL CITRATE 50 UG/ML
100 INJECTION, SOLUTION INTRAMUSCULAR; INTRAVENOUS
Status: COMPLETED | OUTPATIENT
Start: 2018-09-26 | End: 2018-09-26

## 2018-09-26 RX ORDER — DIPHENHYDRAMINE HYDROCHLORIDE 50 MG/ML
12.5 INJECTION, SOLUTION INTRAMUSCULAR; INTRAVENOUS
Status: DISCONTINUED | OUTPATIENT
Start: 2018-09-26 | End: 2018-09-30 | Stop reason: HOSPADM

## 2018-09-26 RX ORDER — ONDANSETRON 2 MG/ML
4 INJECTION INTRAMUSCULAR; INTRAVENOUS
Status: DISCONTINUED | OUTPATIENT
Start: 2018-09-26 | End: 2018-09-26 | Stop reason: SDUPTHER

## 2018-09-26 RX ORDER — EPINEPHRINE 1 MG/ML
INJECTION, SOLUTION, CONCENTRATE INTRAVENOUS AS NEEDED
Status: DISCONTINUED | OUTPATIENT
Start: 2018-09-26 | End: 2018-09-26 | Stop reason: HOSPADM

## 2018-09-26 RX ORDER — LANOLIN ALCOHOL/MO/W.PET/CERES
1 CREAM (GRAM) TOPICAL 2 TIMES DAILY WITH MEALS
Status: DISCONTINUED | OUTPATIENT
Start: 2018-09-27 | End: 2018-09-30 | Stop reason: HOSPADM

## 2018-09-26 RX ORDER — DEXTROSE 50 % IN WATER (D50W) INTRAVENOUS SYRINGE
25-50 AS NEEDED
Status: DISCONTINUED | OUTPATIENT
Start: 2018-09-26 | End: 2018-09-26 | Stop reason: HOSPADM

## 2018-09-26 RX ORDER — FENTANYL CITRATE 50 UG/ML
100 INJECTION, SOLUTION INTRAMUSCULAR; INTRAVENOUS ONCE
Status: COMPLETED | OUTPATIENT
Start: 2018-09-26 | End: 2018-09-26

## 2018-09-26 RX ORDER — KETOROLAC TROMETHAMINE 30 MG/ML
INJECTION, SOLUTION INTRAMUSCULAR; INTRAVENOUS AS NEEDED
Status: DISCONTINUED | OUTPATIENT
Start: 2018-09-26 | End: 2018-09-26 | Stop reason: HOSPADM

## 2018-09-26 RX ORDER — MORPHINE SULFATE 4 MG/ML
2 INJECTION, SOLUTION INTRAMUSCULAR; INTRAVENOUS ONCE
Status: COMPLETED | OUTPATIENT
Start: 2018-09-26 | End: 2018-09-26

## 2018-09-26 RX ORDER — OXYCODONE HYDROCHLORIDE 5 MG/1
10-15 TABLET ORAL
Status: DISCONTINUED | OUTPATIENT
Start: 2018-09-26 | End: 2018-09-30 | Stop reason: HOSPADM

## 2018-09-26 RX ORDER — VANCOMYCIN HYDROCHLORIDE 1 G/20ML
INJECTION, POWDER, LYOPHILIZED, FOR SOLUTION INTRAVENOUS AS NEEDED
Status: DISCONTINUED | OUTPATIENT
Start: 2018-09-26 | End: 2018-09-26 | Stop reason: HOSPADM

## 2018-09-26 RX ORDER — SODIUM CHLORIDE, SODIUM LACTATE, POTASSIUM CHLORIDE, CALCIUM CHLORIDE 600; 310; 30; 20 MG/100ML; MG/100ML; MG/100ML; MG/100ML
75 INJECTION, SOLUTION INTRAVENOUS CONTINUOUS
Status: DISCONTINUED | OUTPATIENT
Start: 2018-09-26 | End: 2018-09-26 | Stop reason: HOSPADM

## 2018-09-26 RX ORDER — EPHEDRINE SULFATE/0.9% NACL/PF 25 MG/5 ML
SYRINGE (ML) INTRAVENOUS AS NEEDED
Status: DISCONTINUED | OUTPATIENT
Start: 2018-09-26 | End: 2018-09-26 | Stop reason: HOSPADM

## 2018-09-26 RX ORDER — ONDANSETRON 2 MG/ML
4 INJECTION INTRAMUSCULAR; INTRAVENOUS
Status: COMPLETED | OUTPATIENT
Start: 2018-09-26 | End: 2018-09-26

## 2018-09-26 RX ORDER — HYDROCODONE BITARTRATE AND ACETAMINOPHEN 5; 325 MG/1; MG/1
1 TABLET ORAL
Qty: 20 TAB | Refills: 0 | Status: SHIPPED | OUTPATIENT
Start: 2018-09-26 | End: 2018-11-11

## 2018-09-26 RX ORDER — CEFAZOLIN SODIUM 2 G/50ML
2 SOLUTION INTRAVENOUS EVERY 8 HOURS
Status: COMPLETED | OUTPATIENT
Start: 2018-09-27 | End: 2018-09-28

## 2018-09-26 RX ORDER — MIDAZOLAM HYDROCHLORIDE 1 MG/ML
2 INJECTION, SOLUTION INTRAMUSCULAR; INTRAVENOUS ONCE
Status: COMPLETED | OUTPATIENT
Start: 2018-09-26 | End: 2018-09-26

## 2018-09-26 RX ORDER — NALOXONE HYDROCHLORIDE 0.4 MG/ML
0.4 INJECTION, SOLUTION INTRAMUSCULAR; INTRAVENOUS; SUBCUTANEOUS AS NEEDED
Status: DISCONTINUED | OUTPATIENT
Start: 2018-09-26 | End: 2018-09-30 | Stop reason: HOSPADM

## 2018-09-26 RX ORDER — SODIUM CHLORIDE 0.9 % (FLUSH) 0.9 %
5-10 SYRINGE (ML) INJECTION AS NEEDED
Status: DISCONTINUED | OUTPATIENT
Start: 2018-09-26 | End: 2018-09-30 | Stop reason: HOSPADM

## 2018-09-26 RX ADMIN — FAMOTIDINE 20 MG: 20 TABLET ORAL at 17:03

## 2018-09-26 RX ADMIN — FENTANYL CITRATE 100 MCG: 50 INJECTION, SOLUTION INTRAMUSCULAR; INTRAVENOUS at 18:12

## 2018-09-26 RX ADMIN — Medication 3 MG: at 19:25

## 2018-09-26 RX ADMIN — ONDANSETRON 4 MG: 2 INJECTION INTRAMUSCULAR; INTRAVENOUS at 19:11

## 2018-09-26 RX ADMIN — LIDOCAINE HYDROCHLORIDE 60 MG: 20 INJECTION, SOLUTION EPIDURAL; INFILTRATION; INTRACAUDAL; PERINEURAL at 18:12

## 2018-09-26 RX ADMIN — MORPHINE SULFATE 2 MG: 4 INJECTION, SOLUTION INTRAMUSCULAR; INTRAVENOUS at 14:48

## 2018-09-26 RX ADMIN — SODIUM CHLORIDE, SODIUM LACTATE, POTASSIUM CHLORIDE, AND CALCIUM CHLORIDE 75 ML/HR: 600; 310; 30; 20 INJECTION, SOLUTION INTRAVENOUS at 17:04

## 2018-09-26 RX ADMIN — MORPHINE SULFATE 4 MG: 4 INJECTION, SOLUTION INTRAMUSCULAR; INTRAVENOUS at 12:36

## 2018-09-26 RX ADMIN — Medication 5 MG: at 18:38

## 2018-09-26 RX ADMIN — ONDANSETRON 4 MG: 2 INJECTION INTRAMUSCULAR; INTRAVENOUS at 12:36

## 2018-09-26 RX ADMIN — ACETAMINOPHEN 650 MG: 325 TABLET ORAL at 22:03

## 2018-09-26 RX ADMIN — MIDAZOLAM HYDROCHLORIDE 1 MG: 1 INJECTION, SOLUTION INTRAMUSCULAR; INTRAVENOUS at 17:25

## 2018-09-26 RX ADMIN — FENTANYL CITRATE 50 MCG: 50 INJECTION, SOLUTION INTRAMUSCULAR; INTRAVENOUS at 19:15

## 2018-09-26 RX ADMIN — ROPIVACAINE HYDROCHLORIDE 150 MG: 5 INJECTION, SOLUTION EPIDURAL; INFILTRATION; PERINEURAL at 17:29

## 2018-09-26 RX ADMIN — PROPOFOL 150 MG: 10 INJECTION, EMULSION INTRAVENOUS at 18:13

## 2018-09-26 RX ADMIN — ACETAMINOPHEN 1000 MG: 500 TABLET, FILM COATED ORAL at 17:03

## 2018-09-26 RX ADMIN — SODIUM CHLORIDE 75 ML/HR: 900 INJECTION, SOLUTION INTRAVENOUS at 21:39

## 2018-09-26 RX ADMIN — CEFAZOLIN SODIUM 2 G: 2 SOLUTION INTRAVENOUS at 18:20

## 2018-09-26 RX ADMIN — GLYCOPYRROLATE 0.4 MG: 0.2 INJECTION INTRAMUSCULAR; INTRAVENOUS at 19:25

## 2018-09-26 RX ADMIN — FENTANYL CITRATE 100 MCG: 50 INJECTION INTRAMUSCULAR; INTRAVENOUS at 15:31

## 2018-09-26 RX ADMIN — MORPHINE SULFATE 4 MG: 4 INJECTION, SOLUTION INTRAMUSCULAR; INTRAVENOUS at 13:48

## 2018-09-26 RX ADMIN — ROCURONIUM BROMIDE 30 MG: 10 INJECTION, SOLUTION INTRAVENOUS at 18:25

## 2018-09-26 RX ADMIN — OXYCODONE HYDROCHLORIDE 15 MG: 5 TABLET ORAL at 22:03

## 2018-09-26 RX ADMIN — FENTANYL CITRATE 50 MCG: 50 INJECTION, SOLUTION INTRAMUSCULAR; INTRAVENOUS at 19:43

## 2018-09-26 RX ADMIN — FENTANYL CITRATE 50 MCG: 50 INJECTION, SOLUTION INTRAMUSCULAR; INTRAVENOUS at 18:47

## 2018-09-26 RX ADMIN — SODIUM CHLORIDE 1000 ML: 900 INJECTION, SOLUTION INTRAVENOUS at 13:53

## 2018-09-26 RX ADMIN — FENTANYL CITRATE 50 MCG: 50 INJECTION INTRAMUSCULAR; INTRAVENOUS at 17:25

## 2018-09-26 RX ADMIN — SUCCINYLCHOLINE CHLORIDE 100 MG: 20 INJECTION INTRAMUSCULAR; INTRAVENOUS at 18:13

## 2018-09-26 NOTE — PATIENT INSTRUCTIONS
Broken Hand: Care Instructions Your Care Instructions A hand can break (fracture) during sports, a fall, or other accidents. The break may happen when your hand twists, is hit, or is used to protect you in a fall. Fractures can range from a small, hairline crack, to a bone or bones broken into two or more pieces. Your treatment depends on how bad the break is. Your doctor may have put your hand in a brace, splint, or cast to allow it to heal or to keep it stable until you see another doctor. It may take weeks or months for your hand to heal. You can help it heal with some care at home. You heal best when you take good care of yourself. Eat a variety of healthy foods, and don't smoke. Follow-up care is a key part of your treatment and safety. Be sure to make and go to all appointments, and call your doctor if you are having problems. It's also a good idea to know your test results and keep a list of the medicines you take. How can you care for yourself at home? · Put ice or a cold pack on your hand for 10 to 20 minutes at a time. Try to do this every 1 to 2 hours for the next 3 days (when you are awake). Put a thin cloth between the ice and your cast or splint. Keep your cast or splint dry. · Follow the cast care instructions your doctor gives you. If you have a splint, do not take it off unless your doctor tells you to. · Be safe with medicines. Take pain medicines exactly as directed. ¨ If the doctor gave you a prescription medicine for pain, take it as prescribed. ¨ If you are not taking a prescription pain medicine, ask your doctor if you can take an over-the-counter medicine. · Prop up your hand on pillows when you sit or lie down in the first few days after the injury. Keep your hand higher than the level of your heart. This will help reduce swelling. · Follow instructions for exercises to keep your arm strong.  
· Wiggle your uninjured fingers often to reduce swelling and stiffness, but do not use that hand to grasp or carry anything. When should you call for help? Call your doctor now or seek immediate medical care if: 
  · You have new or worse pain.  
  · Your hand or fingers are cool or pale or change color.  
  · Your cast or splint feels too tight.  
  · You have tingling, weakness, or numbness in your hand or fingers.  
 Watch closely for changes in your health, and be sure to contact your doctor if: 
  · You do not get better as expected.  
  · You have problems with your cast or splint. Where can you learn more? Go to http://niurka-michi.info/. Enter [phone] in the search box to learn more about \"Broken Hand: Care Instructions. \" Current as of: November 29, 2017 Content Version: 11.7 © 0051-0324 [employer]. Care instructions adapted under license by [employer] (which disclaims liability or warranty for this information). If you have questions about a medical condition or this instruction, always ask your healthcare professional. [place] [address] any warranty or liability for your use of this information. Animal Bites: Care Instructions Your Care Instructions After an animal bite, the biggest concern is infection. The chance of infection depends on the type of animal that bit you, where on your body you were bitten, and your general health. Many animal bites are not closed with stitches, because this can increase the chance of infection. Your bite may take as little as 7 days or as long as several months to heal, depending on how bad it is. Taking good care of your wound at home will help it heal and reduce your chance of infection. The doctor has checked you carefully, but problems can develop later. If you notice any problems or new symptoms, get medical treatment right away. Follow-up care is a key part of your treatment and safety.  Be sure to make and go to all appointments, and call your doctor if you are having problems. It's also a good idea to know your test results and keep a list of the medicines you take. How can you care for yourself at home? · If your doctor told you how to care for your wound, follow your doctor's instructions. If you did not get instructions, follow this general advice: ¨ After 24 to 48 hours, gently wash the wound with clean water 2 times a day. Do not scrub or soak the wound. Don't use hydrogen peroxide or alcohol, which can slow healing. ¨ You may cover the wound with a thin layer of petroleum jelly, such as Vaseline, and a nonstick bandage. ¨ Apply more petroleum jelly and replace the bandage as needed. · After you shower, gently dry the wound with a clean towel. · If your doctor has closed the wound, cover the bandage with a plastic bag before you take a shower. · A small amount of skin redness and swelling around the wound edges and the stitches or staples is normal. Your wound may itch or feel irritated. Do not scratch or rub the wound. · Ask your doctor if you can take an over-the-counter pain medicine, such as acetaminophen (Tylenol), ibuprofen (Advil, Motrin), or naproxen (Aleve). Read and follow all instructions on the label. · Do not take two or more pain medicines at the same time unless the doctor told you to. Many pain medicines have acetaminophen, which is Tylenol. Too much acetaminophen (Tylenol) can be harmful. · If your bite puts you at risk for rabies, you will get a series of shots over the next few weeks to prevent rabies. Your doctor will tell you when to get the shots. It is very important that you get the full cycle of shots. Follow your doctor's instructions exactly. · You may need a tetanus shot if you have not received one in the last 5 years. · If your doctor prescribed antibiotics, take them as directed. Do not stop taking them just because you feel better.  You need to take the full course of antibiotics. When should you call for help? Call your doctor now or seek immediate medical care if: 
  · The skin near the bite turns cold or pale or it changes color.  
  · You lose feeling in the area near the bite, or it feels numb or tingly.  
  · You have trouble moving a limb near the bite.  
  · You have symptoms of infection, such as: 
¨ Increased pain, swelling, warmth, or redness near the wound. ¨ Red streaks leading from the wound. ¨ Pus draining from the wound. ¨ A fever.  
  · Blood soaks through the bandage. Oozing small amounts of blood is normal.  
  · Your pain is getting worse.  
 Watch closely for changes in your health, and be sure to contact your doctor if you are not getting better as expected. Where can you learn more? Go to http://niurka-michi.info/. Enter W486 in the search box to learn more about \"Animal Bites: Care Instructions. \" Current as of: November 20, 2017 Content Version: 11.7 © 0830-7276 [employer]. Care instructions adapted under license by [employer] (which disclaims liability or warranty for this information). If you have questions about a medical condition or this instruction, always ask your healthcare professional. [address] any warranty or liability for your use of this information.

## 2018-09-26 NOTE — LETTER
RE: Rich Rainey          SURGERY DATE/TIME: 10/1/18 AT 8:30 AM    
 
[facility] Please arrive at PATIENT REGISTRATION on the FIRST floor no later than 6:30 AM. PROCEDURE: OPEN REDUCTION INTERNAL FIXATION LEFT 2ND METACARPAL, POSSIBLE DIGITAL NERVE REPAIR 
 
PLEAE HAVE YOUR PRE-OPERATIVE LABS COMPLETED AT [place] [address] NO LATER THAN 9/27/18. INSTRUCTIONS: 
1. DO NOT EAT OR DRINK ANYTHING, OR CHEW GUM AFTER MIDNIGHT PRIOR TO YOUR SURGERY. 2. PLEASE DO NOT WEAR NAIL POLISH, PERFUME, MAKE-UP OR JEWELRY TO THE HOSPITAL. 3. WEAR LOOSE COMFORTABLE CLOTHING THAT IS EASY TO REMOVE AND PUT BACK ON. 4. DO NOT BRING MONEY OR VALUABLES TO [place] [address]. 5. HAVE SOMEONE TO [facility] AND STAY WITH YOU FOR 24 HOURS FOLLOWING SURGERY. 6. TAKE A LIST OF ALL MEDICATIONS WITH YOU TO THE HOSPITAL. 7. DO NOT TAKE ASPIRIN, ANTI-INFLAMMATORY MEDIACATIONS, OR FISH OIL SUPPLEMENTS FOR 5 DAYS PRIOR TO YOUR SURGERY. 8. IF YOU ARE TAKING BLOOD THINNERS, STOP TAKING THEM 5 DAYS PRIOR TO YOUR SURGERY DATE. IF YOU ARE TAKING PLAVIX, AGGRENOX, OR TRENTAL, CONSULT YOUR PRESCRIBING PHYSICIAN FOR INSTRUCTIONS. 9. TAKE BLOOD PRESSURE MEDICATIONS WITH A SIP OF WATER AND ASK YOUR PHYSICIAN ABOUT TAKING ANY OTHER MEDICATIONS. Your post-operative visit is scheduled for 10/11/18 AT 8:40 AM at the [address] [place] [address]. If any of these appointments need to be changed, please call me at [phone]. Thank you, YOVANI Wood Surgical Coordinator

## 2018-09-26 NOTE — Clinical Note
Status[de-identified] Inpatient [101] Type of Bed: Surgical [18] Inpatient Hospitalization Certified Necessary for the Following Reasons: 3. Patient receiving treatment that can only be provided in an inpatient setting (further clarification in H&P documentation) Admitting Diagnosis: Periprosthetic fracture around internal prosthetic left knee joint, initial encounter [[id number]] Admitting Physician: Gucci Sullivan [[id number]] Attending Physician: Gucci Sullivan [[id number]] Estimated Length of Stay: 5-7 Midnights Discharge Plan[de-identified] Home with Office Follow-up

## 2018-09-26 NOTE — PROGRESS NOTES
Jaylon Gupta is a 79 y.o. female right handed on disability. Worker's Compensation and legal considerations: none filed. Vitals:  
 09/26/18 4901 BP: 171/72 Pulse: 61 Resp: 18 Temp: 97.2 °F (36.2 °C) TempSrc: Oral  
SpO2: 98% Weight: 177 lb 9.6 oz (80.6 kg) Height: 5' 1\" (1.549 m) PainSc:  10 - Worst pain ever PainLoc: Hand Chief Complaint Patient presents with  
 Hand Pain Left HPI: Dogbite on 9/22/2018. Pain today about the same as over the weekend Date of onset:  9/22/2018 Injury: Yes: Comment: Dogbite from breaking up 2 dogs Prior Treatment:  Yes: Comment: Washed out and splinted by Dr Camila Mcfadden Numbness/ Tingling: No 
 
ROS: Review of Systems - General ROS: negative Respiratory ROS: negative Cardiovascular ROS: negative Musculoskeletal ROS: positive for - pain in hand - left Neurological ROS: negative Dermatological ROS: positive for - dogbites Past Medical History:  
Diagnosis Date  Anxiety  Arthritis  Asbestosis ([facility] [address]  Depression  Diabetes ([facility] [address]  Fibromyalgia  GERD (gastroesophageal reflux disease)  Hypercholesteremia  Hypertension  Renal insufficiency  Skin abscess  Sleep apnea Past Surgical History:  
Procedure Laterality Date  HX BREAST LUMPECTOMY  HX CARPAL TUNNEL RELEASE  HX KNEE REPLACEMENT    
 bilater  HX PARTIAL THYROIDECTOMY  HX SHOULDER ARTHROSCOPY    
 HX TUBAL LIGATION Current Outpatient Prescriptions Medication Sig Dispense Refill  acetaminophen (TYLENOL) 325 mg tablet Take  by mouth every four (4) hours as needed for Pain.  pravastatin sodium (PRAVASTATIN PO) Take  by mouth.  omeprazole (PRILOSEC) 10 mg capsule Take 10 mg by mouth daily.  hydroxychloroquine (PLAQUENIL) 200 mg tablet Take 200 mg by mouth two (2) times a day.     
 amoxicillin-clavulanate (AUGMENTIN) 500-125 mg per tablet Take 1 Tab by mouth three (3) times daily. 21 Tab 0  
 amiodarone (CORDARONE) 200 mg tablet TAKE 1 TAB BY MOUTH DAILY. 30 Tab 3  
 dilTIAZem CD (CARDIZEM CD) 180 mg ER capsule TAKE ONE CAPSULE BY MOUTH EVERY DAY 30 Cap 6  
 lisinopril (PRINIVIL, ZESTRIL) 10 mg tablet Take 1 Tab by mouth daily. 90 Tab 1  
 furosemide (LASIX) 40 mg tablet Take 1 Tab by mouth daily. Indications: Peripheral Edema due to Chronic Heart Failure, For leg edema or SOB 90 Tab 1  
 metoprolol tartrate (LOPRESSOR) 25 mg tablet Take 0.5 Tabs by mouth two (2) times a day. 180 Tab 1  pregabalin (LYRICA) 150 mg capsule Take 150 mg by mouth three (3) times daily. 1 cap 3 x day  insulin glargine (LANTUS) 100 unit/mL injection 20 Units by SubCUTAneous route nightly. 1 Vial 0  
 aspirin 81 mg chewable tablet Take 1 Tab by mouth daily. 30 Tab 0  
 DULoxetine (CYMBALTA) 60 mg capsule Take 1 Cap by mouth daily. 30 Cap 0  
 montelukast (SINGULAIR) 10 mg tablet Take 10 mg by mouth daily.  loratadine (CLARITIN) 10 mg tablet Take 10 mg by mouth.  oxyCODONE-acetaminophen (PERCOCET 7.5) 7.5-325 mg per tablet 1-2 po q 4-6h prn pain 35 Tab 0 No Known Allergies PE:  L Hand:  Macerated wounds closed. CDI with no evidence of breakdown or drainage. Sensation grossly intact distally. 2-pt discrimination <6mm in all digits. Cap refill brisk. Pt unable to move digits due to pain and swelling. Imaging: Plain films of Left Hand + for IF MC head fracture, comminuted, displaced, and intra-articular. Extent of fracture not well defined due to extent, location, and limitations of the xray. ICD-10-CM ICD-9-CM 1. Open displaced fracture of other part of second metacarpal bone of left hand, initial encounter S62.391B 815.10 AMB POC XRAY, HAND; 3+ VIEWS  
   SCHEDULE SURGERY  
   HEMOGLOBIN A1C WITH EAG  
   CT HAND LT W CONT 2.  Dog bite, hand, left, initial encounter S61.452A 882.0 AMB POC XRAY, HAND; 3+ VIEWS  
 K77. 0XXA E906.0 SCHEDULE SURGERY  
   HEMOGLOBIN A1C WITH EAG  
   CT HAND LT W CONT 3. Pre-operative laboratory examination Z01.812 V72.63 HEMOGLOBIN A1C WITH EAG 4. Preop examination Z01.818 V72.84 HEMOGLOBIN A1C WITH EAG Plan: CT Scan STAT for surgical planning Reapply soft dressing and TKO orthosis Orthosis only to be removed for her CT scan To OR Monday 10/1 for ORIF [address]nd [address] head and possible digital nerve repair Plan was reviewed with patient, who verbalized agreement and understanding of the plan

## 2018-09-26 NOTE — ANESTHESIA PREPROCEDURE EVALUATION
Anesthetic History No history of anesthetic complications Review of Systems / Medical History Patient summary reviewed and pertinent labs reviewed Pulmonary Sleep apnea: CPAP Neuro/Psych Psychiatric history (Depression) Cardiovascular Hypertension: well controlled Dysrhythmias : atrial fibrillation Exercise tolerance: >4 METS 
  
GI/Hepatic/Renal 
  
GERD: well controlled Endo/Other Diabetes: using insulin Morbid obesity and arthritis Comments: H/O Fibromyalgia Other Findings Physical Exam 
 
Airway Mallampati: III 
TM Distance: 4 - 6 cm Neck ROM: normal range of motion Mouth opening: Normal 
 
 Cardiovascular Regular rate and rhythm,  S1 and S2 normal,  no murmur, click, rub, or gallop Dental 
No notable dental hx Pulmonary Breath sounds clear to auscultation Abdominal 
GI exam deferred Other Findings Anesthetic Plan ASA: 3, emergent Anesthesia type: general and regional - femoral single shot Induction: Intravenous Anesthetic plan and risks discussed with: Patient

## 2018-09-26 NOTE — BRIEF OP NOTE
BRIEF OPERATIVE NOTE Date of Procedure: 9/26/2018 Preoperative Diagnosis: LEFT FEMORAL PERIPROSTETIC FRACTURE Postoperative Diagnosis: LEFT FEMORAL PERIPROSTETIC FRACTURE Procedure(s): LEFT FEMUR DISTAL OPEN REDUCTION INTERNAL FIXATION/ SYNTHES/ PERIARTICULAR DISTAL LOCKING PLATE Surgeon(s) and Role: 
   * Leonardo Kumar MD - Primary Surgical Assistant: Juan Hernandez Surgical Staff: 
Circ-1: Melissa Still RN Physician Assistant: Timi Servin PA-C Scrub Tech-1: Agustin Asai Surg Asst-1: Ulysses Langford Event Time In Incision Start [id number] Incision Close Anesthesia: General  
Estimated Blood Loss: 225ml Specimens: * No specimens in log * Findings: same Complications: none Implants:  
Implant Name Type Inv. Item Serial No.  Lot No. LRB No. Used Action SCR DIALLO LCK VA 5X75MM --  - [id number]  SCR DIALLO LCK VA 5X75MM --   [address] N/A Left 1 Implanted SCR DIALLO LCK VA 5X70MM --  - [id number]  SCR DIALLO LCK VA 5X70MM --   [address] N/A Left 1 Implanted PLATE CONDYLR 6H [id number] LT -- VA-LCP STRL - [id number]  PLATE CONDYLR 6H [id number] LT -- VA-LCP STRL  [employer] [place] N/A Left 1 Implanted SCR DIALLO LCK VA 5X30MM --  - [id number]  SCR DIALLO LCK VA 5X30MM --   [address] N/A Left 1 Implanted SCR DIALLO LCK VA 5X80MM --  - [id number]  SCR DIALLO LCK VA 5X80MM --   [address] N/A Left 2 Implanted SCR BNE CRTX ST 4.5X36MM SS --  - [id number]  SCR BNE CRTX ST 4.5X36MM SS --   SYNTHES [place] N/A Left 1 Implanted SCR LCK VA ST STARDRV 5X40MM --  - [id number]  SCR LCK VA ST STARDRV 5X40MM --   [address] N/A Left 1 Implanted SCR LCK VA ST STARDRV 5X42MM --  - [id number]  SCR LCK VA ST STARDRV 5X42MM --   [address] N/A Left 2 Implanted SCR LCK VA ST STARDRV 5X38MM --  - [id number]  SCR LCK VA ST STARDRV 5X38MM --   [address] N/A Left 2 Implanted SCR BNE CRTX ST 4.5X40MM SS --  - [id number]   SCR BNE CRTX ST 4.5X40MM SS --    SYNTHES [place] N/A Left 1 Implanted

## 2018-09-26 NOTE — ANESTHESIA PROCEDURE NOTES
Peripheral Block Start time: 9/26/2018 5:20 PM 
End time: 9/26/2018 7:30 PM 
Performed by: Minerva Thomas Authorized by: Minerva Thomas Pre-procedure: Indications: at surgeon's request, post-op pain management and procedure for pain Preanesthetic Checklist: patient identified, risks and benefits discussed, site marked, timeout performed, anesthesia consent given and patient being monitored Block Type:  
Block Type:  Femoral single shot Laterality:  Left Monitoring:  Standard ASA monitoring, continuous pulse ox, frequent vital sign checks, oxygen, heart rate and responsive to questions Injection Technique:  Single shot Procedures: ultrasound guided and nerve stimulator Patient Position: supine Prep: chlorhexidine Location:  Upper thigh Needle Type:  Stimuplex Needle Gauge:  21 G Needle Localization:  Ultrasound guidance and nerve stimulator Medication Injected:  0.5% 
ropivacaine Volume (mL):  20 Assessment: 
Number of attempts:  1 Injection Assessment:  No paresthesia, incremental injection every 5 mL, ultrasound image on chart, no intravascular symptoms, negative aspiration for blood and local visualized surrounding nerve on ultrasound Patient tolerance:  Patient tolerated the procedure well with no immediate complications Location:  PREOP HOLDING Patient given 1 mg IV Versed and 50 mcg IV Fentanyl for sedation.  
 
9/26/2018     7:31 PM     Shawna Cleaning MD

## 2018-09-26 NOTE — CONSULTS
Hospitalist Consult Note             Patient: Michael Costello MRN: [id number]  CSN: [id number]    YOB: 1950  Age: 79 y.o. Sex: female    DOA: 9/26/2018 LOS:  LOS: 0 days        DOA: 9/26/2018        Assessment/Plan     Active Problems:    Periprosthetic fracture around internal prosthetic left knee joint (9/26/2018)      Closed left hip fracture ([address] (9/26/2018)        Plan:  1. Displaced left distal supracondylar femur fracture - Per ortho - to OR this afternoon   2. Recent left hand surg after dog bite - is f/u with Dr Isaac Nicole for definitive repair   3. HTN - resume meds when able to take PO   4. T2DM - insulin SS - monitor BS   5.  H/o CAD   DVT Px - Lovenox    FC           Severity of Signs & Symptoms -- Moderate  Risk of adverse events -- Moderate  Current Medical Rx Plan - As Above   Patient history & comorbidities - Per HPI  Discharge Plan -- Home            HPI:     Michael Costello is a 79 y.o. female who is being admitted 2y to Displaced left distal supracondylar femur fracture  Pt mentions she tripped today & fell on her left side - came to the Er since she was in pain   Dx'd to have Displaced left distal supracondylar femur fracture  Hospitalist consulted for medical management   H/o CAD, HTN ,T2DM  Will follow     Past Medical History:   Diagnosis Date    Anxiety     Arthritis     Asbestosis ([address]     Depression     Diabetes ([address]     Fibromyalgia     GERD (gastroesophageal reflux disease)     Hypercholesteremia     Hypertension     Renal insufficiency     Skin abscess     Sleep apnea        Past Surgical History:   Procedure Laterality Date    HX BREAST LUMPECTOMY      HX CARPAL TUNNEL RELEASE      HX KNEE REPLACEMENT      bilater    HX PARTIAL THYROIDECTOMY      HX SHOULDER ARTHROSCOPY      HX TUBAL LIGATION         Family History   Problem Relation Age of Onset    Asthma Mother     Hypertension Mother     Kidney Disease Mother     Thyroid Disease Mother    Cam Brice Diabetes Maternal Grandmother     Thyroid Disease Maternal Grandmother        Social History     Social History    Marital status: LEGALLY      Spouse name: N/A    Number of children: N/A    Years of education: N/A     Social History Main Topics    Smoking status: Never Smoker    Smokeless tobacco: Never Used    Alcohol use No    Drug use: No    Sexual activity: Yes     Other Topics Concern    None     Social History Narrative       Prior to Admission medications    Medication Sig Start Date End Date Taking? Authorizing Provider   HYDROcodone-acetaminophen (NORCO) 5-325 mg per tablet Take 1 Tab by mouth every six (6) hours as needed for Pain. Max Daily Amount: 4 Tabs. 9/26/18  Yes Oh Sequeira DO   montelukast (SINGULAIR) 10 mg tablet Take 10 mg by mouth daily. Yes Yahir Reyes MD   acetaminophen (TYLENOL) 325 mg tablet Take  by mouth every four (4) hours as needed for Pain. Yes Yahir Reyes MD   loratadine (CLARITIN) 10 mg tablet Take 10 mg by mouth. Yes Yahir Reyes MD   pravastatin sodium (PRAVASTATIN PO) Take  by mouth. Yes Yahir Reyes MD   omeprazole (PRILOSEC) 10 mg capsule Take 10 mg by mouth daily. Yes Yahir Reyes MD   hydroxychloroquine (PLAQUENIL) 200 mg tablet Take 200 mg by mouth two (2) times a day. Yes Yahir Reyes MD   amoxicillin-clavulanate (AUGMENTIN) 500-125 mg per tablet Take 1 Tab by mouth three (3) times daily. 9/22/18  Yes Lisa Brice MD   amiodarone (CORDARONE) 200 mg tablet TAKE 1 TAB BY MOUTH DAILY. 6/5/18  Yes Amy Zuniga MD   dilTIAZem CD (CARDIZEM CD) 180 mg ER capsule TAKE ONE CAPSULE BY MOUTH EVERY DAY 5/7/18  Yes Amy Zuniga MD   lisinopril (PRINIVIL, ZESTRIL) 10 mg tablet Take 1 Tab by mouth daily. 4/23/18  Yes Micaela Barry NP   furosemide (LASIX) 40 mg tablet Take 1 Tab by mouth daily.  Indications: Peripheral Edema due to Chronic Heart Failure, For leg edema or SOB 1/30/18  Yes Amy Zuniga MD   metoprolol tartrate (LOPRESSOR) 25 mg tablet Take 0.5 Tabs by mouth two (2) times a day. 11/24/17  Yes Micaela Barry NP   pregabalin (LYRICA) 150 mg capsule Take 150 mg by mouth three (3) times daily. 1 cap 3 x day   Yes Historical Provider   insulin glargine (LANTUS) 100 unit/mL injection 20 Units by SubCUTAneous route nightly. 5/14/17  Yes Lizzie Lindo MD   aspirin 81 mg chewable tablet Take 1 Tab by mouth daily. 5/14/17  Yes Lizzie Lindo MD   DULoxetine (CYMBALTA) 60 mg capsule Take 1 Cap by mouth daily. 5/2/17  Yes Edgard Peterson MD   oxyCODONE-acetaminophen (PERCOCET 7.5) 7.5-325 mg per tablet 1-2 po q 4-6h prn pain 9/22/18   Jerri Bridges MD       No Known Allergies    Review of Systems  A comprehensive review of systems was negative except for that written in the History of Present Illness. Physical Exam:      Visit Vitals    /61    Pulse (!) 56    Temp 97.9 °F (36.6 °C)    Resp 10    Ht 5' 1\" (1.549 m)    Wt 77.2 kg (170 lb 3 oz)    SpO2 91%    BMI 32.16 kg/m2       Physical Exam:    Gen: In general, this is a well nourished female in no acute distress  HEENT: Sclerae nonicteric. Oral mucous membranes moist. Dentition poor  Neck: Supple with midline trachea. CV: RRR without murmur or rub appreciated. Resp:Respirations are unlabored without use of accessory muscles. Lung fields bilaterally without wheezes or rhonchi. Abd: Soft, nontender, nondistended. Extrem: Extremities are warm, without cyanosis or clubbing. No pitting pretibial edema - left leg shortened & externally rotated   Skin: Warm, no visible rashes. Neuro: Patient is alert, oriented, and cooperative. No obvious focal defects. Moves all 4 extremities.     Labs Reviewed:    Recent Results (from the past 24 hour(s))   CBC WITH AUTOMATED DIFF    Collection Time: 09/26/18 12:30 PM   Result Value Ref Range    WBC 7.6 4.6 - 13.2 K/uL    RBC 3.62 (L) 4.20 - 5.30 M/uL    HGB 9.2 (L) 12.0 - 16.0 g/dL    HCT 29.5 (L) 35.0 - 45.0 %    MCV 81.5 74.0 - 97.0 FL    MCH 25.4 24.0 - 34.0 PG    MCHC 31.2 31.0 - 37.0 g/dL    RDW 14.4 11.6 - 14.5 %    PLATELET 245 118 - 108 K/uL    MPV 10.9 9.2 - 11.8 FL    NEUTROPHILS 72 40 - 73 %    LYMPHOCYTES 13 (L) 21 - 52 %    MONOCYTES 13 (H) 3 - 10 %    EOSINOPHILS 2 0 - 5 %    BASOPHILS 0 0 - 2 %    ABS. NEUTROPHILS 5.4 1.8 - 8.0 K/UL    ABS. LYMPHOCYTES 1.0 0.9 - 3.6 K/UL    ABS. MONOCYTES 1.0 0.05 - 1.2 K/UL    ABS. EOSINOPHILS 0.2 0.0 - 0.4 K/UL    ABS. BASOPHILS 0.0 0.0 - 0.1 K/UL    DF AUTOMATED     METABOLIC PANEL, COMPREHENSIVE    Collection Time: 09/26/18 12:30 PM   Result Value Ref Range    Sodium 139 136 - 145 mmol/L    Potassium 4.3 3.5 - 5.5 mmol/L    Chloride 108 100 - 108 mmol/L    CO2 25 21 - 32 mmol/L    Anion gap 6 3.0 - 18 mmol/L    Glucose 155 (H) 74 - 99 mg/dL    BUN 40 (H) 7.0 - 18 MG/DL    Creatinine 1.73 (H) 0.6 - 1.3 MG/DL    BUN/Creatinine ratio 23 (H) 12 - 20      GFR est AA 36 (L) >60 ml/min/1.73m2    GFR est non-AA 29 (L) >60 ml/min/1.73m2    Calcium 8.5 8.5 - 10.1 MG/DL    Bilirubin, total 0.3 0.2 - 1.0 MG/DL    ALT (SGPT) 27 13 - 56 U/L    AST (SGOT) 36 15 - 37 U/L    Alk.  phosphatase 259 (H) 45 - 117 U/L    Protein, total 6.6 6.4 - 8.2 g/dL    Albumin 2.4 (L) 3.4 - 5.0 g/dL    Globulin 4.2 (H) 2.0 - 4.0 g/dL    A-G Ratio 0.6 (L) 0.8 - 1.7     PROTHROMBIN TIME + INR    Collection Time: 09/26/18 12:30 PM   Result Value Ref Range    Prothrombin time 12.9 11.5 - 15.2 sec    INR 1.0 0.8 - 1.2     EKG, 12 LEAD, INITIAL    Collection Time: 09/26/18  2:47 PM   Result Value Ref Range    Ventricular Rate 56 BPM    Atrial Rate 56 BPM    P-R Interval 208 ms    QRS Duration 100 ms    Q-T Interval 486 ms    QTC Calculation (Bezet) 468 ms    Calculated P Axis 28 degrees    Calculated R Axis 59 degrees    Calculated T Axis 37 degrees    Diagnosis       Sinus bradycardia  Otherwise normal ECG  When compared with ECG of 22-SEP-2018 17:57,  No significant change was found  Confirmed by Roe Cuevas, Ronan Pritchard ([id number]) on 9/26/2018 6:03:24 PM     GLUCOSE, POC    Collection Time: 09/26/18  4:56 PM   Result Value Ref Range    Glucose (POC) 101 70 - 110 mg/dL       Imaging Reviewed:    X ray Left Knee     IMPRESSION:  1. Left femoral periprosthetic fracture  of the left knee.            Wallace Conway MD  9/26/2018, 3:18 PM

## 2018-09-26 NOTE — LETTER
[facility] Surgery [facility] Fax to [phone]                                        Telephone: [phone] or [phone] To be completed by Physician Office: 
 
Date: 2018    Requested by: Carmen Mak Page Phone No: [phone]  Fax No:  [phone] Surgery Date: 10/01/18    Requested Time: 08 Surgeon: DR. Negro Kirkland  Assistant/2nd Surgeon: CPT CODE Procedure 11811 OPEN REDUCTION INTERNAL FIXATION LEFT 2ND METACARPAL  
61857 (POSSIBLE) DIGITAL NERVE REPAIR, LEFT INDEX FINGER  
   
 
ICD10 code(s): S61.452A: DOG BITE; S62.331A: OPEN FRACTURE LEFT 2ND METACARPAL, DISPLACED HEAD Patient Information: 
 
Name: Issa Lamar SSN: [id number]  : 1950  Male/Female: female Home Phone No: [phone] (home) Primary Insurance: [address] [place] [address] Number: [id number] Allergies: No Known Allergies Admission:  Outpatient Anesthesia Type: BLOCK Comments/Special Equipment and/or : MEDARTIS, MINI C-ARM, HAND TABLE Clary Akhtar

## 2018-09-26 NOTE — CONSULTS
[facility]    Judith Noonan  MR#: [id number]  : 1950  ACCOUNT #: [de-identified]   DATE OF SERVICE: 2018    REASON FOR CONSULTATION:  Displaced left distal supracondylar femur fracture, periprosthetic. HISTORY OF PRESENT ILLNESS:  I actually operated on her last week for an open fracture and dog bite to her hand, which I washed out. Dr. Barby Garcia is going to be doing definitive repair. When she got home, she tripped and fell over a step at her house and sustained a closed injury to her left distal femur. There was some significant displacement, but neurologically intact in the emergency department and emergency department reduced the fracture and splinted her and she complains of some pain, but it is well controlled. She is in no acute distress. She can wiggle her toes and she denies neck pain, back pain, denies shortness of breath or chest pain. Denies elbow pain, wrist pain or shoulder pain. Isolated injury to the left knee. PAST MEDICAL HISTORY:  Includes arthritis, anxiety, depression, diabetes, fibromyalgia, GERD, sleep apnea, previous skin abscess, renal insufficiency, hypertension, hypercholesterolemia. MEDICATIONS:  Elucidated on the chart. ALLERGIES:  NO KNOWN DRUG ALLERGIES. FAMILY HISTORY:  Positive for arthritis, hypertension, diabetes. PHYSICAL EXAMINATION:  Compartments are soft. She can wiggle her toes adequately. Good cap refill. Perhaps a slight evidence of neuropathy distally, but mainly intact and x-rays confirm a displaced distal femoral periprosthetic femur fracture on the left side. I had a lengthy discussion regarding risks and benefits involving the surgery itself.   All risks and  benefits described including but not limited to infection, DVT, pulmonary embolism, anesthetic complications, blood loss requiring transfusion, pain, stiffness, nonweightbearing for an extended period of time, malunion, nonunion and other complications including stiffness, pain, reoperation. The patient elects to proceed.       Charlene Ryder MD AM / Gracie Leaver  D: 09/26/2018 17:53     T: 09/26/2018 18:49  JOB #: [id number]

## 2018-09-26 NOTE — ED TRIAGE NOTES
Pt. States \"I tripped over a piece of concrete and fell in my yard\" c/o left knee pain along with swelling and bruising. States \"I can't walk on it\".

## 2018-09-27 ENCOUNTER — APPOINTMENT (OUTPATIENT)
Dept: CT IMAGING | Age: 68
DRG: 481 | End: 2018-09-27
Attending: PHYSICIAN ASSISTANT
Payer: MEDICARE

## 2018-09-27 LAB
ALBUMIN SERPL-MCNC: 1.9 G/DL (ref 3.4–5)
ALBUMIN/GLOB SERPL: 0.6 {RATIO} (ref 0.8–1.7)
ALP SERPL-CCNC: 198 U/L (ref 45–117)
ALT SERPL-CCNC: 19 U/L (ref 13–56)
ANION GAP SERPL CALC-SCNC: 8 MMOL/L (ref 3–18)
APTT PPP: 39.4 SEC (ref 23–36.4)
AST SERPL-CCNC: 31 U/L (ref 15–37)
BASOPHILS # BLD: 0 K/UL (ref 0–0.1)
BASOPHILS NFR BLD: 0 % (ref 0–2)
BILIRUB SERPL-MCNC: 0.2 MG/DL (ref 0.2–1)
BUN SERPL-MCNC: 43 MG/DL (ref 7–18)
BUN/CREAT SERPL: 22 (ref 12–20)
CALCIUM SERPL-MCNC: 7.8 MG/DL (ref 8.5–10.1)
CHLORIDE SERPL-SCNC: 111 MMOL/L (ref 100–108)
CHOLEST SERPL-MCNC: 97 MG/DL
CO2 SERPL-SCNC: 22 MMOL/L (ref 21–32)
CREAT SERPL-MCNC: 1.96 MG/DL (ref 0.6–1.3)
DIFFERENTIAL METHOD BLD: ABNORMAL
EOSINOPHIL # BLD: 0.2 K/UL (ref 0–0.4)
EOSINOPHIL NFR BLD: 2 % (ref 0–5)
ERYTHROCYTE [DISTWIDTH] IN BLOOD BY AUTOMATED COUNT: 14.7 % (ref 11.6–14.5)
EST. AVERAGE GLUCOSE BLD GHB EST-MCNC: 189 MG/DL
GLOBULIN SER CALC-MCNC: 3.3 G/DL (ref 2–4)
GLUCOSE BLD STRIP.AUTO-MCNC: 110 MG/DL (ref 70–110)
GLUCOSE BLD STRIP.AUTO-MCNC: 149 MG/DL (ref 70–110)
GLUCOSE BLD STRIP.AUTO-MCNC: 166 MG/DL (ref 70–110)
GLUCOSE SERPL-MCNC: 112 MG/DL (ref 74–99)
HBA1C MFR BLD: 8.2 % (ref 4.2–5.6)
HCT VFR BLD AUTO: 21.9 % (ref 35–45)
HCT VFR BLD AUTO: 26 % (ref 35–45)
HDLC SERPL-MCNC: 47 MG/DL (ref 40–60)
HDLC SERPL: 2.1 {RATIO} (ref 0–5)
HGB BLD-MCNC: 7 G/DL (ref 12–16)
HGB BLD-MCNC: 8 G/DL (ref 12–16)
LDLC SERPL CALC-MCNC: 31.4 MG/DL (ref 0–100)
LIPID PROFILE,FLP: NORMAL
LYMPHOCYTES # BLD: 1.1 K/UL (ref 0.9–3.6)
LYMPHOCYTES NFR BLD: 13 % (ref 21–52)
MCH RBC QN AUTO: 26 PG (ref 24–34)
MCHC RBC AUTO-ENTMCNC: 32 G/DL (ref 31–37)
MCV RBC AUTO: 81.4 FL (ref 74–97)
MONOCYTES # BLD: 1 K/UL (ref 0.05–1.2)
MONOCYTES NFR BLD: 12 % (ref 3–10)
NEUTS SEG # BLD: 5.9 K/UL (ref 1.8–8)
NEUTS SEG NFR BLD: 73 % (ref 40–73)
PLATELET # BLD AUTO: 259 K/UL (ref 135–420)
PMV BLD AUTO: 10.3 FL (ref 9.2–11.8)
POTASSIUM SERPL-SCNC: 4.9 MMOL/L (ref 3.5–5.5)
PROT SERPL-MCNC: 5.2 G/DL (ref 6.4–8.2)
RBC # BLD AUTO: 2.69 M/UL (ref 4.2–5.3)
SODIUM SERPL-SCNC: 141 MMOL/L (ref 136–145)
TRIGL SERPL-MCNC: 93 MG/DL (ref ?–150)
VLDLC SERPL CALC-MCNC: 18.6 MG/DL
WBC # BLD AUTO: 8.2 K/UL (ref 4.6–13.2)

## 2018-09-27 PROCEDURE — 65660000000 HC RM CCU STEPDOWN

## 2018-09-27 PROCEDURE — 36415 COLL VENOUS BLD VENIPUNCTURE: CPT | Performed by: ORTHOPAEDIC SURGERY

## 2018-09-27 PROCEDURE — 82962 GLUCOSE BLOOD TEST: CPT

## 2018-09-27 PROCEDURE — 85018 HEMOGLOBIN: CPT | Performed by: NURSE PRACTITIONER

## 2018-09-27 PROCEDURE — 73200 CT UPPER EXTREMITY W/O DYE: CPT

## 2018-09-27 PROCEDURE — 85025 COMPLETE CBC W/AUTO DIFF WBC: CPT | Performed by: ORTHOPAEDIC SURGERY

## 2018-09-27 PROCEDURE — 97162 PT EVAL MOD COMPLEX 30 MIN: CPT

## 2018-09-27 PROCEDURE — 80053 COMPREHEN METABOLIC PANEL: CPT | Performed by: ORTHOPAEDIC SURGERY

## 2018-09-27 PROCEDURE — 83036 HEMOGLOBIN GLYCOSYLATED A1C: CPT | Performed by: ORTHOPAEDIC SURGERY

## 2018-09-27 PROCEDURE — 80061 LIPID PANEL: CPT | Performed by: ORTHOPAEDIC SURGERY

## 2018-09-27 PROCEDURE — 97530 THERAPEUTIC ACTIVITIES: CPT

## 2018-09-27 PROCEDURE — 74011636637 HC RX REV CODE- 636/637: Performed by: HOSPITALIST

## 2018-09-27 PROCEDURE — 74011250637 HC RX REV CODE- 250/637: Performed by: ORTHOPAEDIC SURGERY

## 2018-09-27 PROCEDURE — 74011250636 HC RX REV CODE- 250/636: Performed by: ORTHOPAEDIC SURGERY

## 2018-09-27 PROCEDURE — 85730 THROMBOPLASTIN TIME PARTIAL: CPT | Performed by: ORTHOPAEDIC SURGERY

## 2018-09-27 RX ADMIN — FERROUS SULFATE TAB 325 MG (65 MG ELEMENTAL FE) 325 MG: 325 (65 FE) TAB at 17:16

## 2018-09-27 RX ADMIN — OXYCODONE HYDROCHLORIDE 10 MG: 5 TABLET ORAL at 20:55

## 2018-09-27 RX ADMIN — OXYCODONE HYDROCHLORIDE 10 MG: 5 TABLET ORAL at 15:22

## 2018-09-27 RX ADMIN — CEFAZOLIN SODIUM 2 G: 2 SOLUTION INTRAVENOUS at 09:17

## 2018-09-27 RX ADMIN — ENOXAPARIN SODIUM 30 MG: 30 INJECTION SUBCUTANEOUS at 12:51

## 2018-09-27 RX ADMIN — DOCUSATE SODIUM 100 MG: 100 CAPSULE, LIQUID FILLED ORAL at 17:16

## 2018-09-27 RX ADMIN — OXYCODONE HYDROCHLORIDE 15 MG: 5 TABLET ORAL at 04:45

## 2018-09-27 RX ADMIN — DOCUSATE SODIUM 100 MG: 100 CAPSULE, LIQUID FILLED ORAL at 08:24

## 2018-09-27 RX ADMIN — OXYCODONE HYDROCHLORIDE 15 MG: 5 TABLET ORAL at 01:24

## 2018-09-27 RX ADMIN — INSULIN LISPRO 2 UNITS: 100 INJECTION, SOLUTION INTRAVENOUS; SUBCUTANEOUS at 12:52

## 2018-09-27 RX ADMIN — FERROUS SULFATE TAB 325 MG (65 MG ELEMENTAL FE) 325 MG: 325 (65 FE) TAB at 08:24

## 2018-09-27 RX ADMIN — CEFAZOLIN SODIUM 2 G: 2 SOLUTION INTRAVENOUS at 17:16

## 2018-09-27 RX ADMIN — CEFAZOLIN SODIUM 2 G: 2 SOLUTION INTRAVENOUS at 01:25

## 2018-09-27 RX ADMIN — ACETAMINOPHEN 650 MG: 325 TABLET ORAL at 01:25

## 2018-09-27 RX ADMIN — Medication 10 ML: at 13:58

## 2018-09-27 NOTE — OP NOTES
[address] [place] [address]   OPERATIVE REPORT    Chon Fry  MR#: [id number]  : 1950  ACCOUNT #: [de-identified]   DATE OF SERVICE: 2018    PREOPERATIVE DIAGNOSIS:  Comminuted displaced osteoporotic distal femoral supracondylar femur fracture above the left total knee replacement . POSTOPERATIVE DIAGNOSIS:  Comminuted displaced osteoporotic distal femoral supracondylar femur fracture above the left total knee replacement . PROCEDURES PERFORMED:  Open reduction internal fixation using Synthes periarticular distal locking plate, left distal femur. COMPLICATIONS:  None. SPECIMENS REMOVED:  None. IMPLANTS:  As above-mentioned. ESTIMATED BLOOD LOSS:  150 mL General.    SURGEON:  Michael Gaitan MD    ASSISTANT:  Duane Ashton, first assistant; Mallika Posey, second assistant. ANESTHESIA:  Dr. Kassandra Mendoza general.    DESCRIPTION OF PROCEDURE:  The operative procedure. After anesthetic was successfully induced, antibiotics confirmed given, standard prep and drape and a timeout performed. Standard lateral approach to the femur. Good hemostasis was achieved. The IT band delineated and incised, the vastus lateralis fascia opened, muscle teased from the fascia and swept anteriorly and a Paredes placed. Delineated the fracture site, irrigated it out carefully. The fracture was quite comminuted. The femoral component was well fixed. With inline traction, we were able to disimpact the fracture and realign it. Again, significant comminution was noted. We were able to manually reduce it well. We applied the plate distal and anteriorly as possible and it was affixed just with K-wires and then placed the locked screws distally. We then reduced the fracture to the plate and initially applied a 4.5 cortical screw and then locking screws. It was not possible to interfrag the fracture due to its comminution.     I was very pleased with the AP and lateral images, liberally irrigation and washed out and routine closure. At the end of the case, instrument, sponge and needle counts correct. No complications. The patient tolerated the procedure well. Dalton Toure was the first assistant of the surgery and assisted with all phases of the surgery commencing with patient positioning, patient prep, patient drape, leg positioning during surgery, retraction, assisted with the surgery itself, closure, dressing placement and transfer.       Mayi Stallings MD AM / BHARGAV  D: 09/26/2018 20:22     T: 09/26/2018 20:59  JOB #: [id number]

## 2018-09-27 NOTE — ROUTINE PROCESS
Bedside and Verbal shift change report given to Tomeka Castano RN  & Mohan Dickson RN. Report included the following information SBAR, Kardex, ED Summary, OR Summary, Procedure Summary, Intake/Output, MAR, Recent Results, Med Rec Status and Cardiac Rhythm sinus rythme.

## 2018-09-27 NOTE — PERIOP NOTES
TRANSFER - OUT REPORT: 
 
Verbal report given [facility] [address] [facility]  [facility] for routine post - op Report consisted of patients Situation, Background, Assessment and  
Recommendations(SBAR). Information from the following report(s) SBAR, OR Summary, Procedure Summary, Intake/Output, MAR and Recent Results was reviewed with the receiving nurse. Lines:  
Peripheral IV 09/26/18 Left Antecubital (Active) Site Assessment Clean, dry, & intact 9/26/2018  7:48 PM  
Phlebitis Assessment 0 9/26/2018  7:48 PM  
Infiltration Assessment 0 9/26/2018  7:48 PM  
Dressing Status Clean, dry, & intact 9/26/2018  7:48 PM  
Dressing Type Tape;Transparent 9/26/2018  7:48 PM  
Hub Color/Line Status Pink; Infusing 9/26/2018  7:48 PM  
  
 
Opportunity for questions and clarification was provided. Patient transported with: 
 Registered Nurse

## 2018-09-27 NOTE — NURSE NAVIGATOR
Reason for Admission:    
Left femoral periprosthetic fracture; periprosthetic fracture around internal prosthetic left knee. RRAT Score:    26 Resources/supports as identified by patient/family:    
             
Top Challenges facing patient (as identified by patient/family and CM):   
                  
 Transportation? Patient's son Alondra Early will provide transportation upon discharge. Support system or lack thereof? [facility] Living arrangements? Patient lives with porfirio Early Self-care/ADLs/Cognition? Patient reports being independent with ADLs prior to admission. Current Advanced Directive/Advance Care Plan:   
Not on file Plan for utilizing home health [place] [facility] singed for [facility] Likelihood of readmission:  High  
              
Transition of Care Plan:               
Home with home health. Patient reports that she is a [employer] Participant. Patient also reports that she has a cane and a rollator has been ordered but not received as of yet. Patient reports that she has Medicaid coverage in addition to Medicare. Patient unsure of name of the home care company she had previously used. Patient asked nurse navigator to  call her son Nasima Casillas. Patient would like to use the company that her son identifies. Nurse Navigator called Anam Taylor. Mr. Yennifer Avila related that patient had used [facility] in the past.  Patient's son Mr. Yennifer Avila stated that he was just previously informed that patient was in the hospital. Mr. Yennifer Avila encouraged to visit with patient for follow up. Patient' s medical information was not shared with Mr. Yennifer Avila. Care Management Interventions PCP Verified by CM:  Yes 
Palliative Care Criteria Met (RRAT>21 & CHF Dx)?: No 
 Mode of Transport at Discharge: Other (see comment) ([facility]) Transition of Care Consult (CM Consult): Discharge Planning Physical Therapy Consult: Yes Occupational Therapy Consult: Yes Speech Therapy Consult: No 
Current Support Network: Other (Patient lives with porfirio Medina) Confirm Follow Up Transport: Family Plan discussed with Pt/Family/Caregiver: Yes Freedom of Choice Offered: Yes Discharge Location Discharge Placement: Home with home health

## 2018-09-27 NOTE — PROGRESS NOTES
Problem: Mobility Impaired (Adult and Pediatric) Goal: *Acute Goals and Plan of Care (Insert Text) Physical Therapy Goals Initiated 9/27/2018 and to be accomplished within 7 day(s) 1. Patient will move from supine to sit and sit to supine , scoot up and down and roll side to side in bed with supervision/set-up. 2.  Patient will transfer from bed to chair and chair to bed with minimal assistance/contact guard assist using the least restrictive device. 3.  Patient will perform sit to stand with minimal assistance/contact guard assist. 
4.  Patient will ambulate with minimal assistance/contact guard assist for >10 feet with the least restrictive device. 5.  Patient will ascend/descend 15 stairs with 1-2 handrail(s) with minimal assistance/contact guard assist. 
Outcome: Progressing Towards Goal 
physical Therapy EVALUATION Patient: Miguel Huddleston (80 y.o. female) Date: 9/27/2018 Primary Diagnosis: LEFT FEMORAL PERIPROSTETIC FRACTURE Periprosthetic fracture around internal prosthetic left knee joint, initial encounter Closed left hip fracture ([facility] [address] B06.529V DOG BITE; S62.391B OPEN FX LEFT 2ND METACARPAL, DISPLACED HEAD Procedure(s) (LRB): LEFT FEMUR DISTAL OPEN REDUCTION INTERNAL FIXATION/ SYNTHES/ PERIARTICULAR DISTAL LOCKING PLATE (Left) 1 Day Post-Op Precautions: Fall, NWB (LLE, brace on L hand) OBJECTIVE/ASSESSMENT : 
Based on the objective data described below, the patient presents with impaired functional mobility including bed mobility, transfers, ambulation, and general activity tolerance s/p L distal femur ORIF. Patient has dog bites and 2nd metacarpal fracture on L hand, brace in place with plan for surgery Monday (10/1/18) -- will require clarification regarding WB of L hand. Patient presented today semi-reclined in bed, alert and agreeable to PT evaluation. Patient educated on NWB status of LLE as well as knee immobilizer.  Patient transferred to sitting EOB with Addie and additional time to complete. She performed stand pivot transfer to locked recliner with MaxA of PT as well as additional assistance to ensure maintenance of NWB status. At conclusion of session, patient left sitting up in recliner with call bell in reach, needs met, and nurse notified. Education: bed mobility, transfers, activity pacing, positioning, safety awareness, body mechanics -- patient verbalized/demonstrated understanding Patient will benefit from skilled intervention to address the above impairments. Patients rehabilitation potential is considered to be Good Factors which may influence rehabilitation potential include:  
[]         None noted 
[]         Mental ability/status []         Medical condition 
[x]         Home/family situation and support systems 
[x]         Safety awareness 
[]         Pain tolerance/management 
[]         Other: PLAN : 
Recommendations and Planned Interventions: 
[x]           Bed Mobility Training             [x]    Neuromuscular Re-Education 
[x]           Transfer Training                   []    Orthotic/Prosthetic Training 
[x]           Gait Training                          []    Modalities [x]           Therapeutic Exercises          []    Edema Management/Control 
[x]           Therapeutic Activities            [x]    Patient and Family Training/Education 
[]           Other (comment): Frequency/Duration: Patient will be followed by physical therapy 1-2 times per day, 4-7 days per week to address goals. Discharge Recommendations: Diego Ahmadi Further Equipment Recommendations for Discharge: TBD SUBJECTIVE:  
Patient stated I want to go home.  OBJECTIVE DATA SUMMARY:  
 
Past Medical History:  
Diagnosis Date  Anxiety  Arthritis  Asbestosis ([facility] [address]  Depression  Diabetes ([facility]  Fibromyalgia  GERD (gastroesophageal reflux disease)  Hypercholesteremia  Hypertension  Renal insufficiency  Skin abscess  Sleep apnea Past Surgical History:  
Procedure Laterality Date  HX BREAST LUMPECTOMY  HX CARPAL TUNNEL RELEASE  HX KNEE REPLACEMENT    
 bilater  HX PARTIAL THYROIDECTOMY  HX SHOULDER ARTHROSCOPY    
 HX TUBAL LIGATION Barriers to Learning/Limitations: None Compensate with: N/A Prior Level of Function/Home Situation: Patient lives with son, although daughter states he is often not there. She was independent with functional mobility, required additional time and rest breaks to ascend stairs to FROG. Home Situation Home Environment: Private residence # Steps to Enter: 15 
Rails to Enter: Yes Hand Rails : Bilateral 
One/Two Story Residence: One story ([facility]) # of Interior Steps: 0 Height of Each Step (in): 1 inches Interior Rails: Right Lift Chair Available: No 
Living Alone: No (Son there intermittently) Support Systems: Child(ham), Family member(s) Patient Expects to be Discharged to[de-identified] Private residence Current DME Used/Available at Home: dede Schumacher Critical Behavior: 
Neurologic State: Alert Psychosocial 
Patient Behaviors: Calm; Cooperative Family  Behaviors: Calm Strength:   
Strength: Generally decreased, functional (BLE) Tone & Sensation:  
Tone: Normal (BLE) Sensation: Intact (BLE) Range Of Motion: 
AROM: Grossly decreased, non-functional (LLE in immobilizer) Functional Mobility: 
Bed Mobility: 
Supine to Sit: Minimum assistance Scooting: Moderate assistance Transfers: 
Sit to Stand: Maximum assistance Stand to Sit: Maximum assistance Balance:  
Sitting: Impaired Sitting - Static: Fair (occasional) Sitting - Dynamic: Fair (occasional) Standing: Impaired; With support Standing - Static: Poor Standing - Dynamic : Poor Ambulation/Gait Training: N/A Pain: 
Pre session: 10/10 Post session: 10/10 (nurse providing meds) Activity Tolerance:  
Fair Please refer to the flowsheet for vital signs taken during this treatment. After treatment:  
[x] Patient left in no apparent distress sitting up in chair 
[] Patient left sitting on EOB [] Patient left in no apparent distress in bed 
[] Patient declined to be OOB at this time due to 
[x] Call bell left within reach [x] Nursing notified(Akash Mckinney Forth) 
[] Caregiver present 
[] Bed alarm activated 
[] SCDs in place COMMUNICATION/EDUCATION:  
[x]         Fall prevention education was provided and the patient/caregiver indicated understanding. [x]         Patient/family have participated as able in goal setting and plan of care. [x]         Patient/family agree to work toward stated goals and plan of care. []         Patient understands intent and goals of therapy, but is neutral about his/her participation. []         Patient is unable to participate in goal setting and plan of care. Thank you for this referral. 
Victor Manuel Joya Time Calculation: 25 mins Mobility [id number] Current  CM= 80-99%   Goal  CJ= 20-39%. The severity rating is based on the Level of Assistance required for Functional Mobility and ADLs.  
 
Eval Complexity: History: MEDIUM  Complexity : 1-2 comorbidities / personal factors will impact the outcome/ POC Exam:MEDIUM Complexity : 3 Standardized tests and measures addressing body structure, function, activity limitation and / or participation in recreation  Presentation: MEDIUM Complexity : Evolving with changing characteristics  Clinical Decision Making:Medium Complexity   Overall Complexity:MEDIUM

## 2018-09-27 NOTE — ROUTINE PROCESS
Patient's thomas removed 0900 -- she unable to void after 6 plus hours called Dr. Polly Bustos orders to increase fluid to 125 ml/hr and rescan at 2100 and call the on call beeper with results Bedside and Verbal shift change report given to [address] [place] [address] (oncoming nurse) by Florentin Menjivar RN and Kenji Martínez RN (offgoing nurse). Report given with SBAR, Kardex, Intake/Output, MAR, Accordion and Recent Results.

## 2018-09-27 NOTE — PROGRESS NOTES
Per Clark Sylvester (RN) sent home health referral to [address] in [facility] Left v/m for liaison referral was placed in que. Informed Clark Sylvester referral has been sent.

## 2018-09-27 NOTE — PROGRESS NOTES
conducted an initial consultation and Spiritual Assessment for Ade Breen, who is a 79 y.o.,female. Patients Primary Language is: [place]. According to the patients EMR Christian Affiliation is: [place]. The reason the Patient came to the hospital is:  
Patient Active Problem List  
 Diagnosis Date Noted  Type 2 diabetes with nephropathy ([address] 09/26/2018  Open displaced fracture of neck of second metacarpal bone of left hand with routine healing 09/26/2018  Periprosthetic fracture around internal prosthetic left knee joint 09/26/2018  Closed left hip fracture ([address] 09/26/2018  Open fracture 09/22/2018  Dog bite 09/22/2018  Type 2 diabetes mellitus without complication ([address] 32/37/9729  Mild mitral stenosis 05/22/2017  Anemia 05/22/2017  CHF (congestive heart failure) ([address] 05/12/2017  Atrial fibrillation ([address] 04/28/2017  Gastric bypass status for obesity 04/28/2017  History of pulmonary embolism 04/28/2017  Fibromyalgia 04/27/2017  
 HTN (hypertension) 04/27/2017  Septic joint of right shoulder region ([address] 04/27/2017  Cellulitis 04/24/2017  Sepsis ([address] 04/24/2017  Incomplete bladder emptying 01/23/2015  Stress incontinence 01/23/2015  Sleep apnea 01/25/2012  Type II diabetes mellitus ([address] 01/25/2012  Acquired trigger finger 01/25/2012  Primary localized osteoarthrosis, hand 01/25/2012  Osteoarthrosis 11/06/2009  Enthesopathy of hip region 05/06/2009  Myalgia and myositis 05/06/2009 The  provided the following Interventions: 
Initiated a relationship of care and support. Listened empathically. Provided chaplaincy education. Offered prayer and assurance of continued prayers on patient's behalf. Chart reviewed. The following outcomes where achieved: 
Patient shared limited information about both their medical narrative and spiritual journey/beliefs. Patient processed feeling about current hospitalization. Patient expressed gratitude for 's visit. Assessment: 
Patient does not have any Judaism/cultural needs that will affect patients preferences in health care. There are no spiritual or Judaism issues which require intervention at this time. Plan: 
Chaplains will continue to follow and will provide pastoral care on an as needed/requested basis.  recommends bedside caregivers page  on duty if patient shows signs of acute spiritual or emotional distress. [facility] Care  
[phone]

## 2018-09-27 NOTE — NURSE NAVIGATOR
Nurse Navigator spoke with patient re: choice of a home health care provider and related that her son said that [facility] had previously provided services. Verbal freedom of choice obtained for [facility]. Referral called to Deirdre Ty with Care Management.

## 2018-09-27 NOTE — ANESTHESIA POSTPROCEDURE EVALUATION
Post-Anesthesia Evaluation and Assessment Patient: Aamir Garza MRN: [id number]  SSN: [id number] YOB: 1950  Age: 79 y.o. Sex: female Cardiovascular Function/Vital Signs Visit Vitals  /68  Pulse 60  Temp 36.5 °C (97.7 °F)  Resp 13  Ht 5' 1\" (1.549 m)  Wt 77.2 kg (170 lb 3 oz)  SpO2 96%  BMI 32.16 kg/m2 Patient is status post general, regional anesthesia for Procedure(s): LEFT FEMUR DISTAL OPEN REDUCTION INTERNAL FIXATION/ SYNTHES/ PERIARTICULAR DISTAL LOCKING PLATE. Nausea/Vomiting: None Postoperative hydration reviewed and adequate. Pain: 
Pain Scale 1: Visual (09/26/18 1948) Pain Intensity 1: 0 (09/26/18 1948) Managed Neurological Status:  
Neuro (WDL): Within Defined Limits (09/26/18 1948) Neuro LUE Motor Response: Purposeful (09/26/18 1948) LLE Motor Response: Purposeful (09/26/18 1948) RUE Motor Response: Purposeful (09/26/18 1948) RLE Motor Response: Purposeful (09/26/18 1948) At baseline Mental Status and Level of Consciousness: Arousable Pulmonary Status:  
O2 Device: Room air (09/26/18 2008) Adequate oxygenation and airway patent Complications related to anesthesia: None Post-anesthesia assessment completed. No concerns Signed By: Farrukh Bone CRNA September 26, 2018

## 2018-09-27 NOTE — PROGRESS NOTES
Problem: Falls - Risk of 
Goal: *Absence of Falls Document Ami Deal Fall Risk and appropriate interventions in the flowsheet. Outcome: Progressing Towards Goal 
Fall Risk Interventions: 
Mobility Interventions: Communicate number of staff needed for ambulation/transfer, OT consult for ADLs, Patient to call before getting OOB, PT Consult for mobility concerns, PT Consult for assist device competence, Strengthening exercises (ROM-active/passive), Utilize walker, cane, or other assistive device Medication Interventions: Evaluate medications/consider consulting pharmacy, Patient to call before getting OOB, Teach patient to arise slowly Elimination Interventions: Call light in reach, Patient to call for help with toileting needs, Toilet paper/wipes in reach, Toileting schedule/hourly rounds History of Falls Interventions: Consult care management for discharge planning, Door open when patient unattended, Evaluate medications/consider consulting pharmacy, Investigate reason for fall, Room close to nurse's station

## 2018-09-27 NOTE — PROGRESS NOTES
Ortho Pt seen and evaluated Doing well Pain well controlled No cp, sob, abd pain Visit Vitals  /70 (BP 1 Location: Right arm, BP Patient Position: Supine)  Pulse 66  Temp 98.2 °F (36.8 °C)  Resp 18  Ht 5' 1\" (1.549 m)  Wt 170 lb 3 oz (77.2 kg)  SpO2 96%  BMI 32.16 kg/m2 Left LE Knee immobilizer in place Compartments soft Dressing cdi 
nv intact Neg calf tenderness Labs Lab Results Component Value Date/Time WBC 8.2 09/27/2018 05:22 AM  
 HGB 7.0 (L) 09/27/2018 05:22 AM  
 HCT 21.9 (L) 09/27/2018 05:22 AM  
 PLATELET 790 37/97/2333 05:22 AM  
 MCV 81.4 09/27/2018 05:22 AM  
 
Lab Results Component Value Date/Time Sodium 141 09/27/2018 05:22 AM  
 Potassium 4.9 09/27/2018 05:22 AM  
 Chloride 111 (H) 09/27/2018 05:22 AM  
 CO2 22 09/27/2018 05:22 AM  
 Anion gap 8 09/27/2018 05:22 AM  
 Glucose 112 (H) 09/27/2018 05:22 AM  
 BUN 43 (H) 09/27/2018 05:22 AM  
 Creatinine 1.96 (H) 09/27/2018 05:22 AM  
 BUN/Creatinine ratio 22 (H) 09/27/2018 05:22 AM  
 GFR est AA 31 (L) 09/27/2018 05:22 AM  
 GFR est non-AA 26 (L) 09/27/2018 05:22 AM  
 Calcium 7.8 (L) 09/27/2018 05:22 AM  
 Bilirubin, total 0.2 09/27/2018 05:22 AM  
 AST (SGOT) 31 09/27/2018 05:22 AM  
 Alk. phosphatase 198 (H) 09/27/2018 05:22 AM  
 Protein, total 5.2 (L) 09/27/2018 05:22 AM  
 Albumin 1.9 (L) 09/27/2018 05:22 AM  
 Globulin 3.3 09/27/2018 05:22 AM  
 A-G Ratio 0.6 (L) 09/27/2018 05:22 AM  
 ALT (SGPT) 19 09/27/2018 05:22 AM  
 
A: sp orif left femur fx, acute blood loss anemia P:  IV fluids, CT scan of the left hand- per Dr. Amanda Boss. Check cbc in am, lovenox

## 2018-09-27 NOTE — PROGRESS NOTES
Progress Note Patient: Peter Arambula               Sex: female          DOA: 9/26/2018 YOB: 1950      Age:  79 y.o.        LOS:  LOS: 1 day Subjective:  
 
Patient was just waking up. Little groggy according to patient. No complaints. No pain to left leg or left hand. Left hand dog bite last week. Dressing and hand brace. Denies HA, CP/chest discomfort, abdominal pain, N/V 
 
Objective:  
 /70 (BP 1 Location: Right arm, BP Patient Position: Supine)  Pulse 66  Temp 98.2 °F (36.8 °C)  Resp 18  Ht 5' 1\" (1.549 m)  Wt 77.2 kg (170 lb 3 oz)  SpO2 96%  BMI 32.16 kg/m2 Physical Exam: 
General:  Alert, NAD Cardiovascular:  RRR, murmur Pulmonary:  LSC throughout; respiratory effort WNL 
GI:  +BS in all four quadrants, soft, non-tender Extremities:  Left leg immobilizer, ace bandage. Left foot pedal edema, non-pitting. No edema RLE; 2+ dorsalis pedis pulses bilaterally. Left hand brace and dressing. Neuro: alert and oriented x3 Intake and Output: 
Current Shift:  09/27 0701 - 09/27 1900 In: 277.5 [I.V.:277.5] Out: 200 [Urine:200] Last three shifts:  09/25 1901 - 09/27 0700 In: 2046.3 [P.O.:720; I.V.:1326.3] Out: 2325 [OSNAD:8905] Lab/Data Reviewed: 
Recent Results (from the past 12 hour(s)) HEMOGLOBIN A1C WITH EAG Collection Time: 09/27/18  5:22 AM  
Result Value Ref Range Hemoglobin A1c 8.2 (H) 4.2 - 5.6 % Est. average glucose 189 mg/dL METABOLIC PANEL, COMPREHENSIVE Collection Time: 09/27/18  5:22 AM  
Result Value Ref Range Sodium 141 136 - 145 mmol/L Potassium 4.9 3.5 - 5.5 mmol/L Chloride 111 (H) 100 - 108 mmol/L  
 CO2 22 21 - 32 mmol/L Anion gap 8 3.0 - 18 mmol/L Glucose 112 (H) 74 - 99 mg/dL BUN 43 (H) 7.0 - 18 MG/DL Creatinine 1.96 (H) 0.6 - 1.3 MG/DL  
 BUN/Creatinine ratio 22 (H) 12 - 20 GFR est AA 31 (L) >60 ml/min/1.73m2 GFR est non-AA 26 (L) >60 ml/min/1.73m2 Calcium 7.8 (L) 8.5 - 10.1 MG/DL Bilirubin, total 0.2 0.2 - 1.0 MG/DL  
 ALT (SGPT) 19 13 - 56 U/L  
 AST (SGOT) 31 15 - 37 U/L Alk. phosphatase 198 (H) 45 - 117 U/L Protein, total 5.2 (L) 6.4 - 8.2 g/dL Albumin 1.9 (L) 3.4 - 5.0 g/dL Globulin 3.3 2.0 - 4.0 g/dL A-G Ratio 0.6 (L) 0.8 - 1.7 LIPID PANEL Collection Time: 09/27/18  5:22 AM  
Result Value Ref Range LIPID PROFILE Cholesterol, total 97 <200 MG/DL Triglyceride 93 <150 MG/DL  
 HDL Cholesterol 47 40 - 60 MG/DL  
 LDL, calculated 31.4 0 - 100 MG/DL VLDL, calculated 18.6 MG/DL  
 CHOL/HDL Ratio 2.1 0 - 5.0    
PTT Collection Time: 09/27/18  5:22 AM  
Result Value Ref Range aPTT 39.4 (H) 23.0 - 36.4 SEC  
CBC WITH AUTOMATED DIFF Collection Time: 09/27/18  5:22 AM  
Result Value Ref Range WBC 8.2 4.6 - 13.2 K/uL  
 RBC 2.69 (L) 4.20 - 5.30 M/uL HGB 7.0 (L) 12.0 - 16.0 g/dL HCT 21.9 (L) 35.0 - 45.0 % MCV 81.4 74.0 - 97.0 FL  
 MCH 26.0 24.0 - 34.0 PG  
 MCHC 32.0 31.0 - 37.0 g/dL  
 RDW 14.7 (H) 11.6 - 14.5 % PLATELET 061 831 - 418 K/uL MPV 10.3 9.2 - 11.8 FL  
 NEUTROPHILS 73 40 - 73 % LYMPHOCYTES 13 (L) 21 - 52 % MONOCYTES 12 (H) 3 - 10 % EOSINOPHILS 2 0 - 5 % BASOPHILS 0 0 - 2 %  
 ABS. NEUTROPHILS 5.9 1.8 - 8.0 K/UL  
 ABS. LYMPHOCYTES 1.1 0.9 - 3.6 K/UL  
 ABS. MONOCYTES 1.0 0.05 - 1.2 K/UL  
 ABS. EOSINOPHILS 0.2 0.0 - 0.4 K/UL  
 ABS. BASOPHILS 0.0 0.0 - 0.1 K/UL  
 DF AUTOMATED    
GLUCOSE, POC Collection Time: 09/27/18  6:15 AM  
Result Value Ref Range Glucose (POC) 110 70 - 110 mg/dL Assessment/Plan Assessment 1. Acute anemia s/p surgical procedure 2. ORIF left femur fx 3. Left hand dog bite injury 4. Renal insufficiency 5. HTN 6. HLD 7. DM II Plan: 1. Serial H and H x3, CBC in am 
2. IVF 3. Pain management, PT/OT, HH vs rehab 4. CT scan of left hand 5. lovenox

## 2018-09-27 NOTE — PROGRESS NOTES
PT orders received, chart reviewed. Pt unable to participate with PT due to: 
[]  Nausea/vomiting 
[]  Eating 
[]  Pain 
[]  Pt lethargic [x]  Off Unit Will f/u later as patient's schedule allows. Thank you.  
Sara Villarreal PT, DPT

## 2018-09-27 NOTE — PROGRESS NOTES
Primary Nurse Lisa Woods RN and CHESTER Chavira performed a dual skin assessment on this patient Impairment noted- see wound doc flow sheet Neel score is 20 TRANSFER - IN REPORT: 
 
Verbal report received from Clover Ashley on Cole Grider  being received from PACU for routine progression of care Report consisted of patients Situation, Background, Assessment and  
Recommendations(SBAR). Information from the following report(s) SBAR, Kardex, ED Summary, OR Summary, Procedure Summary, Intake/Output, MAR and Recent Results was reviewed with the receiving nurse. Opportunity for questions and clarification was provided. Assessment completed upon patients arrival to unit and care assumed.

## 2018-09-27 NOTE — PROGRESS NOTES
OT order received and chart reviewed. Patient off the floor x2 attempts. Will continue to follow and see patient when available/as appropriate.   Thank you for the referral.  Reanna Juares MS OTR/L

## 2018-09-28 LAB
AMORPH CRY URNS QL MICRO: ABNORMAL
ANION GAP SERPL CALC-SCNC: 10 MMOL/L (ref 3–18)
APPEARANCE UR: ABNORMAL
BACTERIA URNS QL MICRO: ABNORMAL /HPF
BASOPHILS # BLD: 0 K/UL (ref 0–0.1)
BASOPHILS NFR BLD: 0 % (ref 0–2)
BILIRUB UR QL: NEGATIVE
BUN SERPL-MCNC: 48 MG/DL (ref 7–18)
BUN/CREAT SERPL: 24 (ref 12–20)
CALCIUM SERPL-MCNC: 8.1 MG/DL (ref 8.5–10.1)
CHLORIDE SERPL-SCNC: 110 MMOL/L (ref 100–108)
CO2 SERPL-SCNC: 22 MMOL/L (ref 21–32)
COLOR UR: YELLOW
CREAT SERPL-MCNC: 2.01 MG/DL (ref 0.6–1.3)
DIFFERENTIAL METHOD BLD: ABNORMAL
EOSINOPHIL # BLD: 0.1 K/UL (ref 0–0.4)
EOSINOPHIL NFR BLD: 1 % (ref 0–5)
EPITH CASTS URNS QL MICRO: ABNORMAL /LPF (ref 0–5)
ERYTHROCYTE [DISTWIDTH] IN BLOOD BY AUTOMATED COUNT: 14.6 % (ref 11.6–14.5)
GLUCOSE BLD STRIP.AUTO-MCNC: 107 MG/DL (ref 70–110)
GLUCOSE BLD STRIP.AUTO-MCNC: 160 MG/DL (ref 70–110)
GLUCOSE BLD STRIP.AUTO-MCNC: 193 MG/DL (ref 70–110)
GLUCOSE BLD STRIP.AUTO-MCNC: 306 MG/DL (ref 70–110)
GLUCOSE SERPL-MCNC: 164 MG/DL (ref 74–99)
GLUCOSE UR STRIP.AUTO-MCNC: NEGATIVE MG/DL
HCT VFR BLD AUTO: 21.3 % (ref 35–45)
HCT VFR BLD AUTO: 21.9 % (ref 35–45)
HGB BLD-MCNC: 6.8 G/DL (ref 12–16)
HGB BLD-MCNC: 6.9 G/DL (ref 12–16)
HGB UR QL STRIP: NEGATIVE
KETONES UR QL STRIP.AUTO: NEGATIVE MG/DL
LEUKOCYTE ESTERASE UR QL STRIP.AUTO: NEGATIVE
LYMPHOCYTES # BLD: 1.2 K/UL (ref 0.9–3.6)
LYMPHOCYTES NFR BLD: 12 % (ref 21–52)
MCH RBC QN AUTO: 25.8 PG (ref 24–34)
MCHC RBC AUTO-ENTMCNC: 31.5 G/DL (ref 31–37)
MCV RBC AUTO: 82 FL (ref 74–97)
MONOCYTES # BLD: 1.4 K/UL (ref 0.05–1.2)
MONOCYTES NFR BLD: 14 % (ref 3–10)
NEUTS SEG # BLD: 7.3 K/UL (ref 1.8–8)
NEUTS SEG NFR BLD: 73 % (ref 40–73)
NITRITE UR QL STRIP.AUTO: NEGATIVE
PH UR STRIP: 5 [PH] (ref 5–8)
PLATELET # BLD AUTO: 268 K/UL (ref 135–420)
PMV BLD AUTO: 10 FL (ref 9.2–11.8)
POTASSIUM SERPL-SCNC: 4.7 MMOL/L (ref 3.5–5.5)
PROT UR STRIP-MCNC: 100 MG/DL
RBC # BLD AUTO: 2.67 M/UL (ref 4.2–5.3)
SODIUM SERPL-SCNC: 142 MMOL/L (ref 136–145)
SP GR UR REFRACTOMETRY: 1.02 (ref 1–1.03)
UROBILINOGEN UR QL STRIP.AUTO: 0.2 EU/DL (ref 0.2–1)
WBC # BLD AUTO: 10 K/UL (ref 4.6–13.2)
WBC URNS QL MICRO: ABNORMAL /HPF (ref 0–4)

## 2018-09-28 PROCEDURE — 80048 BASIC METABOLIC PNL TOTAL CA: CPT | Performed by: ORTHOPAEDIC SURGERY

## 2018-09-28 PROCEDURE — 65660000000 HC RM CCU STEPDOWN

## 2018-09-28 PROCEDURE — P9016 RBC LEUKOCYTES REDUCED: HCPCS | Performed by: NURSE PRACTITIONER

## 2018-09-28 PROCEDURE — 77030005538 HC CATH URETH FOL44 BARD -B

## 2018-09-28 PROCEDURE — 86920 COMPATIBILITY TEST SPIN: CPT | Performed by: NURSE PRACTITIONER

## 2018-09-28 PROCEDURE — 82962 GLUCOSE BLOOD TEST: CPT

## 2018-09-28 PROCEDURE — 77030011943

## 2018-09-28 PROCEDURE — 74011250637 HC RX REV CODE- 250/637: Performed by: ORTHOPAEDIC SURGERY

## 2018-09-28 PROCEDURE — 77030011255 HC DSG AQUACEL AG BMS -A

## 2018-09-28 PROCEDURE — 74011250637 HC RX REV CODE- 250/637: Performed by: NURSE PRACTITIONER

## 2018-09-28 PROCEDURE — 74011636637 HC RX REV CODE- 636/637: Performed by: NURSE PRACTITIONER

## 2018-09-28 PROCEDURE — 74011636637 HC RX REV CODE- 636/637: Performed by: HOSPITALIST

## 2018-09-28 PROCEDURE — 97530 THERAPEUTIC ACTIVITIES: CPT

## 2018-09-28 PROCEDURE — 51798 US URINE CAPACITY MEASURE: CPT

## 2018-09-28 PROCEDURE — 86900 BLOOD TYPING SEROLOGIC ABO: CPT | Performed by: NURSE PRACTITIONER

## 2018-09-28 PROCEDURE — 36415 COLL VENOUS BLD VENIPUNCTURE: CPT | Performed by: ORTHOPAEDIC SURGERY

## 2018-09-28 PROCEDURE — 85025 COMPLETE CBC W/AUTO DIFF WBC: CPT | Performed by: ORTHOPAEDIC SURGERY

## 2018-09-28 PROCEDURE — 85018 HEMOGLOBIN: CPT | Performed by: NURSE PRACTITIONER

## 2018-09-28 PROCEDURE — 81001 URINALYSIS AUTO W/SCOPE: CPT | Performed by: ORTHOPAEDIC SURGERY

## 2018-09-28 PROCEDURE — 74011250636 HC RX REV CODE- 250/636: Performed by: ORTHOPAEDIC SURGERY

## 2018-09-28 PROCEDURE — 87086 URINE CULTURE/COLONY COUNT: CPT | Performed by: ORTHOPAEDIC SURGERY

## 2018-09-28 PROCEDURE — 36430 TRANSFUSION BLD/BLD COMPNT: CPT

## 2018-09-28 PROCEDURE — 74011250637 HC RX REV CODE- 250/637: Performed by: PHYSICIAN ASSISTANT

## 2018-09-28 PROCEDURE — 74011250636 HC RX REV CODE- 250/636: Performed by: HOSPITALIST

## 2018-09-28 PROCEDURE — 74011250636 HC RX REV CODE- 250/636: Performed by: EMERGENCY MEDICINE

## 2018-09-28 RX ORDER — DILTIAZEM HYDROCHLORIDE 180 MG/1
180 CAPSULE, COATED, EXTENDED RELEASE ORAL DAILY
Status: DISCONTINUED | OUTPATIENT
Start: 2018-09-28 | End: 2018-09-30 | Stop reason: HOSPADM

## 2018-09-28 RX ORDER — PRAVASTATIN SODIUM 20 MG/1
20 TABLET ORAL
Status: DISCONTINUED | OUTPATIENT
Start: 2018-09-28 | End: 2018-09-30 | Stop reason: HOSPADM

## 2018-09-28 RX ORDER — GUAIFENESIN 100 MG/5ML
81 LIQUID (ML) ORAL DAILY
Status: DISCONTINUED | OUTPATIENT
Start: 2018-09-28 | End: 2018-09-30 | Stop reason: HOSPADM

## 2018-09-28 RX ORDER — INSULIN GLARGINE 100 [IU]/ML
10 INJECTION, SOLUTION SUBCUTANEOUS
Status: DISCONTINUED | OUTPATIENT
Start: 2018-09-28 | End: 2018-09-30 | Stop reason: HOSPADM

## 2018-09-28 RX ORDER — AMIODARONE HYDROCHLORIDE 200 MG/1
200 TABLET ORAL DAILY
Status: DISCONTINUED | OUTPATIENT
Start: 2018-09-28 | End: 2018-09-30 | Stop reason: HOSPADM

## 2018-09-28 RX ORDER — SODIUM CHLORIDE 9 MG/ML
250 INJECTION, SOLUTION INTRAVENOUS AS NEEDED
Status: DISCONTINUED | OUTPATIENT
Start: 2018-09-28 | End: 2018-09-30 | Stop reason: HOSPADM

## 2018-09-28 RX ORDER — TAMSULOSIN HYDROCHLORIDE 0.4 MG/1
0.4 CAPSULE ORAL DAILY
Status: DISCONTINUED | OUTPATIENT
Start: 2018-09-28 | End: 2018-09-30 | Stop reason: HOSPADM

## 2018-09-28 RX ORDER — METOPROLOL TARTRATE 25 MG/1
12.5 TABLET, FILM COATED ORAL 2 TIMES DAILY
Status: DISCONTINUED | OUTPATIENT
Start: 2018-09-28 | End: 2018-09-30 | Stop reason: HOSPADM

## 2018-09-28 RX ADMIN — INSULIN GLARGINE 10 UNITS: 100 INJECTION, SOLUTION SUBCUTANEOUS at 21:20

## 2018-09-28 RX ADMIN — INSULIN LISPRO 2 UNITS: 100 INJECTION, SOLUTION INTRAVENOUS; SUBCUTANEOUS at 12:03

## 2018-09-28 RX ADMIN — METOPROLOL TARTRATE 12.5 MG: 25 TABLET ORAL at 11:59

## 2018-09-28 RX ADMIN — DOCUSATE SODIUM 100 MG: 100 CAPSULE, LIQUID FILLED ORAL at 17:12

## 2018-09-28 RX ADMIN — INSULIN LISPRO 2 UNITS: 100 INJECTION, SOLUTION INTRAVENOUS; SUBCUTANEOUS at 08:06

## 2018-09-28 RX ADMIN — AMIODARONE HYDROCHLORIDE 200 MG: 200 TABLET ORAL at 11:59

## 2018-09-28 RX ADMIN — Medication 10 ML: at 21:22

## 2018-09-28 RX ADMIN — INSULIN LISPRO 8 UNITS: 100 INJECTION, SOLUTION INTRAVENOUS; SUBCUTANEOUS at 17:01

## 2018-09-28 RX ADMIN — SODIUM CHLORIDE 125 ML/HR: 900 INJECTION, SOLUTION INTRAVENOUS at 02:55

## 2018-09-28 RX ADMIN — DILTIAZEM HYDROCHLORIDE 180 MG: 180 CAPSULE, COATED, EXTENDED RELEASE ORAL at 11:58

## 2018-09-28 RX ADMIN — FERROUS SULFATE TAB 325 MG (65 MG ELEMENTAL FE) 325 MG: 325 (65 FE) TAB at 17:12

## 2018-09-28 RX ADMIN — ASPIRIN 81 MG 81 MG: 81 TABLET ORAL at 11:59

## 2018-09-28 RX ADMIN — TAMSULOSIN HYDROCHLORIDE 0.4 MG: 0.4 CAPSULE ORAL at 12:07

## 2018-09-28 RX ADMIN — FERROUS SULFATE TAB 325 MG (65 MG ELEMENTAL FE) 325 MG: 325 (65 FE) TAB at 08:12

## 2018-09-28 RX ADMIN — Medication 10 ML: at 17:14

## 2018-09-28 RX ADMIN — PRAVASTATIN SODIUM 20 MG: 20 TABLET ORAL at 21:20

## 2018-09-28 RX ADMIN — OXYCODONE HYDROCHLORIDE 10 MG: 5 TABLET ORAL at 21:20

## 2018-09-28 RX ADMIN — OXYCODONE HYDROCHLORIDE 15 MG: 5 TABLET ORAL at 02:41

## 2018-09-28 RX ADMIN — DOCUSATE SODIUM 100 MG: 100 CAPSULE, LIQUID FILLED ORAL at 08:12

## 2018-09-28 RX ADMIN — MORPHINE SULFATE 2 MG: 4 INJECTION INTRAVENOUS at 08:58

## 2018-09-28 RX ADMIN — SODIUM CHLORIDE 125 ML/HR: 900 INJECTION, SOLUTION INTRAVENOUS at 07:12

## 2018-09-28 RX ADMIN — ENOXAPARIN SODIUM 30 MG: 30 INJECTION SUBCUTANEOUS at 11:58

## 2018-09-28 RX ADMIN — METOPROLOL TARTRATE 12.5 MG: 25 TABLET ORAL at 17:12

## 2018-09-28 RX ADMIN — OXYCODONE HYDROCHLORIDE 10 MG: 5 TABLET ORAL at 17:16

## 2018-09-28 RX ADMIN — Medication 10 ML: at 05:24

## 2018-09-28 NOTE — ROUTINE PROCESS
Bedside and Verbal shift change report given to [address] (oncoming nurse) by Vish Sanders RN (offgoing nurse). Report given with SBAR, Kardex, Intake/Output, MAR, Accordion and Recent Results.

## 2018-09-28 NOTE — PROGRESS NOTES
Problem: Falls - Risk of 
Goal: *Absence of Falls Document Moni Sanchez Fall Risk and appropriate interventions in the flowsheet. Outcome: Progressing Towards Goal 
Fall Risk Interventions: 
Mobility Interventions: Bed/chair exit alarm, Patient to call before getting OOB, Utilize walker, cane, or other assistive device Medication Interventions: Bed/chair exit alarm, Patient to call before getting OOB, Teach patient to arise slowly Elimination Interventions: Bed/chair exit alarm, Call light in reach, Patient to call for help with toileting needs History of Falls Interventions: Bed/chair exit alarm, Door open when patient unattended, Investigate reason for fall

## 2018-09-28 NOTE — WOUND CARE
Physical Exam  
Musculoskeletal:  
     Hands: 
Seen by wound care per consult, left hand tissue injury assessment performed at this time. Tissue injury listed abovenoted during assessment, with three areas of sutures, well approximated and intact. Treatment plan explained to [facility] and Pt agreeable to continue current treatment. Hydrated aqua alex Ag and kerlix applied to open area of hand. Area between index and middle finger split. Protective brace reapplied and patient left resting comfortable in bed. Patient scheduled for OR and will reassess wound care needs post op, if required. Wound care education provided to pt at this time. Plan of care reviewed with nursing. Will turn over care to nursing staff at this time Rory Drew, Wound Care Department

## 2018-09-28 NOTE — ROUTINE PROCESS
Bedside and Verbal shift change report given to Mohinder Carolina RN (oncoming nurse) by Marbin Hopkins RN (offgoing nurse). Report included the following information SBAR, Kardex, ED Summary, OR Summary, Intake/Output, MAR, Recent Results and Cardiac Rhythm . Patient Vitals for the past 12 hrs: 
 Temp Pulse Resp BP SpO2  
09/28/18 0400 98.3 °F (36.8 °C) 83 16 156/77 98 %  
09/28/18 0000 99.1 °F (37.3 °C) 87 18 135/69 93 % 09/27/18 2000 99.2 °F (37.3 °C) 88 18 155/69 92 %

## 2018-09-28 NOTE — ROUTINE PROCESS
2 persons from Dr. Emily Pugh office arrived to cast the patient's leg Gave patient PRN morphine prior to procedure 
 
 
0926-- cast in complete patient in no distress -- seems to have handle this well

## 2018-09-28 NOTE — PROGRESS NOTES
0040: Patient denies urge to void. Bladder scanned patient and scanner shows >312mL. Per order, paged on-call hospitalist, spoke with Dr. Joe Julien, notified that patient still has not voided and per day RN and order entered, bladder scanned patient. Informed MD that ortho MD placed order \"No ins and outs If thomas re-inserted obtain urinalysis, c&s, consult urology. \" Received telephone order with read back to straight cath x1. Patient wanted to attempt to void on her own. Placed on bedpan, patient was able to void 100mL, PVR showed >330mL. Straight cath'd patient, removed 700mL. 0245: Patient complaining that leg brace is hurting her thigh, patient request that brace is loosened. Loosened top of brace per patient's request. Patient also took off hand brace to left hand. Informed patient to keep brace on per doctor's order. Patient verbalized understanding

## 2018-09-28 NOTE — PROGRESS NOTES
Pt currently with sub-therapeutic hemoglobin at 6.8 and she is receiving blood. Will hold OT eval for now and follow-up at later date/time to complete.

## 2018-09-28 NOTE — PROGRESS NOTES
CM in to speak with pt. Pt adamant that she does not want SNF services. Pt accepted to [facility].   
.Kylie Flores

## 2018-09-28 NOTE — PROGRESS NOTES
Ortho Pt seen and evaluated Doing well Pain well controlled No cp, sob, abd pain Visit Vitals  /77 (BP 1 Location: Right arm, BP Patient Position: Supine)  Pulse 77  Temp 98.5 °F (36.9 °C)  Resp 16  
 Ht 5' 1\" (1.549 m)  Wt 170 lb 3 oz (77.2 kg)  SpO2 97%  BMI 32.16 kg/m2 Left LE 
Dressing cdi Compartments soft 
nv intact Neg calf tenderness Labs Lab Results Component Value Date/Time WBC 10.0 09/28/2018 03:41 AM  
 HGB 6.8 (L) 09/28/2018 10:15 AM  
 HCT 21.3 (L) 09/28/2018 10:15 AM  
 PLATELET 413 88/33/4898 03:41 AM  
 MCV 82.0 09/28/2018 03:41 AM  
 
Lab Results Component Value Date/Time Sodium 142 09/28/2018 03:41 AM  
 Potassium 4.7 09/28/2018 03:41 AM  
 Chloride 110 (H) 09/28/2018 03:41 AM  
 CO2 22 09/28/2018 03:41 AM  
 Anion gap 10 09/28/2018 03:41 AM  
 Glucose 164 (H) 09/28/2018 03:41 AM  
 BUN 48 (H) 09/28/2018 03:41 AM  
 Creatinine 2.01 (H) 09/28/2018 03:41 AM  
 BUN/Creatinine ratio 24 (H) 09/28/2018 03:41 AM  
 GFR est AA 30 (L) 09/28/2018 03:41 AM  
 GFR est non-AA 25 (L) 09/28/2018 03:41 AM  
 Calcium 8.1 (L) 09/28/2018 03:41 AM  
 Bilirubin, total 0.2 09/27/2018 05:22 AM  
 AST (SGOT) 31 09/27/2018 05:22 AM  
 Alk. phosphatase 198 (H) 09/27/2018 05:22 AM  
 Protein, total 5.2 (L) 09/27/2018 05:22 AM  
 Albumin 1.9 (L) 09/27/2018 05:22 AM  
 Globulin 3.3 09/27/2018 05:22 AM  
 A-G Ratio 0.6 (L) 09/27/2018 05:22 AM  
 ALT (SGPT) 19 09/27/2018 05:22 AM  
 
A: sp orif left femur fracture, acute blood loss anemia, urinary retention P:  Recommend one unit prbc's, thomas cath if unable to void, [employer] to left LE.

## 2018-09-28 NOTE — PROGRESS NOTES
[address] Progress Note Patient: Xavier Iraheta               Sex: female          DOA: 9/26/2018 YOB: 1950      Age:  79 y.o.        LOS:  LOS: 2 days S/P  Procedure(s): LEFT FEMUR DISTAL OPEN REDUCTION INTERNAL FIXATION/ SYNTHES/ PERIARTICULAR DISTAL LOCKING PLATE Subjective: No complaints Tolerating diet Voiding q shift. She is doing well this morning. Her pain is managed on current medications. She understands to put no weight on her operative leg. She did not have questions at this time. Denies cp, sob, abd pain Objective:  
  
Blood pressure 148/67, pulse 68, temperature 97.6 °F (36.4 °C), resp. rate 16, height 5' 1\" (1.549 m), weight 170 lb 3 oz (77.2 kg), SpO2 98 %. Well developed, Well Nourished alert, cooperative, no distress Incision clean, dry, no drainage, dressing in place, Long leg cast in place 
swelling and tenderness noted in left lower extremity Sensory is intact Motor is intact 
nv intact 2+distal pulses Neg calf tenderness Neg for facial asymmetry Labs: 
CBC 
@ 
CBC:  
Lab Results Component Value Date/Time WBC 8.0 09/29/2018 05:09 AM  
 RBC 2.76 (L) 09/29/2018 05:09 AM  
 HGB 7.2 (L) 09/29/2018 05:09 AM  
 HCT 22.3 (L) 09/29/2018 05:09 AM  
 PLATELET 805 98/35/4705 05:09 AM  
@ Coagulation Lab Results Component Value Date INR 1.0 09/26/2018 APTT 39.4 (H) 09/27/2018 Basic Metabolic Profile Lab Results Component Value Date  09/28/2018 CO2 22 09/28/2018 BUN 48 (H) 09/28/2018 Medications Reviewed Assessment/Plan Active Problems: 
  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  
 
 1. PT and/or OT Consults: YES continue PT/OT: oob to chair, gentle rom, NWB operative leg, Long leg cast in place. 2. Incision Care:  Routine Incision Care and Dressing Changes as necessary: dressing changes POD#2 and as needed 3. Pain control 4. DVT Prophylaxis - SCD in place, Lovenox 30 mg  
5. Acute blood loss anemia - will continue to monitor 6.  thomas cath if unable to void, flomax Miki Rainey PA-C [address] [place] [address] and [facility] [phone]

## 2018-09-28 NOTE — PROGRESS NOTES
Problem: Mobility Impaired (Adult and Pediatric) Goal: *Acute Goals and Plan of Care (Insert Text) Physical Therapy Goals Initiated 9/27/2018 and to be accomplished within 7 day(s) 1. Patient will move from supine to sit and sit to supine , scoot up and down and roll side to side in bed with supervision/set-up. 2.  Patient will transfer from bed to chair and chair to bed with minimal assistance/contact guard assist using the least restrictive device. 3.  Patient will perform sit to stand with minimal assistance/contact guard assist. 
4.  Patient will ambulate with minimal assistance/contact guard assist for >10 feet with the least restrictive device. 5.  Patient will ascend/descend 15 stairs with 1-2 handrail(s) with minimal assistance/contact guard assist.  
Outcome: Progressing Towards Goal 
physical Therapy TREATMENT Patient: Marybeth Elizabeth (74 y.o. 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 ASSESSMENT: 
Patient is cleared by nursing for PT, and patient consents to therapy. Pt has low H/H 6.9/21.9 this morning and then repeat 6.8/21.3. Ortho provided long leg cast to L LE. No WB updated on L hand and will continue NWB. Pt continues to be NWB on L LE. Nursing is about to start blood but allowed for PT for bed level mobility today and positioning. Performed supine to sit mod A. Attempted scooting EOB max/total A.  Sit to supine mod A. Rolling in bed min/mod A. Scooting up in bed max/total Ax2. L LE upon arrival was positioned turned more inward. At end of therapy, L LE positioned in neutral with pillow to assist with positioning and to promote healing with nursing informed. Educated pt on WB status at this time including NWB L LE and using NWB for L hand. Pt may be able to use a platform walker in the future but no standing/transfers performed today due to H/H. Pt educated on need for increased assistance as well as need for a SNF due to pt's mobility at this time and safety (pt has refused SNF per other notes but did not say anything negative to the therapist today regarding a SNF). Pt ended therapy supine in bed with all needs met. Progression toward goals: 
[]      Improving appropriately and progressing toward goals [x]      Improving slowly and progressing toward goals 
[]      Not making progress toward goals and plan of care will be adjusted PLAN: 
Patient continues to benefit from skilled intervention to address the above impairments to increase functional independence. Continue treatment per established plan of care. Discharge Recommendations:  Diego Ahmadi Further Equipment Recommendations for Discharge:  Platform RW, WC, drop arm BSC, SUBJECTIVE:  
Patient stated I'm going to rest. OBJECTIVE DATA SUMMARY:  
Critical Behavior: 
Neurologic State: Appropriate for age (occassionally confused or strange conversation) Orientation Level: Oriented to person, Oriented to place, Oriented to situation Cognition: Poor safety awareness, Follows commands Safety/Judgement: Awareness of environment, Fall prevention Functional Mobility Training: 
Bed Mobility: 
Rolling: Minimum assistance; Moderate assistance Supine to Sit: Moderate assistance Sit to Supine: Moderate assistance Scooting: Total assistance;Maximum assistance Transfers: 
 Attempted lateral scooting EOB with pt requiring total A. Balance: 
Sitting: Impaired Sitting - Static: Fair (occasional) Sitting - Dynamic: Poor (constant support) Therapeutic Exercises:  
Reviewed ankle pumps and heel slides on R LE and toe wiggles on L LE 
Pain: 
Pre: 4/10 L LE Post: 4/10 L LE Activity Tolerance:  
poor Please refer to the flowsheet for vital signs taken during this treatment. After treatment:  
[] Patient left in no apparent distress sitting up in chair 
[x] Patient left in no apparent distress in bed 
[x] Call bell left within reach [x] Nursing notified, Billye Holding 
[] Caregiver present 
[] Bed alarm activated 
[x] Personal items in reach Albaro Recio, PT, DPT Time Calculation: 23 mins Mobility [id number] Current  CM= 80-99%   Goal  CJ= 20-39%. The severity rating is based on the Level of Assistance required for Functional Mobility and ADLs.

## 2018-09-28 NOTE — PROGRESS NOTES
Progress Note Patient: Priscila Jones               Sex: female          DOA: 9/26/2018 YOB: 1950      Age:  79 y.o.        LOS:  LOS: 2 days Subjective:  
Lethargic but oriented x3. NAD. Left hand discomfort. Left leg discomfort. Last night difficulty with voiding. Fuentes catheter in place, no discomfort Denies HA, SOB, CP/chest discomfort, abdominal pain, N/V Assessment 1. Acute anemia s/p surgical procedure 2. ORIF left femur fx, ortho following 3. Renal insufficiency possibly d/t urinary retention 4. Left hand dog bite injury, comminute fracture of the second metacarpal head with multiple displaced bone fragments, hand surgery consult Dr. Rosa M Du 5. HTN 6. afib hx with no OAC 7. HLD 8. DM II 9. Discharge planning Plan 1. One unit PRBC ordered, Monitor H and H 
2. Pain management, casting of the left leg, left hand brace 3. Fuentes catheter placed, monitor I and O's, monitor metabolic panel if no improvement nephrology consult, ua and urine cx ordered, lisinopril held 4. Possible hand surgery scheduled 10/1/18 5. Resumed home cardizem 180 mg daily, amiodarone 200 mg daily and metoprolol 12.5 mg BID, resume asa 81 mg 
6. Statin resumed 7. POC glucose, SSI. lantus resumed 10 units at night 8. Patient refused SNF wants C, CM consult for planning. PT/OT eval and treat Objective:  
  
/72 (BP 1 Location: Right arm, BP Patient Position: Supine)  Pulse 74  Temp 98.7 °F (37.1 °C)  Resp 17  Ht 5' 1\" (1.549 m)  Wt 77.2 kg (170 lb 3 oz)  SpO2 93%  BMI 32.16 kg/m2 Physical Exam: 
General:  Lethargic and oriented, NAD Cardiovascular:  RRR, murmur Pulmonary:  LSC throughout; respiratory effort WNL 
GI:  +BS in all four quadrants, soft, non-tender Extremities:  Left leg cast, left hand brace. No edema; 2+ dorsalis pedis pulses bilaterally Neuro: lethargic and oriented x3 Lab/Data Reviewed: 
Recent Results (from the past 12 hour(s)) METABOLIC PANEL, BASIC Collection Time: 09/28/18  3:41 AM  
Result Value Ref Range Sodium 142 136 - 145 mmol/L Potassium 4.7 3.5 - 5.5 mmol/L Chloride 110 (H) 100 - 108 mmol/L  
 CO2 22 21 - 32 mmol/L Anion gap 10 3.0 - 18 mmol/L Glucose 164 (H) 74 - 99 mg/dL BUN 48 (H) 7.0 - 18 MG/DL Creatinine 2.01 (H) 0.6 - 1.3 MG/DL  
 BUN/Creatinine ratio 24 (H) 12 - 20 GFR est AA 30 (L) >60 ml/min/1.73m2 GFR est non-AA 25 (L) >60 ml/min/1.73m2 Calcium 8.1 (L) 8.5 - 10.1 MG/DL  
CBC WITH AUTOMATED DIFF Collection Time: 09/28/18  3:41 AM  
Result Value Ref Range WBC 10.0 4.6 - 13.2 K/uL  
 RBC 2.67 (L) 4.20 - 5.30 M/uL HGB 6.9 (L) 12.0 - 16.0 g/dL HCT 21.9 (L) 35.0 - 45.0 % MCV 82.0 74.0 - 97.0 FL  
 MCH 25.8 24.0 - 34.0 PG  
 MCHC 31.5 31.0 - 37.0 g/dL  
 RDW 14.6 (H) 11.6 - 14.5 % PLATELET 388 901 - 829 K/uL MPV 10.0 9.2 - 11.8 FL  
 NEUTROPHILS 73 40 - 73 % LYMPHOCYTES 12 (L) 21 - 52 % MONOCYTES 14 (H) 3 - 10 % EOSINOPHILS 1 0 - 5 % BASOPHILS 0 0 - 2 %  
 ABS. NEUTROPHILS 7.3 1.8 - 8.0 K/UL  
 ABS. LYMPHOCYTES 1.2 0.9 - 3.6 K/UL  
 ABS. MONOCYTES 1.4 (H) 0.05 - 1.2 K/UL  
 ABS. EOSINOPHILS 0.1 0.0 - 0.4 K/UL  
 ABS. BASOPHILS 0.0 0.0 - 0.1 K/UL  
 DF AUTOMATED    
GLUCOSE, POC Collection Time: 09/28/18  7:26 AM  
Result Value Ref Range Glucose (POC) 160 (H) 70 - 110 mg/dL

## 2018-09-29 ENCOUNTER — ANESTHESIA EVENT (OUTPATIENT)
Dept: SURGERY | Age: 68
DRG: 501 | End: 2018-09-29
Payer: MEDICARE

## 2018-09-29 VITALS
DIASTOLIC BLOOD PRESSURE: 73 MMHG | OXYGEN SATURATION: 95 % | BODY MASS INDEX: 32.13 KG/M2 | HEIGHT: 61 IN | SYSTOLIC BLOOD PRESSURE: 168 MMHG | TEMPERATURE: 98.1 F | RESPIRATION RATE: 17 BRPM | HEART RATE: 77 BPM | WEIGHT: 170.19 LBS

## 2018-09-29 LAB
ABO + RH BLD: NORMAL
ANION GAP SERPL CALC-SCNC: 8 MMOL/L (ref 3–18)
BASOPHILS # BLD: 0 K/UL (ref 0–0.1)
BASOPHILS NFR BLD: 0 % (ref 0–2)
BLD PROD TYP BPU: NORMAL
BLOOD GROUP ANTIBODIES SERPL: NORMAL
BPU ID: NORMAL
BUN SERPL-MCNC: 41 MG/DL (ref 7–18)
BUN/CREAT SERPL: 28 (ref 12–20)
CALCIUM SERPL-MCNC: 8.3 MG/DL (ref 8.5–10.1)
CALLED TO:,BCALL1: NORMAL
CHLORIDE SERPL-SCNC: 114 MMOL/L (ref 100–108)
CO2 SERPL-SCNC: 21 MMOL/L (ref 21–32)
CREAT SERPL-MCNC: 1.48 MG/DL (ref 0.6–1.3)
CROSSMATCH RESULT,%XM: NORMAL
DIFFERENTIAL METHOD BLD: ABNORMAL
EOSINOPHIL # BLD: 0.1 K/UL (ref 0–0.4)
EOSINOPHIL NFR BLD: 2 % (ref 0–5)
ERYTHROCYTE [DISTWIDTH] IN BLOOD BY AUTOMATED COUNT: 14.7 % (ref 11.6–14.5)
GLUCOSE BLD STRIP.AUTO-MCNC: 114 MG/DL (ref 70–110)
GLUCOSE BLD STRIP.AUTO-MCNC: 163 MG/DL (ref 70–110)
GLUCOSE BLD STRIP.AUTO-MCNC: 185 MG/DL (ref 70–110)
GLUCOSE BLD STRIP.AUTO-MCNC: 193 MG/DL (ref 70–110)
GLUCOSE SERPL-MCNC: 107 MG/DL (ref 74–99)
HCT VFR BLD AUTO: 22.3 % (ref 35–45)
HGB BLD-MCNC: 7.2 G/DL (ref 12–16)
LYMPHOCYTES # BLD: 1 K/UL (ref 0.9–3.6)
LYMPHOCYTES NFR BLD: 13 % (ref 21–52)
MCH RBC QN AUTO: 26.1 PG (ref 24–34)
MCHC RBC AUTO-ENTMCNC: 32.3 G/DL (ref 31–37)
MCV RBC AUTO: 80.8 FL (ref 74–97)
MONOCYTES # BLD: 1.2 K/UL (ref 0.05–1.2)
MONOCYTES NFR BLD: 15 % (ref 3–10)
NEUTS SEG # BLD: 5.7 K/UL (ref 1.8–8)
NEUTS SEG NFR BLD: 70 % (ref 40–73)
PLATELET # BLD AUTO: 293 K/UL (ref 135–420)
PMV BLD AUTO: 9.9 FL (ref 9.2–11.8)
POTASSIUM SERPL-SCNC: 4.5 MMOL/L (ref 3.5–5.5)
RBC # BLD AUTO: 2.76 M/UL (ref 4.2–5.3)
SODIUM SERPL-SCNC: 143 MMOL/L (ref 136–145)
SPECIMEN EXP DATE BLD: NORMAL
STATUS OF UNIT,%ST: NORMAL
UNIT DIVISION, %UDIV: 0
WBC # BLD AUTO: 8 K/UL (ref 4.6–13.2)

## 2018-09-29 PROCEDURE — 74011250637 HC RX REV CODE- 250/637: Performed by: NURSE PRACTITIONER

## 2018-09-29 PROCEDURE — 2W3RX2Z IMMOBILIZATION OF LEFT LOWER LEG USING CAST: ICD-10-PCS | Performed by: ORTHOPAEDIC SURGERY

## 2018-09-29 PROCEDURE — 74011250636 HC RX REV CODE- 250/636: Performed by: HOSPITALIST

## 2018-09-29 PROCEDURE — 74011250636 HC RX REV CODE- 250/636: Performed by: EMERGENCY MEDICINE

## 2018-09-29 PROCEDURE — 97165 OT EVAL LOW COMPLEX 30 MIN: CPT

## 2018-09-29 PROCEDURE — 36415 COLL VENOUS BLD VENIPUNCTURE: CPT | Performed by: ORTHOPAEDIC SURGERY

## 2018-09-29 PROCEDURE — 74011250636 HC RX REV CODE- 250/636: Performed by: ORTHOPAEDIC SURGERY

## 2018-09-29 PROCEDURE — 30233N1 TRANSFUSION OF NONAUTOLOGOUS RED BLOOD CELLS INTO PERIPHERAL VEIN, PERCUTANEOUS APPROACH: ICD-10-PCS | Performed by: EMERGENCY MEDICINE

## 2018-09-29 PROCEDURE — 82962 GLUCOSE BLOOD TEST: CPT

## 2018-09-29 PROCEDURE — 80048 BASIC METABOLIC PNL TOTAL CA: CPT | Performed by: ORTHOPAEDIC SURGERY

## 2018-09-29 PROCEDURE — 74011250637 HC RX REV CODE- 250/637: Performed by: ORTHOPAEDIC SURGERY

## 2018-09-29 PROCEDURE — 97530 THERAPEUTIC ACTIVITIES: CPT

## 2018-09-29 PROCEDURE — 97535 SELF CARE MNGMENT TRAINING: CPT

## 2018-09-29 PROCEDURE — 85025 COMPLETE CBC W/AUTO DIFF WBC: CPT | Performed by: ORTHOPAEDIC SURGERY

## 2018-09-29 PROCEDURE — 74011636637 HC RX REV CODE- 636/637: Performed by: HOSPITALIST

## 2018-09-29 PROCEDURE — 74011250637 HC RX REV CODE- 250/637: Performed by: PHYSICIAN ASSISTANT

## 2018-09-29 RX ORDER — FACIAL-BODY WIPES
10 EACH TOPICAL DAILY PRN
Status: DISCONTINUED | OUTPATIENT
Start: 2018-09-29 | End: 2018-09-30 | Stop reason: HOSPADM

## 2018-09-29 RX ORDER — SODIUM CHLORIDE 0.9 % (FLUSH) 0.9 %
5-10 SYRINGE (ML) INJECTION EVERY 8 HOURS
Status: CANCELLED | OUTPATIENT
Start: 2018-09-29

## 2018-09-29 RX ORDER — MORPHINE SULFATE 4 MG/ML
1 INJECTION INTRAVENOUS ONCE
Status: DISCONTINUED | OUTPATIENT
Start: 2018-09-29 | End: 2018-09-30 | Stop reason: HOSPADM

## 2018-09-29 RX ORDER — SODIUM CHLORIDE 0.9 % (FLUSH) 0.9 %
5-10 SYRINGE (ML) INJECTION AS NEEDED
Status: CANCELLED | OUTPATIENT
Start: 2018-09-29

## 2018-09-29 RX ORDER — INSULIN LISPRO 100 [IU]/ML
INJECTION, SOLUTION INTRAVENOUS; SUBCUTANEOUS ONCE
Status: CANCELLED | OUTPATIENT
Start: 2018-09-29 | End: 2018-09-30

## 2018-09-29 RX ORDER — POLYETHYLENE GLYCOL 3350 17 G/17G
17 POWDER, FOR SOLUTION ORAL DAILY
Status: DISCONTINUED | OUTPATIENT
Start: 2018-09-30 | End: 2018-09-30 | Stop reason: HOSPADM

## 2018-09-29 RX ORDER — SODIUM CHLORIDE, SODIUM LACTATE, POTASSIUM CHLORIDE, CALCIUM CHLORIDE 600; 310; 30; 20 MG/100ML; MG/100ML; MG/100ML; MG/100ML
75 INJECTION, SOLUTION INTRAVENOUS CONTINUOUS
Status: CANCELLED | OUTPATIENT
Start: 2018-09-29 | End: 2018-09-30

## 2018-09-29 RX ADMIN — DOCUSATE SODIUM 100 MG: 100 CAPSULE, LIQUID FILLED ORAL at 17:27

## 2018-09-29 RX ADMIN — TAMSULOSIN HYDROCHLORIDE 0.4 MG: 0.4 CAPSULE ORAL at 08:35

## 2018-09-29 RX ADMIN — METOPROLOL TARTRATE 12.5 MG: 25 TABLET ORAL at 08:35

## 2018-09-29 RX ADMIN — DILTIAZEM HYDROCHLORIDE 180 MG: 180 CAPSULE, COATED, EXTENDED RELEASE ORAL at 08:35

## 2018-09-29 RX ADMIN — INSULIN LISPRO 2 UNITS: 100 INJECTION, SOLUTION INTRAVENOUS; SUBCUTANEOUS at 17:28

## 2018-09-29 RX ADMIN — INSULIN LISPRO 2 UNITS: 100 INJECTION, SOLUTION INTRAVENOUS; SUBCUTANEOUS at 11:59

## 2018-09-29 RX ADMIN — OXYCODONE HYDROCHLORIDE 10 MG: 5 TABLET ORAL at 12:09

## 2018-09-29 RX ADMIN — FERROUS SULFATE TAB 325 MG (65 MG ELEMENTAL FE) 325 MG: 325 (65 FE) TAB at 08:34

## 2018-09-29 RX ADMIN — METOPROLOL TARTRATE 12.5 MG: 25 TABLET ORAL at 17:27

## 2018-09-29 RX ADMIN — DOCUSATE SODIUM 100 MG: 100 CAPSULE, LIQUID FILLED ORAL at 08:35

## 2018-09-29 RX ADMIN — MORPHINE SULFATE 2 MG: 4 INJECTION INTRAVENOUS at 18:47

## 2018-09-29 RX ADMIN — ASPIRIN 81 MG 81 MG: 81 TABLET ORAL at 08:34

## 2018-09-29 RX ADMIN — Medication 10 ML: at 08:38

## 2018-09-29 RX ADMIN — SODIUM CHLORIDE 50 ML/HR: 900 INJECTION, SOLUTION INTRAVENOUS at 15:42

## 2018-09-29 RX ADMIN — OXYCODONE HYDROCHLORIDE 10 MG: 5 TABLET ORAL at 17:27

## 2018-09-29 RX ADMIN — FERROUS SULFATE TAB 325 MG (65 MG ELEMENTAL FE) 325 MG: 325 (65 FE) TAB at 17:26

## 2018-09-29 RX ADMIN — ENOXAPARIN SODIUM 30 MG: 30 INJECTION SUBCUTANEOUS at 12:00

## 2018-09-29 RX ADMIN — AMIODARONE HYDROCHLORIDE 200 MG: 200 TABLET ORAL at 08:35

## 2018-09-29 RX ADMIN — Medication 10 ML: at 17:28

## 2018-09-29 NOTE — PROGRESS NOTES
Problem: Falls - Risk of 
Goal: *Absence of Falls Document Dannial Shadow Fall Risk and appropriate interventions in the flowsheet. Outcome: Progressing Towards Goal 
Fall Risk Interventions: 
Mobility Interventions: Bed/chair exit alarm, Patient to call before getting OOB, PT Consult for mobility concerns, PT Consult for assist device competence Mentation Interventions: Adequate sleep, hydration, pain control, Door open when patient unattended, More frequent rounding, Reorient patient, Toileting rounds, Update white board Medication Interventions: Bed/chair exit alarm, Patient to call before getting OOB, Teach patient to arise slowly Elimination Interventions: Call light in reach, Patient to call for help with toileting needs, Toileting schedule/hourly rounds History of Falls Interventions: Bed/chair exit alarm, Consult care management for discharge planning, Door open when patient unattended, Room close to nurse's station Problem: Pressure Injury - Risk of 
Goal: *Prevention of pressure injury Document Neel Scale and appropriate interventions in the flowsheet. Outcome: Progressing Towards Goal 
Pressure Injury Interventions: 
Sensory Interventions: Assess changes in LOC, Discuss PT/OT consult with provider, Keep linens dry and wrinkle-free Moisture Interventions: Absorbent underpads, Internal/External urinary devices Activity Interventions: Increase time out of bed, Pressure redistribution bed/mattress(bed type) Mobility Interventions: Float heels, HOB 30 degrees or less, Pressure redistribution bed/mattress (bed type), PT/OT evaluation Nutrition Interventions: Document food/fluid/supplement intake

## 2018-09-29 NOTE — PROGRESS NOTES
1727 pain meds Roxicodone 10 mg po given c/o pain 5/10 will continue to reevaluate and monitor pain on the numerical scale 1844 c/o of feeling diaphoretic checked blood glucose 185 mg/dl portable fan placed in the room for comfort and in progress 1847 reevaluated pt is trashing around in bed c/o excruciating pain 10/10 on pain scale stating that her heel feels like it is swelling the  elevated on 2 pillows and a blanket with the heel floating off the pillow able to palpate pulse, capillary refills < 3 sec, skin color pink and warm to touch. Morphine 2 mg IV given elevated left leg cast on pillow pt continues to take her leg off of the pillow and trying to get out of the bed call bell within reach, bed alarm in progress, bed in lowest position and locked. Pt is attempting to pull out thomas catheter redirected her attention with successful result pt is on the telephone calling family to come pick her because she is leaving and going home and is very adamant that she wants the cast cut off 
 
1852 Paged Dr Shabbir Estes and made aware of the above new order given 1 mg morphine IV stat and continue to keep left leg elevated pt refused to take anymore medication from staff 1949  Paged Dr Simon Crisostomo pt stated that she wants to sign out AMA referred to call ortho again 200 Paged Dr Shabbir Estes returned page and given update on pt's condition and pt wanting to sign out AMA 2016 Dr Shabbir Estes at bedside to elavauate pt's condition

## 2018-09-29 NOTE — PROGRESS NOTES
Problem: Mobility Impaired (Adult and Pediatric) Goal: *Acute Goals and Plan of Care (Insert Text) Physical Therapy Goals Initiated 9/27/2018 and to be accomplished within 7 day(s) 1. Patient will move from supine to sit and sit to supine , scoot up and down and roll side to side in bed with supervision/set-up. 2.  Patient will transfer from bed to chair and chair to bed with minimal assistance/contact guard assist using the least restrictive device. 3.  Patient will perform sit to stand with minimal assistance/contact guard assist. 
4.  Patient will ambulate with minimal assistance/contact guard assist for >10 feet with the least restrictive device. 5.  Patient will ascend/descend 15 stairs with 1-2 handrail(s) with minimal assistance/contact guard assist.  
physical Therapy TREATMENT Patient: Eugenia Parsons (29 y.o. female) Date: 9/29/2018 Diagnosis: LEFT FEMORAL PERIPROSTETIC FRACTURE Periprosthetic fracture around internal prosthetic left knee joint, initial encounter Closed left hip fracture ([address] L66.479P DOG BITE; S62.391B OPEN FX LEFT 2ND METACARPAL, DISPLACED HEAD <principal problem not specified> Procedure(s) (LRB): LEFT FEMUR DISTAL OPEN REDUCTION INTERNAL FIXATION/ SYNTHES/ PERIARTICULAR DISTAL LOCKING PLATE (Left) 3 Days Post-Op Precautions: Fall, NWB (LLE, brace on L hand) Chart, physical therapy assessment, plan of care and goals were reviewed. ASSESSMENT: 
Patient presents tdoay alert and agreeable to therapy and was supine in bed upon arrival. Educated patient on NWB LUE and NWB LLE and patient required Addie to maintain during session. Patient was confused which was barrier to participation as patient demonstrating decreased motor planning and muscle activation. Patient able to sit EOB for several mins and demos fair to poor balance.  Attempted x2 with gait belt to stand, once with one person assist standing in front of patient and another attempt with 2 person assist. Patient does not activate muscles to assist and demos poor motor planning; is not safe for sit to stand/bed to chair transfer for patient or therapist as patient unable to clear bottom from bed. Patient was assisted back to supine and was left resting with call bell by her side. Patient required additional time during session for instruction and demo with reinforcement via verbal, tactile, and manual cues. Progression toward goals: 
[]      Improving appropriately and progressing toward goals [x]      Improving slowly and progressing toward goals 
[]      Not making progress toward goals and plan of care will be adjusted PLAN: 
Patient continues to benefit from skilled intervention to address the above impairments. Continue treatment per established plan of care. Discharge Recommendations:  Diego Ahmadi Further Equipment Recommendations for Discharge:  N/A  
 
G-CODES:  
 
Mobility  Current  CL= 60-79%   Goal  CJ= 20-39%. The severity rating is based on the Level of Assistance required for Functional Mobility and ADLs. SUBJECTIVE:  
Patient stated How did I break my leg?  OBJECTIVE DATA SUMMARY:  
Critical Behavior: 
Neurologic State: Alert, Eyes open spontaneously Orientation Level: Disoriented to situation, Disoriented to time, Oriented to person, Oriented to place Cognition: Decreased attention/concentration, Follows commands Safety/Judgement: Awareness of environment, Fall prevention Functional Mobility Training: 
Bed Mobility: 
Rolling: Minimum assistance; Moderate assistance Supine to Sit: Moderate assistance;Maximum assistance Sit to Supine: Moderate assistance;Assist x2 Scooting: Maximum assistance; Total assistance;Assist x2 Transfers: 
Sit to Stand:  (attempted x2, unsuccessful in clearing bottom from bed) Balance: 
Sitting: Impaired Sitting - Static: Fair (occasional) Sitting - Dynamic: Poor (constant support) Pain: Pt reports 0/10 pain or discomfort prior to treatment.   
Pt reports 0/10 pain or discomfort post treatment. Activity Tolerance:  
Patient tolerated activity fair; expressed being confused d Please refer to the flowsheet for vital signs taken during this treatment. After treatment:  
[] Patient left in no apparent distress sitting up in chair 
[x] Patient left in no apparent distress in bed 
[x] Call bell left within reach [x] Nursing notified 
[] Caregiver present [x] Bed alarm activated Ruiz Plan, PT Time Calculation: 29 mins

## 2018-09-29 NOTE — PROGRESS NOTES
Progress Note Patient: Adriana Forrester               Sex: female          DOA: 9/26/2018 YOB: 1950      Age:  79 y.o.        LOS:  LOS: 3 days Subjective: No complaints this morning. Last BM yesterday. Denies pain of left hand, left leg. More awake and alert. Denies HA, CP/chest discomfort, cough, SOB, abdominal pain, fever or chills, N/V Assessment 1. Acute anemia s/p surgical procedure 2. ORIF left femur fx, ortho following 3. Renal insufficiency possibly d/t urinary retention 4. Left hand dog bite injury, comminute fracture of the second metacarpal head with multiple displaced bone fragments, hand surgery consult Dr. Larry Marcano 5. HTN 6. afib hx with no OAC 7. HLD 8. DM II 9. Discharge planning 
  
Plan 1. Monitor H and H, stable, no blood needed today 2. Pain management, casting of the left leg, left hand brace 3. Fuentes catheter placed, monitor I and O's, Flomax added to regimen 4. monitor metabolic panel improved, urinalysis neg, urine cx NGTD, lisinopril held 5. hand surgery scheduled 10/1/18, Dr. Larry Marcano 6. Continue cardizem 180 mg daily, amiodarone 200 mg daily and metoprolol 12.5 mg BID,  asa 81 mg 
7. Statin 8. CM consult Objective:  
  
/68 (BP 1 Location: Right arm, BP Patient Position: At rest)  Pulse 79  Temp 97.9 °F (36.6 °C)  Resp 17  Ht 5' 1\" (1.549 m)  Wt 77.2 kg (170 lb 3 oz)  SpO2 100%  BMI 32.16 kg/m2 Physical Exam: 
General:  NAD Cardiovascular:  RRR, murmur Pulmonary:  LSC throughout; respiratory effort WNL 
GI:  +BS in all four quadrants, soft, non-tender Extremities:  Left leg cast, left hand brace. No edema; 2+ dorsalis pedis pulses bilaterally Neuro: alert and oriented x3 Lab/Data Reviewed: 
Recent Results (from the past 12 hour(s)) METABOLIC PANEL, BASIC Collection Time: 09/29/18  5:09 AM  
Result Value Ref Range Sodium 143 136 - 145 mmol/L  Potassium 4.5 3.5 - 5.5 mmol/L  
 Chloride 114 (H) 100 - 108 mmol/L  
 CO2 21 21 - 32 mmol/L Anion gap 8 3.0 - 18 mmol/L Glucose 107 (H) 74 - 99 mg/dL BUN 41 (H) 7.0 - 18 MG/DL Creatinine 1.48 (H) 0.6 - 1.3 MG/DL  
 BUN/Creatinine ratio 28 (H) 12 - 20 GFR est AA 43 (L) >60 ml/min/1.73m2 GFR est non-AA 35 (L) >60 ml/min/1.73m2 Calcium 8.3 (L) 8.5 - 10.1 MG/DL  
CBC WITH AUTOMATED DIFF Collection Time: 09/29/18  5:09 AM  
Result Value Ref Range WBC 8.0 4.6 - 13.2 K/uL  
 RBC 2.76 (L) 4.20 - 5.30 M/uL HGB 7.2 (L) 12.0 - 16.0 g/dL HCT 22.3 (L) 35.0 - 45.0 % MCV 80.8 74.0 - 97.0 FL  
 MCH 26.1 24.0 - 34.0 PG  
 MCHC 32.3 31.0 - 37.0 g/dL  
 RDW 14.7 (H) 11.6 - 14.5 % PLATELET 370 458 - 209 K/uL MPV 9.9 9.2 - 11.8 FL  
 NEUTROPHILS 70 40 - 73 % LYMPHOCYTES 13 (L) 21 - 52 % MONOCYTES 15 (H) 3 - 10 % EOSINOPHILS 2 0 - 5 % BASOPHILS 0 0 - 2 %  
 ABS. NEUTROPHILS 5.7 1.8 - 8.0 K/UL  
 ABS. LYMPHOCYTES 1.0 0.9 - 3.6 K/UL  
 ABS. MONOCYTES 1.2 0.05 - 1.2 K/UL  
 ABS. EOSINOPHILS 0.1 0.0 - 0.4 K/UL  
 ABS. BASOPHILS 0.0 0.0 - 0.1 K/UL  
 DF AUTOMATED    
GLUCOSE, POC Collection Time: 09/29/18  6:49 AM  
Result Value Ref Range Glucose (POC) 114 (H) 70 - 110 mg/dL

## 2018-09-29 NOTE — ROUTINE PROCESS
Bedside and Verbal shift change report given to Adele WOODRUFF RN (oncoming nurse) by Ryan Hanson RN (offgoing nurse). Report included the following information SBAR, Kardex and MAR. Patient had no acute events overnight. Currently resting in NAD. No express needs reported at this time.

## 2018-09-29 NOTE — PROGRESS NOTES
Problem: Self Care Deficits Care Plan (Adult) Goal: *Acute Goals and Plan of Care (Insert Text) Occupational Therapy Goals Initiated 9/29/2018 within 7 day(s). 1.  Patient will perform grooming with supervision/set-up 2. Patient will perform upper body dressing with CGA 3. Patient will perform lower body dressing with moderate assistance with AE. 4.  Patient will maneuver in bed with CGA in preparation for EOB ADLs. Outcome: Progressing Towards Goal 
Occupational Therapy EVALUATION Patient: Leeroy Aguilar (31 y.o. female) Date: 9/29/2018 Primary Diagnosis: LEFT FEMORAL PERIPROSTETIC FRACTURE Periprosthetic fracture around internal prosthetic left knee joint, initial encounter Closed left hip fracture ([facility] [address] Z72.424V DOG BITE; S62.391B OPEN FX LEFT 2ND METACARPAL, DISPLACED HEAD Procedure(s) (LRB): LEFT FEMUR DISTAL OPEN REDUCTION INTERNAL FIXATION/ SYNTHES/ PERIARTICULAR DISTAL LOCKING PLATE (Left) 3 Days Post-Op Precautions:   Fall, Skin, Aspiration, LUE NWB, LLE NWB(cast) ASSESSMENT : 
Based on the objective data described below, the patient willing to work with OT upon arrival. Patient needed mod/max A x2 to perform bed mobility with additional time and min cues to adhere to NWB LUE. Patient needed min A/CGA during ADLs seated on EOB. Patient educated on adaptive strategies for upper body dressing; patient needed mod A to doff/coby gown seated on EOB. Attempted to stand with max A x2 within NWB LUE/LLE in preparation for clothes management/toileting; patient unable to stand fully. Assisted back to bed with mod/max A x2 and set-up with breakfast. 
 
Patient will benefit from skilled intervention to address the above impairments. Patients rehabilitation potential is considered to be Good Factors which may influence rehabilitation potential include:  
[]             None noted []             Mental ability/status [x]             Medical condition []             Home/family situation and support systems []             Safety awareness []             Pain tolerance/management 
[]             Other: PLAN : 
Recommendations and Planned Interventions: 
[x]               Self Care Training                  [x]        Therapeutic Activities [x]               Functional Mobility Training    []        Cognitive Retraining 
[x]               Therapeutic Exercises           [x]        Endurance Activities [x]               Balance Training                   []        Neuromuscular Re-Education []               Visual/Perceptual Training     [x]   Home Safety Training 
[x]               Patient Education                 []        Family Training/Education []               Other (comment): Frequency/Duration: Patient will be followed by occupational therapy 1-2 times per day/4-7 days per week to address goals. Discharge Recommendations: Diego Ahmadi Further Equipment Recommendations for Discharge: TBD Barriers to Learning/Limitations: yes;  physical 
Compensate with: visual, verbal, tactile, kinesthetic cues/model PATIENT COMPLEXITY Eval Complexity: History: LOW Complexity : Brief history review ; Examination: LOW Complexity : 1-3 performance deficits relating to physical, cognitive , or psychosocial skils that result in activity limitations and / or participation restrictions ; Decision Making:LOW Complexity : No comorbidities that affect functional and no verbal or physical assistance needed to complete eval tasks  Assessment: Low Complexity G-CODES:  
 
Self Care  Current  CK= 40-59%  Goal  CI= 1-19%. The severity rating is based on the Level of Assistance required for Functional Mobility and ADLs. SUBJECTIVE:  
Patient stated How did I break my leg? Robbie Osborne OBJECTIVE DATA SUMMARY:  
 
Past Medical History:  
Diagnosis Date  Anxiety  Arthritis  Asbestosis ([facility] [address]  Depression  Diabetes ([facility]  Fibromyalgia  GERD (gastroesophageal reflux disease)  Hypercholesteremia  Hypertension  Renal insufficiency  Skin abscess  Sleep apnea Past Surgical History:  
Procedure Laterality Date  HX BREAST LUMPECTOMY  HX CARPAL TUNNEL RELEASE  HX KNEE REPLACEMENT    
 bilater  HX PARTIAL THYROIDECTOMY  HX SHOULDER ARTHROSCOPY    
 HX TUBAL LIGATION Prior Level of Function/Home Situation: Patient reported she was independent in basic self care tasks and functional mobility PTA. Home Situation Home Environment: Private residence # Steps to Enter: 15 
Rails to Enter: Yes Hand Rails : Bilateral 
One/Two Story Residence: One story (FROG) # of Interior Steps: 0 Height of Each Step (in): 1 inches Interior Rails: Right Lift Chair Available: No 
Living Alone: No 
Support Systems: Child(ham) Patient Expects to be Discharged to[de-identified] Private residence Current DME Used/Available at Home: Cane, straight, Grab bars Tub or Shower Type: Tub/Shower combination 
[x]  Right hand dominant   []  Left hand dominant Cognitive/Behavioral Status: 
Neurologic State: Alert Orientation Level: Oriented to person;Oriented to place; Disoriented to situation;Oriented to time Cognition: Follows commands Skin: No skin changes noted Edema: No edema noted Vision/Perceptual:   
   Acuity: Within Defined Limits Coordination: N/A Balance: 
Sitting: Impaired Sitting - Static: Fair (occasional) Sitting - Dynamic: Poor (constant support) Strength: 
Strength: Generally decreased, functional (RUE; not assessed LUE until clarification ) Tone & Sensation:(BUEs) Tone: Normal 
Sensation: Intact Range of Motion: 
AROM: Within functional limits (RUE; not assessed LUE until clarification ) Functional Mobility and Transfers for ADLs: 
Bed Mobility: 
Rolling: Minimum assistance; Moderate assistance Supine to Sit: Moderate assistance;Maximum assistance;Assist x2 
 Sit to Supine: Moderate assistance;Assist x2 Scooting: Maximum assistance; Total assistance Transfers: 
Sit to Stand:  (max A x2, not able to stand) ADL Assessment:(clinical judgement) Feeding: Supervision;Setup (using RUE) Oral Facial Hygiene/Grooming: Minimum assistance Bathing: Maximum assistance Upper Body Dressing: Moderate assistance Lower Body Dressing: Maximum assistance Toileting: Maximum assistance Pain: 
Pt reports 6/10 pain or discomfort prior to treatment.   
Pt reports 6/10 pain or discomfort post treatment. Activity Tolerance:  
Fair Please refer to the flowsheet for vital signs taken during this treatment. After treatment:  
[] Patient left in no apparent distress sitting up in chair 
[x] Patient left in no apparent distress in bed 
[x] Call bell left within reach [x] Nursing notified 
[] Caregiver present 
[] Bed alarm activated COMMUNICATION/EDUCATION:  
[] Home safety education was provided and the patient/caregiver indicated understanding. [x] Patient/family have participated as able in goal setting and plan of care. [x] Patient/family agree to work toward stated goals and plan of care. [] Patient understands intent and goals of therapy, but is neutral about his/her participation. [] Patient is unable to participate in goal setting and plan of care. Thank you for this referral. 
Veneta Ahumada, OTR/L Time Calculation: 30 mins

## 2018-09-30 ENCOUNTER — HOSPITAL ENCOUNTER (INPATIENT)
Age: 68
LOS: 5 days | Discharge: NURSING, REHAB OR LONG-TERM CARE | DRG: 501 | End: 2018-10-05
Attending: EMERGENCY MEDICINE | Admitting: INTERNAL MEDICINE
Payer: MEDICARE

## 2018-09-30 ENCOUNTER — APPOINTMENT (OUTPATIENT)
Dept: GENERAL RADIOLOGY | Age: 68
DRG: 501 | End: 2018-09-30
Attending: EMERGENCY MEDICINE
Payer: MEDICARE
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
## 2018-09-30 DIAGNOSIS — I10 HYPERTENSION, ESSENTIAL: ICD-10-CM

## 2018-09-30 DIAGNOSIS — S80.212A KNEE ABRASION, LEFT, INITIAL ENCOUNTER: Primary | ICD-10-CM

## 2018-09-30 DIAGNOSIS — S62.331D: ICD-10-CM

## 2018-09-30 DIAGNOSIS — E86.0 DEHYDRATION: ICD-10-CM

## 2018-09-30 DIAGNOSIS — M97.12XS PERIPROSTHETIC FRACTURE AROUND INTERNAL PROSTHETIC LEFT KNEE JOINT, SEQUELA: ICD-10-CM

## 2018-09-30 PROBLEM — G62.9 NEUROPATHY: Status: ACTIVE | Noted: 2018-09-30

## 2018-09-30 LAB
ALBUMIN SERPL-MCNC: 1.9 G/DL (ref 3.4–5)
ALBUMIN/GLOB SERPL: 0.5 {RATIO} (ref 0.8–1.7)
ALP SERPL-CCNC: 229 U/L (ref 45–117)
ALT SERPL-CCNC: 12 U/L (ref 13–56)
ANION GAP SERPL CALC-SCNC: 10 MMOL/L (ref 3–18)
AST SERPL-CCNC: 33 U/L (ref 15–37)
BACTERIA SPEC CULT: NORMAL
BASOPHILS # BLD: 0 K/UL (ref 0–0.1)
BASOPHILS NFR BLD: 0 % (ref 0–2)
BILIRUB SERPL-MCNC: 0.6 MG/DL (ref 0.2–1)
BUN SERPL-MCNC: 36 MG/DL (ref 7–18)
BUN/CREAT SERPL: 27 (ref 12–20)
CALCIUM SERPL-MCNC: 8.5 MG/DL (ref 8.5–10.1)
CHLORIDE SERPL-SCNC: 114 MMOL/L (ref 100–108)
CK SERPL-CCNC: 184 U/L (ref 26–192)
CO2 SERPL-SCNC: 21 MMOL/L (ref 21–32)
CREAT SERPL-MCNC: 1.31 MG/DL (ref 0.6–1.3)
DIFFERENTIAL METHOD BLD: ABNORMAL
EOSINOPHIL # BLD: 0.1 K/UL (ref 0–0.4)
EOSINOPHIL NFR BLD: 1 % (ref 0–5)
ERYTHROCYTE [DISTWIDTH] IN BLOOD BY AUTOMATED COUNT: 14.7 % (ref 11.6–14.5)
GLOBULIN SER CALC-MCNC: 3.9 G/DL (ref 2–4)
GLUCOSE SERPL-MCNC: 99 MG/DL (ref 74–99)
HCT VFR BLD AUTO: 23 % (ref 35–45)
HGB BLD-MCNC: 7.7 G/DL (ref 12–16)
LYMPHOCYTES # BLD: 0.9 K/UL (ref 0.9–3.6)
LYMPHOCYTES NFR BLD: 9 % (ref 21–52)
MCH RBC QN AUTO: 26.7 PG (ref 24–34)
MCHC RBC AUTO-ENTMCNC: 33.5 G/DL (ref 31–37)
MCV RBC AUTO: 79.9 FL (ref 74–97)
MONOCYTES # BLD: 1.6 K/UL (ref 0.05–1.2)
MONOCYTES NFR BLD: 15 % (ref 3–10)
NEUTS SEG # BLD: 8 K/UL (ref 1.8–8)
NEUTS SEG NFR BLD: 75 % (ref 40–73)
PLATELET # BLD AUTO: 349 K/UL (ref 135–420)
PMV BLD AUTO: 9.4 FL (ref 9.2–11.8)
POTASSIUM SERPL-SCNC: 4.1 MMOL/L (ref 3.5–5.5)
PROT SERPL-MCNC: 5.8 G/DL (ref 6.4–8.2)
RBC # BLD AUTO: 2.88 M/UL (ref 4.2–5.3)
SERVICE CMNT-IMP: NORMAL
SODIUM SERPL-SCNC: 145 MMOL/L (ref 136–145)
WBC # BLD AUTO: 10.5 K/UL (ref 4.6–13.2)

## 2018-09-30 PROCEDURE — 99284 EMERGENCY DEPT VISIT MOD MDM: CPT

## 2018-09-30 PROCEDURE — 80053 COMPREHEN METABOLIC PANEL: CPT | Performed by: EMERGENCY MEDICINE

## 2018-09-30 PROCEDURE — 82550 ASSAY OF CK (CPK): CPT | Performed by: EMERGENCY MEDICINE

## 2018-09-30 PROCEDURE — 74011250636 HC RX REV CODE- 250/636: Performed by: INTERNAL MEDICINE

## 2018-09-30 PROCEDURE — 73562 X-RAY EXAM OF KNEE 3: CPT

## 2018-09-30 PROCEDURE — 85025 COMPLETE CBC W/AUTO DIFF WBC: CPT | Performed by: EMERGENCY MEDICINE

## 2018-09-30 PROCEDURE — 65270000029 HC RM PRIVATE

## 2018-09-30 RX ORDER — AMIODARONE HYDROCHLORIDE 200 MG/1
400 TABLET ORAL DAILY
Status: DISCONTINUED | OUTPATIENT
Start: 2018-10-01 | End: 2018-10-05 | Stop reason: HOSPADM

## 2018-09-30 RX ORDER — PREGABALIN 50 MG/1
100 CAPSULE ORAL 2 TIMES DAILY
Status: DISCONTINUED | OUTPATIENT
Start: 2018-10-01 | End: 2018-10-05 | Stop reason: HOSPADM

## 2018-09-30 RX ORDER — DOCUSATE SODIUM 100 MG/1
100 CAPSULE, LIQUID FILLED ORAL
Status: DISCONTINUED | OUTPATIENT
Start: 2018-09-30 | End: 2018-10-05 | Stop reason: HOSPADM

## 2018-09-30 RX ORDER — ONDANSETRON HYDROCHLORIDE 4 MG/2ML
4 INJECTION, SOLUTION INTRAMUSCULAR; INTRAVENOUS
Status: DISCONTINUED | OUTPATIENT
Start: 2018-09-30 | End: 2018-10-05 | Stop reason: HOSPADM

## 2018-09-30 RX ORDER — HYDROXYCHLOROQUINE SULFATE 200 MG/1
200 TABLET, FILM COATED ORAL 2 TIMES DAILY
Status: DISCONTINUED | OUTPATIENT
Start: 2018-10-01 | End: 2018-10-05 | Stop reason: HOSPADM

## 2018-09-30 RX ORDER — MORPHINE SULFATE 2 MG/ML
1 INJECTION, SOLUTION INTRAMUSCULAR; INTRAVENOUS
Status: DISCONTINUED | OUTPATIENT
Start: 2018-09-30 | End: 2018-10-05 | Stop reason: HOSPADM

## 2018-09-30 RX ORDER — GUAIFENESIN 100 MG/5ML
81 LIQUID (ML) ORAL DAILY
Status: DISCONTINUED | OUTPATIENT
Start: 2018-10-01 | End: 2018-10-01

## 2018-09-30 RX ORDER — DULOXETIN HYDROCHLORIDE 60 MG/1
60 CAPSULE, DELAYED RELEASE ORAL DAILY
Status: DISCONTINUED | OUTPATIENT
Start: 2018-10-01 | End: 2018-10-05 | Stop reason: HOSPADM

## 2018-09-30 RX ORDER — HEPARIN SODIUM 5000 [USP'U]/ML
5000 INJECTION, SOLUTION INTRAVENOUS; SUBCUTANEOUS EVERY 8 HOURS
Status: DISCONTINUED | OUTPATIENT
Start: 2018-09-30 | End: 2018-10-01

## 2018-09-30 RX ORDER — MAGNESIUM SULFATE 100 %
4 CRYSTALS MISCELLANEOUS AS NEEDED
Status: DISCONTINUED | OUTPATIENT
Start: 2018-09-30 | End: 2018-10-05 | Stop reason: HOSPADM

## 2018-09-30 RX ORDER — LISINOPRIL 10 MG/1
10 TABLET ORAL DAILY
Status: DISCONTINUED | OUTPATIENT
Start: 2018-10-01 | End: 2018-10-02

## 2018-09-30 RX ORDER — DEXTROSE 50 % IN WATER (D50W) INTRAVENOUS SYRINGE
25-50 AS NEEDED
Status: DISCONTINUED | OUTPATIENT
Start: 2018-09-30 | End: 2018-10-05 | Stop reason: HOSPADM

## 2018-09-30 RX ORDER — AMIODARONE HYDROCHLORIDE 400 MG/1
200 TABLET ORAL DAILY
COMMUNITY
End: 2018-12-13

## 2018-09-30 RX ORDER — METOPROLOL TARTRATE 50 MG/1
50 TABLET ORAL 2 TIMES DAILY
COMMUNITY
End: 2018-12-13

## 2018-09-30 RX ORDER — SODIUM CHLORIDE 9 MG/ML
50 INJECTION, SOLUTION INTRAVENOUS CONTINUOUS
Status: DISCONTINUED | OUTPATIENT
Start: 2018-09-30 | End: 2018-10-02

## 2018-09-30 RX ORDER — PREGABALIN 100 MG/1
100 CAPSULE ORAL 2 TIMES DAILY
COMMUNITY
End: 2019-08-09 | Stop reason: DRUGHIGH

## 2018-09-30 RX ORDER — NALOXONE HYDROCHLORIDE 0.4 MG/ML
0.4 INJECTION, SOLUTION INTRAMUSCULAR; INTRAVENOUS; SUBCUTANEOUS AS NEEDED
Status: DISCONTINUED | OUTPATIENT
Start: 2018-09-30 | End: 2018-10-05 | Stop reason: HOSPADM

## 2018-09-30 RX ORDER — ACETAMINOPHEN 325 MG/1
650 TABLET ORAL
Status: DISCONTINUED | OUTPATIENT
Start: 2018-09-30 | End: 2018-10-05 | Stop reason: HOSPADM

## 2018-09-30 RX ORDER — PRAVASTATIN SODIUM 20 MG/1
20 TABLET ORAL
Status: DISCONTINUED | OUTPATIENT
Start: 2018-09-30 | End: 2018-10-05 | Stop reason: HOSPADM

## 2018-09-30 RX ORDER — METOPROLOL TARTRATE 50 MG/1
50 TABLET ORAL EVERY 12 HOURS
Status: DISCONTINUED | OUTPATIENT
Start: 2018-09-30 | End: 2018-10-05 | Stop reason: HOSPADM

## 2018-09-30 RX ORDER — OXYCODONE AND ACETAMINOPHEN 5; 325 MG/1; MG/1
1 TABLET ORAL
Status: DISCONTINUED | OUTPATIENT
Start: 2018-09-30 | End: 2018-10-05 | Stop reason: HOSPADM

## 2018-09-30 RX ORDER — INSULIN LISPRO 100 [IU]/ML
INJECTION, SOLUTION INTRAVENOUS; SUBCUTANEOUS
Status: DISCONTINUED | OUTPATIENT
Start: 2018-10-01 | End: 2018-10-05 | Stop reason: HOSPADM

## 2018-09-30 RX ADMIN — SODIUM CHLORIDE 50 ML/HR: 900 INJECTION, SOLUTION INTRAVENOUS at 23:09

## 2018-09-30 NOTE — ED PROVIDER NOTES
EMERGENCY DEPARTMENT HISTORY AND PHYSICAL EXAM 
 
7:01 PM 
 
 
Date: 9/30/2018 Patient Name: Ady Lopez History of Presenting Illness Chief Complaint Patient presents with  Fall History Provided By: Patient and Patient's granddaughter Chief Complaint: Jasper Peek Duration:  Today Timing:  Sudden Severity: Moderate Modifying Factors: No modifying or aggravating factors were reported. Associated Symptoms: left knee pain, hip pain Additional History (Context): Ady Lopez is a 79 y.o. female with past medical Hx of diabetes, hypertension and bilateral knee replacement who presents with a sudden fall of moderate severity that happened today. Associated symptoms include left knee pain and hip pain. Patient reports scraping her knee on a rug. Her granddaughter notes that she found patient on the floor where she had been for the past 6 hours. Patient was home alone at time of fall. Patient states she was admitted to hospital for a broken knee on Wednesday and that she signed herself out last night because \"they got on her nerves\". She states that she has been ambulating by \"scooting one leg at a time\". She says that she doesn't have any walking equipment at home, however Jumana Pedraza has ordered her a walker and she's awaiting its arrival. Patient is unsure whether she used her hands to break fall, where she has previous injury. No modifying or aggravating factors were reported. Denies any further complaints or symptoms at the moment. PCP: Chetan Tellez NP Current Facility-Administered Medications Medication Dose Route Frequency Provider Last Rate Last Dose  sodium chloride 0.9 % bolus infusion 1,000 mL  1,000 mL IntraVENous ONCE Alanis Balbuena MD      
 
Current Outpatient Prescriptions Medication Sig Dispense Refill  
 HYDROcodone-acetaminophen (NORCO) 5-325 mg per tablet Take 1 Tab by mouth every six (6) hours as needed for Pain. Max Daily Amount: 4 Tabs.  20 Tab 0  
  montelukast (SINGULAIR) 10 mg tablet Take 10 mg by mouth daily.  acetaminophen (TYLENOL) 325 mg tablet Take  by mouth every four (4) hours as needed for Pain.  loratadine (CLARITIN) 10 mg tablet Take 10 mg by mouth.  pravastatin sodium (PRAVASTATIN PO) Take  by mouth.  omeprazole (PRILOSEC) 10 mg capsule Take 10 mg by mouth daily.  hydroxychloroquine (PLAQUENIL) 200 mg tablet Take 200 mg by mouth two (2) times a day.  oxyCODONE-acetaminophen (PERCOCET 7.5) 7.5-325 mg per tablet 1-2 po q 4-6h prn pain 35 Tab 0  
 amoxicillin-clavulanate (AUGMENTIN) 500-125 mg per tablet Take 1 Tab by mouth three (3) times daily. 21 Tab 0  
 amiodarone (CORDARONE) 200 mg tablet TAKE 1 TAB BY MOUTH DAILY. 30 Tab 3  
 dilTIAZem CD (CARDIZEM CD) 180 mg ER capsule TAKE ONE CAPSULE BY MOUTH EVERY DAY 30 Cap 6  
 lisinopril (PRINIVIL, ZESTRIL) 10 mg tablet Take 1 Tab by mouth daily. 90 Tab 1  
 furosemide (LASIX) 40 mg tablet Take 1 Tab by mouth daily. Indications: Peripheral Edema due to Chronic Heart Failure, For leg edema or SOB 90 Tab 1  
 metoprolol tartrate (LOPRESSOR) 25 mg tablet Take 0.5 Tabs by mouth two (2) times a day. 180 Tab 1  pregabalin (LYRICA) 150 mg capsule Take 150 mg by mouth three (3) times daily. 1 cap 3 x day  insulin glargine (LANTUS) 100 unit/mL injection 20 Units by SubCUTAneous route nightly. 1 Vial 0  
 aspirin 81 mg chewable tablet Take 1 Tab by mouth daily. 30 Tab 0  
 DULoxetine (CYMBALTA) 60 mg capsule Take 1 Cap by mouth daily. 30 Cap 0 Past History Past Medical History: 
Past Medical History:  
Diagnosis Date  Anxiety  Arthritis  Asbestosis ([facility] [address]  Depression  Diabetes ([facility]  Fibromyalgia  GERD (gastroesophageal reflux disease)  Hypercholesteremia  Hypertension  Renal insufficiency  Skin abscess  Sleep apnea Past Surgical History: 
Past Surgical History:  
Procedure Laterality Date  HX BREAST LUMPECTOMY  HX CARPAL TUNNEL RELEASE  HX KNEE REPLACEMENT    
 bilater  HX PARTIAL THYROIDECTOMY  HX SHOULDER ARTHROSCOPY    
 HX TUBAL LIGATION Family History: 
Family History Problem Relation Age of Onset  Asthma Mother  Hypertension Mother  Kidney Disease Mother  Thyroid Disease Mother  Diabetes Maternal Grandmother  Thyroid Disease Maternal Grandmother Social History: 
Social History Substance Use Topics  Smoking status: Never Smoker  Smokeless tobacco: Never Used  Alcohol use No  
 
 
Allergies: 
No Known Allergies Review of Systems Review of Systems Constitutional: Negative for chills and fever. HENT: Negative for congestion. Respiratory: Negative for cough. Cardiovascular: Negative for chest pain. Gastrointestinal: Negative for abdominal pain. Genitourinary: Negative for difficulty urinating. Musculoskeletal: Positive for arthralgias and myalgias (left knee, hip). Negative for back pain. Skin: Negative for wound. All other systems reviewed and are negative. Physical Exam  
 
Visit Vitals  /69  Pulse 70  Temp 98.2 °F (36.8 °C)  Resp 16  SpO2 96% Physical Exam  
Constitutional: She is oriented to person, place, and time. She appears well-developed and well-nourished. No distress. HENT:  
Head: Normocephalic and atraumatic. Right Ear: External ear normal.  
Left Ear: External ear normal.  
Mouth/Throat: Oropharynx is clear and moist.  
Eyes: Conjunctivae and EOM are normal. Pupils are equal, round, and reactive to light. Neck: Normal range of motion. Neck supple. Cardiovascular: Normal rate and regular rhythm. Pulmonary/Chest: Effort normal and breath sounds normal.  
Abdominal: Soft. There is no tenderness.   
Musculoskeletal:  
     Right hip: Normal.  
     Left hip: Normal.  
     Right knee: Normal.  
     Right ankle: Normal.  
 Left ankle: Normal.  
     Left hand: She exhibits swelling. Hands: 
     Legs: 
bilat calves are soft and non tender Neurological: She is alert and oriented to person, place, and time. Skin: Skin is warm and dry. She is not diaphoretic. Bruising in various stages or healing Psychiatric: She has a normal mood and affect. Her behavior is normal.  
 
 
 
Diagnostic Study Results Labs - Recent Results (from the past 12 hour(s)) CBC WITH AUTOMATED DIFF Collection Time: 09/30/18  8:10 PM  
Result Value Ref Range WBC 10.5 4.6 - 13.2 K/uL  
 RBC 2.88 (L) 4.20 - 5.30 M/uL HGB 7.7 (L) 12.0 - 16.0 g/dL HCT 23.0 (L) 35.0 - 45.0 % MCV 79.9 74.0 - 97.0 FL  
 MCH 26.7 24.0 - 34.0 PG  
 MCHC 33.5 31.0 - 37.0 g/dL  
 RDW 14.7 (H) 11.6 - 14.5 % PLATELET 304 258 - 772 K/uL MPV 9.4 9.2 - 11.8 FL  
 NEUTROPHILS 75 (H) 40 - 73 % LYMPHOCYTES 9 (L) 21 - 52 % MONOCYTES 15 (H) 3 - 10 % EOSINOPHILS 1 0 - 5 % BASOPHILS 0 0 - 2 %  
 ABS. NEUTROPHILS 8.0 1.8 - 8.0 K/UL  
 ABS. LYMPHOCYTES 0.9 0.9 - 3.6 K/UL  
 ABS. MONOCYTES 1.6 (H) 0.05 - 1.2 K/UL  
 ABS. EOSINOPHILS 0.1 0.0 - 0.4 K/UL  
 ABS. BASOPHILS 0.0 0.0 - 0.1 K/UL  
 DF AUTOMATED METABOLIC PANEL, COMPREHENSIVE Collection Time: 09/30/18  8:10 PM  
Result Value Ref Range Sodium 145 136 - 145 mmol/L Potassium 4.1 3.5 - 5.5 mmol/L Chloride 114 (H) 100 - 108 mmol/L  
 CO2 21 21 - 32 mmol/L Anion gap 10 3.0 - 18 mmol/L Glucose 99 74 - 99 mg/dL BUN 36 (H) 7.0 - 18 MG/DL Creatinine 1.31 (H) 0.6 - 1.3 MG/DL  
 BUN/Creatinine ratio 27 (H) 12 - 20 GFR est AA 49 (L) >60 ml/min/1.73m2 GFR est non-AA 41 (L) >60 ml/min/1.73m2 Calcium 8.5 8.5 - 10.1 MG/DL Bilirubin, total 0.6 0.2 - 1.0 MG/DL  
 ALT (SGPT) 12 (L) 13 - 56 U/L  
 AST (SGOT) 33 15 - 37 U/L Alk. phosphatase 229 (H) 45 - 117 U/L Protein, total 5.8 (L) 6.4 - 8.2 g/dL Albumin 1.9 (L) 3.4 - 5.0 g/dL Globulin 3.9 2.0 - 4.0 g/dL A-G Ratio 0.5 (L) 0.8 - 1.7 CK Collection Time: 09/30/18  8:10 PM  
Result Value Ref Range  26 - 192 U/L Radiologic Studies -  
XR KNEE LT 3 V    (Results Pending) Medical Decision Making I am the first provider for this patient. I reviewed the vital signs, available nursing notes, past medical history, past surgical history, family history and social history. Vital Signs-Reviewed the patient's vital signs. Pulse Oximetry Analysis -  98% on room air (Normal) Records Reviewed: Nursing Notes (Time of Review: 7:01 PM) 
 
ED Course: Progress Notes, Reevaluation, and Consults: 
 
7:19 PM Discussed with Dr. Aliza Mcallister (on call for Dr Sergio Lizama). States he is familiar with this pt from in patient admission and states she signed out AMA from hospital. Has hand surgery scheduled tomorrow. If needs to me admitted, can be admitted to medicine. Provider Notes (Medical Decision Making): Pt presents for eval of fall. Signed out AMA yesterday from in patient against the advise of Dr. Aliza Mcallister and family. Today pt was home alone and fell at 11am and was found by her daughter at [facility] states she was unable to get up on her own. She does not have walking devices at home and left prior to receiving one in the hospital. Pt only sustained an abrasion to the left knee area. Denies any other injuries. She is having surgery tomorrow morning for comminuted fracture of left hand after a dog bite last week. 10:03 PM Pt provided with walker by nursing supervisor. 10:44 PM Discussed w/ Dr. Patricia Schaffer, will admit. Diagnosis Clinical Impression: 1. Knee abrasion, left, initial encounter 2. Fall, initial encounter 3. Periprosthetic fracture around internal prosthetic left knee joint, sequela 4. Dehydration 5. Hypertension, essential   
 
 
Disposition: d/c to home Follow-up Information None Patient's Medications Start Taking No medications on file Continue Taking ACETAMINOPHEN (TYLENOL) 325 MG TABLET    Take  by mouth every four (4) hours as needed for Pain. AMIODARONE (CORDARONE) 200 MG TABLET    TAKE 1 TAB BY MOUTH DAILY. AMOXICILLIN-CLAVULANATE (AUGMENTIN) 500-125 MG PER TABLET    Take 1 Tab by mouth three (3) times daily. ASPIRIN 81 MG CHEWABLE TABLET    Take 1 Tab by mouth daily. DILTIAZEM CD (CARDIZEM CD) 180 MG ER CAPSULE    TAKE ONE CAPSULE BY MOUTH EVERY DAY  
 DULOXETINE (CYMBALTA) 60 MG CAPSULE    Take 1 Cap by mouth daily. FUROSEMIDE (LASIX) 40 MG TABLET    Take 1 Tab by mouth daily. Indications: Peripheral Edema due to Chronic Heart Failure, For leg edema or SOB HYDROCODONE-ACETAMINOPHEN (NORCO) 5-325 MG PER TABLET    Take 1 Tab by mouth every six (6) hours as needed for Pain. Max Daily Amount: 4 Tabs. HYDROXYCHLOROQUINE (PLAQUENIL) 200 MG TABLET    Take 200 mg by mouth two (2) times a day. INSULIN GLARGINE (LANTUS) 100 UNIT/ML INJECTION    20 Units by SubCUTAneous route nightly. LISINOPRIL (PRINIVIL, ZESTRIL) 10 MG TABLET    Take 1 Tab by mouth daily. LORATADINE (CLARITIN) 10 MG TABLET    Take 10 mg by mouth. METOPROLOL TARTRATE (LOPRESSOR) 25 MG TABLET    Take 0.5 Tabs by mouth two (2) times a day. MONTELUKAST (SINGULAIR) 10 MG TABLET    Take 10 mg by mouth daily. OMEPRAZOLE (PRILOSEC) 10 MG CAPSULE    Take 10 mg by mouth daily. OXYCODONE-ACETAMINOPHEN (PERCOCET 7.5) 7.5-325 MG PER TABLET    1-2 po q 4-6h prn pain PRAVASTATIN SODIUM (PRAVASTATIN PO)    Take  by mouth. PREGABALIN (LYRICA) 150 MG CAPSULE    Take 150 mg by mouth three (3) times daily. 1 cap 3 x day These Medications have changed No medications on file Stop Taking No medications on file  
 
_______________________________ Attestations: 
Scribe Attestation Isak Campoverde acting as a scribe for and in the presence of [employer], [place] September 30, 2018 at 7:01 PM 
    
Provider Attestation: I personally performed the services described in the documentation, reviewed the documentation, as recorded by the scribe in my presence, and it accurately and completely records my words and actions. September 30, 2018 at 7:01 PM - RJ Bennett 
_______________________________

## 2018-09-30 NOTE — IP AVS SNAPSHOT
[address] Patient: Quincy Friend MRN: [id number] ZPN:7/0/4111 About your hospitalization You were admitted on:  September 30, 2018 You last received care in the:  [facility] 5 [facility] 1978 You were discharged on:  October 5, 2018 Why you were hospitalized Your primary diagnosis was:  Dehydration Your diagnoses also included:  Htn (Hypertension), Type Ii Diabetes Mellitus (Hcc), Periprosthetic Fracture Around Internal Prosthetic Left Knee Joint, Open Displaced Fracture Of Neck Of Second Metacarpal Bone Of Left Hand With Routine Healing, Neuropathy Follow-up Information Follow up With Details Comments Contact Info Jose Forrester DO Go on 10/11/2018 Appointment at 8:40am. [address] [place] [address] 100 200 [facility] 
[phone] Dayanna Nettles NP On 10/9/2018 Appointment at 10:50 am [address] [place] [address] 
[phone] Jose Forrester DO Call on 10/16/2018 For suture removal, For wound re-check. GO TO THE OFFICE AT [address] [place] [address] NOT THE ONE LISTED IN CONTACT INFO. CALL TO CONFIRM APPOINTMENT. [address] [facility] [address] 200 [facility] 
[phone] Your Scheduled Appointments Thursday October 11, 2018  8:40 AM EDT  
POST OP with Jose Forrester [address] Parsons ([facility]) [address] [place] [address] 100 200 [facility]  
[phone] Discharge Orders Procedure Order Date Status Priority Quantity Spec Type Associated Dx WALKER STANDARD 09/30/18 2200 Normal Routine 1  Periprosthetic fracture around internal prosthetic left knee joint, sequela [[id number]] Comments: For ambulation due to high fall risk Dx: s/p left femur ORIF A check linus indicates which time of day the medication should be taken. My Medications START taking these medications Instructions Each Dose to Equal  
 Morning Noon Evening Bedtime  
 amoxicillin-clavulanate 875-125 mg per tablet Commonly known as:  AUGMENTIN Replaces:  amoxicillin-clavulanate 500-125 mg per tablet Your last dose was: Your next dose is: Take 1 Tab by mouth two (2) times daily (with meals) for 4 days. 1 Tab  
    
   
   
   
  
 oxyCODONE-acetaminophen 5-325 mg per tablet Commonly known as:  PERCOCET Replaces:  oxyCODONE-acetaminophen 7.5-325 mg per tablet Your last dose was: Your next dose is: Take 1 Tab by mouth every six (6) hours as needed. Max Daily Amount: 4 Tabs. 1 Tab CHANGE how you take these medications Instructions Each Dose to Equal  
 Morning Noon Evening Bedtime  
 lisinopril 40 mg tablet Commonly known as:  Gerber Calderon Start taking on:  10/6/2018 What changed:   
- medication strength 
- how much to take Your last dose was: Your next dose is: Take 1 Tab by mouth daily. 40 mg CONTINUE taking these medications Instructions Each Dose to Equal  
 Morning Noon Evening Bedtime  
 amiodarone 400 mg tablet Commonly known as:  Juanford Backbone Your last dose was: Your next dose is: Take 400 mg by mouth daily. 400 mg  
    
   
   
   
  
 aspirin 81 mg chewable tablet Your last dose was: Your next dose is: Take 1 Tab by mouth daily. 81 mg CLARITIN 10 mg tablet Generic drug:  loratadine Your last dose was: Your next dose is: Take 10 mg by mouth. 10 mg DULoxetine 60 mg capsule Commonly known as:  CYMBALTA Your last dose was: Your next dose is: Take 1 Cap by mouth daily. 60 mg  
    
   
   
   
  
 furosemide 40 mg tablet Commonly known as:  LASIX Your last dose was: Your next dose is: Take 1 Tab by mouth daily. Indications: Peripheral Edema due to Chronic Heart Failure, For leg edema or SOB 40 mg HYDROcodone-acetaminophen 5-325 mg per tablet Commonly known as:  Shanti Luis Your last dose was: Your next dose is: Take 1 Tab by mouth every six (6) hours as needed for Pain. Max Daily Amount: 4 Tabs. 1 Tab LANTUS U-100 INSULIN 100 unit/mL injection Generic drug:  insulin glargine Your last dose was: Your next dose is:    
   
   
 20 Units by SubCUTAneous route nightly. 20 Units LYRICA 100 mg capsule Generic drug:  pregabalin Your last dose was: Your next dose is: Take 100 mg by mouth two (2) times a day. 100 mg  
    
   
   
   
  
 metoprolol tartrate 50 mg tablet Commonly known as:  LOPRESSOR Your last dose was: Your next dose is: Take 50 mg by mouth two (2) times a day. 50 mg  
    
   
   
   
  
 PLAQUENIL 200 mg tablet Generic drug:  hydroxychloroquine Your last dose was: Your next dose is: Take 200 mg by mouth two (2) times a day. 200 mg PRAVASTATIN PO Your last dose was: Your next dose is: Take  by mouth. PriLOSEC 10 mg capsule Generic drug:  omeprazole Your last dose was: Your next dose is: Take 10 mg by mouth daily. 10 mg  
    
   
   
   
  
 SINGULAIR 10 mg tablet Generic drug:  montelukast  
   
Your last dose was: Your next dose is: Take 10 mg by mouth daily. 10 mg  
    
   
   
   
  
 TYLENOL 325 mg tablet Generic drug:  acetaminophen Your last dose was: Your next dose is: Take  by mouth every four (4) hours as needed for Pain. STOP taking these medications   
 amoxicillin-clavulanate 500-125 mg per tablet Commonly known as:  AUGMENTIN Replaced by:  amoxicillin-clavulanate 875-125 mg per tablet  
   
  
 oxyCODONE-acetaminophen 7.5-325 mg per tablet Commonly known as:  PERCOCET 7.5 Replaced by:  oxyCODONE-acetaminophen 5-325 mg per tablet Where to Get Your Medications Information on where to get these meds will be given to you by the nurse or doctor. ! Ask your nurse or doctor about these medications  
  amoxicillin-clavulanate 875-125 mg per tablet  
 lisinopril 40 mg tablet  
 oxyCODONE-acetaminophen 5-325 mg per tablet Opioid Education Prescription Opioids: What You Need to Know: 
 
 
 
F-face looks uneven A-arms unable to move or move unevenly S-speech slurred or non-existent T-time-call 911 as soon as signs and symptoms begin-DO NOT go Back to bed or wait to see if you get better-TIME IS BRAIN. Warning Signs of HEART ATTACK Call 911 if you have these symptoms: 
? Chest discomfort. Most heart attacks involve discomfort in the center of the chest that lasts more than a few minutes, or that goes away and comes back. It can feel like uncomfortable pressure, squeezing, fullness, or pain. ? Discomfort in other areas of the upper body. Symptoms can include pain or discomfort in one or both arms, the back, neck, jaw, or stomach. ? Shortness of breath with or without chest discomfort. ? Other signs may include breaking out in a cold sweat, nausea, or lightheadedness. Don't wait more than five minutes to call [address]! Fast action can save your life. Calling 911 is almost always the fastest way to get lifesaving treatment. Emergency Medical Services staff can begin treatment when they arrive  up to an hour sooner than if someone gets to the hospital by car. The discharge information has been reviewed with the patient. The patient verbalized understanding. Discharge medications reviewed with the patient and appropriate educational materials and side effects teaching were provided. ___________________________________________________________________________________________________________________________________ Patient armband removed and shredded MyChart Activation Thank you for requesting access to [employer]. Please follow the instructions below to securely access and download your online medical record. [employer] allows you to send messages to your doctor, view your test results, renew your prescriptions, schedule appointments, and more. How Do I Sign Up? 1. In your internet browser, go to [email or web address] 
2. Click on the First Time User? Click Here link in the Sign In box. You will be redirect to the New Member Sign Up page. 3. Enter your [employer] Access Code exactly as it appears below. You will not need to use this code after youve completed the sign-up process. If you do not sign up before the expiration date, you must request a new code. [employer] Access Code: Activation code not generated Current [employer] Status: Active (This is the date your [employer] access code will ) 4.  Enter the last four digits of your Social Security Number (xxxx) and Date of Birth (mm/dd/yyyy) as indicated and click Submit. You will be taken to the next sign-up page. 5. Create a [employer] ID. This will be your [employer] login ID and cannot be changed, so think of one that is secure and easy to remember. 6. Create a [employer] password. You can change your password at any time. 7. Enter your Password Reset Question and Answer. This can be used at a later time if you forget your password. 8. Enter your e-mail address. You will receive e-mail notification when new information is available in [address] 9. Click Sign Up. You can now view and download portions of your medical record. 10. Click the Download Summary menu link to download a portable copy of your medical information. Additional Information If you have questions, please visit the Frequently Asked Questions section of the [employer] website at [email or web address] Remember, [employer] is NOT to be used for urgent needs. For medical emergencies, dial 911. Introducing [facility]! Dear Reji Trejo: Thank you for requesting a [employer] account. Our records indicate that you already have an active [employer] account. You can access your account anytime at [email or web address] Did you know that you can access your hospital and ER discharge instructions at any time in [employer]? You can also review all of your test results from your hospital stay or ER visit. Additional Information If you have questions, please visit the Frequently Asked Questions section of the [employer] website at [email or web address] Remember, [employer] is NOT to be used for urgent needs. For medical emergencies, dial 911. Now available from your iPhone and Android! Introducing Zackary Reyes As a Kristin Falter patient, I wanted to make you aware of our electronic visit tool called Zackary Reyes. [employer] [facility]/7 allows you to connect within minutes with a medical provider 24 hours a day, seven days a week via a mobile device or tablet or logging into a secure website from your computer. You can access [employer] from anywhere in the [place]. A virtual visit might be right for you when you have a simple condition and feel like you just dont want to get out of bed, or cant get away from work for an appointment, when your regular [employer] provider is not available (evenings, weekends or holidays), or when youre out of town and need minor care. Electronic visits cost only $49 and if the [employer] [facility]/[employer] provider determines a prescription is needed to treat your condition, one can be electronically transmitted to a nearby pharmacy*. Please take a moment to enroll today if you have not already done so. The enrollment process is free and takes just a few minutes. To enroll, please download the [employer]/7 shekhar to your tablet or phone, or visit [email or web address] to enroll on your computer. And, as an [address] [place] [address] patient with a [employer] account, the results of your visits will be scanned into your electronic medical record and your primary care provider will be able to view the scanned results. We urge you to continue to see your regular [employer] provider for your ongoing medical care. And while your primary care provider may not be the one available when you seek a [employer] virtual visit, the peace of mind you get from getting a real diagnosis real time can be priceless. For more information on [employer], view our Frequently Asked Questions (FAQs) at [email or web address]. Sincerely, 
 
Rozina Juan MD 
Chief Medical Officer Sarita Orellana *:  certain medications cannot be prescribed via [employer] Providers Seen During Your Hospitalization Provider Specialty Primary office phone Viki Ma MD Emergency Medicine [phone] Jessica Coleman MD Emergency Medicine [phone] Luana Solo MD Hospitalist [phone] Polly Mares MD Internal Medicine [phone] Elisabet Tam MD Family Practice [phone] Your Primary Care Physician (PCP) Primary Care Physician Office Phone Office Fax Lily Bryan [phone] You are allergic to the following No active allergies Recent Documentation Height Weight BMI OB Status Smoking Status 1.549 m 78.5 kg 32.69 kg/m2 Hysterectomy Never Smoker Emergency Contacts Name Discharge Info Relation Home Work Mobile Tanner Roberson [1] Other Relative [6]   [phone] Lauri Yu DECLINED CAREGIVER [4] Son [22] [phone] Patient Belongings The following personal items are in your possession at time of discharge: 
  Dental Appliances: None         Home Medications: Kept at bedside (will go in preop locker )   Jewelry: None  Clothing: Robe    Other Valuables: Sendy Raymond (all personal items to preop locker ) Please provide this summary of care documentation to your next provider. Signatures-by signing, you are acknowledging that this After Visit Summary has been reviewed with you and you have received a copy. Patient Signature:  ____________________________________________________________ Date:  ____________________________________________________________  
  
Karissa Figures Provider Signature:  ____________________________________________________________ Date:  ____________________________________________________________

## 2018-09-30 NOTE — IP AVS SNAPSHOT
[address] Patient: Deshawn Moreira MRN: [id number] DCQ:8/1/3207 A check linus indicates which time of day the medication should be taken. My Medications START taking these medications Instructions Each Dose to Equal  
 Morning Noon Evening Bedtime  
 amoxicillin-clavulanate 875-125 mg per tablet Commonly known as:  AUGMENTIN Replaces:  amoxicillin-clavulanate 500-125 mg per tablet Your last dose was: Your next dose is: Take 1 Tab by mouth two (2) times daily (with meals) for 4 days. 1 Tab  
    
   
   
   
  
 oxyCODONE-acetaminophen 5-325 mg per tablet Commonly known as:  PERCOCET Replaces:  oxyCODONE-acetaminophen 7.5-325 mg per tablet Your last dose was: Your next dose is: Take 1 Tab by mouth every six (6) hours as needed. Max Daily Amount: 4 Tabs. 1 Tab CHANGE how you take these medications Instructions Each Dose to Equal  
 Morning Noon Evening Bedtime  
 lisinopril 40 mg tablet Commonly known as:  Roma Mathis Start taking on:  10/6/2018 What changed:   
- medication strength 
- how much to take Your last dose was: Your next dose is: Take 1 Tab by mouth daily. 40 mg CONTINUE taking these medications Instructions Each Dose to Equal  
 Morning Noon Evening Bedtime  
 amiodarone 400 mg tablet Commonly known as:  Becky Salterh Your last dose was: Your next dose is: Take 400 mg by mouth daily. 400 mg  
    
   
   
   
  
 aspirin 81 mg chewable tablet Your last dose was: Your next dose is: Take 1 Tab by mouth daily. 81 mg CLARITIN 10 mg tablet Generic drug:  loratadine Your last dose was: Your next dose is: Take 10 mg by mouth. 10 mg DULoxetine 60 mg capsule Commonly known as:  CYMBALTA Your last dose was: Your next dose is: Take 1 Cap by mouth daily. 60 mg  
    
   
   
   
  
 furosemide 40 mg tablet Commonly known as:  LASIX Your last dose was: Your next dose is: Take 1 Tab by mouth daily. Indications: Peripheral Edema due to Chronic Heart Failure, For leg edema or SOB 40 mg HYDROcodone-acetaminophen 5-325 mg per tablet Commonly known as:  Almeta Reasoner Your last dose was: Your next dose is: Take 1 Tab by mouth every six (6) hours as needed for Pain. Max Daily Amount: 4 Tabs. 1 Tab LANTUS U-100 INSULIN 100 unit/mL injection Generic drug:  insulin glargine Your last dose was: Your next dose is:    
   
   
 20 Units by SubCUTAneous route nightly. 20 Units LYRICA 100 mg capsule Generic drug:  pregabalin Your last dose was: Your next dose is: Take 100 mg by mouth two (2) times a day. 100 mg  
    
   
   
   
  
 metoprolol tartrate 50 mg tablet Commonly known as:  LOPRESSOR Your last dose was: Your next dose is: Take 50 mg by mouth two (2) times a day. 50 mg  
    
   
   
   
  
 PLAQUENIL 200 mg tablet Generic drug:  hydroxychloroquine Your last dose was: Your next dose is: Take 200 mg by mouth two (2) times a day. 200 mg PRAVASTATIN PO Your last dose was: Your next dose is: Take  by mouth. PriLOSEC 10 mg capsule Generic drug:  omeprazole Your last dose was: Your next dose is: Take 10 mg by mouth daily. 10 mg  
    
   
   
   
  
 SINGULAIR 10 mg tablet Generic drug:  montelukast  
   
Your last dose was: Your next dose is: Take 10 mg by mouth daily. 10 mg  
    
   
   
   
  
 TYLENOL 325 mg tablet Generic drug:  acetaminophen Your last dose was: Your next dose is: Take  by mouth every four (4) hours as needed for Pain. STOP taking these medications   
 amoxicillin-clavulanate 500-125 mg per tablet Commonly known as:  AUGMENTIN Replaced by:  amoxicillin-clavulanate 875-125 mg per tablet  
   
  
 oxyCODONE-acetaminophen 7.5-325 mg per tablet Commonly known as:  PERCOCET 7.5 Replaced by:  oxyCODONE-acetaminophen 5-325 mg per tablet Where to Get Your Medications Information on where to get these meds will be given to you by the nurse or doctor. ! Ask your nurse or doctor about these medications  
  amoxicillin-clavulanate 875-125 mg per tablet  
 lisinopril 40 mg tablet  
 oxyCODONE-acetaminophen 5-325 mg per tablet

## 2018-09-30 NOTE — DISCHARGE SUMMARY
Radha Srivastaav is alert and oriented, and able to clearly report the risks of leaving the hospital w/o further intervention. This examiner had a long discussion with the patient at his bedside regarding the risks of leaving the hospital AMA and benefits of further treatment. The patient's decisional capacity is intact and clearly understands what this examiner is recommending and why. The patient is fully aware of the risks including, but not limited to the following: worsening symptoms, decline in functional status, and even the possiblity of death. The patient declines the recommendations at this time. The patient clearly understands that he may return to the ED at any time for further evaluation and treatment. The patient is adamant of leaving the hospital AMA. [facility] multiple times to [facility] she is scheduled for surgery on Monday for her Wrist   Pt continued to refuse    D/W Pt  Again, in front of her sons risks of her leaving AMA including loss function of her Lt leg and possible becoming disable  Pt understand also that she is non-mikaela bearing   Also risk of  her wrist Fx is discussed including Loss of function of her hand and infections in wrist and Leg  Pt showed persistence on leaving AMA w/out mentioning specific cause  Pt is alert and oriented x 4   Her sons are at bedside and witnessed the whole conversation    Her plan is to see her PCP in [facility] on Monday where they have Ortho surgeon   Ortho surgery in house came and removed Pt's cast as per her request  Her home meds are reconciled    Pt is asked to return to ED if she experience pain or difficulty seeing her PCP.     Pt and her sons again showed understanding and as Per Pt, her sons will take good care of her till she sees her PCP  Her sons agreed to plan and show understanding to Pt's explained risks but stated that their mom had made up her mnd to leave and they can not change that    Pt is discharged ama, alert and oriented and vitally stable.   Pain is controlled       Nancy Miller MD  9/29/2018 10 PM

## 2018-09-30 NOTE — PROGRESS NOTES
Called in to the hospital to see the patient regarding her left leg cast.  She stated that the cast was too painful, hot and itchy. She also stated that she wants her IV out, thomas catheter out and cast off because she is \"going home\". After a lengthy discussion with her regarding her hand and leg diagnoses as well as the need for an IV and catheter, she stated that she wanted to sign out AMA and that when she got home she would \"cut the cast off herself\". Due to my concern for her safety, I recommended again that she not sign out AMA and went over the possible consequences of this including but not limited to personal responsibility for any medical bills/care as a result of her leaving AMA after she leaves the hospital, disability resulting from inadequate care and inability to take care of herself, further injury, danger to herself and others, inability to urinate, inability to control her pain, inability for family members to care for herself and death. Despite having this discussion with her, she was adamant about leaving and due to my concern for her attempting to cut off her cast after she left the hospital and injuring herself in that attempt, I made the decision to remove her cast in the hospital and place her into a knee immobilizer. The cast was noted to not be too tight fitting with good circulation and ability to move her toes with full sensation at the exposed toes on examination prior to removing the cast.  We also discussed her upcoming surgery on her hand this coming Monday 10/1/18. She said that she will return to the hospital on Monday morning and will be NPO for the surgery. I told her to arrive no later than 6am if she wants to have her surgery. She said she would return. I also discussed all of these risks and alternatives to leaving the hospital with her family members including her son as well as with the medical doctor taking care of her.   All were in agreement with understanding the potential risks of removing the cast as well as removing the hand splint and leaving the hospital AMA.    
If she decides to change her mind and stay in the hospital we will continue to care for her leg and hand as appropriate in concert with the medical team.

## 2018-09-30 NOTE — ED TRIAGE NOTES
Pt. Arrived via medic from home c/o having a fall out of bed today. Pt. D/c yesterday for L knee surgery and expecting L hand surgery from dog bite. Pt. AO x 4. C/o L knee pain @ this time.

## 2018-09-30 NOTE — PROGRESS NOTES
MD at bedside to remove cast, placed immobilizer to left leg. Patient signed AMA papers, RN witnessed. Family at bedside. IV catheter removed, thomas catheter removed, telemetry removed. Patient taken downstairs on wheelchair.

## 2018-09-30 NOTE — PROGRESS NOTES
Bedside and Verbal shift change report given to [address] [place] [address] (oncoming nurse) by Beryl Horton RN 
 (offgoing nurse). Report given with SBAR, Kardex, MAR and Recent Results.

## 2018-10-01 ENCOUNTER — APPOINTMENT (OUTPATIENT)
Dept: GENERAL RADIOLOGY | Age: 68
DRG: 501 | End: 2018-10-01
Attending: ORTHOPAEDIC SURGERY
Payer: MEDICARE

## 2018-10-01 ENCOUNTER — ANESTHESIA (OUTPATIENT)
Dept: SURGERY | Age: 68
DRG: 501 | End: 2018-10-01
Payer: MEDICARE

## 2018-10-01 LAB
ANION GAP SERPL CALC-SCNC: 8 MMOL/L (ref 3–18)
APTT PPP: 31.1 SEC (ref 23–36.4)
BASOPHILS # BLD: 0 K/UL (ref 0–0.1)
BASOPHILS NFR BLD: 0 % (ref 0–2)
BUN SERPL-MCNC: 37 MG/DL (ref 7–18)
BUN/CREAT SERPL: 28 (ref 12–20)
CALCIUM SERPL-MCNC: 8 MG/DL (ref 8.5–10.1)
CHLORIDE SERPL-SCNC: 116 MMOL/L (ref 100–108)
CO2 SERPL-SCNC: 21 MMOL/L (ref 21–32)
CREAT SERPL-MCNC: 1.33 MG/DL (ref 0.6–1.3)
DIFFERENTIAL METHOD BLD: ABNORMAL
EOSINOPHIL # BLD: 0.1 K/UL (ref 0–0.4)
EOSINOPHIL NFR BLD: 2 % (ref 0–5)
ERYTHROCYTE [DISTWIDTH] IN BLOOD BY AUTOMATED COUNT: 15.2 % (ref 11.6–14.5)
EST. AVERAGE GLUCOSE BLD GHB EST-MCNC: 203 MG/DL
GLUCOSE BLD STRIP.AUTO-MCNC: 129 MG/DL (ref 70–110)
GLUCOSE BLD STRIP.AUTO-MCNC: 136 MG/DL (ref 70–110)
GLUCOSE BLD STRIP.AUTO-MCNC: 176 MG/DL (ref 70–110)
GLUCOSE SERPL-MCNC: 120 MG/DL (ref 74–99)
HBA1C MFR BLD: 8.7 % (ref 4.2–5.6)
HCT VFR BLD AUTO: 21.8 % (ref 35–45)
HCT VFR BLD AUTO: 24.6 % (ref 35–45)
HGB BLD-MCNC: 7.1 G/DL (ref 12–16)
HGB BLD-MCNC: 7.8 G/DL (ref 12–16)
INR PPP: 1 (ref 0.8–1.2)
LYMPHOCYTES # BLD: 1.2 K/UL (ref 0.9–3.6)
LYMPHOCYTES NFR BLD: 16 % (ref 21–52)
MAGNESIUM SERPL-MCNC: 1.7 MG/DL (ref 1.6–2.6)
MCH RBC QN AUTO: 26.7 PG (ref 24–34)
MCHC RBC AUTO-ENTMCNC: 32.6 G/DL (ref 31–37)
MCV RBC AUTO: 82 FL (ref 74–97)
MONOCYTES # BLD: 0.8 K/UL (ref 0.05–1.2)
MONOCYTES NFR BLD: 12 % (ref 3–10)
NEUTS SEG # BLD: 5 K/UL (ref 1.8–8)
NEUTS SEG NFR BLD: 70 % (ref 40–73)
PHOSPHATE SERPL-MCNC: 3.3 MG/DL (ref 2.5–4.9)
PLATELET # BLD AUTO: 311 K/UL (ref 135–420)
PMV BLD AUTO: 9.1 FL (ref 9.2–11.8)
POTASSIUM SERPL-SCNC: 4.1 MMOL/L (ref 3.5–5.5)
PROTHROMBIN TIME: 13.2 SEC (ref 11.5–15.2)
RBC # BLD AUTO: 2.66 M/UL (ref 4.2–5.3)
SODIUM SERPL-SCNC: 145 MMOL/L (ref 136–145)
WBC # BLD AUTO: 7.2 K/UL (ref 4.6–13.2)

## 2018-10-01 PROCEDURE — 76210000016 HC OR PH I REC 1 TO 1.5 HR: Performed by: ORTHOPAEDIC SURGERY

## 2018-10-01 PROCEDURE — 77030002888 HC SUT CHRMC J&J -A: Performed by: ORTHOPAEDIC SURGERY

## 2018-10-01 PROCEDURE — 85610 PROTHROMBIN TIME: CPT | Performed by: ORTHOPAEDIC SURGERY

## 2018-10-01 PROCEDURE — 0RQV0ZZ REPAIR LEFT METACARPOPHALANGEAL JOINT, OPEN APPROACH: ICD-10-PCS | Performed by: ORTHOPAEDIC SURGERY

## 2018-10-01 PROCEDURE — 77030020268 HC MISC GENERAL SUPPLY: Performed by: ORTHOPAEDIC SURGERY

## 2018-10-01 PROCEDURE — 74011000258 HC RX REV CODE- 258: Performed by: INTERNAL MEDICINE

## 2018-10-01 PROCEDURE — 77030031139 HC SUT VCRL2 J&J -A: Performed by: ORTHOPAEDIC SURGERY

## 2018-10-01 PROCEDURE — 0MQ80ZZ REPAIR LEFT HAND BURSA AND LIGAMENT, OPEN APPROACH: ICD-10-PCS | Performed by: ORTHOPAEDIC SURGERY

## 2018-10-01 PROCEDURE — 77030032490 HC SLV COMPR SCD KNE COVD -B: Performed by: ORTHOPAEDIC SURGERY

## 2018-10-01 PROCEDURE — 83735 ASSAY OF MAGNESIUM: CPT | Performed by: INTERNAL MEDICINE

## 2018-10-01 PROCEDURE — 77030003601 HC NDL NRV BLK BBMI -A: Performed by: ORTHOPAEDIC SURGERY

## 2018-10-01 PROCEDURE — 74011000250 HC RX REV CODE- 250: Performed by: NURSE ANESTHETIST, CERTIFIED REGISTERED

## 2018-10-01 PROCEDURE — 76942 ECHO GUIDE FOR BIOPSY: CPT | Performed by: ANESTHESIOLOGY

## 2018-10-01 PROCEDURE — 36415 COLL VENOUS BLD VENIPUNCTURE: CPT | Performed by: ANESTHESIOLOGY

## 2018-10-01 PROCEDURE — 74011250637 HC RX REV CODE- 250/637: Performed by: INTERNAL MEDICINE

## 2018-10-01 PROCEDURE — 0PSQ04Z REPOSITION LEFT METACARPAL WITH INTERNAL FIXATION DEVICE, OPEN APPROACH: ICD-10-PCS | Performed by: ORTHOPAEDIC SURGERY

## 2018-10-01 PROCEDURE — 77030002916 HC SUT ETHLN J&J -A: Performed by: ORTHOPAEDIC SURGERY

## 2018-10-01 PROCEDURE — 74011250636 HC RX REV CODE- 250/636: Performed by: ANESTHESIOLOGY

## 2018-10-01 PROCEDURE — 77030010509 HC AIRWY LMA MSK TELE -A: Performed by: ANESTHESIOLOGY

## 2018-10-01 PROCEDURE — 74011250636 HC RX REV CODE- 250/636: Performed by: EMERGENCY MEDICINE

## 2018-10-01 PROCEDURE — 74011250636 HC RX REV CODE- 250/636

## 2018-10-01 PROCEDURE — 77030020274 HC MISC IMPL ORTHOPEDIC: Performed by: ORTHOPAEDIC SURGERY

## 2018-10-01 PROCEDURE — 77030002966 HC SUT PDS J&J -A: Performed by: ORTHOPAEDIC SURGERY

## 2018-10-01 PROCEDURE — 77030012012 HC AIRWY OP SFT TELE -A: Performed by: ANESTHESIOLOGY

## 2018-10-01 PROCEDURE — 77030027138 HC INCENT SPIROMETER -A

## 2018-10-01 PROCEDURE — 86900 BLOOD TYPING SEROLOGIC ABO: CPT | Performed by: ANESTHESIOLOGY

## 2018-10-01 PROCEDURE — 65270000029 HC RM PRIVATE

## 2018-10-01 PROCEDURE — 76060000038 HC ANESTHESIA 3.5 TO 4 HR: Performed by: ORTHOPAEDIC SURGERY

## 2018-10-01 PROCEDURE — 74011250636 HC RX REV CODE- 250/636: Performed by: INTERNAL MEDICINE

## 2018-10-01 PROCEDURE — 77030010396 HC WRE FIX C CNMD -A: Performed by: ORTHOPAEDIC SURGERY

## 2018-10-01 PROCEDURE — 76010000134 HC OR TIME 3.5 TO 4 HR: Performed by: ORTHOPAEDIC SURGERY

## 2018-10-01 PROCEDURE — 80048 BASIC METABOLIC PNL TOTAL CA: CPT | Performed by: INTERNAL MEDICINE

## 2018-10-01 PROCEDURE — 84100 ASSAY OF PHOSPHORUS: CPT | Performed by: INTERNAL MEDICINE

## 2018-10-01 PROCEDURE — 83036 HEMOGLOBIN GLYCOSYLATED A1C: CPT | Performed by: INTERNAL MEDICINE

## 2018-10-01 PROCEDURE — 76010000093 HC SPECIAL PROCEDURE: Performed by: ANESTHESIOLOGY

## 2018-10-01 PROCEDURE — 74011250636 HC RX REV CODE- 250/636: Performed by: NURSE ANESTHETIST, CERTIFIED REGISTERED

## 2018-10-01 PROCEDURE — 77030020782 HC GWN BAIR PAWS FLX 3M -B: Performed by: ORTHOPAEDIC SURGERY

## 2018-10-01 PROCEDURE — 82962 GLUCOSE BLOOD TEST: CPT

## 2018-10-01 PROCEDURE — 85018 HEMOGLOBIN: CPT | Performed by: ANESTHESIOLOGY

## 2018-10-01 PROCEDURE — 85730 THROMBOPLASTIN TIME PARTIAL: CPT | Performed by: ORTHOPAEDIC SURGERY

## 2018-10-01 PROCEDURE — 86920 COMPATIBILITY TEST SPIN: CPT | Performed by: ANESTHESIOLOGY

## 2018-10-01 PROCEDURE — 74011636637 HC RX REV CODE- 636/637: Performed by: INTERNAL MEDICINE

## 2018-10-01 PROCEDURE — 0PBQ0ZZ EXCISION OF LEFT METACARPAL, OPEN APPROACH: ICD-10-PCS | Performed by: ORTHOPAEDIC SURGERY

## 2018-10-01 PROCEDURE — 64450 NJX AA&/STRD OTHER PN/BRANCH: CPT

## 2018-10-01 PROCEDURE — 85025 COMPLETE CBC W/AUTO DIFF WBC: CPT | Performed by: INTERNAL MEDICINE

## 2018-10-01 PROCEDURE — 73120 X-RAY EXAM OF HAND: CPT

## 2018-10-01 PROCEDURE — 77030018836 HC SOL IRR NACL ICUM -A: Performed by: ORTHOPAEDIC SURGERY

## 2018-10-01 PROCEDURE — 3E0T3BZ INTRODUCTION OF ANESTHETIC AGENT INTO PERIPHERAL NERVES AND PLEXI, PERCUTANEOUS APPROACH: ICD-10-PCS | Performed by: ANESTHESIOLOGY

## 2018-10-01 DEVICE — IMPLANTABLE DEVICE: Type: IMPLANTABLE DEVICE | Site: FINGER | Status: FUNCTIONAL

## 2018-10-01 RX ORDER — SODIUM CHLORIDE 0.9 % (FLUSH) 0.9 %
5-10 SYRINGE (ML) INJECTION AS NEEDED
Status: DISCONTINUED | OUTPATIENT
Start: 2018-10-01 | End: 2018-10-01 | Stop reason: HOSPADM

## 2018-10-01 RX ORDER — INSULIN LISPRO 100 [IU]/ML
INJECTION, SOLUTION INTRAVENOUS; SUBCUTANEOUS ONCE
Status: DISCONTINUED | OUTPATIENT
Start: 2018-10-01 | End: 2018-10-01 | Stop reason: HOSPADM

## 2018-10-01 RX ORDER — MIDAZOLAM HYDROCHLORIDE 1 MG/ML
2 INJECTION, SOLUTION INTRAMUSCULAR; INTRAVENOUS ONCE
Status: COMPLETED | OUTPATIENT
Start: 2018-10-01 | End: 2018-10-01

## 2018-10-01 RX ORDER — SODIUM CHLORIDE, SODIUM LACTATE, POTASSIUM CHLORIDE, CALCIUM CHLORIDE 600; 310; 30; 20 MG/100ML; MG/100ML; MG/100ML; MG/100ML
75 INJECTION, SOLUTION INTRAVENOUS CONTINUOUS
Status: DISCONTINUED | OUTPATIENT
Start: 2018-10-01 | End: 2018-10-01 | Stop reason: HOSPADM

## 2018-10-01 RX ORDER — LABETALOL HYDROCHLORIDE 5 MG/ML
INJECTION, SOLUTION INTRAVENOUS AS NEEDED
Status: DISCONTINUED | OUTPATIENT
Start: 2018-10-01 | End: 2018-10-01 | Stop reason: HOSPADM

## 2018-10-01 RX ORDER — ONDANSETRON 2 MG/ML
4 INJECTION INTRAMUSCULAR; INTRAVENOUS ONCE
Status: DISCONTINUED | OUTPATIENT
Start: 2018-10-01 | End: 2018-10-01 | Stop reason: HOSPADM

## 2018-10-01 RX ORDER — FENTANYL CITRATE 50 UG/ML
25 INJECTION, SOLUTION INTRAMUSCULAR; INTRAVENOUS
Status: DISCONTINUED | OUTPATIENT
Start: 2018-10-01 | End: 2018-10-01 | Stop reason: HOSPADM

## 2018-10-01 RX ORDER — DEXTROSE 50 % IN WATER (D50W) INTRAVENOUS SYRINGE
25-50 AS NEEDED
Status: DISCONTINUED | OUTPATIENT
Start: 2018-10-01 | End: 2018-10-01 | Stop reason: HOSPADM

## 2018-10-01 RX ORDER — LORAZEPAM 2 MG/ML
INJECTION INTRAMUSCULAR
Status: COMPLETED
Start: 2018-10-01 | End: 2018-10-01

## 2018-10-01 RX ORDER — PROPOFOL 10 MG/ML
INJECTION, EMULSION INTRAVENOUS AS NEEDED
Status: DISCONTINUED | OUTPATIENT
Start: 2018-10-01 | End: 2018-10-01 | Stop reason: HOSPADM

## 2018-10-01 RX ORDER — LORAZEPAM 2 MG/ML
0.5 INJECTION INTRAMUSCULAR AS NEEDED
Status: DISCONTINUED | OUTPATIENT
Start: 2018-10-01 | End: 2018-10-01 | Stop reason: HOSPADM

## 2018-10-01 RX ORDER — FENTANYL CITRATE 50 UG/ML
100 INJECTION, SOLUTION INTRAMUSCULAR; INTRAVENOUS ONCE
Status: COMPLETED | OUTPATIENT
Start: 2018-10-01 | End: 2018-10-01

## 2018-10-01 RX ORDER — MAGNESIUM SULFATE 100 %
4 CRYSTALS MISCELLANEOUS AS NEEDED
Status: DISCONTINUED | OUTPATIENT
Start: 2018-10-01 | End: 2018-10-01 | Stop reason: HOSPADM

## 2018-10-01 RX ORDER — SODIUM CHLORIDE 9 MG/ML
250 INJECTION, SOLUTION INTRAVENOUS AS NEEDED
Status: DISCONTINUED | OUTPATIENT
Start: 2018-10-01 | End: 2018-10-05 | Stop reason: HOSPADM

## 2018-10-01 RX ORDER — FENTANYL CITRATE 50 UG/ML
INJECTION, SOLUTION INTRAMUSCULAR; INTRAVENOUS AS NEEDED
Status: DISCONTINUED | OUTPATIENT
Start: 2018-10-01 | End: 2018-10-01 | Stop reason: HOSPADM

## 2018-10-01 RX ORDER — LIDOCAINE HYDROCHLORIDE 20 MG/ML
INJECTION, SOLUTION EPIDURAL; INFILTRATION; INTRACAUDAL; PERINEURAL AS NEEDED
Status: DISCONTINUED | OUTPATIENT
Start: 2018-10-01 | End: 2018-10-01 | Stop reason: HOSPADM

## 2018-10-01 RX ORDER — CEFAZOLIN SODIUM 2 G/50ML
2 SOLUTION INTRAVENOUS ONCE
Status: DISCONTINUED | OUTPATIENT
Start: 2018-10-01 | End: 2018-10-01 | Stop reason: HOSPADM

## 2018-10-01 RX ORDER — SODIUM CHLORIDE 0.9 % (FLUSH) 0.9 %
5-10 SYRINGE (ML) INJECTION EVERY 8 HOURS
Status: DISCONTINUED | OUTPATIENT
Start: 2018-10-01 | End: 2018-10-01 | Stop reason: HOSPADM

## 2018-10-01 RX ORDER — GLYCOPYRROLATE 0.2 MG/ML
INJECTION INTRAMUSCULAR; INTRAVENOUS AS NEEDED
Status: DISCONTINUED | OUTPATIENT
Start: 2018-10-01 | End: 2018-10-01 | Stop reason: HOSPADM

## 2018-10-01 RX ORDER — ROPIVACAINE HYDROCHLORIDE 5 MG/ML
30 INJECTION, SOLUTION EPIDURAL; INFILTRATION; PERINEURAL
Status: COMPLETED | OUTPATIENT
Start: 2018-10-01 | End: 2018-10-01

## 2018-10-01 RX ORDER — ONDANSETRON 2 MG/ML
INJECTION INTRAMUSCULAR; INTRAVENOUS AS NEEDED
Status: DISCONTINUED | OUTPATIENT
Start: 2018-10-01 | End: 2018-10-01 | Stop reason: HOSPADM

## 2018-10-01 RX ADMIN — FENTANYL CITRATE 25 MCG: 50 INJECTION, SOLUTION INTRAMUSCULAR; INTRAVENOUS at 11:57

## 2018-10-01 RX ADMIN — METOPROLOL TARTRATE 50 MG: 50 TABLET ORAL at 19:13

## 2018-10-01 RX ADMIN — AMPICILLIN SODIUM AND SULBACTAM SODIUM 3 G: 2; 1 INJECTION, POWDER, FOR SOLUTION INTRAMUSCULAR; INTRAVENOUS at 16:31

## 2018-10-01 RX ADMIN — LABETALOL HYDROCHLORIDE 5 MG: 5 INJECTION, SOLUTION INTRAVENOUS at 12:02

## 2018-10-01 RX ADMIN — AMPICILLIN SODIUM AND SULBACTAM SODIUM 3 G: 2; 1 INJECTION, POWDER, FOR SOLUTION INTRAMUSCULAR; INTRAVENOUS at 00:30

## 2018-10-01 RX ADMIN — PRAVASTATIN SODIUM 20 MG: 20 TABLET ORAL at 21:40

## 2018-10-01 RX ADMIN — MORPHINE SULFATE 1 MG: 2 INJECTION, SOLUTION INTRAMUSCULAR; INTRAVENOUS at 21:40

## 2018-10-01 RX ADMIN — ONDANSETRON 4 MG: 2 INJECTION INTRAMUSCULAR; INTRAVENOUS at 12:48

## 2018-10-01 RX ADMIN — LORAZEPAM 0.5 MG: 2 INJECTION INTRAMUSCULAR at 14:18

## 2018-10-01 RX ADMIN — GLYCOPYRROLATE 0.2 MG: 0.2 INJECTION INTRAMUSCULAR; INTRAVENOUS at 10:07

## 2018-10-01 RX ADMIN — LABETALOL HYDROCHLORIDE 5 MG: 5 INJECTION, SOLUTION INTRAVENOUS at 13:32

## 2018-10-01 RX ADMIN — SODIUM CHLORIDE, SODIUM LACTATE, POTASSIUM CHLORIDE, AND CALCIUM CHLORIDE: 600; 310; 30; 20 INJECTION, SOLUTION INTRAVENOUS at 13:16

## 2018-10-01 RX ADMIN — ROPIVACAINE HYDROCHLORIDE 150 MG: 5 INJECTION, SOLUTION EPIDURAL; INFILTRATION; PERINEURAL at 09:55

## 2018-10-01 RX ADMIN — HEPARIN SODIUM 5000 UNITS: 5000 INJECTION, SOLUTION INTRAVENOUS; SUBCUTANEOUS at 00:49

## 2018-10-01 RX ADMIN — MIDAZOLAM HYDROCHLORIDE 1 MG: 1 INJECTION, SOLUTION INTRAMUSCULAR; INTRAVENOUS at 09:50

## 2018-10-01 RX ADMIN — PRAVASTATIN SODIUM 20 MG: 20 TABLET ORAL at 00:35

## 2018-10-01 RX ADMIN — INSULIN LISPRO 2 UNITS: 100 INJECTION, SOLUTION INTRAVENOUS; SUBCUTANEOUS at 17:49

## 2018-10-01 RX ADMIN — PROPOFOL 100 MG: 10 INJECTION, EMULSION INTRAVENOUS at 10:13

## 2018-10-01 RX ADMIN — SODIUM CHLORIDE 1000 ML: 900 INJECTION, SOLUTION INTRAVENOUS at 00:32

## 2018-10-01 RX ADMIN — SODIUM CHLORIDE, SODIUM LACTATE, POTASSIUM CHLORIDE, AND CALCIUM CHLORIDE 75 ML/HR: 600; 310; 30; 20 INJECTION, SOLUTION INTRAVENOUS at 10:01

## 2018-10-01 RX ADMIN — FENTANYL CITRATE 50 MCG: 50 INJECTION, SOLUTION INTRAMUSCULAR; INTRAVENOUS at 10:20

## 2018-10-01 RX ADMIN — FENTANYL CITRATE 50 MCG: 50 INJECTION, SOLUTION INTRAMUSCULAR; INTRAVENOUS at 13:45

## 2018-10-01 RX ADMIN — ONDANSETRON HYDROCHLORIDE 4 MG: 4 INJECTION, SOLUTION INTRAMUSCULAR; INTRAVENOUS at 00:50

## 2018-10-01 RX ADMIN — LORAZEPAM 0.5 MG: 2 INJECTION INTRAMUSCULAR; INTRAVENOUS at 14:18

## 2018-10-01 RX ADMIN — LABETALOL HYDROCHLORIDE 5 MG: 5 INJECTION, SOLUTION INTRAVENOUS at 12:29

## 2018-10-01 RX ADMIN — HYDROXYCHLOROQUINE SULFATE 200 MG: 200 TABLET, FILM COATED ENTERAL at 17:49

## 2018-10-01 RX ADMIN — AMPICILLIN SODIUM AND SULBACTAM SODIUM 3 G: 2; 1 INJECTION, POWDER, FOR SOLUTION INTRAMUSCULAR; INTRAVENOUS at 23:43

## 2018-10-01 RX ADMIN — FENTANYL CITRATE 25 MCG: 50 INJECTION, SOLUTION INTRAMUSCULAR; INTRAVENOUS at 13:07

## 2018-10-01 RX ADMIN — LABETALOL HYDROCHLORIDE 5 MG: 5 INJECTION, SOLUTION INTRAVENOUS at 10:19

## 2018-10-01 RX ADMIN — METOPROLOL TARTRATE 50 MG: 50 TABLET ORAL at 00:33

## 2018-10-01 RX ADMIN — OXYCODONE AND ACETAMINOPHEN 1 TABLET: 5; 325 TABLET ORAL at 00:50

## 2018-10-01 RX ADMIN — ACETAMINOPHEN 650 MG: 325 TABLET ORAL at 23:42

## 2018-10-01 RX ADMIN — FENTANYL CITRATE 25 MCG: 50 INJECTION, SOLUTION INTRAMUSCULAR; INTRAVENOUS at 11:33

## 2018-10-01 RX ADMIN — SODIUM CHLORIDE 20 MG: 9 INJECTION INTRAMUSCULAR; INTRAVENOUS; SUBCUTANEOUS at 10:02

## 2018-10-01 RX ADMIN — PREGABALIN 100 MG: 50 CAPSULE ORAL at 17:49

## 2018-10-01 RX ADMIN — FENTANYL CITRATE 50 MCG: 50 INJECTION INTRAMUSCULAR; INTRAVENOUS at 09:50

## 2018-10-01 RX ADMIN — FENTANYL CITRATE 25 MCG: 50 INJECTION, SOLUTION INTRAMUSCULAR; INTRAVENOUS at 12:28

## 2018-10-01 RX ADMIN — OXYCODONE AND ACETAMINOPHEN 1 TABLET: 5; 325 TABLET ORAL at 17:56

## 2018-10-01 RX ADMIN — LIDOCAINE HYDROCHLORIDE 40 MG: 20 INJECTION, SOLUTION EPIDURAL; INFILTRATION; INTRACAUDAL; PERINEURAL at 10:13

## 2018-10-01 NOTE — H&P
History & Physical 
 
Patient: Denver Gonzalez MRN: [id number]  CSN: [id number] YOB: 1950  Age: 79 y.o. Sex: female DOA: 9/30/2018 Chief Complaint:  
Chief Complaint Patient presents with  Fall HPI:  
 
Denver Gonzalez is a 79 y.o.  female who has PMH of A-fib, DVT, PE (Not on anticoagulation), DM, neuropathy and arthritis S/p ORIF yesterday for Periprosthetic fracture around internal prosthetic left knee joint and has closed hip Fx. Pt also suffered metacarpal Fx on her Lt hand adnwas awaiting surgery on Monday. Pt got upset yesterday while in the hospital and signed AMA despite multiple attempts by myself and by Ortho to persuade her to stay. Pt was non-weight bearing but stated that she has enough help at home and Lt last night around 10 PM 
Pt was visited today by her [place] daughter who found her on the floor for the 5-6 hours and called EMS. In ER, Pt is apologetic and admitted that she made a mistake by signing AMA but she was worried about her dog and wanted her cast off. On admission, pt is dehydrated and is not able to ambulate on own and will be admitted for continuation of her Rx prior to her AMA Past Medical History:  
Diagnosis Date  Anxiety  Arthritis  Asbestosis ([address]  Depression  Diabetes ([address]  Fibromyalgia  GERD (gastroesophageal reflux disease)  Hypercholesteremia  Hypertension  Renal insufficiency  Skin abscess  Sleep apnea Past Surgical History:  
Procedure Laterality Date  HX BREAST LUMPECTOMY  HX CARPAL TUNNEL RELEASE  HX KNEE REPLACEMENT    
 bilater  HX PARTIAL THYROIDECTOMY  HX SHOULDER ARTHROSCOPY    
 HX TUBAL LIGATION Family History Problem Relation Age of Onset  Asthma Mother  Hypertension Mother  Kidney Disease Mother  Thyroid Disease Mother  Diabetes Maternal Grandmother  Thyroid Disease Maternal Grandmother Social History Social History  Marital status: LEGALLY  Spouse name: N/A  
 Number of children: N/A  
 Years of education: N/A Social History Main Topics  Smoking status: Never Smoker  Smokeless tobacco: Never Used  Alcohol use No  
 Drug use: No  
 Sexual activity: Yes Other Topics Concern  None Social History Narrative Prior to Admission medications Medication Sig Start Date End Date Taking? Authorizing Provider  
amiodarone (PACERONE) 400 mg tablet Take 400 mg by mouth daily. Yes Historical Provider  
metoprolol tartrate (LOPRESSOR) 50 mg tablet Take 50 mg by mouth two (2) times a day. Yes Historical Provider  
pregabalin (LYRICA) 100 mg capsule Take 100 mg by mouth two (2) times a day. Yes Historical Provider HYDROcodone-acetaminophen (NORCO) 5-325 mg per tablet Take 1 Tab by mouth every six (6) hours as needed for Pain. Max Daily Amount: 4 Tabs. 9/26/18   Oh Sequeira DO  
montelukast (SINGULAIR) 10 mg tablet Take 10 mg by mouth daily. Yahir Reyes MD  
acetaminophen (TYLENOL) 325 mg tablet Take  by mouth every four (4) hours as needed for Pain. Yahir Reyes MD  
loratadine (CLARITIN) 10 mg tablet Take 10 mg by mouth. Yahir Reyes MD  
pravastatin sodium (PRAVASTATIN PO) Take  by mouth. Yahir Reyes MD  
omeprazole (PRILOSEC) 10 mg capsule Take 10 mg by mouth daily. Yahir Reyes MD  
hydroxychloroquine (PLAQUENIL) 200 mg tablet Take 200 mg by mouth two (2) times a day. Yahir Reyes MD  
oxyCODONE-acetaminophen (PERCOCET 7.5) 7.5-325 mg per tablet 1-2 po q 4-6h prn pain 9/22/18   Clint Zhang MD  
amoxicillin-clavulanate (AUGMENTIN) 500-125 mg per tablet Take 1 Tab by mouth three (3) times daily. 9/22/18   Clint Zhang MD  
lisinopril (PRINIVIL, ZESTRIL) 10 mg tablet Take 1 Tab by mouth daily. 4/23/18   Micaela Barry NP  
furosemide (LASIX) 40 mg tablet Take 1 Tab by mouth daily.  Indications: Peripheral Edema due to Chronic Heart Failure, For leg edema or SOB 18   Angelique Cockayne, MD  
insulin glargine (LANTUS) 100 unit/mL injection 20 Units by SubCUTAneous route nightly. 17   Hailee Cooper MD  
aspirin 81 mg chewable tablet Take 1 Tab by mouth daily. 17   Hailee Cooper MD  
DULoxetine (CYMBALTA) 60 mg capsule Take 1 Cap by mouth daily. 17   Yris Rangel MD  
 
 
No Known Allergies Review of Systems GENERAL: Patient alert, awake and oriented times 3, able to communicate full sentences and not in distress. HEENT: No change in vision, no earache, tinnitus, sore throat or sinus congestion. NECK: No pain or stiffness. PULMONARY: No shortness of breath, cough or wheeze. Cardiovascular: no pnd / orthopnea, no CP GASTROINTESTINAL: No abdominal pain, nausea, vomiting or diarrhea, melena or bright red blood per rectum. GENITOURINARY: No urinary frequency, urgency, hesitancy or dysuria. MUSCULOSKELETAL: +ve joint / muscle pain and claudications DERMATOLOGIC: No rash, no itching, no lesions. ENDOCRINE: No polyuria, polydipsia, no heat or cold intolerance. No recent change in weight. HEMATOLOGICAL: No anemia or easy bruising or bleeding. NEUROLOGIC: No headache, seizures, numbness, tingling or weakness. Physical Exam:  
 
Physical Exam: 
Visit Vitals  /50  Pulse 70  Temp 98.2 °F (36.8 °C)  Resp 16  SpO2 99% O2 Device: Room air Temp (24hrs), Av.2 °F (36.8 °C), Min:98.2 °F (36.8 °C), Max:98.2 °F (36.8 °C) General:  Alert, cooperative, no distress, appears stated age. Head: Normocephalic, without obvious abnormality, atraumatic. Eyes:  Conjunctivae/corneas clear. PERRL, EOMs intact. Nose: Nares normal. No drainage or sinus tenderness. Neck: Supple, symmetrical, trachea midline, no adenopathy, thyroid: no enlargement, no carotid bruit and no JVD. Lungs:   Clear to auscultation bilaterally. Heart:  Regular rate and rhythm, S1, S2 normal.  
  Abdomen: Soft, non-tender. Bowel sounds normal.   
Extremities: Lt hip and Hand pain . Pulses: 2+ and symmetric all extremities. Skin:  No rashes +ve  Lesions Lt wrist. Staples LLE Neurologic: AAOx3, No focal motor or sensory deficit. Labs Reviewed: 
 
Lab results reviewed. For significant abnormal values and values requiring intervention, see assessment and plan. EKG Procedures/imaging: see electronic medical records for all procedures/Xrays and details which were not copied into this note but were reviewed prior to creation of Plan Assessment/Plan Principal Problem: 
  Dehydration (9/30/2018) Active Problems: 
  Type II diabetes mellitus ([facility] [address] (1/25/2012) HTN (hypertension) (4/27/2017) Open displaced fracture of neck of second metacarpal bone of left hand with routine healing (9/26/2018) Periprosthetic fracture around internal prosthetic left knee joint (9/26/2018) Neuropathy (9/30/2018) Pt is admitted for Dehydration and is being prepared for Hand surgery in AM s/p dog bite resulting in metacarpal bone Fx She is s/p ORIF for  Periprosthetic fracture around internal prosthetic left knee joint (9/26/2018) 
  Pt was on Non weight bearing protocol for the above and for Lt hip closed Fx Pt is also s/p irrigation and debridement of her Lt hand s/p dog bite On Augmentin PO for possible cellulitis and prophylaxis Pt signed AMA last night and was found on the floor today s/p fall and inability to ambulate at home. Dehydration Hx of A-fib on Amiodarone and BB Hx of DVT and PE 2016 >> no current anticoagulation DM on Lantus and Metformin Will continue Pt on gentle hydration Will need PT eval and Tx Will continue Pt on Unasyn for now instead of Augmentin at home for cellulitis and s/p dog bite Continue Home meds for A-fib >> Amiodarone and BB 
 
 DM with neuropathy >> SSI  For now Continue Lyrica and Duloxetine ? Hx of CHF >> pt is on lasix and dehydrated >> holding lasix and start gentle hydration Last echo 2017 >> Ef 55-60 % and Grade I Diastolic Dysfunction DVT/GI Prophylaxis: Hep SQ Plan of care is discussed in details with Patient/Family at bedside and agreed upon Lenny Cid MD 
10/1/2018 10:46 PM

## 2018-10-01 NOTE — PROGRESS NOTES
[facility] Progress Note Patient: Armond Rob Age: 79 y.o. : 1950 MR#: [id number] SSN: [id number] Date/Time: 10/1/2018 Subjective: pt feels ok, denies any CP or SOB Assessment/Plan: 1. Left hand dog bite injury, comminute fracture of the second metacarpal head with multiple displaced bone fragments, s/p hand surgery  By Dr. Clint Orellana today, cont Abx 2. ORIF left femur fx, ortho team reconsulted. 3. Prerenal azotemia: cont IVF and monitor 4. Pt left AMA day before but readmitted yesterday 5. HTN: BP stable, cont home med;s 6. afib hx with no OAC 7. HLD 8. DM II: cont SSI  
DVT prophylaxis per Sx team  
D/w pt in detail PT/OT Case discussed with:  [x]Patient  []Family  [x]Nursing  []Case Management DVT Prophylaxis:  []Lovenox  []Hep SQ  []SCDs  []Coumadin   []On Heparin gtt Objective:  
VS:  
Visit Vitals  /83 (BP 1 Location: Right arm, BP Patient Position: At rest)  Pulse 66  Temp 97.6 °F (36.4 °C)  Resp 17  Ht 5' 1\" (1.549 m)  Wt 78.5 kg (173 lb)  SpO2 98%  BMI 32.69 kg/m2 Tmax/24hrs: Temp (24hrs), Av.1 °F (36.7 °C), Min:97.6 °F (36.4 °C), Max:98.2 °F (36.8 °C) IOBRIEF Intake/Output Summary (Last 24 hours) at 10/01/18 1653 Last data filed at 10/01/18 7664 Gross per 24 hour Intake             1000 ml Output                5 ml Net              995 ml General:  Alert, cooperative, no acute distress   
Pulmonary:  CTA Bilaterally. No Rales. Cardiovascular: Regular rate and Rhythm. GI:  Soft, Non distended, Non tender. + Bowel sounds. Extremities: left leg in cast. Left hand dressing clean. No edema, cyanosis, clubbing. Neurologic: Alert and oriented X 3. No acute neuro deficits. Medications:  
Current Facility-Administered Medications Medication Dose Route Frequency  0.9% sodium chloride infusion 250 mL  250 mL IntraVENous PRN  
  metoprolol tartrate (LOPRESSOR) tablet 50 mg  50 mg Oral Q12H  
 amiodarone (CORDARONE) tablet 400 mg  400 mg Oral DAILY  DULoxetine (CYMBALTA) capsule 60 mg  60 mg Oral DAILY  hydroxychloroquine (PLAQUENIL) tablet 200 mg  200 mg Oral BID  lisinopril (PRINIVIL, ZESTRIL) tablet 10 mg  10 mg Oral DAILY  pravastatin (PRAVACHOL) tablet 20 mg  20 mg Oral QHS  pregabalin (LYRICA) capsule 100 mg  100 mg Oral BID  
 0.9% sodium chloride infusion  50 mL/hr IntraVENous CONTINUOUS  
 ampicillin-sulbactam (UNASYN) 3 g in 0.9% sodium chloride (MBP/ADV) 100 mL MBP  3 g IntraVENous Q8H  
 acetaminophen (TYLENOL) tablet 650 mg  650 mg Oral Q6H PRN  
 oxyCODONE-acetaminophen (PERCOCET) 5-325 mg per tablet 1 Tab  1 Tab Oral Q6H PRN  
 naloxone (NARCAN) injection 0.4 mg  0.4 mg IntraVENous PRN  
 ondansetron HCl (PF) (ZOFRAN) syringe 4 mg  4 mg IntraVENous Q6H PRN  
 docusate sodium (COLACE) capsule 100 mg  100 mg Oral BID PRN  
 morphine injection 1 mg  1 mg IntraVENous Q4H PRN  
 insulin lispro (HUMALOG) injection   SubCUTAneous AC&HS  
 glucose chewable tablet 16 g  4 Tab Oral PRN  
 glucagon (GLUCAGEN) injection 1 mg  1 mg IntraMUSCular PRN  
 dextrose (D50W) injection syrg 12.5-25 g  25-50 mL IntraVENous PRN Labs:   
Recent Results (from the past 24 hour(s)) CBC WITH AUTOMATED DIFF Collection Time: 09/30/18  8:10 PM  
Result Value Ref Range WBC 10.5 4.6 - 13.2 K/uL  
 RBC 2.88 (L) 4.20 - 5.30 M/uL HGB 7.7 (L) 12.0 - 16.0 g/dL HCT 23.0 (L) 35.0 - 45.0 % MCV 79.9 74.0 - 97.0 FL  
 MCH 26.7 24.0 - 34.0 PG  
 MCHC 33.5 31.0 - 37.0 g/dL  
 RDW 14.7 (H) 11.6 - 14.5 % PLATELET 406 306 - 000 K/uL MPV 9.4 9.2 - 11.8 FL  
 NEUTROPHILS 75 (H) 40 - 73 % LYMPHOCYTES 9 (L) 21 - 52 % MONOCYTES 15 (H) 3 - 10 % EOSINOPHILS 1 0 - 5 % BASOPHILS 0 0 - 2 %  
 ABS. NEUTROPHILS 8.0 1.8 - 8.0 K/UL  
 ABS. LYMPHOCYTES 0.9 0.9 - 3.6 K/UL  
 ABS. MONOCYTES 1.6 (H) 0.05 - 1.2 K/UL ABS. EOSINOPHILS 0.1 0.0 - 0.4 K/UL  
 ABS. BASOPHILS 0.0 0.0 - 0.1 K/UL  
 DF AUTOMATED METABOLIC PANEL, COMPREHENSIVE Collection Time: 09/30/18  8:10 PM  
Result Value Ref Range Sodium 145 136 - 145 mmol/L Potassium 4.1 3.5 - 5.5 mmol/L Chloride 114 (H) 100 - 108 mmol/L  
 CO2 21 21 - 32 mmol/L Anion gap 10 3.0 - 18 mmol/L Glucose 99 74 - 99 mg/dL BUN 36 (H) 7.0 - 18 MG/DL Creatinine 1.31 (H) 0.6 - 1.3 MG/DL  
 BUN/Creatinine ratio 27 (H) 12 - 20 GFR est AA 49 (L) >60 ml/min/1.73m2 GFR est non-AA 41 (L) >60 ml/min/1.73m2 Calcium 8.5 8.5 - 10.1 MG/DL Bilirubin, total 0.6 0.2 - 1.0 MG/DL  
 ALT (SGPT) 12 (L) 13 - 56 U/L  
 AST (SGOT) 33 15 - 37 U/L Alk. phosphatase 229 (H) 45 - 117 U/L Protein, total 5.8 (L) 6.4 - 8.2 g/dL Albumin 1.9 (L) 3.4 - 5.0 g/dL Globulin 3.9 2.0 - 4.0 g/dL A-G Ratio 0.5 (L) 0.8 - 1.7 CK Collection Time: 09/30/18  8:10 PM  
Result Value Ref Range  26 - 984 U/L  
METABOLIC PANEL, BASIC Collection Time: 10/01/18  5:04 AM  
Result Value Ref Range Sodium 145 136 - 145 mmol/L Potassium 4.1 3.5 - 5.5 mmol/L Chloride 116 (H) 100 - 108 mmol/L  
 CO2 21 21 - 32 mmol/L Anion gap 8 3.0 - 18 mmol/L Glucose 120 (H) 74 - 99 mg/dL BUN 37 (H) 7.0 - 18 MG/DL Creatinine 1.33 (H) 0.6 - 1.3 MG/DL  
 BUN/Creatinine ratio 28 (H) 12 - 20 GFR est AA 48 (L) >60 ml/min/1.73m2 GFR est non-AA 40 (L) >60 ml/min/1.73m2 Calcium 8.0 (L) 8.5 - 10.1 MG/DL MAGNESIUM Collection Time: 10/01/18  5:04 AM  
Result Value Ref Range Magnesium 1.7 1.6 - 2.6 mg/dL PHOSPHORUS Collection Time: 10/01/18  5:04 AM  
Result Value Ref Range Phosphorus 3.3 2.5 - 4.9 MG/DL  
CBC WITH AUTOMATED DIFF Collection Time: 10/01/18  5:04 AM  
Result Value Ref Range WBC 7.2 4.6 - 13.2 K/uL  
 RBC 2.66 (L) 4.20 - 5.30 M/uL HGB 7.1 (L) 12.0 - 16.0 g/dL HCT 21.8 (L) 35.0 - 45.0 %  MCV 82.0 74.0 - 97.0 FL  
 MCH 26.7 24.0 - 34.0 PG  
 MCHC 32.6 31.0 - 37.0 g/dL  
 RDW 15.2 (H) 11.6 - 14.5 % PLATELET 196 027 - 336 K/uL MPV 9.1 (L) 9.2 - 11.8 FL  
 NEUTROPHILS 70 40 - 73 % LYMPHOCYTES 16 (L) 21 - 52 % MONOCYTES 12 (H) 3 - 10 % EOSINOPHILS 2 0 - 5 % BASOPHILS 0 0 - 2 %  
 ABS. NEUTROPHILS 5.0 1.8 - 8.0 K/UL  
 ABS. LYMPHOCYTES 1.2 0.9 - 3.6 K/UL  
 ABS. MONOCYTES 0.8 0.05 - 1.2 K/UL  
 ABS. EOSINOPHILS 0.1 0.0 - 0.4 K/UL  
 ABS. BASOPHILS 0.0 0.0 - 0.1 K/UL  
 DF AUTOMATED HEMOGLOBIN A1C WITH EAG Collection Time: 10/01/18  5:04 AM  
Result Value Ref Range Hemoglobin A1c 8.7 (H) 4.2 - 5.6 % Est. average glucose 203 mg/dL GLUCOSE, POC Collection Time: 10/01/18  8:11 AM  
Result Value Ref Range Glucose (POC) 136 (H) 70 - 110 mg/dL PROTHROMBIN TIME + INR Collection Time: 10/01/18  9:02 AM  
Result Value Ref Range Prothrombin time 13.2 11.5 - 15.2 sec INR 1.0 0.8 - 1.2 PTT Collection Time: 10/01/18  9:02 AM  
Result Value Ref Range aPTT 31.1 23.0 - 36.4 SEC  
TYPE + CROSSMATCH Collection Time: 10/01/18  9:28 AM  
Result Value Ref Range Crossmatch Expiration 10/04/2018 ABO/Rh(D) O POSITIVE Antibody screen NEG   
 CALLED TO: AUTUMN LEONARD, [id number] AT 1054 BY [facility]. Unit number [id number] Blood component type  LR Unit division 00 Status of unit ALLOCATED Crossmatch result Compatible HGB & HCT Collection Time: 10/01/18  2:10 PM  
Result Value Ref Range HGB 7.8 (L) 12.0 - 16.0 g/dL HCT 24.6 (L) 35.0 - 45.0 % Signed By: Marybeth Phillip MD   
 October 1, 2018

## 2018-10-01 NOTE — ANESTHESIA POSTPROCEDURE EVALUATION
Post-Anesthesia Evaluation and Assessment Patient: Heidi Melendrez MRN: [id number]  SSN: [id number] YOB: 1950  Age: 79 y.o. Sex: female Cardiovascular Function/Vital Signs Visit Vitals  /80  Pulse 62  Temp 36.7 °C (98 °F)  Resp 18  Ht 5' 1\" (1.549 m)  Wt 78.5 kg (173 lb)  SpO2 100%  BMI 32.69 kg/m2 Patient is status post general, regional anesthesia for Procedure(s): OPEN REDUCTION INTERNAL FIXATION LEFT SECOND METACARPAL/MEDARTIS/HAND TABLE/C-ARM 
DIGITAL NERVE REPAIR LEFT INDEX FINGER. Nausea/Vomiting: None Postoperative hydration reviewed and adequate. Pain: 
Pain Scale 1: Visual (10/01/18 1827) Pain Intensity 1: 4 (10/01/18 1827) Managed Neurological Status:  
Neuro (WDL): Within Defined Limits (10/01/18 1348) At baseline Mental Status and Level of Consciousness: Arousable Pulmonary Status:  
O2 Device: Nasal cannula (10/01/18 1756) Adequate oxygenation and airway patent Complications related to anesthesia: None Post-anesthesia assessment completed. No concerns Signed By: Annabelle Joseph MD   
 October 1, 2018

## 2018-10-01 NOTE — ANESTHESIA PROCEDURE NOTES
Peripheral Block Start time: 10/1/2018 9:50 AM 
End time: 10/1/2018 10:00 AM 
Performed by: Tomi Marr Authorized by: Tomi Marr Pre-procedure: Indications: at surgeon's request and post-op pain management Preanesthetic Checklist: patient identified, risks and benefits discussed, site marked, timeout performed, anesthesia consent given and patient being monitored Timeout Time: 09:50 Block Type:  
Block Type:  Supraclavicular Laterality:  Left Monitoring:  Standard ASA monitoring, continuous pulse ox, oxygen, responsive to questions, frequent vital sign checks and heart rate Injection Technique:  Single shot Procedures: ultrasound guided Patient Position: supine Prep: chlorhexidine Location:  Supraclavicular Needle Type:  Ultraplex Needle Gauge:  22 G Needle Localization:  Ultrasound guidance Medication Injected:  0.5% 
ropivacaine Volume (mL):  30 
 
Assessment: 
Number of attempts:  1 Injection Assessment:  Incremental injection every 5 mL, local visualized surrounding nerve on ultrasound, negative aspiration for blood, no intravascular symptoms, no paresthesia, negative aspiration for CSF and ultrasound image on chart Patient tolerance:  Patient tolerated the procedure well with no immediate complications

## 2018-10-01 NOTE — PROGRESS NOTES
PT orders received and chart was reviewed. Pt is currently off the floor for a procedure. Will continue to follow.   Felipe Esquivel PT

## 2018-10-01 NOTE — ANESTHESIA PREPROCEDURE EVALUATION
Anesthetic History No history of anesthetic complications Review of Systems / Medical History Patient summary reviewed and pertinent labs reviewed Pulmonary Sleep apnea: No treatment Neuro/Psych Psychiatric history Cardiovascular Hypertension: well controlled Dysrhythmias Exercise tolerance: <4 METS 
  
GI/Hepatic/Renal 
  
GERD: well controlled Renal disease: CRI Endo/Other Diabetes: well controlled, type 2 Morbid obesity, arthritis and anemia Other Findings Comments: Documentation of current medication Current medications obtained, documented and obtained? YES Risk Factors for Postoperative nausea/vomiting: 
     History of postoperative nausea/vomiting? NO Female? YES Motion sickness? NO Intended opioid administration for postoperative analgesia? YES Smoking Abstinence: 
Current Smoker? NO Elective Surgery? NO Seen preoperatively by anesthesiologist or proxy prior to day of surgery? YES Pt abstained from smoking 24 hours prior to anesthesia? N/A Preventive care/screening for High Blood Pressure: 
Aged 18 years and older: YES Screened for high blood pressure: YES Patients with high blood pressure referred to primary care provider 
 for BP management: YES Physical Exam 
 
Airway Mallampati: III 
TM Distance: 4 - 6 cm Neck ROM: decreased range of motion Mouth opening: Diminished (comment) Cardiovascular Rhythm: regular Rate: normal 
 
 
 
 Dental 
 
Dentition: Poor dentition Pulmonary Breath sounds clear to auscultation Abdominal 
GI exam deferred Other Findings Anesthetic Plan ASA: 3 Anesthesia type: general and regional - supraclavicular block Induction: Intravenous Anesthetic plan and risks discussed with: Patient

## 2018-10-01 NOTE — H&P
H&P Update: 
Joe Hernandez was seen and examined. History and physical has been reviewed. Significant clinical changes have occurred as noted:  After seeing the patient in the office, she fell and suffered a periprosthetic distal femur fracture that was fixed by Dr Ricki Gama on Wednesday 9/26. She left AMA on 9/29 and returned on 9/30. She received Heparin today at 1 AM but has not received a subsequent dose. She is currently admitted to medicine. Signed By: Daren Pittman DO  October 1, 2018 8:36 AM

## 2018-10-01 NOTE — PROGRESS NOTES
Subjective Pt in pacu not cooperating and complaining of being hot. Temp:  [98.2 °F (36.8 °C)] Pulse (Heart Rate):  [99-15] BP: (151-199)/() Resp Rate:  [13-24] O2 Sat (%):  [91 %-99 %] Weight:  [173 lb (78.5 kg)] 10/01 0701 - 10/01 1900 In: 1000 [I.V.:1000] Out: 5 Objective  Cast CDI. Cap refill brisk distally Principal Problem: 
  Dehydration (9/30/2018) Active Problems: 
  Type II diabetes mellitus ([facility] [address] (1/25/2012) HTN (hypertension) (4/27/2017) Open displaced fracture of neck of second metacarpal bone of left hand with routine healing (9/26/2018) Periprosthetic fracture around internal prosthetic left knee joint (9/26/2018) Neuropathy (9/30/2018) Assessment & Plan POD 0 ORIF L 2nd MC head and partial head excision I have DC'd aspirin and DVT px. Please wait until Tuesday AM to restart.

## 2018-10-01 NOTE — BRIEF OP NOTE
BRIEF OPERATIVE NOTE Date of Procedure: 10/1/2018 Preoperative Diagnosis: S61.452A DOG BITE; S62.391B OPEN FX LEFT 2ND METACARPAL, DISPLACED HEAD Postoperative Diagnosis: S61.452A DOG BITE; S62.391B OPEN FX LEFT 2ND METACARPAL, DISPLACED HEAD Procedure(s): OPEN REDUCTION INTERNAL FIXATION LEFT SECOND METACARPAL HEAD Surgeon(s) and Role: * Denita Keenan, DO - Primary Surgical Assistant: none Surgical Staff: 
Circ-1: Liz Gaitan RN 
Circ-Relief: Falguni Galvan Scrub Tech-1: Penn Sample Scrub Tech-Relief: Terisa Breezy Surg Asst-1: Roberta Witt Event Time In Incision Start [phone] Incision Close 1322 Anesthesia: General with a regional block Estimated Blood Loss: 5mL Specimens: * No specimens in log * Findings: Severely comminuted MC head fracture with many nonviable fragments Complications: none Implants:  
Implant Name Type Inv. Item Serial No.  Lot No. LRB No. Used Action 1.5 cortex screw 15mm   N/A MEDARTIS N/A Left 1 Implanted

## 2018-10-01 NOTE — ED NOTES
Patient resting well and eating at this time. Patient is aware that she is NPO after midnight for procedure.

## 2018-10-01 NOTE — OP NOTES
[facility]  OPERATIVE REPORT    Judith Noonan  MR#: [id number]  : 1950  ACCOUNT #: [de-identified]   DATE OF SERVICE: 10/01/2018    PREOPERATIVE DIAGNOSIS:  Left hand index finger second metacarpal head comminuted fracture secondary to dog bite. POSTOPERATIVE DIAGNOSIS:  Left hand index finger second metacarpal head comminuted fracture secondary to dog bite. PROCEDURES PERFORMED:  1. Left second metacarpal head open reduction and internal fixation with partial head excision. [id number]  2. Capsular repair and collateral ligament repair along the radial collateral ligament. 3.  Interposition arthroplasty of redundant capsular tissue. SURGEON:  Valorie Simpson DO    ASSISTANTS:  None. ANESTHESIA:  Regional block with general anesthesia. ESTIMATED BLOOD LOSS:  5 mL. FLUIDS:  Per Anesthesia. SPECIMEN REMOVED:  Partial 2nd metacarpal head excision on the left. IMPLANTS:  Per the brief op note, a 1.5 cortical screw was placed in the buried aspect of the metacarpal head. COMPLICATIONS:  none    FINDINGS:  Comminuted multi-fragmentary that second metacarpal head on the left with many pieces that are nonviable and dysvascular. INDICATIONS:  Displaced comminuted metacarpal head secondary to dog bite. This is a delayed surgery, 8 days after the patient was bitten and had been on oral and IV antibiotics. PROCEDURE IN DETAIL:  The patient was taken back to the operative suite after appropriate consent was performed. This patient has been somewhat of a difficult case, as she was admitted for a periprosthetic distal femur fracture as well and left AMA. She also came back last night due to lack of help. Today, she was appropriately consented. She was taken back to the operative suite. Her left upper extremity was prepped and draped in the normal sterile fashion. This was after a tourniquet was applied. Her lacerations were cleaned and the nylon was removed.   Her incision was extended over the metacarpal head. The EIP and EDC to the index finger were split apart to expose the metacarpophalangeal joint. The capsule was incised and there was a large fragment that was found to be buttonholed within the second webspace. This fragment was placed in its appropriate position. There was another fragment that had been completely inverted with the cartilaginous surface facing into the canal of the bone. This fragment was placed back in position. Both fragments were placed in the best optimal possible position; however, this was not anatomic. This is due to the many fragments that had to be removed. The more ulnar fragment was allowed to be fixated with 1 cortical screw, 1.5. The other fragment was very fragile and unable to take a screw, as every time this fragment was drilled or a K-wire was passed, it became more and more fragmented. At this point, 4-0 FiberWire was used to fixate that fragment to its neighboring fragment as well as to the metaphyseal bone of the second metacarpal.  At this point, there was redundant capsular tissue that was used as an interposition in the metacarpophalangeal joint and was fixated to the repaired ulnar collateral ligament. The capsule was closed as much as possible and the wound was copiously irrigated. The tourniquet was let down and there was not found to be any copious bleeding. The wound was again irrigated and was closed with 5-0 nylon in interrupted fashion. There was an area over the dorsum of the hand that was unable to be completely approximated and was just loosely closed to allow for secondary healing. The hand was placed into a soft dressing and this was followed by a radial gutter cast secondary to the already mentioned difficulties with this patient. Patient will be kept in this cast for at least 2 weeks and possibly 4 weeks to allow for scarring of the metacarpophalangeal joint.   I do not expect much motion with this finger and this patient will likely need a fusion in the future. However, to allow for healing and a stable joint, we will allow everything to scar in, with likely 4 weeks of immobilization. At that point, we will start gentle range of motion and progress her gradually. Again, this patient is not 100% reliable and things are being done in a less than conventional fashion. Additionally, this case was also a much higher level of difficulty, especially her metacarpal, but even so for a metacarpal head as it was multi-fragmentary in at least 8 pieces. The patient will be admitted back to the floor and she was readmitted to Medicine last night. I have requested that Medicine hold her anticoagulants until tomorrow morning. The patient will likely be placed in a rehab center where she can get the assistance she needs.       DO JENNIFER Richmond  D: 10/01/2018 14:42     T: 10/01/2018 15:29  JOB #: [id number]

## 2018-10-01 NOTE — ED NOTES
AM blood draws completed on patient at this time. Call bell within reach and all safety measure are in place at this time. No additional wants or needs noted at this time.

## 2018-10-01 NOTE — ED NOTES
Pt provided two pillows. Lights dimmed, door half closed per request. TV on call bell within reach. Pt verbilized understanding of NPO.

## 2018-10-01 NOTE — ED NOTES
Patient assessment completed patient is currently eating waiting for possible discharge. Patient denies pain currently family is present at the bedside. Patient has no additional wants or needs noted at this time. Has contacted supervisor to get, a walker for patient.

## 2018-10-01 NOTE — PERIOP NOTES
Patient received subcutaneous heparin at 0049. Patient's aPTT checked, patient within normal limits. Spoke with anesthesia and surgeon regarding results. Will continue with the procedure. Results for Agus Bingham (MRN [id number]) as of 10/1/2018 09:35 Ref. Range 10/1/2018 09:02 INR Latest Ref Range: 0.8 - 1.2   1.0 Prothrombin time Latest Ref Range: 11.5 - 15.2 sec 13.2  
aPTT Latest Ref Range: 23.0 - 36.4 SEC 31.1

## 2018-10-02 LAB
ANION GAP SERPL CALC-SCNC: 9 MMOL/L (ref 3–18)
BASOPHILS # BLD: 0 K/UL (ref 0–0.1)
BASOPHILS NFR BLD: 0 % (ref 0–2)
BUN SERPL-MCNC: 33 MG/DL (ref 7–18)
BUN/CREAT SERPL: 27 (ref 12–20)
CALCIUM SERPL-MCNC: 7.9 MG/DL (ref 8.5–10.1)
CHLORIDE SERPL-SCNC: 115 MMOL/L (ref 100–108)
CO2 SERPL-SCNC: 20 MMOL/L (ref 21–32)
CREAT SERPL-MCNC: 1.23 MG/DL (ref 0.6–1.3)
DIFFERENTIAL METHOD BLD: ABNORMAL
EOSINOPHIL # BLD: 0.2 K/UL (ref 0–0.4)
EOSINOPHIL NFR BLD: 3 % (ref 0–5)
ERYTHROCYTE [DISTWIDTH] IN BLOOD BY AUTOMATED COUNT: 15.8 % (ref 11.6–14.5)
GLUCOSE BLD STRIP.AUTO-MCNC: 113 MG/DL (ref 70–110)
GLUCOSE BLD STRIP.AUTO-MCNC: 159 MG/DL (ref 70–110)
GLUCOSE BLD STRIP.AUTO-MCNC: 199 MG/DL (ref 70–110)
GLUCOSE BLD STRIP.AUTO-MCNC: 207 MG/DL (ref 70–110)
GLUCOSE SERPL-MCNC: 109 MG/DL (ref 74–99)
HCT VFR BLD AUTO: 25.6 % (ref 35–45)
HGB BLD-MCNC: 8 G/DL (ref 12–16)
LYMPHOCYTES # BLD: 1.1 K/UL (ref 0.9–3.6)
LYMPHOCYTES NFR BLD: 15 % (ref 21–52)
MCH RBC QN AUTO: 26.3 PG (ref 24–34)
MCHC RBC AUTO-ENTMCNC: 31.3 G/DL (ref 31–37)
MCV RBC AUTO: 84.2 FL (ref 74–97)
MONOCYTES # BLD: 1 K/UL (ref 0.05–1.2)
MONOCYTES NFR BLD: 14 % (ref 3–10)
NEUTS SEG # BLD: 4.7 K/UL (ref 1.8–8)
NEUTS SEG NFR BLD: 68 % (ref 40–73)
PLATELET # BLD AUTO: 286 K/UL (ref 135–420)
PMV BLD AUTO: 9.8 FL (ref 9.2–11.8)
POTASSIUM SERPL-SCNC: 4.3 MMOL/L (ref 3.5–5.5)
RBC # BLD AUTO: 3.04 M/UL (ref 4.2–5.3)
SODIUM SERPL-SCNC: 144 MMOL/L (ref 136–145)
WBC # BLD AUTO: 7.1 K/UL (ref 4.6–13.2)

## 2018-10-02 PROCEDURE — 94760 N-INVAS EAR/PLS OXIMETRY 1: CPT

## 2018-10-02 PROCEDURE — 80048 BASIC METABOLIC PNL TOTAL CA: CPT | Performed by: INTERNAL MEDICINE

## 2018-10-02 PROCEDURE — 82962 GLUCOSE BLOOD TEST: CPT

## 2018-10-02 PROCEDURE — 74011250636 HC RX REV CODE- 250/636: Performed by: ORTHOPAEDIC SURGERY

## 2018-10-02 PROCEDURE — 77010033678 HC OXYGEN DAILY

## 2018-10-02 PROCEDURE — 65270000029 HC RM PRIVATE

## 2018-10-02 PROCEDURE — 74011250636 HC RX REV CODE- 250/636: Performed by: INTERNAL MEDICINE

## 2018-10-02 PROCEDURE — 36415 COLL VENOUS BLD VENIPUNCTURE: CPT | Performed by: INTERNAL MEDICINE

## 2018-10-02 PROCEDURE — 85025 COMPLETE CBC W/AUTO DIFF WBC: CPT | Performed by: INTERNAL MEDICINE

## 2018-10-02 PROCEDURE — 77030034849

## 2018-10-02 PROCEDURE — 74011250637 HC RX REV CODE- 250/637: Performed by: HOSPITALIST

## 2018-10-02 PROCEDURE — 74011000258 HC RX REV CODE- 258: Performed by: INTERNAL MEDICINE

## 2018-10-02 PROCEDURE — 74011250637 HC RX REV CODE- 250/637: Performed by: INTERNAL MEDICINE

## 2018-10-02 PROCEDURE — 51798 US URINE CAPACITY MEASURE: CPT

## 2018-10-02 PROCEDURE — 74011636637 HC RX REV CODE- 636/637: Performed by: INTERNAL MEDICINE

## 2018-10-02 RX ORDER — LISINOPRIL 10 MG/1
10 TABLET ORAL
Status: COMPLETED | OUTPATIENT
Start: 2018-10-02 | End: 2018-10-02

## 2018-10-02 RX ORDER — ENOXAPARIN SODIUM 100 MG/ML
40 INJECTION SUBCUTANEOUS EVERY 24 HOURS
Status: DISCONTINUED | OUTPATIENT
Start: 2018-10-02 | End: 2018-10-05 | Stop reason: HOSPADM

## 2018-10-02 RX ORDER — HYDRALAZINE HYDROCHLORIDE 20 MG/ML
20 INJECTION INTRAMUSCULAR; INTRAVENOUS ONCE
Status: COMPLETED | OUTPATIENT
Start: 2018-10-02 | End: 2018-10-02

## 2018-10-02 RX ORDER — GUAIFENESIN 100 MG/5ML
81 LIQUID (ML) ORAL DAILY
Status: DISCONTINUED | OUTPATIENT
Start: 2018-10-03 | End: 2018-10-05 | Stop reason: HOSPADM

## 2018-10-02 RX ORDER — LISINOPRIL 20 MG/1
20 TABLET ORAL DAILY
Status: DISCONTINUED | OUTPATIENT
Start: 2018-10-03 | End: 2018-10-04

## 2018-10-02 RX ORDER — HYDRALAZINE HYDROCHLORIDE 20 MG/ML
INJECTION INTRAMUSCULAR; INTRAVENOUS
Status: DISPENSED
Start: 2018-10-02 | End: 2018-10-02

## 2018-10-02 RX ADMIN — AMIODARONE HYDROCHLORIDE 400 MG: 200 TABLET ORAL at 08:24

## 2018-10-02 RX ADMIN — AMPICILLIN SODIUM AND SULBACTAM SODIUM 3 G: 2; 1 INJECTION, POWDER, FOR SOLUTION INTRAMUSCULAR; INTRAVENOUS at 23:52

## 2018-10-02 RX ADMIN — PRAVASTATIN SODIUM 20 MG: 20 TABLET ORAL at 21:00

## 2018-10-02 RX ADMIN — ENOXAPARIN SODIUM 40 MG: 100 INJECTION SUBCUTANEOUS at 14:06

## 2018-10-02 RX ADMIN — PREGABALIN 100 MG: 50 CAPSULE ORAL at 17:08

## 2018-10-02 RX ADMIN — AMPICILLIN SODIUM AND SULBACTAM SODIUM 3 G: 2; 1 INJECTION, POWDER, FOR SOLUTION INTRAMUSCULAR; INTRAVENOUS at 15:52

## 2018-10-02 RX ADMIN — MORPHINE SULFATE 1 MG: 2 INJECTION, SOLUTION INTRAMUSCULAR; INTRAVENOUS at 20:55

## 2018-10-02 RX ADMIN — INSULIN LISPRO 2 UNITS: 100 INJECTION, SOLUTION INTRAVENOUS; SUBCUTANEOUS at 18:46

## 2018-10-02 RX ADMIN — AMPICILLIN SODIUM AND SULBACTAM SODIUM 3 G: 2; 1 INJECTION, POWDER, FOR SOLUTION INTRAMUSCULAR; INTRAVENOUS at 08:29

## 2018-10-02 RX ADMIN — HYDROXYCHLOROQUINE SULFATE 200 MG: 200 TABLET, FILM COATED ENTERAL at 08:24

## 2018-10-02 RX ADMIN — LISINOPRIL 10 MG: 10 TABLET ORAL at 08:24

## 2018-10-02 RX ADMIN — LISINOPRIL 10 MG: 10 TABLET ORAL at 14:06

## 2018-10-02 RX ADMIN — DULOXETINE HYDROCHLORIDE 60 MG: 60 CAPSULE, DELAYED RELEASE ORAL at 08:24

## 2018-10-02 RX ADMIN — HYDROXYCHLOROQUINE SULFATE 200 MG: 200 TABLET, FILM COATED ENTERAL at 17:09

## 2018-10-02 RX ADMIN — OXYCODONE AND ACETAMINOPHEN 1 TABLET: 5; 325 TABLET ORAL at 17:08

## 2018-10-02 RX ADMIN — OXYCODONE AND ACETAMINOPHEN 1 TABLET: 5; 325 TABLET ORAL at 04:50

## 2018-10-02 RX ADMIN — INSULIN LISPRO 2 UNITS: 100 INJECTION, SOLUTION INTRAVENOUS; SUBCUTANEOUS at 21:49

## 2018-10-02 RX ADMIN — METOPROLOL TARTRATE 50 MG: 50 TABLET ORAL at 20:54

## 2018-10-02 RX ADMIN — METOPROLOL TARTRATE 50 MG: 50 TABLET ORAL at 08:24

## 2018-10-02 RX ADMIN — HYDRALAZINE HYDROCHLORIDE 20 MG: 20 INJECTION INTRAMUSCULAR; INTRAVENOUS at 04:45

## 2018-10-02 RX ADMIN — OXYCODONE AND ACETAMINOPHEN 1 TABLET: 5; 325 TABLET ORAL at 12:16

## 2018-10-02 RX ADMIN — PREGABALIN 100 MG: 50 CAPSULE ORAL at 08:24

## 2018-10-02 RX ADMIN — INSULIN LISPRO 2 UNITS: 100 INJECTION, SOLUTION INTRAVENOUS; SUBCUTANEOUS at 12:15

## 2018-10-02 NOTE — ROUTINE PROCESS
1900 Bedside and Verbal shift change  Received from ELANA Alvarenga RN (outgoing nurse), to GARLAND Gallego (oncoming)  Pt. Is AOX 4. IV Patent and infusing well, Pt. [id number]  pain at this time. Report included the following information SBAR, Kardex, Procedure Summary, Intake/Output, MAR, Recent Lab Results. Will resume care and monitor Pt. Condition. Pt. Educated on call bell when in need of help and assistance. Pt. verbalized understanding. Daughter in room. Bed alarm on. 
 
 Pt. Head to toe Assessment Done and documented. 1913  /80  Given metoprolol scheduled for 2100. 
 
2015  Pt. On bedpan. Unable to void. 2030  Bladder scan done. 870 ml noted. Notified Dr Chikis Atkinson MD ordered to straight cath x1 and bladder scan every 6 hrs and call MD for result. 2200  Given morphine per [facility] for pain management. Straight cath done and 1500ml of urine noted. 2300  Pt. Re-educated on RLE elevation. 0000  Pt. Resting comfortably in bed. 
 
0200  PT. Resting comfortably in bed, no signs of distress. 0426 BP @ 206/82  Notified Dr Chikis Atkinson MD ordered to give hydralazine 20 mg iv x1 now. MD notified of Bladder scan of 256 MD ordered to bladder scan Pt. @ 0800 and call MD for report. 0531  BP @ 174/74. Resting in bed. 
 
0641  Pt. Able to rest well, made no complaints. Verbal and bedside Shift changed report given to Jeff Arreola RN (oncoming RN) on Pt. Condition. Report consisted of patients Situation, History, Activities, intake/output,  Background, Assessment and Recommendations(SBAR). Information from the following report(s) Kardex, order Summary, Lab results and MAR was reviewed with the receiving nurse. Opportunity for questions and clarification was provided.

## 2018-10-02 NOTE — PROGRESS NOTES
Subjective:  
  
Apolonia Chávez is a 79 y.o. right hand-dominant female who returns for follow-up of a left hand (2nd Metacarpal head) fracture. The injury occurred 9/22/2018. She reports no problems with the cast or injury, and no problems with swelling, numbness, or tingling in the affected area or distal to it. She does report some pain in the hand however she says she is comfortable Objective:  
 
General:   alert, cooperative, no distress, appears stated age Left Hand Radial Gutter Cast 
Cast Condition:  good Circulation:   warm, well perfused, brisk capillary refill distal to the injury Skin:   intact at cast edges Swelling:  not assessed - cast not removed Deformity:  There is not an obvious deformity of the L Hand Sensation:   intact to light touch Imaging X-ray Plain films done in pacu are positive for fixation with nonanatomic flattening of the 2nd metacarpal head.: 
 
Assessment/Plan:  
 
Resolving fracture without complication. Cast left intact I have thoroughly discussed with the patient and family the poor condition that her metacarpal head was in. For this type of injury, when Fixation can not be ideally achieved, it is best to proceed with primary arthroplasty or arthrodesis. However give her recent contaminated wound and recent lack of compliance, fixation with interposition arthroplasty of the 2nd MCP joint followed by radial gutter casting was deemed to be the only feasible option. I have discussed at length the likelihood that she will not only have arthritis in this joint but also possibly instability as the ulnar collater ligament was partially torn and needed a partial repair. I will plan for casting for 3-4 weeks followed by a progressive mobilization plan. Addendum: 
Postop Day 1 with normal pain for this stage Pain control Ice/Elevate DVT Px per Medicine and Knee surgeon F/U in office 10-14 days after surgical date Hand stable for DC

## 2018-10-02 NOTE — PROGRESS NOTES
[facility] Progress Note Patient: Armando Standing Age: 79 y.o. : 1950 MR#: [id number] SSN: [id number] Date/Time: 10/2/2018 Subjective: pt feels fine, eating lunch, denies any CP or SOB Daughter at bedside Assessment/Plan: 1. Left hand dog bite injury, comminute fracture of the second metacarpal head with multiple displaced bone fragments, s/p hand surgery. Plan per Sx 2. ORIF left femur fx, ortho team reconsulted. D/w PA. 3. Prerenal azotemia: improving, will d/c IVF and monitor 5. HTN: BP high, will increase lisinopril and cont metoprolol 6. Afib hx with no OAC 7. HLD 8. DM II: cont SSI  
DVT prophylaxis per Sx team  
D/w pt in detail PT/OT Case discussed with:  [x]Patient  []Family  [x]Nursing  []Case Management DVT Prophylaxis:  []Lovenox  []Hep SQ  []SCDs  []Coumadin   []On Heparin gtt Objective:  
VS:  
Visit Vitals  /77  Pulse (!) 59  Temp 97.7 °F (36.5 °C)  Resp 18  Ht 5' 1\" (1.549 m)  Wt 78.5 kg (173 lb)  SpO2 99%  BMI 32.69 kg/m2 Tmax/24hrs: Temp (24hrs), Av.8 °F (36.6 °C), Min:97.2 °F (36.2 °C), Max:98.2 °F (36.8 °C) IOBRIEF Intake/Output Summary (Last 24 hours) at 10/02/18 1345 Last data filed at 10/02/18 1130 Gross per 24 hour Intake           1197.5 ml Output             2300 ml Net          -1102.5 ml  
 
 
General:  Alert, cooperative, no acute distress   
Pulmonary:  CTA Bilaterally. No Rales. Cardiovascular: Regular rate and Rhythm. GI:  Soft, Non distended, Non tender. + Bowel sounds. Extremities: left leg in cast. Left hand dressing clean. No edema, cyanosis, clubbing. Neurologic: Alert and oriented X 3. No acute neuro deficits. Medications:  
Current Facility-Administered Medications Medication Dose Route Frequency  hydrALAZINE (APRESOLINE) 20 mg/mL injection  enoxaparin (LOVENOX) injection 40 mg  40 mg SubCUTAneous Q24H  [START ON 10/3/2018] aspirin chewable tablet 81 mg  81 mg Oral DAILY  [START ON 10/3/2018] lisinopril (PRINIVIL, ZESTRIL) tablet 20 mg  20 mg Oral DAILY  lisinopril (PRINIVIL, ZESTRIL) tablet 10 mg  10 mg Oral NOW  
 0.9% sodium chloride infusion 250 mL  250 mL IntraVENous PRN  
 metoprolol tartrate (LOPRESSOR) tablet 50 mg  50 mg Oral Q12H  
 amiodarone (CORDARONE) tablet 400 mg  400 mg Oral DAILY  DULoxetine (CYMBALTA) capsule 60 mg  60 mg Oral DAILY  hydroxychloroquine (PLAQUENIL) tablet 200 mg  200 mg Oral BID  pravastatin (PRAVACHOL) tablet 20 mg  20 mg Oral QHS  pregabalin (LYRICA) capsule 100 mg  100 mg Oral BID  ampicillin-sulbactam (UNASYN) 3 g in 0.9% sodium chloride (MBP/ADV) 100 mL MBP  3 g IntraVENous Q8H  
 acetaminophen (TYLENOL) tablet 650 mg  650 mg Oral Q6H PRN  
 oxyCODONE-acetaminophen (PERCOCET) 5-325 mg per tablet 1 Tab  1 Tab Oral Q6H PRN  
 naloxone (NARCAN) injection 0.4 mg  0.4 mg IntraVENous PRN  
 ondansetron HCl (PF) (ZOFRAN) syringe 4 mg  4 mg IntraVENous Q6H PRN  
 docusate sodium (COLACE) capsule 100 mg  100 mg Oral BID PRN  
 morphine injection 1 mg  1 mg IntraVENous Q4H PRN  
 insulin lispro (HUMALOG) injection   SubCUTAneous AC&HS  
 glucose chewable tablet 16 g  4 Tab Oral PRN  
 glucagon (GLUCAGEN) injection 1 mg  1 mg IntraMUSCular PRN  
 dextrose (D50W) injection syrg 12.5-25 g  25-50 mL IntraVENous PRN Labs:   
Recent Results (from the past 24 hour(s)) HGB & HCT Collection Time: 10/01/18  2:10 PM  
Result Value Ref Range HGB 7.8 (L) 12.0 - 16.0 g/dL HCT 24.6 (L) 35.0 - 45.0 % GLUCOSE, POC Collection Time: 10/01/18  4:58 PM  
Result Value Ref Range Glucose (POC) 176 (H) 70 - 110 mg/dL GLUCOSE, POC Collection Time: 10/01/18  9:39 PM  
Result Value Ref Range Glucose (POC) 129 (H) 70 - 110 mg/dL METABOLIC PANEL, BASIC Collection Time: 10/02/18  2:58 AM  
Result Value Ref Range Sodium 144 136 - 145 mmol/L Potassium 4.3 3.5 - 5.5 mmol/L Chloride 115 (H) 100 - 108 mmol/L  
 CO2 20 (L) 21 - 32 mmol/L Anion gap 9 3.0 - 18 mmol/L Glucose 109 (H) 74 - 99 mg/dL BUN 33 (H) 7.0 - 18 MG/DL Creatinine 1.23 0.6 - 1.3 MG/DL  
 BUN/Creatinine ratio 27 (H) 12 - 20 GFR est AA 53 (L) >60 ml/min/1.73m2 GFR est non-AA 44 (L) >60 ml/min/1.73m2 Calcium 7.9 (L) 8.5 - 10.1 MG/DL  
CBC WITH AUTOMATED DIFF Collection Time: 10/02/18  2:58 AM  
Result Value Ref Range WBC 7.1 4.6 - 13.2 K/uL  
 RBC 3.04 (L) 4.20 - 5.30 M/uL HGB 8.0 (L) 12.0 - 16.0 g/dL HCT 25.6 (L) 35.0 - 45.0 % MCV 84.2 74.0 - 97.0 FL  
 MCH 26.3 24.0 - 34.0 PG  
 MCHC 31.3 31.0 - 37.0 g/dL  
 RDW 15.8 (H) 11.6 - 14.5 % PLATELET 216 926 - 046 K/uL MPV 9.8 9.2 - 11.8 FL  
 NEUTROPHILS 68 40 - 73 % LYMPHOCYTES 15 (L) 21 - 52 % MONOCYTES 14 (H) 3 - 10 % EOSINOPHILS 3 0 - 5 % BASOPHILS 0 0 - 2 %  
 ABS. NEUTROPHILS 4.7 1.8 - 8.0 K/UL  
 ABS. LYMPHOCYTES 1.1 0.9 - 3.6 K/UL  
 ABS. MONOCYTES 1.0 0.05 - 1.2 K/UL  
 ABS. EOSINOPHILS 0.2 0.0 - 0.4 K/UL  
 ABS. BASOPHILS 0.0 0.0 - 0.1 K/UL  
 DF AUTOMATED    
GLUCOSE, POC Collection Time: 10/02/18  6:05 AM  
Result Value Ref Range Glucose (POC) 113 (H) 70 - 110 mg/dL GLUCOSE, POC Collection Time: 10/02/18 11:31 AM  
Result Value Ref Range Glucose (POC) 159 (H) 70 - 110 mg/dL Signed By: Marybeth Phillip MD   
 October 2, 2018

## 2018-10-02 NOTE — PROGRESS NOTES
conducted an initial consultation and Spiritual Assessment for Alicia Crespo, who is a 79 y.o.,female. Patients Primary Language is: [place]. According to the patients EMR Worship Affiliation is: [place]. The reason the Patient came to the hospital is:  
Patient Active Problem List  
 Diagnosis Date Noted  Neuropathy 09/30/2018  Dehydration 09/30/2018  Type 2 diabetes with nephropathy ([address] 09/26/2018  Open displaced fracture of neck of second metacarpal bone of left hand with routine healing 09/26/2018  Periprosthetic fracture around internal prosthetic left knee joint 09/26/2018  Closed left hip fracture ([address] 09/26/2018  Open fracture 09/22/2018  Dog bite 09/22/2018  Type 2 diabetes mellitus without complication ([address] 17/99/0922  Mild mitral stenosis 05/22/2017  Anemia 05/22/2017  CHF (congestive heart failure) ([address] 05/12/2017  Atrial fibrillation ([address] 04/28/2017  Gastric bypass status for obesity 04/28/2017  History of pulmonary embolism 04/28/2017  Fibromyalgia 04/27/2017  
 HTN (hypertension) 04/27/2017  Septic joint of right shoulder region ([address] 04/27/2017  Cellulitis 04/24/2017  Sepsis ([address] 04/24/2017  Incomplete bladder emptying 01/23/2015  Stress incontinence 01/23/2015  Sleep apnea 01/25/2012  Type II diabetes mellitus ([address] 01/25/2012  Acquired trigger finger 01/25/2012  Primary localized osteoarthrosis, hand 01/25/2012  Osteoarthrosis 11/06/2009  Enthesopathy of hip region 05/06/2009  Myalgia and myositis 05/06/2009 The  provided the following Interventions: 
Initiated a relationship of care and support. Explored issues of pamela, belief, spirituality and Denominational/ritual needs while hospitalized. Listened empathically. Provided chaplaincy education. Provided information about Spiritual Care Services. Offered prayer and assurance of continued prayers on patient's behalf. Chart reviewed. The following outcomes where achieved: 
Patient shared limited information about both their medical narrative and spiritual journey/beliefs.  confirmed Patient's Latter-day Affiliation. Patient processed feeling about current hospitalization. Patient expressed gratitude for 's visit. Assessment: 
Patient does not have any Muslim/cultural needs that will affect patients preferences in health care. There are no spiritual or Muslim issues which require intervention at this time. Plan: 
Chaplains will continue to follow and will provide pastoral care on an as needed/requested basis.  recommends bedside caregivers page  on duty if patient shows signs of acute spiritual or emotional distress.  [facility]  
[phone]

## 2018-10-02 NOTE — NURSE NAVIGATOR
Reason for Readmission:     Patient signed out AMA after undergoing  ORIF last week for Periprosthetic fracture around internal prosthetic left knee joint and has closed hip Fx. Pt also suffered metacarpal Fx on her Lt hand and was awaiting surgery. On the day of admission, her granddaughter found her on the floor at her apartment where patient had been lying for approximately 6 hours. RRAT Score and Risk Level:     30 Level of Readmission:     
   
Care Conference scheduled:    
    
Resources/supports as identified by patient/family:    
    
Top Challenges facing patient (as identified by patient/family and CM): Finances/Medication cost?      
Transportation      - she is not sure who will transport her home Support system or lack thereof? Living arrangements? Lives with son who works during the day Self-care/ADLs/Cognition? Per patient, she was independent in her self care/ADL's prior to admission (prior to original admission). She presently is unable to walk because she can't bear weight on her Left leg. Current Advanced Directive/Advance Care Plan:   
        
Plan for utilizing home health:    
          
Likelihood of additional readmission:    
          
Transition of Care Plan:    Based on readmission, the patient's previous Plan of Care 
 has been evaluated and/or modified. The current Transition of Care Plan is: This patient lives in a 1 story apartment with her 40 yo son. He works days so she is home alone during the day. The apartment is on the second floor and there is no elevator. She was independent in her self-care/ADL's prior to admission. At this time , patient is not sure who will be available to transport her home upon discharge. On previous admission (last week), PT was recommending skilled nursing facility placement for rehab prior to transitioning back to her apartment.   She has a walker in her room which is now hers. She has a cane at home. She states she needs a wheelchair so that she doesn't have to try and walk without bearing weight on the Left foot. She is also recovering from surgery on her Left hand due to dog bite approximately 10 days ago. She does admit that when she's at home, she has been falling a lot - she thinks it's because her \"balance is off\" Her daughter Mat's # [phone]. Matthew Olvera is her granddaughter and emergency contact. Care Management Interventions PCP Verified by CM: Yes Last Visit to PCP: 09/18/18 Mode of Transport at Discharge: Self Transition of Care Consult (CM Consult): Home Health, SNF Discharge Durable Medical Equipment: Yes Physical Therapy Consult: Yes Occupational Therapy Consult: Yes Current Support Network: Other (lives with her son in a second floor apartment) Confirm Follow Up Transport: Other (see comment) (patient unsure at this time who will be available to transport her home) Plan discussed with Pt/Family/Caregiver:  Yes

## 2018-10-02 NOTE — PROGRESS NOTES
Problem: Pressure Injury - Risk of 
Goal: *Prevention of pressure injury Document Neel Scale and appropriate interventions in the flowsheet. Outcome: Progressing Towards Goal 
Pressure Injury Interventions: Activity Interventions: Pressure redistribution bed/mattress(bed type), PT/OT evaluation Mobility Interventions: HOB 30 degrees or less, Pressure redistribution bed/mattress (bed type), PT/OT evaluation Nutrition Interventions: Document food/fluid/supplement intake Friction and Shear Interventions: HOB 30 degrees or less, Lift sheet, Minimize layers

## 2018-10-02 NOTE — PROGRESS NOTES
OT order received and chart reviewed. Please designate weight bearing for left UE/LE to actively participate in skilled OT.   Thank you for the referral.  Reanna Juares MS OTR/L

## 2018-10-02 NOTE — ROUTINE PROCESS
Pt care received. Pt A/O x4 . Pt resting in bed. Denies pain. Pt stable. Call light within reach, bed in lowest position and locked. 1000-Pt's bladder scanned. 529 cc residual. Dr. Jorden Puentes paged x2 for orders. [id number]- Orders received for thomas insertion. Thomas inserted. 800 cc twyla urine drained immeditaely. Patient tolerated well.

## 2018-10-02 NOTE — PROGRESS NOTES
Patients is lying comfortable alert awake and oriented , Lleg and R arm is elevated CMS+. No s/s of distress or sob.

## 2018-10-02 NOTE — DIABETES MGMT
Glycemic Control Plan of Care 
 
T2DM with current A1cof 8.7% (10/01/2018). See separate notes, 10/02/2018, for assessment of home diabetes management and education. Patient reported episodes of hypoglycemia at home but she never told her doctor. Home diabetes med: Lantus insulin 24 units daily every morning. POC BG range on 10/01/2018: 129-176 mg/dL. POC BG report on 10/02/2018 at time of review: 113, 159 mg/dL. Patient is currently on correctional lispro insulin. Recommendation(s): 
1.) Continue inpatient glycemic monitoring and intervention. Assessment: 
Patient is 79year old with past medical history including type 2 diabetes mellitus, neuropathy, hypertension, fibromyalgia, atrial fibrillation, pulmonary embolism, CHF, left hand dog bite, and bilateral knee replacement - was admitted on 9/30/2018 with report that she fell out of bed at home with c/o left knee pain. She was found by her granddaughter found her on the floor where she has been for 6 hours. She was alone at time of fall. Left AMA but readmitted 9/30/2018/ Noted:  
Status post reduction internal fixation left second metacarpal head on 10/01/2018. Status post ORIF for periprosthetic fracture around internal prosthetic left knee joint. T2DM with current A1c of 8.7% (10/01/2018). Most recent blood glucose values: 
 
Results for Liz Landon (MRN [id number]) as of 10/2/2018 12:18 Ref. Range 10/1/2018 08:11 10/1/2018 16:58 10/1/2018 21:39  
GLUCOSE,FAST - POC Latest Ref Range: 70 - 110 mg/dL 136 (H) 176 (H) 129 (H) Results for Liz Landon (MRN [id number]) as of 10/2/2018 12:18 Ref. Range 10/2/2018 06:05 10/2/2018 11:31 GLUCOSE,FAST - POC Latest Ref Range: 70 - 110 mg/dL 113 (H) 159 (H) Current A1C: 8.7% (10/01/2018) which is equivalent to estimated average blood glucose of 203 mg/dL during the past 2-3 months. Current hospital diabetes medications: Correctional lispro insulin ACHS. Normal sensitivity dose. Total daily dose insulin requirement previous day: 10/01/2018 Lispro: 2 units Home diabetes medications: Patient reported on 10/02/2018: 
Lantus insulin 24 units daily every morning. Diet: Diabetic consistent carb regular. Goals:  Blood glucose will be within target range of  mg/dL by 10/05/2018 Education:  _X__  Refer to Diabetes Education Record: 10/02/2018 
           ___  Education not indicated at this time Flores Young RN [facility] Pager: [phone]

## 2018-10-02 NOTE — ROUTINE PROCESS
Bedside and Verbal shift change report given to Sara Dumas RN (oncoming nurse) by Mago Posey RN (offgoing nurse). Report included the following information Kardex, Intake/Output, MAR and Recent Results.

## 2018-10-02 NOTE — ROUTINE PROCESS
Bedside and Verbal shift change report given to Odilia PRITCHARD RN (oncoming nurse) by Josefina Hauser RN 
 (offgoing nurse). Report included the following information SBAR, Kardex, Intake/Output and MAR.

## 2018-10-02 NOTE — PROGRESS NOTES
PT orders received and chart was reviewed. Pt will require weight bearing orders for left upper and lower extremities prior to evaluation. Will continue to follow.   Tere Fam, PT

## 2018-10-02 NOTE — PROGRESS NOTES
Problem: Pressure Injury - Risk of 
Goal: *Prevention of pressure injury Document Neel Scale and appropriate interventions in the flowsheet. Outcome: Progressing Towards Goal 
Pressure Injury Interventions: 
Sensory Interventions: Assess changes in LOC Activity Interventions: Pressure redistribution bed/mattress(bed type) Mobility Interventions: HOB 30 degrees or less Nutrition Interventions: Document food/fluid/supplement intake Friction and Shear Interventions: HOB 30 degrees or less

## 2018-10-02 NOTE — DIABETES MGMT
Diabetes Patient/Family Education Record Factors That  May Influence Patients Ability  to Learn or  Comply with Recommendations 
 []   Language barrier    []   Cultural needs   []   Motivation  
 []   Cognitive limitation    []   Physical   [x]   Education  
 []   Physiological factors   []   Hearing/vision/speaking impairment   []   Holiness beliefs []   Financial factors   []  Other:   []  No factors identified at this time. Person Instructed: 
 [x]   Patient   [x]   Family: supportive daughter visiting at bedside participated. []  Other Preference for Learning: 
 [x]   Verbal   [x]   Written   []  Demonstration Level of Comprehension & Competence:   
[x]  Good                                      [] Fair                                     []  Poor                             []  Needs Reinforcement  
[x]  Teachback completed Education Component:  
[x]  Medication management, including how to administer insulin (if appropriate) and potential medication interactions: Patient reported home diabetes med: 
Lantus insulin 24 units daily every morning. [x]  Nutritional management: Discussed and encouraged the importance of eating 3 meals daily. Discussed serving size/portion control of carbs (starches, fruits, dairy) and limiting intake of concentrated sweets. [x]  Exercise: Patient reported that she was active walking with no problem before this hospital admission. [x]  Signs, symptoms, and treatment of hyperglycemia and hypoglycemia: Patient reported episodes of low blood sugar. She's able to recognize the symptoms and knows how to treat. Encouraged patient to call her medical provider if she experience low blood sugar 2-3x week that she cannot explain. Discussed how to prevent low blood sugar: eat 3 meals daily. Patient verbalized understanding. [x] Prevention, recognition and treatment of hyperglycemia and hypoglycemia: Patient verbalized understanding. [x]  Importance of blood glucose monitoring and how to obtain a blood glucose meter: See notes below under \"A1c.\"  
[]  Instruction on use of the blood glucose meter [x]  Discuss the importance of HbA1C monitoring: Current A1c level is 8.7% (10/01/2018) indicate that her diabetes is not under control. The result is equivalent to estimated average blood glucose of 203 mg/dL during the past 2-3 months. Patient reported that she was not checking her blood sugar regularly at home therefore she did not know that she has been running high average blood sugar during the past 2-3 months. She has BG meter and testing supplies and has no problem about using the meter to check blood sugar. Patient verbalized understanding and agree with recommendation to monitor her blood sugar at home regularly. []  Sick day guidelines  
[]  Proper use and disposal of lancets, needles, syringes or insulin pens (if appropriate) []  Potential long-term complications (retinopathy, kidney disease, neuropathy, foot care) [x] Information about whom to contact in case of emergency or for more information   
[x]  Goal:  Patient/family will demonstrate understanding of Diabetes Self Management Skills by:10/09/2018 Plan for post-discharge education or self-management support: 
  [x] Outpatient class schedule provided            [] Patient Declined 
  [] Scheduled for outpatient classes (date) _______ Kathleen Paige RN 
pgr [phone]

## 2018-10-03 LAB
ANION GAP SERPL CALC-SCNC: 7 MMOL/L (ref 3–18)
BASOPHILS # BLD: 0 K/UL (ref 0–0.1)
BASOPHILS NFR BLD: 1 % (ref 0–2)
BUN SERPL-MCNC: 32 MG/DL (ref 7–18)
BUN/CREAT SERPL: 24 (ref 12–20)
CALCIUM SERPL-MCNC: 7.6 MG/DL (ref 8.5–10.1)
CHLORIDE SERPL-SCNC: 113 MMOL/L (ref 100–108)
CO2 SERPL-SCNC: 24 MMOL/L (ref 21–32)
CREAT SERPL-MCNC: 1.31 MG/DL (ref 0.6–1.3)
DIFFERENTIAL METHOD BLD: ABNORMAL
EOSINOPHIL # BLD: 0.3 K/UL (ref 0–0.4)
EOSINOPHIL NFR BLD: 4 % (ref 0–5)
ERYTHROCYTE [DISTWIDTH] IN BLOOD BY AUTOMATED COUNT: 15.8 % (ref 11.6–14.5)
GLUCOSE BLD STRIP.AUTO-MCNC: 140 MG/DL (ref 70–110)
GLUCOSE BLD STRIP.AUTO-MCNC: 174 MG/DL (ref 70–110)
GLUCOSE BLD STRIP.AUTO-MCNC: 185 MG/DL (ref 70–110)
GLUCOSE BLD STRIP.AUTO-MCNC: 239 MG/DL (ref 70–110)
GLUCOSE SERPL-MCNC: 145 MG/DL (ref 74–99)
HCT VFR BLD AUTO: 23.9 % (ref 35–45)
HGB BLD-MCNC: 7.6 G/DL (ref 12–16)
LYMPHOCYTES # BLD: 1.2 K/UL (ref 0.9–3.6)
LYMPHOCYTES NFR BLD: 18 % (ref 21–52)
MCH RBC QN AUTO: 26.8 PG (ref 24–34)
MCHC RBC AUTO-ENTMCNC: 31.8 G/DL (ref 31–37)
MCV RBC AUTO: 84.2 FL (ref 74–97)
MONOCYTES # BLD: 0.7 K/UL (ref 0.05–1.2)
MONOCYTES NFR BLD: 11 % (ref 3–10)
NEUTS SEG # BLD: 4.3 K/UL (ref 1.8–8)
NEUTS SEG NFR BLD: 66 % (ref 40–73)
PLATELET # BLD AUTO: 380 K/UL (ref 135–420)
PMV BLD AUTO: 9.3 FL (ref 9.2–11.8)
POTASSIUM SERPL-SCNC: 4.1 MMOL/L (ref 3.5–5.5)
RBC # BLD AUTO: 2.84 M/UL (ref 4.2–5.3)
SODIUM SERPL-SCNC: 144 MMOL/L (ref 136–145)
WBC # BLD AUTO: 6.6 K/UL (ref 4.6–13.2)

## 2018-10-03 PROCEDURE — 74011250637 HC RX REV CODE- 250/637: Performed by: HOSPITALIST

## 2018-10-03 PROCEDURE — 74011250637 HC RX REV CODE- 250/637: Performed by: INTERNAL MEDICINE

## 2018-10-03 PROCEDURE — 74011636637 HC RX REV CODE- 636/637: Performed by: INTERNAL MEDICINE

## 2018-10-03 PROCEDURE — 97167 OT EVAL HIGH COMPLEX 60 MIN: CPT

## 2018-10-03 PROCEDURE — 74011000258 HC RX REV CODE- 258: Performed by: INTERNAL MEDICINE

## 2018-10-03 PROCEDURE — 65270000029 HC RM PRIVATE

## 2018-10-03 PROCEDURE — 85025 COMPLETE CBC W/AUTO DIFF WBC: CPT | Performed by: INTERNAL MEDICINE

## 2018-10-03 PROCEDURE — 74011250636 HC RX REV CODE- 250/636: Performed by: INTERNAL MEDICINE

## 2018-10-03 PROCEDURE — 82962 GLUCOSE BLOOD TEST: CPT

## 2018-10-03 PROCEDURE — 80048 BASIC METABOLIC PNL TOTAL CA: CPT | Performed by: INTERNAL MEDICINE

## 2018-10-03 PROCEDURE — 74011250636 HC RX REV CODE- 250/636: Performed by: ORTHOPAEDIC SURGERY

## 2018-10-03 PROCEDURE — 36415 COLL VENOUS BLD VENIPUNCTURE: CPT | Performed by: INTERNAL MEDICINE

## 2018-10-03 PROCEDURE — 97161 PT EVAL LOW COMPLEX 20 MIN: CPT

## 2018-10-03 PROCEDURE — 97535 SELF CARE MNGMENT TRAINING: CPT

## 2018-10-03 PROCEDURE — 74011636637 HC RX REV CODE- 636/637: Performed by: FAMILY MEDICINE

## 2018-10-03 RX ADMIN — AMPICILLIN SODIUM AND SULBACTAM SODIUM 3 G: 2; 1 INJECTION, POWDER, FOR SOLUTION INTRAMUSCULAR; INTRAVENOUS at 08:02

## 2018-10-03 RX ADMIN — ASPIRIN 81 MG 81 MG: 81 TABLET ORAL at 08:18

## 2018-10-03 RX ADMIN — DULOXETINE HYDROCHLORIDE 60 MG: 60 CAPSULE, DELAYED RELEASE ORAL at 08:18

## 2018-10-03 RX ADMIN — ENOXAPARIN SODIUM 40 MG: 100 INJECTION SUBCUTANEOUS at 18:41

## 2018-10-03 RX ADMIN — OXYCODONE AND ACETAMINOPHEN 1 TABLET: 5; 325 TABLET ORAL at 18:37

## 2018-10-03 RX ADMIN — HYDROXYCHLOROQUINE SULFATE 200 MG: 200 TABLET, FILM COATED ENTERAL at 08:19

## 2018-10-03 RX ADMIN — PREGABALIN 100 MG: 50 CAPSULE ORAL at 08:18

## 2018-10-03 RX ADMIN — HYDROXYCHLOROQUINE SULFATE 200 MG: 200 TABLET, FILM COATED ENTERAL at 18:38

## 2018-10-03 RX ADMIN — PRAVASTATIN SODIUM 20 MG: 20 TABLET ORAL at 21:42

## 2018-10-03 RX ADMIN — LISINOPRIL 20 MG: 20 TABLET ORAL at 08:19

## 2018-10-03 RX ADMIN — METOPROLOL TARTRATE 50 MG: 50 TABLET ORAL at 08:18

## 2018-10-03 RX ADMIN — OXYCODONE AND ACETAMINOPHEN 1 TABLET: 5; 325 TABLET ORAL at 01:58

## 2018-10-03 RX ADMIN — INSULIN LISPRO 3 UNITS: 100 INJECTION, SOLUTION INTRAVENOUS; SUBCUTANEOUS at 18:43

## 2018-10-03 RX ADMIN — OXYCODONE AND ACETAMINOPHEN 1 TABLET: 5; 325 TABLET ORAL at 13:50

## 2018-10-03 RX ADMIN — AMIODARONE HYDROCHLORIDE 400 MG: 200 TABLET ORAL at 08:18

## 2018-10-03 RX ADMIN — METOPROLOL TARTRATE 50 MG: 50 TABLET ORAL at 21:42

## 2018-10-03 RX ADMIN — AMPICILLIN SODIUM AND SULBACTAM SODIUM 3 G: 2; 1 INJECTION, POWDER, FOR SOLUTION INTRAMUSCULAR; INTRAVENOUS at 18:38

## 2018-10-03 RX ADMIN — OXYCODONE AND ACETAMINOPHEN 1 TABLET: 5; 325 TABLET ORAL at 08:29

## 2018-10-03 RX ADMIN — PREGABALIN 100 MG: 50 CAPSULE ORAL at 18:38

## 2018-10-03 RX ADMIN — INSULIN LISPRO 4 UNITS: 100 INJECTION, SOLUTION INTRAVENOUS; SUBCUTANEOUS at 14:10

## 2018-10-03 NOTE — PROGRESS NOTES
Rounded on patient Activity: Reinforced importance of repositioning and moving unaffected limbs to prevent DVT. Also calling for help to reposition to prevent bedsore every 2 hours or as needed. Also encouraged patient to prevent skin from being moist and changing clothes/linen to prevent skin problems with decreased mobility. VTE prophylaxis: Instructed patient to use their SCD's  On unaffected let. To use while in bed and in the chair. Educated re: ankle pumps to assist with circulation when in hospital and at home. Medications: Reviewed pain medications patient is taking and the importance of keeping pain under control to help with getting OOB and/or therapy. Reminded the patient to always eat a snack with their pain medication to help to prevent nausea. Encouraged patient to monitor for constipation and to take a stool softner/laxative while recovering and on pain medication. Encouraged to stay on stool softener/laxative as long as taking narcotic for pain. Also encouraged patient to drink 8 glasses of water a day(unless on a fluid restriction). Incentive Spirometry: Reinforced use of incentive spirometer with return demonstration by patient. 500 ml x 3 Wound Care: cast to left leg and wrist intact with warmth to toes and fingers along with pink coloring. Patient instructed not to take dressing off at home. Patient given CHG wash to use in hospital and at home. Stressed importance of using a clean towel and washcloth daily. Reminded to put on clean clothes and night clothes daily. Ice Protocol: Ice solution in place per protocol. Patient Safety: Call light and personal belongings in reach. Patient and family reminded to call for help to reposition for safety. Phone and other items also within reach per patient's request.    
Diet: Educated patient on the importance of eating three well balanced meals a day with protein to promote bone/muscle healing.  Reminded patient to drink lots of fluids to protect kidneys from all the medications being taken currently with recovery. Patient given more educational material to reinforce the things discussed. Patient encouraged. to continue doing them at home to prevent post op complications and have a successful recovery. Patient  verbalized understand of all information and education discussed. Patient  given the opportunity for asking questions.

## 2018-10-03 NOTE — PROGRESS NOTES
Problem: Mobility Impaired (Adult and Pediatric) Goal: *Acute Goals and Plan of Care (Insert Text) Physical Therapy Goals Initiated 10/3/2018 and to be accomplished within 7 day(s) 1. Patient will move from supine to sit and sit to supine , scoot up and down and roll side to side in bed with minimal assistance/contact guard assist.    
2.  Patient will transfer from bed to chair and chair to bed with moderate assistance using the transfer board. 3.  Patient will perform sit to stand with moderate assistance . 4. Patient will ambulate with moderate assistance  for 10 feet with the left platform walker. 5. Patient will perform WC mobility 50ft with standby assist 
physical Therapy EVALUATION Patient: Xavier Iraheta (12 y.o. female) Date: 10/3/2018 Primary Diagnosis: Dehydration Inability to ambulate due to left knee Open displaced fracture of metacarpal bone of left hand N62.295W DOG BITE; S62.391B OPEN FX LEFT 2ND METACARPAL, DISPLACED HEAD Procedure(s) (LRB): OPEN REDUCTION INTERNAL FIXATION LEFT SECOND METACARPAL/MEDARTIS/HAND TABLE/C-ARM (Left) DIGITAL NERVE REPAIR LEFT INDEX FINGER (Left) 2 Days Post-Op Precautions: Fall, NWB LLE, weight bearing through ulnar aspect of hand only ASSESSMENT : 
Patient is 75yo F admitted to hospital s/p fall from bed as patietn home from recent hospital admission. Last admission was several days ago where patient had experienced fall and ORIF of LLE that is casted and NWB. This admission patient underwent surgery to Left hand and surgeon has cleared patient for WB through ulnar aspect of hand only. Patient alert and agreeable to therapy and was educated on WB statuses today. Patient transferred to EOB for objective assessment and was able to clear bottom 4 inches from bed with 2 person assist and required therapist assist to maintain NWB LLE.  Patient will benefit from platform walker and WC training and would benefit from SNF for reahb. Patient assisted back to supine in bed at conclusion of session and was left resting with call bell by her side and RN in room. Patient will benefit from skilled intervention to address the above impairments. Patients rehabilitation potential is considered to be Good Factors which may influence rehabilitation potential include:  
[]         None noted 
[]         Mental ability/status [x]         Medical condition 
[x]         Home/family situation and support systems 
[x]         Safety awareness [x]         Pain tolerance/management 
[]         Other: PLAN : 
Recommendations and Planned Interventions: 
[x]           Bed Mobility Training             [x]    Neuromuscular Re-Education 
[x]           Transfer Training                   []    Orthotic/Prosthetic Training 
[x]           Gait Training                          []    Modalities [x]           Therapeutic Exercises          []    Edema Management/Control 
[x]           Therapeutic Activities            [x]    Patient and Family Training/Education 
[]           Other (comment): Frequency/Duration: Patient will be followed by physical therapy 1-2 times per day/4-7 days per week to address goals. Discharge Recommendations: Diego Ahmadi Further Equipment Recommendations for Discharge: N/A  
 
G-CODES Mobility [id number] Current  CL= 60-79%   Goal  CJ= 20-39%. The severity rating is based on the Level of Assistance required for Functional Mobility and ADLs. 
 
 
 
G-CODES Eval Complexity: History: MEDIUM  Complexity : 1-2 comorbidities / personal factors will impact the outcome/ POC Exam:MEDIUM Complexity : 3 Standardized tests and measures addressing body structure, function, activity limitation and / or participation in recreation  Presentation: LOW Complexity : Stable, uncomplicated  Clinical Decision Making:Low Complexity   Overall Complexity:LOW SUBJECTIVE:  
 Patient stated That's when I realized I need to go somewhere.  referring to her fall at home. OBJECTIVE DATA SUMMARY:  
 
Past Medical History:  
Diagnosis Date  Anxiety  Arthritis  Asbestosis ([facility] [address]  Depression  Diabetes ([facility] [address]  Fibromyalgia  GERD (gastroesophageal reflux disease)  Hypercholesteremia  Hypertension  Renal insufficiency  Skin abscess  Sleep apnea Past Surgical History:  
Procedure Laterality Date  HX BREAST LUMPECTOMY  HX CARPAL TUNNEL RELEASE  HX KNEE REPLACEMENT    
 bilater  HX PARTIAL THYROIDECTOMY  HX SHOULDER ARTHROSCOPY    
 HX TUBAL LIGATION Barriers to Learning/Limitations: None Compensate with: Visual Cues, Verbal Cues and Tactile Cues Prior Level of Function/Home Situation: Patient lives in 1 story home with her son who works during the day. Patient was home alone when she fell and reports she was trying to walk on her cast. Patient has RW, SPC at home. Critical Behavior: A&Ox4 Strength:   
Strength: Generally decreased, functional (DNT LLE; LLE NWB) Tone & Sensation:  
Tone: Normal (BLE) Sensation: Intact (BLE) Range Of Motion: 
AROM: Generally decreased, functional (LLE not tested, in cast) Functional Mobility: 
Bed Mobility: 
Rolling: Contact guard assistance;Minimum assistance Supine to Sit: Moderate assistance;Maximum assistance Sit to Supine: Moderate assistance;Assist x2 Scooting: Moderate assistance Transfers: 
Sit to Stand: Moderate assistance;Assist x2 (LLE assist to ensrue NWB) Stand to Sit: Moderate assistance;Assist x2 Balance:  
Sitting: Intact; With support Standing: Impaired; With support Standing - Static: Poor Pain: 
Pt reports 10/10 pain or discomfort prior to treatment.   
Pt reports 10/10 pain or discomfort post treatment. (CHESTER Hartley in room, notified) Activity Tolerance:  
Patient tolerated activity well, no dizziness, chest pain, or SOB. Please refer to the flowsheet for vital signs taken during this treatment. After treatment:  
[]         Patient left in no apparent distress sitting up in chair 
[x]         Patient left in no apparent distress in bed 
[x]         Call bell left within reach [x]         Nursing notified (Fe) 
[]         Caregiver present [x]         Bed alarm activated 
[]         SCDs in place to B LE 
  
COMMUNICATION/EDUCATION:  
[x]         Fall prevention education was provided and the patient/caregiver indicated understanding. [x]         Patient/family have participated as able in goal setting and plan of care. [x]         Patient/family agree to work toward stated goals and plan of care. []         Patient understands intent and goals of therapy, but is neutral about his/her participation. []         Patient is unable to participate in goal setting and plan of care. Thank you for this referral. 
Mariluz Barlow, PT Time Calculation: 17 mins

## 2018-10-03 NOTE — PROGRESS NOTES
OT eval order received and chart reviewed. Unable to see patient at this time as patient requires Left hand WB status as patient s/p ORIF 2nd metacarpal. Will follow up as patient schedule allows. Thank you for this referral. Pollo Bridges OTRL

## 2018-10-03 NOTE — DIABETES MGMT
Diabetes Patient/Family Education Record Factors That  May Influence Patients Ability  to Learn or  Comply with Recommendations 
 []   Language barrier    []   Cultural needs   []   Motivation  
 []   Cognitive limitation    []   Physical   [x]   Education  
 []   Physiological factors   []   Hearing/vision/speaking impairment   []   Cheondoism beliefs []   Financial factors   []  Other:   []  No factors identified at this time. Person Instructed: 
 [x]   Patient   []   Family   []  Other Preference for Learning: 
 [x]   Verbal   []   Written   []  Demonstration Level of Comprehension & Competence:   
[]  Good                                      [] Fair                                     []  Poor                             [x]  Needs Reinforcement  
[x]  Teachback completed Education Component:  
[]  Medication management, including how to administer insulin (if appropriate) and potential medication interactions [x]  Nutritional management pt with regular ginger ale on nightstand, discussed importance of avoiding sugary drinks. Reviewed portion control and carbohydrates. []  Exercise  
[]  Signs, symptoms, and treatment of hyperglycemia and hypoglycemia  
[] Prevention, recognition and treatment of hyperglycemia and hypoglycemia  
[]  Importance of blood glucose monitoring and how to obtain a blood glucose meter   
[]  Instruction on use of the blood glucose meter  
[]  Discuss the importance of HbA1C monitoring   
[]  Sick day guidelines  
[]  Proper use and disposal of lancets, needles, syringes or insulin pens (if appropriate) []  Potential long-term complications (retinopathy, kidney disease, neuropathy, foot care)  
[] Information about whom to contact in case of emergency or for more information   
[x]  Goal:  Patient/family will demonstrate understanding of Diabetes Self Management Skills by: 10/09/18 Plan for post-discharge education or self-management support: 
  [] Outpatient class schedule provided            [] Patient Declined 
  [] Scheduled for outpatient classes (date) _______ Pt already received schedule and plans to attend Nir Coppola RD, CDE pgr [phone]

## 2018-10-03 NOTE — ROUTINE PROCESS
Bedside and Verbal shift change report given to Twin Jordan RN (oncoming nurse) by US Mendenhall RN (offgoing nurse). Report included the following information SBAR, Kardex, Intake/Output and MAR.

## 2018-10-03 NOTE — PROGRESS NOTES
Bedside and Verbal shift change report given to Airam Mendez RN (oncoming nurse) by Adelaide Reyez RN (offgoing nurse). Report included the following information SBAR, Kardex, ED Summary, OR Summary, Intake/Output, MAR and Recent Results. Patient Vitals for the past 12 hrs: 
 Temp Pulse Resp BP SpO2  
10/03/18 0400 98 °F (36.7 °C) 60 17 172/65 99 % 10/02/18 2101 - 64 - - -  
10/02/18 2000 98.2 °F (36.8 °C) (!) 59 18 171/69 100 %

## 2018-10-03 NOTE — PROGRESS NOTES
Subjective:  Pt doing well. She reports pain from cast as she has a history of doing with previous cast that she had on her leg. When further questioned, she localizes pain to the surgical area beneath the cast.  She denies any increase in pain or new falls since being seen yesterday. She also does report some mild discomfort around some of the sharper edges of the cast. 
 
 
Temp:  [96.7 °F (35.9 °C)-98.6 °F (37 °C)] Pulse (Heart Rate):  [58-74] BP: (139-206)/() Resp Rate:  [12-24] O2 Sat (%):  [91 %-100 %] Weight:  [173 lb (78.5 kg)] 10/03 0701 - 10/03 1900 In: 356 [P.O.:356] Out: 1500 [Urine:1500] 10/01 1901 - 10/03 0700 In: 455 [I.V.:455] Out: 2400 [Urine:2400] Objective LUE:  Radial Cutter Cast intact with the additional padding proximally being somewhat loose. Cap refill brisk digital nerves intact distally. No areas of irritation in the skin by the cast edges. Principal Problem: 
  Dehydration (9/30/2018) Active Problems: 
  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 Pain control Ice/Elevate WB through the hand cast except for the Index and Middle finger parts of the cast 
PT/OT Will bring bandage tape tomorrow to smooth the edges and resecure the proximal padding of the cast 
Hand Stable for DC

## 2018-10-03 NOTE — DIABETES MGMT
NUTRITIONAL ASSESSMENT GLYCEMIC CONTROL/ PLAN OF CARE Eugenia Parsons           79 y.o.           9/30/2018 1. Knee abrasion, left, initial encounter 2. Fall, initial encounter 3. Periprosthetic fracture around internal prosthetic left knee joint, sequela 4. Dehydration 5. Hypertension, essential   
 
 INTERVENTIONS/PLAN:  
Blood glucose elevated, consider addition of Lantus insulin 15 units daily Advance to the very insulin resistant correctional Lispro scale Diabetes education ASSESSMENT:  
Patient is 79year old with past medical history including type 2 diabetes mellitus, neuropathy, hypertension, fibromyalgia, atrial fibrillation, pulmonary embolism, and CHF. Blood glucose elevated above targets. Pt reports fair appetite, states she does not have her teeth with her and needs softer foods, diet adjusted. Reviewed healthy eating for diabetes and encouraged pt to attend outpatient diabetes education classes. Diabetes Management:  
Recent blood glucose: 
10/2/2018 15:17 10/2/2018 21:34 10/3/2018 06:01 10/3/2018 12:03  
207 (H) 199 (H) 185 (H) 239 (H) Within target range (non-ICU: <140; ICU<180): [] Yes   [x]  No 
 
Current Insulin regimen:  
Correctional lispro insulin ACHS. Normal sensitivity dose. Home medication/insulin regimen:  
Lantus insulin 24 units daily every morning. HbA1c: 8.7% (estimated average glucose of 203 mg/dL) Adequate glycemic control PTA:  [] Yes  [x] No 
  
SUBJECTIVE/OBJECTIVE: Information obtained from: patient, chart review Diet: Diabetic consistent carbohydrate soft solids Patient Vitals for the past 100 hrs: 
 % Diet Eaten 10/03/18 0826 100 % 10/01/18 1757 0 % 10/01/18 1734 0 % Medications: [x] Reviewed Most Recent POC Glucose:  
Recent Labs 10/03/18 
 0500  10/02/18 
 0258  10/01/18 
 0504  09/30/18 2010 GLU  145*  109*  120*  99 Labs:  
Lab Results Component Value Date/Time Hemoglobin A1c 8.7 (H) 10/01/2018 05:04 AM  
 
Lab Results Component Value Date/Time Sodium 144 10/03/2018 05:00 AM  
 Potassium 4.1 10/03/2018 05:00 AM  
 Chloride 113 (H) 10/03/2018 05:00 AM  
 CO2 24 10/03/2018 05:00 AM  
 Anion gap 7 10/03/2018 05:00 AM  
 Glucose 145 (H) 10/03/2018 05:00 AM  
 BUN 32 (H) 10/03/2018 05:00 AM  
 Creatinine 1.31 (H) 10/03/2018 05:00 AM  
 Calcium 7.6 (L) 10/03/2018 05:00 AM  
 Magnesium 1.7 10/01/2018 05:04 AM  
 Phosphorus 3.3 10/01/2018 05:04 AM  
 Albumin 1.9 (L) 09/30/2018 08:10 PM  
 
Anthropometrics:BMI (calculated): 32.7 Wt Readings from Last 1 Encounters:  
10/01/18 78.5 kg (173 lb) Ht Readings from Last 1 Encounters:  
10/01/18 5' 1\" (1.549 m) Estimated Nutrition Needs: 2513-2787 Kcal/day, 63-79 grams protein/day Based on:   [x] Actual BW    [] IBW   [] Adjusted BW   
 
Nutrition Diagnoses: Altered nutrition related lab value related to diabetes as evidenced by Hemoglobin A1c of 8.7% Nutrition Interventions: diabetes education, coordination of care Goal: Blood glucose will be within target range of  mg/dL by 10/05/18 Nutrition Monitoring and Evaluation []     Monitor po intake on meal rounds 
[x]     Continue inpatient monitoring and intervention 
[]     Other: 
 
Kaia Camacho RD, CDE pgr [phone]

## 2018-10-03 NOTE — PROGRESS NOTES
Bedside and Verbal shift change report given to Cristian Neff RN (oncoming nurse) by US Mendenhall RN (offgoing nurse). Report included the following information SBAR, Kardex, Intake/Output and MAR.

## 2018-10-03 NOTE — PROGRESS NOTES
PT eval order received and chart reviewed. Unable to see patient at this time as patient requires Left hand WB status as patient s/p ORIF 2nd metacarpal. As of recent admission 9/26/18 patient was NWB LLE with knee immobilizer; will inquired with patient if she has immobilizer with her. Will follow up as patient schedule allows. Thank you for this referral. Katia Brown, PT, DPT.

## 2018-10-03 NOTE — PROGRESS NOTES
Problem: Pressure Injury - Risk of 
Goal: *Prevention of pressure injury Document Neel Scale and appropriate interventions in the flowsheet. Outcome: Progressing Towards Goal 
Pressure Injury Interventions: 
Sensory Interventions: Assess changes in LOC, Check visual cues for pain, Keep linens dry and wrinkle-free, Pad between skin to skin Activity Interventions: Increase time out of bed, Pressure redistribution bed/mattress(bed type), PT/OT evaluation Mobility Interventions: Pressure redistribution bed/mattress (bed type), Turn and reposition approx. every two hours(pillow and wedges) Nutrition Interventions: Document food/fluid/supplement intake Friction and Shear Interventions: HOB 30 degrees or less Problem: Falls - Risk of 
Goal: *Absence of Falls Document Cheng Cristobal Fall Risk and appropriate interventions in the flowsheet. Outcome: Progressing Towards Goal 
Fall Risk Interventions: 
Mobility Interventions: Bed/chair exit alarm, Patient to call before getting OOB, Utilize walker, cane, or other assistive device Medication Interventions: Patient to call before getting OOB, Teach patient to arise slowly, Bed/chair exit alarm Elimination Interventions: Bed/chair exit alarm, Call light in reach, Patient to call for help with toileting needs History of Falls Interventions: Bed/chair exit alarm, Door open when patient unattended, Investigate reason for fall

## 2018-10-03 NOTE — PROGRESS NOTES
[facility] Patient: Cole Grider Age: 79 y.o. : 1950 MR#: [id number] SSN: [id number] Date/Time: 10/3/2018 9:23 AM 
 
Subjective24-hour events: No new complaints. Assessment:  
Comminuted fracture of L 2nd metacarpal head secondary to dog bite injury s/p ORIF with partial head excision L femur fracture s/p ORIF Prerenal azotemia, improved HTN 
HLD 
DM 2 Atrial fibrillation history, not on [id number] therapy Plan: 
Monitor renal indices off IVF - discontinued yesterday. Stable thus far. BPs continue to be a bit elevated overall. Lisinopril increased recently - may need additional adjustment. Continue lopressor. Monitor. SSI. PT/OT, mobilize as tolerated. Disposition. Case discussed with:  [x]Patient  []Family  []Nursing  []Case Management DVT Prophylaxis:  [x]Lovenox  []Hep SQ  []SCDs  []Coumadin   []On Heparin gtt Objective:  
VS:  
Visit Vitals  /68 (BP 1 Location: Left arm, BP Patient Position: At rest)  Pulse 74  Temp 96.9 °F (36.1 °C)  Resp 18  Ht 5' 1\" (1.549 m)  Wt 78.5 kg (173 lb)  SpO2 100%  BMI 32.69 kg/m2 Tmax/24hrs: Temp (24hrs), Av.9 °F (36.6 °C), Min:96.9 °F (36.1 °C), Max:98.6 °F (37 °C) Intake/Output Summary (Last 24 hours) at 10/03/18 0202 Last data filed at 10/03/18 4336 Gross per 24 hour Intake              756 ml Output             2400 ml Net            -1644 ml General:  In NAD. Nontoxic-appearing. Cardiovascular:  RRR. Pulmonary:  Clear, no wheezes. Effort nonlabored. GI:  Abdomen soft, NTTP. Extremities:  L leg casted. L hand splint in place. Warm, no ischemia. Neuro:  Awake and alert, motor nonfocal. 
 
Labs:   
Recent Results (from the past 24 hour(s)) GLUCOSE, POC Collection Time: 10/02/18 11:31 AM  
Result Value Ref Range Glucose (POC) 159 (H) 70 - 110 mg/dL GLUCOSE, POC  Collection Time: 10/02/18  3:17 PM  
 Result Value Ref Range Glucose (POC) 207 (H) 70 - 110 mg/dL GLUCOSE, POC Collection Time: 10/02/18  9:34 PM  
Result Value Ref Range Glucose (POC) 199 (H) 70 - 110 mg/dL METABOLIC PANEL, BASIC Collection Time: 10/03/18  5:00 AM  
Result Value Ref Range Sodium 144 136 - 145 mmol/L Potassium 4.1 3.5 - 5.5 mmol/L Chloride 113 (H) 100 - 108 mmol/L  
 CO2 24 21 - 32 mmol/L Anion gap 7 3.0 - 18 mmol/L Glucose 145 (H) 74 - 99 mg/dL BUN 32 (H) 7.0 - 18 MG/DL Creatinine 1.31 (H) 0.6 - 1.3 MG/DL  
 BUN/Creatinine ratio 24 (H) 12 - 20 GFR est AA 49 (L) >60 ml/min/1.73m2 GFR est non-AA 41 (L) >60 ml/min/1.73m2 Calcium 7.6 (L) 8.5 - 10.1 MG/DL  
CBC WITH AUTOMATED DIFF Collection Time: 10/03/18  5:00 AM  
Result Value Ref Range WBC 6.6 4.6 - 13.2 K/uL  
 RBC 2.84 (L) 4.20 - 5.30 M/uL HGB 7.6 (L) 12.0 - 16.0 g/dL HCT 23.9 (L) 35.0 - 45.0 % MCV 84.2 74.0 - 97.0 FL  
 MCH 26.8 24.0 - 34.0 PG  
 MCHC 31.8 31.0 - 37.0 g/dL  
 RDW 15.8 (H) 11.6 - 14.5 % PLATELET 455 735 - 761 K/uL MPV 9.3 9.2 - 11.8 FL  
 NEUTROPHILS 66 40 - 73 % LYMPHOCYTES 18 (L) 21 - 52 % MONOCYTES 11 (H) 3 - 10 % EOSINOPHILS 4 0 - 5 % BASOPHILS 1 0 - 2 %  
 ABS. NEUTROPHILS 4.3 1.8 - 8.0 K/UL  
 ABS. LYMPHOCYTES 1.2 0.9 - 3.6 K/UL  
 ABS. MONOCYTES 0.7 0.05 - 1.2 K/UL  
 ABS. EOSINOPHILS 0.3 0.0 - 0.4 K/UL  
 ABS. BASOPHILS 0.0 0.0 - 0.1 K/UL  
 DF AUTOMATED    
GLUCOSE, POC Collection Time: 10/03/18  6:01 AM  
Result Value Ref Range Glucose (POC) 185 (H) 70 - 110 mg/dL Signed By: Danya Cervantes MD   
 October 3, 2018 9:23 AM

## 2018-10-03 NOTE — PROGRESS NOTES
Problem: Self Care Deficits Care Plan (Adult) Goal: *Acute Goals and Plan of Care (Insert Text) Occupational Therapy Goals Initiated 10/3/2018 within 7 day(s). 1.  Patient will perform upper body dressing with supervision/set-up 2. Patient will perform lower body dressing with moderate assistance with use of AE as needed. 3.  Patient will perform grooming with modified independence while seated edge of bed with no LOB. Zurdo Earing 4. Patient will perform stand pivot transfer from bed>BSC/arm chair in prep for ADLs with min A x 2 and use of platform walker as needed. 5.  Patient will participate in upper extremity therapeutic exercise/activities with supervision/set-up for 8 minutes. Occupational Therapy EVALUATION Patient: Alicia Crespo (70 y.o. female) Date: 10/3/2018 Primary Diagnosis: Dehydration Inability to ambulate due to left knee Open displaced fracture of metacarpal bone of left hand G45.716I DOG BITE; S62.391B OPEN FX LEFT 2ND METACARPAL, DISPLACED HEAD Procedure(s) (LRB): OPEN REDUCTION INTERNAL FIXATION LEFT SECOND METACARPAL/MEDARTIS/HAND TABLE/C-ARM (Left) DIGITAL NERVE REPAIR LEFT INDEX FINGER (Left) 2 Days Post-Op Precautions:   Fall, NWB, PWB ((NWB LLE, PWB to L hand on ulnar side, none to index,middle)) ASSESSMENT : 
Pt is a 78 yo female who was previously admitted to [facility] for L femur fracture requiring surgical intervention and cast with NWB status. Pt left hospital AMA and returned home where she experienced a fall and remained on floor for sevreal hours before being found. Pt is now 2 days s/p ORIF to L 2nd MC and digital nerve repair from dog bite with cast to L forearm/hand with WB through ulnar side only (Thank you for the clarification). Pt agreeable to OT eval and performed sitting edge of bed with mod A and F- sitting balance. Pt ed on not pulling with L hand due when scooting forward on bed.  Pt demonstrates decreased B shld ROM (90 degrees) and 3/5 strength. Pt does not complain of any sensation loss of L hand and able to move small, ring, and thumb appropriately. Pt performed sit>stand from bed with mod A x 2. Pt demonstrated ability to reach top of R sock in sitting with assist to lift L foot off ground. Pt performed sit>supine mod A x 2. Pt ed on pushing with R LE to assist with bridging towards HOB. Patient will benefit from skilled intervention to address the above impairments. Patients rehabilitation potential is considered to be Fair Factors which may influence rehabilitation potential include:  
[]             None noted []             Mental ability/status []             Medical condition []             Home/family situation and support systems []             Safety awareness []             Pain tolerance/management 
[]             Other: PLAN : 
Recommendations and Planned Interventions: 
[]               Self Care Training                  []        Therapeutic Activities []               Functional Mobility Training    []        Cognitive Retraining 
[]               Therapeutic Exercises           []        Endurance Activities []               Balance Training                   []        Neuromuscular Re-Education []               Visual/Perceptual Training     []   Home Safety Training 
[]               Patient Education                 []        Family Training/Education []               Other (comment): Frequency/Duration: Patient will be followed by occupational therapy 1-2 times per day/4-7 days per week to address goals. Discharge Recommendations: Inpatient Rehab vs. [facility] Further Equipment Recommendations for Discharge: bedside commode and transfer bench Barriers to Learning/Limitations: yes;  altered mental status (i.e.Sedation, Confusion), safety awareness Compensate with: visual, verbal, tactile, kinesthetic cues/model PATIENT COMPLEXITY Eval Complexity: History: MEDIUM Complexity : Expanded review of history including physical, cognitive and psychosocial  history ; Examination: MEDIUM Complexity : 3-5 performance deficits relating to physical, cognitive , or psychosocial skils that result in activity limitations and / or participation restrictions; Decision Making:MEDIUM Complexity : Patient may present with comorbidities that affect occupational performnce. Miniml to moderate modification of tasks or assistance (eg, physical or verbal ) with assesment(s) is necessary to enable patient to complete evaluation  Assessment: moderate Complexity G-CODES:  
 
Self Care  Current  CL= 60-79%  Goal  CK= 40-59%. The severity rating is based on the Level of Assistance required for Functional Mobility and ADLs. SUBJECTIVE:  
Patient stated How am I going to walk.  Pt educated on NWB LLE and partial WB through L hand OBJECTIVE DATA SUMMARY:  
 
Past Medical History:  
Diagnosis Date  Anxiety  Arthritis  [facility]bestosis ([facility] [address] [facility]  Depression  Diabetes ([facility] [facility]  Fibromyalgia  GERD (gastroesophageal reflux disease)  Hypercholesteremia  Hypertension  Renal insufficiency  Skin abscess  Sleep apnea Past Surgical History:  
Procedure Laterality Date  HX BREAST LUMPECTOMY  HX CARPAL TUNNEL RELEASE  HX KNEE REPLACEMENT    
 bilater  HX PARTIAL THYROIDECTOMY  HX SHOULDER ARTHROSCOPY    
 HX TUBAL LIGATION Prior Level of Function/Home Situation: PTA MI in ADL, IADLs Home Situation Home Environment: Private residence Living Alone: No 
Support Systems: Child(ham) Patient Expects to be Discharged to[de-identified] Private residence Current DME Used/Available at Home: Cane, straight, Walker, rolling 
[x]  Right hand dominant   []  Left hand dominant Cognitive/Behavioral Status: 
Neurologic State: Alert Orientation Level: Oriented to person;Oriented to place;Oriented to situation Cognition: Follows commands Safety/Judgement: Fall prevention;Home safety Skin: intact BUEs (unable to assess L hand due to cast) Edema: slight edema L hand. Pt ed on keeping L hand elevated Coordination: 
Coordination: Generally decreased, functional (limited L hand due to cast) Balance: 
Sitting: Intact; With support Standing: Impaired; With support Standing - Static: Poor Strength: 
Strength: Generally decreased, functional (3/5 B shld strength) Tone & Sensation: 
Tone: Normal (BUEs) Sensation: Intact (BUEs) Range of Motion: 
AROM: Generally decreased, functional (L hand limited due to cast) Functional Mobility and Transfers for ADLs: 
Bed Mobility: 
Rolling: Contact guard assistance;Minimum assistance Supine to Sit: Moderate assistance Sit to Supine: Moderate assistance;Assist x2 Scooting: Moderate assistance Transfers: 
Sit to Stand: Moderate assistance;Assist x2 ADL Assessment: 
Feeding: Setup Oral Facial Hygiene/Grooming: Setup Bathing: Maximum assistance Upper Body Dressing: Minimum assistance Lower Body Dressing: Total assistance Toileting: Total assistance ADL Intervention: 
 Cognitive Retraining Safety/Judgement: Fall prevention;Home safety Pain: 
Pt reports 10/10 pain or discomfort prior to treatment.  L hand Pt reports 10/10 pain or discomfort post treatment. L hand (nursing aware) Activity Tolerance:  
Fair Please refer to the flowsheet for vital signs taken during this treatment. After treatment:  
[] Patient left in no apparent distress sitting up in chair 
[x] Patient left in no apparent distress in bed 
[x] Call bell left within reach [x] Nursing notified 
[] Caregiver present 
[] Bed alarm activated COMMUNICATION/EDUCATION:  
[x] Home safety education was provided and the patient/caregiver indicated understanding. [x] Patient/family have participated as able in goal setting and plan of care. [x] Patient/family agree to work toward stated goals and plan of care. [] Patient understands intent and goals of therapy, but is neutral about his/her participation. [] Patient is unable to participate in goal setting and plan of care. Thank you for this referral. 
Nguyen Padilla, OT Time Calculation: 23 mins

## 2018-10-04 LAB
GLUCOSE BLD STRIP.AUTO-MCNC: 146 MG/DL (ref 70–110)
GLUCOSE BLD STRIP.AUTO-MCNC: 178 MG/DL (ref 70–110)
GLUCOSE BLD STRIP.AUTO-MCNC: 189 MG/DL (ref 70–110)
GLUCOSE BLD STRIP.AUTO-MCNC: 211 MG/DL (ref 70–110)

## 2018-10-04 PROCEDURE — 74011250637 HC RX REV CODE- 250/637: Performed by: HOSPITALIST

## 2018-10-04 PROCEDURE — 74011250637 HC RX REV CODE- 250/637: Performed by: INTERNAL MEDICINE

## 2018-10-04 PROCEDURE — 97535 SELF CARE MNGMENT TRAINING: CPT

## 2018-10-04 PROCEDURE — 74011000258 HC RX REV CODE- 258: Performed by: INTERNAL MEDICINE

## 2018-10-04 PROCEDURE — 74011250637 HC RX REV CODE- 250/637: Performed by: FAMILY MEDICINE

## 2018-10-04 PROCEDURE — 74011250636 HC RX REV CODE- 250/636: Performed by: INTERNAL MEDICINE

## 2018-10-04 PROCEDURE — 74011250636 HC RX REV CODE- 250/636: Performed by: ORTHOPAEDIC SURGERY

## 2018-10-04 PROCEDURE — 97530 THERAPEUTIC ACTIVITIES: CPT

## 2018-10-04 PROCEDURE — 74011636637 HC RX REV CODE- 636/637: Performed by: FAMILY MEDICINE

## 2018-10-04 PROCEDURE — 82962 GLUCOSE BLOOD TEST: CPT

## 2018-10-04 PROCEDURE — 65270000029 HC RM PRIVATE

## 2018-10-04 RX ORDER — LISINOPRIL 40 MG/1
40 TABLET ORAL DAILY
Status: DISCONTINUED | OUTPATIENT
Start: 2018-10-05 | End: 2018-10-05 | Stop reason: HOSPADM

## 2018-10-04 RX ORDER — AMOXICILLIN AND CLAVULANATE POTASSIUM 875; 125 MG/1; MG/1
1 TABLET, FILM COATED ORAL 2 TIMES DAILY WITH MEALS
Status: DISCONTINUED | OUTPATIENT
Start: 2018-10-04 | End: 2018-10-05 | Stop reason: HOSPADM

## 2018-10-04 RX ADMIN — ENOXAPARIN SODIUM 40 MG: 100 INJECTION SUBCUTANEOUS at 16:47

## 2018-10-04 RX ADMIN — PRAVASTATIN SODIUM 20 MG: 20 TABLET ORAL at 21:52

## 2018-10-04 RX ADMIN — OXYCODONE AND ACETAMINOPHEN 1 TABLET: 5; 325 TABLET ORAL at 06:51

## 2018-10-04 RX ADMIN — AMPICILLIN SODIUM AND SULBACTAM SODIUM 3 G: 2; 1 INJECTION, POWDER, FOR SOLUTION INTRAMUSCULAR; INTRAVENOUS at 08:49

## 2018-10-04 RX ADMIN — OXYCODONE AND ACETAMINOPHEN 1 TABLET: 5; 325 TABLET ORAL at 00:21

## 2018-10-04 RX ADMIN — ASPIRIN 81 MG 81 MG: 81 TABLET ORAL at 08:49

## 2018-10-04 RX ADMIN — INSULIN LISPRO 3 UNITS: 100 INJECTION, SOLUTION INTRAVENOUS; SUBCUTANEOUS at 13:11

## 2018-10-04 RX ADMIN — PREGABALIN 100 MG: 50 CAPSULE ORAL at 18:33

## 2018-10-04 RX ADMIN — OXYCODONE AND ACETAMINOPHEN 1 TABLET: 5; 325 TABLET ORAL at 16:47

## 2018-10-04 RX ADMIN — AMOXICILLIN AND CLAVULANATE POTASSIUM 1 TABLET: 875; 125 TABLET, FILM COATED ORAL at 16:46

## 2018-10-04 RX ADMIN — INSULIN LISPRO 6 UNITS: 100 INJECTION, SOLUTION INTRAVENOUS; SUBCUTANEOUS at 21:51

## 2018-10-04 RX ADMIN — INSULIN LISPRO 3 UNITS: 100 INJECTION, SOLUTION INTRAVENOUS; SUBCUTANEOUS at 16:47

## 2018-10-04 RX ADMIN — LISINOPRIL 20 MG: 20 TABLET ORAL at 08:50

## 2018-10-04 RX ADMIN — METOPROLOL TARTRATE 50 MG: 50 TABLET ORAL at 21:52

## 2018-10-04 RX ADMIN — HYDROXYCHLOROQUINE SULFATE 200 MG: 200 TABLET, FILM COATED ENTERAL at 18:33

## 2018-10-04 RX ADMIN — HYDROXYCHLOROQUINE SULFATE 200 MG: 200 TABLET, FILM COATED ENTERAL at 08:50

## 2018-10-04 RX ADMIN — AMPICILLIN SODIUM AND SULBACTAM SODIUM 3 G: 2; 1 INJECTION, POWDER, FOR SOLUTION INTRAMUSCULAR; INTRAVENOUS at 00:23

## 2018-10-04 RX ADMIN — METOPROLOL TARTRATE 50 MG: 50 TABLET ORAL at 08:52

## 2018-10-04 RX ADMIN — AMIODARONE HYDROCHLORIDE 400 MG: 200 TABLET ORAL at 08:50

## 2018-10-04 RX ADMIN — OXYCODONE AND ACETAMINOPHEN 1 TABLET: 5; 325 TABLET ORAL at 22:26

## 2018-10-04 RX ADMIN — DULOXETINE HYDROCHLORIDE 60 MG: 60 CAPSULE, DELAYED RELEASE ORAL at 08:51

## 2018-10-04 RX ADMIN — PREGABALIN 100 MG: 50 CAPSULE ORAL at 08:50

## 2018-10-04 RX ADMIN — OXYCODONE AND ACETAMINOPHEN 1 TABLET: 5; 325 TABLET ORAL at 13:11

## 2018-10-04 NOTE — DIABETES MGMT
Glycemic Control Plan of Care 
 
T2DM with current A1cof 8.7% (10/01/2018). See separate notes, 10/02/2018, for assessment of home diabetes management and education. Patient reported episodes of hypoglycemia at home but she never told her doctor. Home diabetes med: Lantus insulin 24 units daily every morning. POC BG range on 10/03/2018: 140-239 mg/dL. POC BG report on 10/04/2018 at time of review: 146 mg/dL. Patient is currently on correctional lispro insulin. Very resistant dose. Current Meal Intake: 
Patient Vitals for the past 100 hrs: 
 % Diet Eaten 10/04/18 1021 50 % 10/03/18 1352 100 % 10/03/18 0826 100 % 10/01/18 1757 0 % 10/01/18 1734 0 % Addressed the 2 liters of regular ginger ale at bedside for diet compliance. Choose diet beverage instead. Recommendation(s): 
1.) Consider adding 10 units basal lantus insulin daily. Assessment: 
Patient is 79year old with past medical history including type 2 diabetes mellitus, neuropathy, hypertension, fibromyalgia, atrial fibrillation, pulmonary embolism, CHF, left hand dog bite, and bilateral knee replacement - was admitted on 9/30/2018 with report that she fell out of bed at home with c/o left knee pain. She was found by her granddaughter found her on the floor where she has been for 6 hours. She was alone at time of fall. Left AMA but readmitted 9/30/2018/ Noted:  
Status post reduction internal fixation left second metacarpal head on 10/01/2018. Status post ORIF for periprosthetic fracture around internal prosthetic left knee joint. T2DM with current A1c of 8.7% (10/01/2018). Most recent blood glucose values: 
 
Results for Patti Meraz (MRN [id number]) as of 10/4/2018 11:46 Ref. Range 10/3/2018 06:01 10/3/2018 12:03 10/3/2018 18:03 10/3/2018 21:42 GLUCOSE,FAST - POC Latest Ref Range: 70 - 110 mg/dL 185 (H) 239 (H) 174 (H) 140 (H) Results for Patti Meraz (MRN [id number]) as of 10/4/2018 11:46 
 Ref. Range 10/4/2018 05:38 GLUCOSE,FAST - POC Latest Ref Range: 70 - 110 mg/dL 146 (H) Current A1C: 8.7% (10/01/2018) which is equivalent to estimated average blood glucose of 203 mg/dL during the past 2-3 months. Current hospital diabetes medications: 
Correctional lispro insulin ACHS. Very resistant dose. Total daily dose insulin requirement previous day: 10/03/2018 Lispro: 7 units Home diabetes medications: Patient reported on 10/02/2018: 
Lantus insulin 24 units daily every morning. Diet: Diabetic consistent carb soft solids; chopped meats. Goals:  Blood glucose will be within target range of  mg/dL by 10/07/2018 Education:  _X__  Refer to Diabetes Education Record: 10/02/2018 
           ___  Education not indicated at this time Evaristo Mina RN [facility] Pager: [phone]

## 2018-10-04 NOTE — PROGRESS NOTES
Problem: Self Care Deficits Care Plan (Adult) Goal: *Acute Goals and Plan of Care (Insert Text) Occupational Therapy Goals Initiated 10/3/2018 within 7 day(s). 1.  Patient will perform upper body dressing with supervision/set-up 2. Patient will perform lower body dressing with moderate assistance with use of AE as needed. 3.  Patient will perform grooming with modified independence while seated edge of bed with no LOB. Luis A Francois 4. Patient will perform stand pivot transfer from bed>BSC/arm chair in prep for ADLs with min A x 2 and use of platform walker as needed. 5.  Patient will participate in upper extremity therapeutic exercise/activities with supervision/set-up for 8 minutes. Outcome: Progressing Towards Goal 
Occupational Therapy TREATMENT Patient: Xavier Iraheta (14 y.o. female) Date: 10/4/2018 Diagnosis: Dehydration Inability to ambulate due to left knee Open displaced fracture of metacarpal bone of left hand U20.939D DOG BITE; S62.391B OPEN FX LEFT 2ND METACARPAL, DISPLACED HEAD Dehydration Procedure(s) (LRB): OPEN REDUCTION INTERNAL FIXATION LEFT SECOND METACARPAL/MEDARTIS/HAND TABLE/C-ARM (Left) DIGITAL NERVE REPAIR LEFT INDEX FINGER (Left) 3 Days Post-Op Precautions: Fall, NWB, PWB ((NWB LLE, PWB to L hand on ulnar side, none to index,middle)) Chart, occupational therapy assessment, plan of care, and goals were reviewed. ASSESSMENT: 
Pt is sitting on the BS upon entry, required Max A for maria luisa-care in sitting. Pt maneuvered to the chair from [facility] w/Max Ax2 and platform walker, pt is putting at least PWB through LLE in spite of Max VCs for NWB precautions for LLE. Pt participated in UB dressing and ADL grooming task requiring SBA for bilateral integration tasks 2/2 NWB on R hand for index and middle finger.  Pt was educated on adaptive strategies for LB dressing, pt was able to doff/don RLE sock w/R hand, requiring SBA and additional time. Attempted to std from the chair in preparation for std aspects of LB dressing and toileting, however, pt is not able to maintain NWB precautions in spite of Total Ax2 for attempt to std. Pt left comfortable at the end of the session sitting in the chair. Pt is not safe to participate in toileting txfrs with nursing at this time. Spoke w/pt's nurse Troy Sher, recommended Lift Team  to return pt to bed when pt is ready. Progression toward goals: 
[]          Improving appropriately and progressing toward goals [x]          Improving slowly and progressing toward goals 
[]          Not making progress toward goals and plan of care will be adjusted PLAN: 
Patient continues to benefit from skilled intervention to address the above impairments. Continue treatment per established plan of care. Discharge Recommendations:  Diego Ahmadi Further Equipment Recommendations for Discharge:  N/A  
  
G-CODES:  
 
Self Care  Current  CL= 60-79%  Goal  CK= 40-59%. The severity rating is based on the Level of Assistance required for Functional Mobility and ADLs. SUBJECTIVE:  
Patient stated I don't think I can stand like that.  (pt is noncompliant w/NWB precautions for LLE) OBJECTIVE DATA SUMMARY:  
Cognitive/Behavioral Status: 
Neurologic State: Alert Orientation Level: Oriented X4 Cognition: Follows commands Safety/Judgement: Fall prevention, Home safety Functional Mobility and Transfers for ADLs: 
  Transfers: 
Sit to Stand: Maximum assistance;Assist x2;Total assistance; Additional time Toilet Transfer : Maximum assistance;Assist x2 (from [facility]) Balance: 
Sitting: Intact; With support Standing: Impaired; With support ADL Intervention: 
Grooming Grooming Assistance: Stand-by assistance (seated in the chair) Washing Face: Supervision/set-up Washing Hands: Stand-by assistance Brushing/Combing Hair: Supervision/set-up (w/RUE) Lower Body Dressing Assistance Socks: Stand-by assistance (RLE w/one-handed technique) Pain: 
Pt reports 0/10 pain or discomfort prior to treatment.   
Pt didn't rate pain or discomfort post treatment. Activity Tolerance:   
Fair Please refer to the flowsheet for vital signs taken during this treatment. After treatment:  
[x]  Patient left in no apparent distress sitting up in chair 
[]  Patient left in no apparent distress in bed 
[x]  Call bell left within reach [x]  Nursing notified 
[]  Caregiver present 
[]  Bed alarm activated SUKHI Sun Time Calculation: 43 mins

## 2018-10-04 NOTE — PROGRESS NOTES
[facility] REFERRAL CONTACT NOTE [address] [place] [address] for Physical Rehabilitation RE: Torri Shashank Thank you for the opportunity to review this patient's case for admission to [address] [place] [address] for Physical Rehabilitation. Based on our pre-admission screening:  
 
[x ] This patient does not meet criteria for admission to [facility] for Physical  Rehabilitation due to: 
 
[x ] Too low level, per documentation, patient has not demonstrated tolerance for acute rehabilitation level of intensity. [x ] We recommend the following: 
 
[x ] [place] Again, Thank you for this referral. Should you have any questions please do not hesitate to call. Sincerely, Silvia Salomon Admissions [facility] for Physical Rehabilitation 
[phone]

## 2018-10-04 NOTE — PROGRESS NOTES
Problem: Mobility Impaired (Adult and Pediatric) Goal: *Acute Goals and Plan of Care (Insert Text) Physical Therapy Goals Initiated 10/3/2018 and to be accomplished within 7 day(s) 1. Patient will move from supine to sit and sit to supine , scoot up and down and roll side to side in bed with minimal assistance/contact guard assist.    
2.  Patient will transfer from bed to chair and chair to bed with moderate assistance using the transfer board. 3.  Patient will perform sit to stand with moderate assistance . 4. Patient will ambulate with moderate assistance  for 10 feet with the left platform walker. 5. Patient will perform WC mobility 50ft with standby assist  
Outcome: Progressing Towards Goal 
physical Therapy TREATMENT Patient: Ja Mulligan (52 y.o. female) Date: 10/4/2018 Diagnosis: Dehydration Inability to ambulate due to left knee Open displaced fracture of metacarpal bone of left hand W22.542Q DOG BITE; S62.391B OPEN FX LEFT 2ND METACARPAL, DISPLACED HEAD Dehydration Procedure(s) (LRB): OPEN REDUCTION INTERNAL FIXATION LEFT SECOND METACARPAL/MEDARTIS/HAND TABLE/C-ARM (Left) DIGITAL NERVE REPAIR LEFT INDEX FINGER (Left) 3 Days Post-Op Precautions: Fall, NWB, PWB ((NWB LLE, PWB to L hand on ulnar side, none to index,middle)) Chart, physical therapy assessment, plan of care and goals were reviewed. OBJECTIVE/ ASSESSMENT: 
Patient found seated on BSC willing to work with PT (time increased for BM). Tx performed w/ OT to maximize safety. Pt performed sit to stand max A to total A X2 from elevated surface w/ poor adherence in maintaining NWBing on LLE.  Pt amb w/ platform RW for 4ft to chair w/ constant cueing to maintain NWBing, however unable to elevate LE 2/2 to weakness and weight of cast. Pt positioned in chair for rest break and further education, and attempted to sit <> stand once more w/ OT supporting LLE to maintain WBing status. Pt unable to achieve standing 2/2 to low chair height w/o utilizing LLE, thus pt unsafe at this time. Over all w/ elevated chair height and addition of platform attachment, pt able to navigate better, however 2/2 to increased weakness and low chair height standing not appropriate on 2nd attempt. Pt left in chair w/ all needs in reach. Pt instructed to stay upright in chair for as long as possible, and when needed alert nursing to notify lift team to bed. Nurse notified of pt status and need for lift team. Pt would benefit from slide board transfer training NV if appropriate to further improve functional mobility while maintaining NWBing status. Education:safety, importance of maintaining NWBing through LLE and left radial aspect of hand Progression toward goals: 
[]      Improving appropriately and progressing toward goals [x]      Improving slowly and progressing toward goals 
[]      Not making progress toward goals and plan of care will be adjusted PLAN: 
Patient continues to benefit from skilled intervention to address the above impairments. Continue treatment per established plan of care. Discharge Recommendations:  Diego Ahmadi Further Equipment Recommendations for Discharge:  rolling walker w/ platform SUBJECTIVE:  
Patient stated Karina Hernandez had to pry my dog's head out of the other dog's mouth to save my baby.  OBJECTIVE DATA SUMMARY:  
Critical Behavior: 
Neurologic State: Alert Orientation Level: Oriented X4 Cognition: Appropriate decision making, Appropriate for age attention/concentration, Appropriate safety awareness Safety/Judgement: Fall prevention, Home safety Functional Mobility Training: 
Transfers: 
Sit to Stand: Maximum assistance;Assist x2;Total assistance; Additional time Stand to Sit: Maximum assistance; Moderate assistance;Assist x2 Balance: 
Sitting: Intact; With support Standing: Impaired; With support Ambulation/Gait Training: Distance (ft): 3 Feet (ft) Assistive Device: Walker, rolling (w/ platform w/ LUE) Ambulation - Level of Assistance: Maximum assistance;Assist x2 Gait Abnormalities: Decreased step clearance Base of Support: Center of gravity altered;Shift to right Speed/Angelica: Slow Pain: did not verbalize pain Activity Tolerance:  
Fatigues easily Please refer to the flowsheet for vital signs taken during this treatment. After treatment:  
[x] Patient left in no apparent distress sitting up in chair 
[] Patient left in no apparent distress in bed 
[x] Call bell left within reach [x] Nursing notified 
[] Caregiver present [x] Chair alarm activated 
[] SCDs applied 
[] Ice applied Kerrie Moody PTA Time Calculation: 44 mins Mobility [id number] Current  CN= 100%. The severity rating is based on the Level of Assistance required for Functional Mobility and ADLs. Mobility   Goal  CJ= 20-39%. The severity rating is based on the Level of Assistance required for Functional Mobility and ADLs.

## 2018-10-04 NOTE — PROGRESS NOTES
Progress Note Patient: Alva Dumont MRN: [id number]  SSN: [id number] YOB: 1950  Age: 79 y.o. Sex: female Admit Date: 2018 3 Days Post-Op Procedure:  Procedure(s): OPEN REDUCTION INTERNAL FIXATION LEFT SECOND METACARPAL/MEDARTIS/HAND TABLE/C-ARM 
DIGITAL NERVE REPAIR LEFT INDEX FINGER Subjective:  
 
Patient has no new complaints. Objective:  
 
Visit Vitals  /65 (BP 1 Location: Left arm, BP Patient Position: At rest)  Pulse (!) 53  Temp 97.4 °F (36.3 °C)  Resp 16  
 Ht 5' 1\" (1.549 m)  Wt 173 lb (78.5 kg)  SpO2 97%  BMI 32.69 kg/m2 Temp (24hrs), Av.8 °F (36.6 °C), Min:97.4 °F (36.3 °C), Max:98.1 °F (36.7 °C) Physical Exam:   
EXTREMITIES:  extremities normal, atraumatic, no cyanosis or edema, normal post op swelling. Cast CDI. Tape applied to cast to help with any sharp edges from rubbing on the skin Data Review: images and reports reviewed Lab Review: All lab results for the last 24 hours reviewed. Assessment:  
 
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 [address] ICD-9-CM: 996.44, V43.65  2018 Yes HTN (hypertension) (Chronic) ICD-10-CM: I10 
ICD-9-CM: 401.9  2017 Yes Type II [facility] mellitus ([facility] ICD-10-CM: E11.9 ICD-9-CM: 250.00  2012 Yes Plan/Recommendations/Medical Decision Making:  
 
Continue present treatment Plan for DC to SNF per medicine F/U with me 1 week after discharge Continue partial weightbearing through her Arm Cast 
 
 Signed By: Parish Cox,  October 4, 2018

## 2018-10-04 NOTE — PROGRESS NOTES
Discharge planning: 
 
Met with patient to discuss discharge planning. It is being recommended that patient go to a SNF for short term rehab. Patient agrees. FOC was done with patient and she has elected to go to [facility] (1st choice), [facility] (2nd choice) and [facility] (3rd choice). Patient's referral was sent out to secure her a SNF bed. This writer will continue to closely monitor for discharge planning to ensure a safe discharge to a SNF, from [facility]. Margarita Sales. [facility] Care Manager [phone]

## 2018-10-04 NOTE — PROGRESS NOTES
[facility] Hospitalist Group Progress Note Patient: Leeroy Augilar Age: 79 y.o. : 1950 MR#: [id number] SSN: [id number] Date/Time: 10/4/2018 9:31AM 
 
Subjective24-hour events:  
 
Stable overnight, no new complaints. Sitting up in chair at bedside currently. Remains afebrile. Assessment:  
Comminuted fracture of L 2nd metacarpal head secondary to dog bite injury s/p ORIF with partial head excision L femur fracture s/p ORIF Prerenal azotemia, improved HTN 
HLD 
DM 2 Atrial fibrillation history, not on [facility] therapy Plan: D/C ampicillin and change to augmentin to complete 10-day course. BPs continue to be a high overall. Increase lisinopril dose today and monitor response. Renal indices stable - continue to monitor. SSI as ordered - sugars acceptable. PT/OT, mobilize as tolerated. Have placed order for inpatient rehab screening eval to be done. Disposition TBD pending ARU screen. If not a candidate, will need SNF. Case discussed with:  [x]Patient  []Family  []Nursing  []Case Management DVT Prophylaxis:  [x]Lovenox  []Hep SQ  []SCDs  []Coumadin   []On Heparin gtt Objective:  
VS:  
Visit Vitals  /75 (BP 1 Location: Left arm, BP Patient Position: At rest)  Pulse (!) 56  Temp 97.8 °F (36.6 °C)  Resp 16  
 Ht 5' 1\" (1.549 m)  Wt 78.5 kg (173 lb)  SpO2 95%  BMI 32.69 kg/m2 Tmax/24hrs: Temp (24hrs), Av.7 °F (36.5 °C), Min:96.7 °F (35.9 °C), Max:98.1 °F (36.7 °C) Intake/Output Summary (Last 24 hours) at 10/04/18 0932 Last data filed at 10/04/18 4661 Gross per 24 hour Intake              960 ml Output              800 ml Net              160 ml General:  In NAD. Nontoxic-appearing. Cardiovascular:  RRR. Pulmonary:  Clear, no wheezes. Effort nonlabored. GI:  Abdomen soft, NTTP. Extremities:  L leg casted. L hand splint in place. Warm, no ischemia.  
Neuro:  Awake and alert, motor nonfocal. 
 
 Labs:   
Recent Results (from the past 24 hour(s)) GLUCOSE, POC Collection Time: 10/03/18 12:03 PM  
Result Value Ref Range Glucose (POC) 239 (H) 70 - 110 mg/dL GLUCOSE, POC Collection Time: 10/03/18  6:03 PM  
Result Value Ref Range Glucose (POC) 174 (H) 70 - 110 mg/dL GLUCOSE, POC Collection Time: 10/03/18  9:42 PM  
Result Value Ref Range Glucose (POC) 140 (H) 70 - 110 mg/dL GLUCOSE, POC Collection Time: 10/04/18  5:38 AM  
Result Value Ref Range Glucose (POC) 146 (H) 70 - 110 mg/dL Signed By: China Hernandez MD   
 October 4, 2018 9:31 AM

## 2018-10-04 NOTE — ROUTINE PROCESS
Bedside shift report given to [facility] Reviewed: · Kardex · SBAR 
· MAR 
· I/Os · Updates on ambulation

## 2018-10-04 NOTE — ROUTINE PROCESS
Bedside and Verbal shift change report given to Viral Douglass RN (oncoming nurse) by Myrtle Bobo RN (offgoing nurse). Report included the following information SBAR, Kardex, MAR and Recent Results. SITUATION:  
? Code Status: Full Code 
? Reason for Admission: Dehydration ? Inability to ambulate due to left knee ? Open displaced fracture of metacarpal bone of left hand ? H81.108F DOG BITE; S62.391B OPEN FX LEFT 2ND METACARPAL, DISPLACED HEAD 
 
? Hospital day: 4 
? Problem List:  
   
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 [facility] ICD-9-CM: 996.44, V43.65  9/26/2018 Yes HTN (hypertension) (Chronic) ICD-10-CM: I10 
ICD-9-CM: 401.9  4/27/2017 Yes Type II diabetes mellitus ([facility] ICD-10-CM: E11.9 ICD-9-CM: 250.00  1/25/2012 Yes BACKGROUND:  
 Past Medical History:  
Past Medical History:  
Diagnosis Date  Anxiety  Arthritis  [facility]  Depression  Diabetes ([facility]  Fibromyalgia  GERD (gastroesophageal reflux disease)  Hypercholesteremia  Hypertension  Renal insufficiency  Skin abscess  Sleep apnea Patient taking anticoagulants no ASSESSMENT:  
? Changes in Assessment Throughout Shift: none ? Patient has Central Line: no Reasons if yes:  
? Patient has Fuentes Cath: no Reasons if yes:   
 
? Last Vitals: 
  
Vitals:  
 10/03/18 1106 10/03/18 1604 10/03/18 1935 10/04/18 1824 BP: 170/73 180/78 164/71 183/73 Pulse: (!) 58 60 (!) 59 (!) 52 Resp: 18 17 16 16 Temp: 96.7 °F (35.9 °C) 97.9 °F (36.6 °C) 98.1 °F (36.7 °C) 97.8 °F (36.6 °C) SpO2: 100% 100% 100% 98% Weight:      
Height:      
 
 
? IV and DRAINS (will only show if present) [REMOVED] Peripheral IV 09/30/18 Right Hand-Site Assessment: Clean, dry, & intact Peripheral IV 10/01/18 Right Forearm-Site Assessment: Clean, dry, & intact ? WOUND (if present) Wound Type:  Cast, arm and leg Dressing present yes Wound Concerns/Notes:  none ? PAIN Pain Assessment Pain Intensity 1: 0 (10/04/18 0348) Pain Location 1: Hand Pain Intervention(s) 1: Medication (see MAR) Patient Stated Pain Goal: 2 
o Interventions for Pain:  See mar 
o Intervention effective: yes 
o Time of last intervention: see mar  
o Reassessment Completed: yes ? Last 3 Weights: 
Last 3 Recorded Weights in this Encounter 10/01/18 6434 Weight: 78.5 kg (173 lb) Weight change: ? INTAKE/OUPUT Current Shift: 10/03 1901 - 10/04 0700 In: 480 [P.O.:480] Out: 250 [Urine:250] Last three shifts: 10/02 0701 - 10/03 1900 In: 9938 [P.O.:836; I.V.:400] Out: 2950 [Urine:2950] ? LAB RESULTS Recent Labs 10/03/18 
 0500  10/02/18 
 0258  10/01/18 [address] WBC  6.6  7.1   --   
HGB  7.6*  8.0*  7.8* HCT  23.9*  25.6*  24.6*  
PLT  380  286   --   
 
  
Recent Labs 10/03/18 
 0500  10/02/18 
 0258  10/01/18 
 7106 NA  144  144   --   
K  4.1  4.3   --   
GLU  145*  109*   --   
BUN  32*  33*   --   
CREA  1.31*  1.23   --   
CA  7.6*  7.9*   --   
INR   --    --   1.0  
 
 
RECOMMENDATIONS AND DISCHARGE PLANNING 1. Pending tests/procedures/ Plan of Care or Other Needs: none 2. Discharge plan for patient and Needs/Barriers: SNF? 
 
3. Estimated Discharge Date: unknown Posted on Whiteboard in Patients Room: no   
 
4. The patient's care plan was reviewed with the oncoming nurse. \"HEALS\" SAFETY CHECK Fall Risk Total Score: 4 Safety Measures: Safety Measures: Bed/Chair-Wheels locked, Bed in low position, Call light within reach, Gripper socks, Side rails X 3 A safety check occurred in the patient's room between off going nurse and oncoming nurse listed above. The safety check included the below items Area Items H High Alert Medications ? Verify all high alert medication drips (heparin, PCA, etc.) E Equipment ? Suction is set up for ALL patients (with tonny) ? Red plugs utilized for all equipment (IV pumps, etc.) ? WOWs wiped down at end of shift. ? Room stocked with oxygen, suction, and other unit-specific supplies A Alarms ? Bed alarm is set for fall risk patients ? Ensure chair alarm is in place and activated if patient is up in a chair L Lines ? Check IV for any infiltration ? Fuentes bag is empty if patient has a Fuentes ? Tubing and IV bags are labeled [place] Prude Safety ? Room is clean, patient is clean, and equipment is clean. ? Hallways are clear from equipment besides carts. ? Fall bracelet on for fall risk patients ? Ensure room is clear and free of clutter ? Suction is set up for ALL patients (with tonny) ? Hallways are clear from equipment besides carts. ? Isolation precautions followed, supplies available outside room, sign posted Debbrah Fleischer, RN

## 2018-10-05 VITALS
WEIGHT: 173 LBS | SYSTOLIC BLOOD PRESSURE: 180 MMHG | RESPIRATION RATE: 16 BRPM | HEART RATE: 55 BPM | DIASTOLIC BLOOD PRESSURE: 76 MMHG | TEMPERATURE: 97.9 F | OXYGEN SATURATION: 96 % | HEIGHT: 61 IN | BODY MASS INDEX: 32.66 KG/M2

## 2018-10-05 DIAGNOSIS — S62.331A CLOSED DISPLACED FRACTURE OF NECK OF SECOND METACARPAL BONE OF LEFT HAND, INITIAL ENCOUNTER: Primary | ICD-10-CM

## 2018-10-05 LAB
ABO + RH BLD: NORMAL
BLD PROD TYP BPU: NORMAL
BLOOD GROUP ANTIBODIES SERPL: NORMAL
BPU ID: NORMAL
CALLED TO:,BCALL1: NORMAL
CROSSMATCH RESULT,%XM: NORMAL
GLUCOSE BLD STRIP.AUTO-MCNC: 143 MG/DL (ref 70–110)
GLUCOSE BLD STRIP.AUTO-MCNC: 172 MG/DL (ref 70–110)
SPECIMEN EXP DATE BLD: NORMAL
STATUS OF UNIT,%ST: NORMAL
UNIT DIVISION, %UDIV: 0

## 2018-10-05 PROCEDURE — 74011250637 HC RX REV CODE- 250/637: Performed by: FAMILY MEDICINE

## 2018-10-05 PROCEDURE — 97530 THERAPEUTIC ACTIVITIES: CPT

## 2018-10-05 PROCEDURE — 97535 SELF CARE MNGMENT TRAINING: CPT

## 2018-10-05 PROCEDURE — 74011636637 HC RX REV CODE- 636/637: Performed by: FAMILY MEDICINE

## 2018-10-05 PROCEDURE — 74011250636 HC RX REV CODE- 250/636

## 2018-10-05 PROCEDURE — 74011000250 HC RX REV CODE- 250

## 2018-10-05 PROCEDURE — 74011250637 HC RX REV CODE- 250/637: Performed by: INTERNAL MEDICINE

## 2018-10-05 PROCEDURE — 82962 GLUCOSE BLOOD TEST: CPT

## 2018-10-05 RX ORDER — OXYCODONE AND ACETAMINOPHEN 5; 325 MG/1; MG/1
1 TABLET ORAL
Qty: 6 TAB | Refills: 0 | Status: SHIPPED | OUTPATIENT
Start: 2018-10-05 | End: 2018-11-11

## 2018-10-05 RX ORDER — LISINOPRIL 40 MG/1
40 TABLET ORAL DAILY
Qty: 30 TAB | Refills: 0 | Status: SHIPPED
Start: 2018-10-06 | End: 2019-08-09

## 2018-10-05 RX ORDER — AMOXICILLIN AND CLAVULANATE POTASSIUM 875; 125 MG/1; MG/1
1 TABLET, FILM COATED ORAL 2 TIMES DAILY WITH MEALS
Qty: 8 TAB | Refills: 0 | Status: SHIPPED | OUTPATIENT
Start: 2018-10-05 | End: 2018-10-09

## 2018-10-05 RX ADMIN — AMOXICILLIN AND CLAVULANATE POTASSIUM 1 TABLET: 875; 125 TABLET, FILM COATED ORAL at 08:31

## 2018-10-05 RX ADMIN — HYDROXYCHLOROQUINE SULFATE 200 MG: 200 TABLET, FILM COATED ENTERAL at 08:31

## 2018-10-05 RX ADMIN — INSULIN LISPRO 3 UNITS: 100 INJECTION, SOLUTION INTRAVENOUS; SUBCUTANEOUS at 12:53

## 2018-10-05 RX ADMIN — OXYCODONE AND ACETAMINOPHEN 1 TABLET: 5; 325 TABLET ORAL at 12:53

## 2018-10-05 RX ADMIN — LISINOPRIL 40 MG: 40 TABLET ORAL at 08:32

## 2018-10-05 RX ADMIN — OXYCODONE AND ACETAMINOPHEN 1 TABLET: 5; 325 TABLET ORAL at 04:23

## 2018-10-05 RX ADMIN — ASPIRIN 81 MG 81 MG: 81 TABLET ORAL at 08:32

## 2018-10-05 RX ADMIN — DULOXETINE HYDROCHLORIDE 60 MG: 60 CAPSULE, DELAYED RELEASE ORAL at 08:31

## 2018-10-05 RX ADMIN — AMIODARONE HYDROCHLORIDE 400 MG: 200 TABLET ORAL at 08:32

## 2018-10-05 RX ADMIN — PREGABALIN 100 MG: 50 CAPSULE ORAL at 08:32

## 2018-10-05 RX ADMIN — METOPROLOL TARTRATE 50 MG: 50 TABLET ORAL at 08:31

## 2018-10-05 NOTE — PROGRESS NOTES
Problem: Mobility Impaired (Adult and Pediatric) Goal: *Acute Goals and Plan of Care (Insert Text) Physical Therapy Goals Initiated 10/3/2018 and to be accomplished within 7 day(s) 1. Patient will move from supine to sit and sit to supine , scoot up and down and roll side to side in bed with minimal assistance/contact guard assist.    
2.  Patient will transfer from bed to chair and chair to bed with moderate assistance using the transfer board. 3.  Patient will perform sit to stand with moderate assistance . 4. Patient will ambulate with moderate assistance  for 10 feet with the left platform walker. 5. Patient will perform WC mobility 50ft with standby assist  
Outcome: Progressing Towards Goal 
physical Therapy TREATMENT Patient: Bruce Fernandez (16 y.o. female) Date: 10/5/2018 Diagnosis: Dehydration Inability to ambulate due to left knee Open displaced fracture of metacarpal bone of left hand Q58.330F DOG BITE; S62.391B OPEN FX LEFT 2ND METACARPAL, DISPLACED HEAD Dehydration Procedure(s) (LRB): OPEN REDUCTION INTERNAL FIXATION LEFT SECOND METACARPAL/MEDARTIS/HAND TABLE/C-ARM (Left) DIGITAL NERVE REPAIR LEFT INDEX FINGER (Left) 4 Days Post-Op Precautions: Fall, NWB, PWB ((NWB LLE, PWB to L hand on ulnar side, none to index,middle)) Chart, physical therapy assessment, plan of care and goals were reviewed. OBJECTIVE/ ASSESSMENT: 
Patient found supine HOB elevated willing to work with PT. Tx performed w/ OT to maximize safety. Pt just received lunch prior to tx attempt. To compromise and to ensure the safety and pt and staff, tx limited to bed level activities. Pt performed sup to sit req min A w/ LLE and CGA for rolling and scooting to EOB. Pt req cueing for self assist to doff LE to EOB, however has difficulty 2/2 weight of cast and weakness in LLE.  Pt provided further education on importance of NWBing status and left at EOB for lunch in care of care partner for vitals check. Nurse notified of pt status, position, and possible need of assistance to return to supine post lunch. Education:safety, importance of mobility w/ NWBing  
Progression toward goals: 
[]      Improving appropriately and progressing toward goals [x]      Improving slowly and progressing toward goals 
[]      Not making progress toward goals and plan of care will be adjusted PLAN: 
Patient continues to benefit from skilled intervention to address the above impairments. Continue treatment per established plan of care. Discharge Recommendations:  Diego Ahmadi Further Equipment Recommendations for Discharge:  rolling walker SUBJECTIVE:  
Patient stated I was hurtin' yesterday because I stayed in the chair for so long.  OBJECTIVE DATA SUMMARY:  
Critical Behavior: 
Neurologic State: Alert Orientation Level: Oriented X4 Cognition: Appropriate for age attention/concentration, Follows commands Safety/Judgement: Fall prevention, Home safety Functional Mobility Training: 
Bed Mobility: 
Rolling: Contact guard assistance Supine to Sit: Minimum assistance Scooting: Contact guard assistance Pain: 6/10 Activity Tolerance: Tolerated bed level tx well Please refer to the flowsheet for vital signs taken during this treatment. After treatment:  
[] Patient left in no apparent distress sitting up in chair 
[x] Patient left in no apparent distress at EOB for meal  
[x] Call bell left within reach [x] Nursing notified 
[x] Carepartner present 
[] Bed alarm activated 
[] SCDs applied 
[] Ice applied Garry Filter, PTA Time Calculation: 11 mins Mobility [id number] Current  CJ= 20-39%. The severity rating is based on the Level of Assistance required for Functional Mobility and ADLs. Mobility   Goal  CJ= 20-39%. The severity rating is based on the Level of Assistance required for Functional Mobility and ADLs.

## 2018-10-05 NOTE — ROUTINE PROCESS
Bedside and Verbal shift change report given to Moira Marinelli RN (oncoming nurse) by Keith Vasques RN (offgoing nurse). Report included the following information SBAR, Kardex, MAR and Recent Results. SITUATION:  
? Code Status: Full Code Reason for Admission: Dehydration Inability to ambulate due to left knee Open displaced fracture of metacarpal bone of left hand ? W43.769G DOG BITE; S62.391B OPEN FX LEFT 2ND METACARPAL, DISPLACED HEAD 
 
? Hospital day: 5 
? Problem List:  
   
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 [facility] ICD-9-CM: 996.44, V43.65  9/26/2018 Yes HTN (hypertension) (Chronic) ICD-10-CM: I10 
ICD-9-CM: 401.9  4/27/2017 Yes [facility] II diabetes mellitus ([facility] ICD-10-CM: E11.9 ICD-9-CM: 250.00  1/25/2012 Yes BACKGROUND:  
 Past Medical History:  
Past Medical History:  
Diagnosis Date  Anxiety  Arthritis  [facility]bestosis ([facility]  Depression  Diabetes ([facility]  Fibromyalgia  GERD (gastroesophageal reflux disease)  Hypercholesteremia  Hypertension  Renal insufficiency  Skin abscess  Sleep apnea Patient taking anticoagulants no ASSESSMENT:  
? Changes in Assessment Throughout Shift: none ? Patient has Central Line: no Reasons if yes:  
? Patient has Fuentes Cath: no Reasons if yes:   
 
? Last Vitals: 
  
Vitals:  
 10/04/18 1214 10/04/18 1514 10/04/18 1936 10/05/18 0410 BP: 165/72 171/65 163/70 181/71 Pulse: (!) 53 (!) 53 (!) 58 (!) 55 Resp: 16 16 18 18 Temp: 97.7 °F (36.5 °C) 97.4 °F (36.3 °C) 97.1 °F (36.2 °C) 97.4 °F (36.3 °C) SpO2: 99% 97% 98% 94% Weight:      
Height:      
 
 
? IV and DRAINS (will only show if present) [REMOVED] Peripheral IV 09/30/18 Right Hand-Site Assessment: Clean, dry, & intact Peripheral IV 10/01/18 Right Forearm-Site Assessment: Clean, dry, & intact ? WOUND (if present) Wound Type:  Cast, arm and leg Dressing present yes Wound Concerns/Notes:  none ? PAIN Pain Assessment Pain Intensity 1: 0 (10/05/18 0502) Pain Location 1: Leg 
  Pain Intervention(s) 1: Medication (see MAR) Patient Stated Pain Goal: 2 
o Interventions for Pain:  See mar 
o Intervention effective: yes 
o Time of last intervention: see mar  
o Reassessment Completed: yes ? Last 3 Weights: 
Last 3 Recorded Weights in this Encounter 10/01/18 4941 Weight: 78.5 kg (173 lb) Weight change: ? INTAKE/OUPUT Current Shift: 10/04 1901 - 10/05 0700 In: 480 [P.O.:480] Out: - Last three shifts: 10/03 0701 - 10/04 1900 In: Jose Daniel Loud [P.O.:1876] Out: 2600 [Urine:2600] ? LAB RESULTS Recent Labs 10/03/18 
 0500 WBC  6.6 HGB  7.6* HCT  23.9*  
PLT  380 Recent Labs 10/03/18 
 0500 NA  144  
K  4.1 GLU  145* BUN  32* CREA  1.31* CA  7.6* RECOMMENDATIONS AND DISCHARGE PLANNING 1. Pending tests/procedures/ Plan of Care or Other Needs: none 2. Discharge plan for patient and Needs/Barriers: SNF? 
 
3. Estimated Discharge Date: unknown Posted on Whiteboard in Patients Room: no   
 
4. The patient's care plan was reviewed with the oncoming nurse. \"HEALS\" SAFETY CHECK Fall Risk Total Score: 4 Safety Measures: Safety Measures: Bed/Chair-Wheels locked, Bed in low position, Call light within reach, Gripper socks, Side rails X 3 A safety check occurred in the patient's room between off going nurse and oncoming nurse listed above. The safety check included the below items Area Items H High Alert Medications ? Verify all high alert medication drips (heparin, PCA, etc.) E Equipment ? Suction is set up for ALL patients (with tonny) ? Red plugs utilized for all equipment (IV pumps, etc.) ? WOWs wiped down at end of shift. ? Room stocked with oxygen, suction, and other unit-specific supplies A Alarms ? Bed alarm is set for fall risk patients ? Ensure chair alarm is in place and activated if patient is up in a chair L Lines ? Check IV for any infiltration ? Fuentes bag is empty if patient has a Fuentes ? Tubing and IV bags are labeled Mariza Griffith Safety ? Room is clean, patient is clean, and equipment is clean. ? Hallways are clear from equipment besides carts. ? Fall bracelet on for fall risk patients ? Ensure room is clear and free of clutter ? Suction is set up for ALL patients (with tonny) ? Hallways are clear from equipment besides carts. ? Isolation precautions followed, supplies available outside room, sign posted Lyda Harada, RN

## 2018-10-05 NOTE — PROGRESS NOTES
Discharge planning: 
 
Patient has been accepted to [facility] SNF. Once medically stable for discharge, she can be transferred to the SNF. This writer will continue to closely monitor for discharge planning to ensure a safe transfer to the SNF from [facility]. Andrew Escobedo. MSW Care Manager [phone]

## 2018-10-05 NOTE — PROGRESS NOTES
Progress Note Patient: Alva Dumont MRN: [id number]  SSN: [id number] YOB: 1950  Age: 79 y.o. Sex: female Admit Date: 2018 4 Days Post-Op Procedure:  Procedure(s): OPEN REDUCTION INTERNAL FIXATION LEFT SECOND METACARPAL/MEDARTIS/HAND TABLE/C-ARM 
DIGITAL NERVE REPAIR LEFT INDEX FINGER Subjective:  
 
Patient has no new complaints. Objective:  
 
Visit Vitals  /83 (BP 1 Location: Left arm, BP Patient Position: Supine)  Pulse (!) 54  Temp 96.5 °F (35.8 °C)  Resp 18  Ht 5' 1\" (1.549 m)  Wt 173 lb (78.5 kg)  SpO2 96%  BMI 32.69 kg/m2 Temp (24hrs), Av.1 °F (36.2 °C), Min:96.5 °F (35.8 °C), Max:97.4 °F (36.3 °C) Physical Exam:   
GENERAL: alert, cooperative, no distress, appears stated age, EXTREMITIES:  extremities normal, atraumatic, no cyanosis or edema. Cast Clean Dry and Intact and in stable condition Data Review: images and reports reviewed Lab Review: All lab results for the last 24 hours reviewed. Assessment:  
 
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 94 [facility] ICD-9-CM: 996.44, V43.65  2018 Yes HTN (hypertension) (Chronic) ICD-10-CM: I10 
ICD-9-CM: 401.9  2017 Yes Type II [facility] ICD-10-CM: E11.9 ICD-9-CM: 250.00  2012 Yes Plan/Recommendations/Medical Decision Making:  
 
Continue present treatment Ice/Elevate as needed PWB to [facility] Surgical/Postopcare per Dr Nae Multani Plan for DC this afternoon F/U on 10/16/2018 -Call [facility] to confirm time Signed By: Daren Pittman DO October 5, 2018

## 2018-10-05 NOTE — PROGRESS NOTES
Problem: Self Care Deficits Care Plan (Adult) Goal: *Acute Goals and Plan of Care (Insert Text) Occupational Therapy Goals Initiated 10/3/2018 within 7 day(s). 1.  Patient will perform upper body dressing with supervision/set-up 2. Patient will perform lower body dressing with moderate assistance with use of AE as needed. 3.  Patient will perform grooming with modified independence while seated edge of bed with no LOB. [employer] 4. Patient will perform stand pivot transfer from bed>BSC/arm chair in prep for ADLs with min A x 2 and use of platform walker as needed. 5.  Patient will participate in upper extremity therapeutic exercise/activities with supervision/set-up for 8 minutes. Outcome: Progressing Towards Goal 
Occupational Therapy TREATMENT Patient: Eugenia Parsons (89 y.o. female) Date: 10/5/2018 Diagnosis: Dehydration Inability to ambulate due to left knee Open displaced fracture of metacarpal bone of left hand D79.005L DOG BITE; S62.391B OPEN FX LEFT 2ND METACARPAL, DISPLACED HEAD Dehydration Procedure(s) (LRB): OPEN REDUCTION INTERNAL FIXATION LEFT SECOND METACARPAL/MEDARTIS/HAND TABLE/C-ARM (Left) DIGITAL NERVE REPAIR LEFT INDEX FINGER (Left) 4 Days Post-Op Precautions: Fall, NWB, PWB ((NWB LLE, PWB to L hand on ulnar side, none to index,middle)) Chart, occupational therapy assessment, plan of care, and goals were reviewed. ASSESSMENT: 
Pt semi-reclined in bed with tray placed in front of her in poor position, agreeable to skilled therapy services at this time. Pt was seen with PT to aid in carryover over cues, transitional movements and safety of pt for mgmt of left LE. Pt maneuvered to EOB with CGA and tactile/verbal cues for log roll technique. Pt able to maneuver left LE with right LE assist to doff off of bed. Pt required Min A for supine to sit in prep for EOB ADLs.  Education provided on importance of continued rehab, pt eager to get started. Seated EOB, pt donned right sock by leaning forward with SBA and verbal cues for safety/pacing. Pt was setup with lunch tray and left in optimal position with CNA present checking blood glucose/vitals, all needs within reach. Nurse notified of pt sitting EOB with education on calling for assistance back to supine for safety. Progression toward goals: 
[x]          Improving appropriately and progressing toward goals 
[]          Improving slowly and progressing toward goals 
[]          Not making progress toward goals and plan of care will be adjusted PLAN: 
Patient continues to benefit from skilled intervention to address the above impairments. Continue treatment per established plan of care. Discharge Recommendations:  Diego Ahmadi Further Equipment Recommendations for Discharge:  bedside commode G-CODES:  
 
Self Care  Current  CL= 60-79%  Goal  CK= 40-59%. The severity rating is based on the Level of Assistance required for Functional Mobility and ADLs. SUBJECTIVE:  
Patient stated [facility] am going to [facility].  OBJECTIVE DATA SUMMARY:  
Cognitive/Behavioral Status: 
Neurologic State: Alert Orientation Level: Oriented X4 Cognition: Appropriate for age attention/concentration, Follows commands Safety/Judgement: Fall prevention, Home safety Functional Mobility and Transfers for ADLs: 
 Bed Mobility: 
Rolling: Contact guard assistance Supine to Sit: Minimum assistance Scooting: Contact guard assistance Balance: 
Sitting: Intact ADL Intervention: Lower Body Dressing Assistance Socks: Stand-by assistance (pt donned right sock seated EOB by leaning forward ) Leg Crossed Method Used: No 
Position Performed: Seated edge of bed Cues: Verbal cues provided (for safety ) Pain: 
Pt reports 6/10 pain or discomfort prior to treatment.   
Pt reports 6/10 pain or discomfort post treatment. Activity Tolerance:   
Fair Please refer to the flowsheet for vital signs taken during this treatment. After treatment:  
[x]  Patient left in no apparent distress sitting EOB []  Patient left in no apparent distress in bed 
[x]  Call bell left within reach [x]  Nursing notified 
[x]  Caregiver present 
[]  Bed alarm activated NAYA Goode/L Time Calculation: 13 mins

## 2018-10-05 NOTE — DIABETES MGMT
Glycemic Control Plan of Care 
 
T2DM with current A1cof 8.7% (10/01/2018). See separate notes, 10/02/2018, for assessment of home diabetes management and education. Patient reported episodes of hypoglycemia at home but she never told her doctor. Home diabetes med: Lantus insulin 24 units daily every morning. POC BG range on 10/04/2018: 146-211 mg/dL. Elevated BG readings due to drinking regular ginger ale. Addressed this with patient and encouraged to choose diet beverages. POC BG report on 10/05/2018 at time of review: 143 mg/dL. Patient is currently on correctional lispro insulin. Very resistant dose. Current Meal Intake: 
Patient Vitals for the past 100 hrs: 
 % Diet Eaten 10/05/18 0857 50 % 10/04/18 1749 75 % 10/04/18 1311 75 % 10/04/18 1021 50 % 10/03/18 1352 100 % 10/03/18 0826 100 % 10/01/18 1757 0 % 10/01/18 1734 0 % Addressed the 2 liters of regular ginger ale at bedside which is now almost empty. Patient stated that she was drinking only small amount and her family members drank some of it. Encouraged diabetes diet compliance and patient stated that she will tel her family not to bring regular beverages anymore unless it is sugar free. Noted dcp: SNF placement. Recommendation(s): 
1.) Continue inpatient BG monitoring and diet compliance. 2.) Consider adding basal lantus insulin 10 units daily if BG remain above target range. Assessment: 
Patient is 79year old with past medical history including type 2 diabetes mellitus, neuropathy, hypertension, fibromyalgia, atrial fibrillation, pulmonary embolism, CHF, left hand dog bite, and bilateral knee replacement - was admitted on 9/30/2018 with report that she fell out of bed at home with c/o left knee pain. She was found by her granddaughter found her on the floor where she has been for 6 hours. She was alone at time of fall. Left AMA but readmitted 9/30/2018/ Noted: Status post reduction internal fixation left second metacarpal head on 10/01/2018. Status post ORIF for periprosthetic fracture around internal prosthetic left knee joint. T2DM with current A1c of 8.7% (10/01/2018). Most recent blood glucose values: 
 
Results for Anne-Marie Martinez (MRN [id number]) as of 10/5/2018 10:21 Ref. Range 10/4/2018 05:38 10/4/2018 12:17 10/4/2018 16:35 10/4/2018 21:14 GLUCOSE,FAST - POC Latest Ref Range: 70 - 110 mg/dL 146 (H) 189 (H) 178 (H) 211 (H) Results for Anne-Marie Martinez (MRN [id number]) as of 10/5/2018 10:21 Ref. Range 10/5/2018 06:08 GLUCOSE,FAST - POC Latest Ref Range: 70 - 110 mg/dL 143 (H) Current A1C: 8.7% (10/01/2018) which is equivalent to estimated average blood glucose of 203 mg/dL during the past 2-3 months. Current hospital diabetes medications: 
Correctional lispro insulin ACHS. Very resistant dose. Total daily dose insulin requirement previous day: 10/04/2018 Lispro: 12 units Home diabetes medications: Patient reported on 10/02/2018: 
Lantus insulin 24 units daily every morning. Diet: Diabetic consistent carb soft solids; chopped meats. Goals:  Blood glucose will be within target range of  mg/dL by 10/08/2018 Education:  _X__  Refer to Diabetes Education Record: 10/02/2018 
           ___  Education not indicated at this time Flores Young RN [facility] Pager: [phone]

## 2018-10-05 NOTE — NURSE NAVIGATOR
Important Message from [facility]" reviewed and explained with the patient and/or representative at bedside and signature was obtained. A signed copy provided to patient/representative. Original signed document placed in patient's chart.

## 2018-10-05 NOTE — DISCHARGE SUMMARY
Discharge Summary    Patient: Darlene De La Cruz MRN: [id number]  CSN: [id number]    YOB: 1950  Age: 79 y.o. Sex: female    DOA: 9/30/2018 LOS:  LOS: 5 days   Discharge Date: 10/5/2018     Admission Diagnoses:   L 2nd metacarpal fracture    Discharge Diagnoses:    Comminuted fracture of L 2nd metacarpal head secondary to dog bite injury s/p ORIF with partial head excision  L femur fracture s/p ORIF  Prerenal azotemia, improved  HTN  HLD  DM 2  Atrial fibrillation history, not on [email or web address]    Discharge Condition: Stable    PHYSICAL EXAM  Visit Vitals    /83 (BP 1 Location: Left arm, BP Patient Position: Supine)    Pulse (!) 54    Temp 96.5 °F (35.8 °C)    Resp 18    Ht 5' 1\" (1.549 m)    Wt 78.5 kg (173 lb)    SpO2 96%    BMI 32.69 kg/m2       General: In NAD.   HEENT: NCAT. Sclerae anicteric. EOMI. Lungs:  Clear no wheezes. Effort nonlabored. Heart:  RRR. Abdomen: Soft, NTTP. Extremities: Clear, no wheezes. Effort nonlabored. Psych:   Mood normal.  Neurologic:  Awake and alert, moves extremities spontaneously. Hospital Course:   See admission H&P for full details of HPI. Patient was readmitted to the surgical floor after leaving AMA the day prior. She underwent ORIF of metacarpal fracture on 10/1 and tolerated the procedure well. Physical and occupational therapy evaluations were obtained and recommendation was for SNF placement. Patient has continued to work with PT/OT and has progressed well with treatment. With regard to overall medical status, there have been no acute, significant medical issues of concern this hospitalization and patient has remained quite stable in this regard. She will need to continue augmentin for 4 additional days to complete for dog bite. Patient has met maximum benefit or hospitalization and is medically stable for transfer to the accepting skilled facility today.   Plan is for transfer with Fuentes catheter until patient is more ambulatory - recommend voiding trial with discontinuation of catheter as soon as it is deemed feasible once she arrives at facility. Consults:   Orthopedic/Hand surgery    Significant Diagnostic Studies:   L knee series:  IMPRESSION:     Intact ORIF hardware in the prosthesis without evidence for new fracture. Major  fracture fragments alignment is unchanged from intraoperative image. L hand series:  FINDINGS: 2 views of the left hand. Limited evaluation due to external cast  material. Interval placement of fixation screw in the second metacarpal head. Flattening morphology of the metacarpal head. Severe joint space loss and  erosive changes at the first IP joint. No new fracture. Discharge Medications:     Current Discharge Medication List      START taking these medications    Details   oxyCODONE-acetaminophen (PERCOCET) 5-325 mg per tablet Take 1 Tab by mouth every six (6) hours as needed. Max Daily Amount: 4 Tabs. Qty: 6 Tab, Refills: 0    Associated Diagnoses: Open displaced fracture of neck of second metacarpal bone of left hand with routine healing      amoxicillin-clavulanate (AUGMENTIN) 875-125 mg per tablet Take 1 Tab by mouth two (2) times daily (with meals) for 4 days. Qty: 8 Tab, Refills: 0         CONTINUE these medications which have CHANGED    Details   lisinopril (PRINIVIL, ZESTRIL) 40 mg tablet Take 1 Tab by mouth daily. Qty: 30 Tab, Refills: 0         CONTINUE these medications which have NOT CHANGED    Details   amiodarone (PACERONE) 400 mg tablet Take 400 mg by mouth daily. metoprolol tartrate (LOPRESSOR) 50 mg tablet Take 50 mg by mouth two (2) times a day. pregabalin (LYRICA) 100 mg capsule Take 100 mg by mouth two (2) times a day. acetaminophen (TYLENOL) 325 mg tablet Take  by mouth every four (4) hours as needed for Pain. furosemide (LASIX) 40 mg tablet Take 1 Tab by mouth daily.  Indications: Peripheral Edema due to Chronic Heart Failure, For leg edema or SOB  Qty: 90 Tab, Refills: 1      HYDROcodone-acetaminophen (NORCO) 5-325 mg per tablet Take 1 Tab by mouth every six (6) hours as needed for Pain. Max Daily Amount: 4 Tabs. Qty: 20 Tab, Refills: 0    Associated Diagnoses: Open displaced fracture of other part of second metacarpal bone of left hand, initial encounter; Dog bite, hand, left, initial encounter      montelukast (SINGULAIR) 10 mg tablet Take 10 mg by mouth daily. loratadine (CLARITIN) 10 mg tablet Take 10 mg by mouth.      pravastatin sodium (PRAVASTATIN PO) Take  by mouth. omeprazole (PRILOSEC) 10 mg capsule Take 10 mg by mouth daily. hydroxychloroquine (PLAQUENIL) 200 mg tablet Take 200 mg by mouth two (2) times a day. insulin glargine (LANTUS) 100 unit/mL injection 20 Units by SubCUTAneous route nightly. Qty: 1 Vial, Refills: 0      aspirin 81 mg chewable tablet Take 1 Tab by mouth daily. Qty: 30 Tab, Refills: 0      DULoxetine (CYMBALTA) 60 mg capsule Take 1 Cap by mouth daily. Qty: 30 Cap, Refills: 0         STOP taking these medications       oxyCODONE-acetaminophen (PERCOCET 7.5) 7.5-325 mg per tablet Comments:   Reason for Stopping:         amoxicillin-clavulanate (AUGMENTIN) 500-125 mg per tablet Comments:   Reason for Stopping:               Activity: As tolerated. Partial weight-bearing through L arm case per orthopedic surgery. Ice/elevate limb as needed. Diet: Diabetic Diet    Wound Care: Routine Fuentes catheter care. Follow-up: with PCP, Suly Gonzalez NP on discharge from [address] [place] [address], Dr. Maddie Mendez, 1 week. Follow up with [facility] (Dr. Orville Forrester) for LLE postsurgical care as directed. Minutes spent on discharge: >30 minutes spent coordinating this discharge. Tutu Miller.  Antoni Doshi MD  [facility]

## 2018-10-05 NOTE — DISCHARGE INSTRUCTIONS
DISCHARGE SUMMARY from Nurse    PATIENT INSTRUCTIONS:    After general anesthesia or intravenous sedation, for 24 hours or while taking prescription Narcotics:  · Limit your activities  · Do not drive and operate hazardous machinery  · Do not make important personal or business decisions  · Do  not drink alcoholic beverages  · If you have not urinated within 8 hours after discharge, please contact your surgeon on call. Report the following to your surgeon:  · Excessive pain, swelling, redness or odor of or around the surgical area  · Temperature over 100.5  · Nausea and vomiting lasting longer than 4 hours or if unable to take medications  · Any signs of decreased circulation or nerve impairment to extremity: change in color, persistent  numbness, tingling, coldness or increase pain  · Any questions    What to do at Home:Bedrest Recommended activity: PT/OT evaluation    If you experience any of the following symptoms swelling, increased pain, fever, chills, please follow up with your doctor. *  Please give a list of your current medications to your Primary Care Provider. *  Please update this list whenever your medications are discontinued, doses are      changed, or new medications (including over-the-counter products) are added. *  Please carry medication information at all times in case of emergency situations. These are general instructions for a healthy lifestyle:    No smoking/ No tobacco products/ Avoid exposure to second hand smoke  Surgeon General's Warning:  Quitting smoking now greatly reduces serious risk to your health.     Obesity, smoking, and sedentary lifestyle greatly increases your risk for illness    A healthy diet, regular physical exercise & weight monitoring are important for maintaining a healthy lifestyle    You may be retaining fluid if you have a history of heart failure or if you experience any of the following symptoms:  Weight gain of 3 pounds or more overnight or 5 pounds in a week, increased swelling in our hands or feet or shortness of breath while lying flat in bed. Please call your doctor as soon as you notice any of these symptoms; do not wait until your next office visit. Recognize signs and symptoms of STROKE:    F-face looks uneven    A-arms unable to move or move unevenly    S-speech slurred or non-existent    T-time-call 911 as soon as signs and symptoms begin-DO NOT go       Back to bed or wait to see if you get better-TIME IS BRAIN. Warning Signs of HEART ATTACK     Call 911 if you have these symptoms:   Chest discomfort. Most heart attacks involve discomfort in the center of the chest that lasts more than a few minutes, or that goes away and comes back. It can feel like uncomfortable pressure, squeezing, fullness, or pain.  Discomfort in other areas of the upper body. Symptoms can include pain or discomfort in one or both arms, the back, neck, jaw, or stomach.  Shortness of breath with or without chest discomfort.  Other signs may include breaking out in a cold sweat, nausea, or lightheadedness. Don't wait more than five minutes to call 911 - MINUTES MATTER! Fast action can save your life. Calling 911 is almost always the fastest way to get lifesaving treatment. Emergency Medical Services staff can begin treatment when they arrive -- up to an hour sooner than if someone gets to the hospital by car. The discharge information has been reviewed with the patient. The patient verbalized understanding. Discharge medications reviewed with the patient and appropriate educational materials and side effects teaching were provided. ___________________________________________________________________________________________________________________________________    Patient armband removed and shredded    [employer] Activation    Thank you for requesting access to [facility] [facility].  Please follow the instructions below to securely access and download your online medical record. [facility] allows you to send messages to your doctor, view your test results, renew your prescriptions, schedule appointments, and more. How Do I Sign Up? 1. In your internet browser, go to [email or web address]  2. Click on the First Time User? Click Here link in the Sign In box. You will be redirect to the New Member Sign Up page. 3. Enter your [facility] Access Code exactly as it appears below. You will not need to use this code after youve completed the sign-up process. If you do not sign up before the expiration date, you must request a new code. [facility] Access Code: Activation code not generated  Current [facility] Status: Active (This is the date your [facility] access code will )    4. Enter the last four digits of your Social Security Number (xxxx) and Date of Birth (mm/dd/yyyy) as indicated and click Submit. You will be taken to the next sign-up page. 5. Create a [facility] ID. This will be your [facility] login ID and cannot be changed, so think of one that is secure and easy to remember. 6. Create a [facility] password. You can change your password at any time. 7. Enter your Password Reset Question and Answer. This can be used at a later time if you forget your password. 8. Enter your e-mail address. You will receive e-mail notification when new information is available in [address] 9. Click Sign Up. You can now view and download portions of your medical record. 10. Click the Download Summary menu link to download a portable copy of your medical information. Additional Information    If you have questions, please visit the Frequently Asked Questions section of the [employer] website at [email or web address] Remember, [facility] is NOT to be used for urgent needs. For medical emergencies, dial 911.

## 2018-10-05 NOTE — ROUTINE PROCESS
[facility] Given report to Liliana. Diagnosis, treatment, history, and reason for hospital stay given.

## 2018-10-05 NOTE — PROGRESS NOTES
Per Miriam ([facility]) called Medicaid transportation at [id number] patient is not listed in their system. Called [facility] set up transport for 4:15 pm to [address] with [address]. Informed Miriam of transportation arrangements.

## 2018-10-10 ENCOUNTER — PATIENT OUTREACH (OUTPATIENT)
Dept: CASE MANAGEMENT | Age: 68
End: 2018-10-10

## 2018-10-16 ENCOUNTER — OFFICE VISIT (OUTPATIENT)
Dept: ORTHOPEDIC SURGERY | Facility: CLINIC | Age: 68
End: 2018-10-16

## 2018-10-16 VITALS
DIASTOLIC BLOOD PRESSURE: 77 MMHG | TEMPERATURE: 98 F | OXYGEN SATURATION: 98 % | HEART RATE: 53 BPM | RESPIRATION RATE: 18 BRPM | SYSTOLIC BLOOD PRESSURE: 195 MMHG | HEIGHT: 61 IN

## 2018-10-16 DIAGNOSIS — S62.331S: Primary | ICD-10-CM

## 2018-10-16 DIAGNOSIS — M79.642 LEFT HAND PAIN: ICD-10-CM

## 2018-10-16 NOTE — PROGRESS NOTES
Michael Costello is a 79 y.o. female right handed individual, not currently working. Worker's Compensation and legal considerations: not known. Vitals:  
 10/16/18 8940 BP: 195/77 Pulse: (!) 53 Resp: 18 Temp: 98 °F (36.7 °C) TempSrc: Oral  
SpO2: 98% Height: 5' 1\" (1.549 m) PainSc:   0 - No pain PainLoc: Hand Chief Complaint Patient presents with  
 Hand Pain Left HPI: F/U s/p L 2nd  head ORIF and partial excision Date of onset:  Surgery:10/1/2018 Injury: Yes: Comment: Dogbite Prior Treatment:  Yes: Comment: Surgery Numbness/ Tingling: No 
 
ROS: Review of Systems - Negative except HPI Past Medical History:  
Diagnosis Date  Anxiety  Arthritis  [facility]  Depression  Diabetes ([facility]  Fibromyalgia  GERD (gastroesophageal reflux disease)  Hypercholesteremia  Hypertension  Renal insufficiency  Skin abscess  Sleep apnea Past Surgical History:  
Procedure Laterality Date  HX BREAST LUMPECTOMY  HX CARPAL TUNNEL RELEASE  HX KNEE REPLACEMENT    
 bilater  HX PARTIAL THYROIDECTOMY  HX SHOULDER ARTHROSCOPY    
 HX TUBAL LIGATION Current Outpatient Prescriptions Medication Sig Dispense Refill  lisinopril (PRINIVIL, ZESTRIL) 40 mg tablet Take 1 Tab by mouth daily. 30 Tab 0  
 oxyCODONE-acetaminophen (PERCOCET) 5-325 mg per tablet Take 1 Tab by mouth every six (6) hours as needed. Max Daily Amount: 4 Tabs. 6 Tab 0  
 amiodarone (PACERONE) 400 mg tablet Take 400 mg by mouth daily.  metoprolol tartrate (LOPRESSOR) 50 mg tablet Take 50 mg by mouth two (2) times a day.  pregabalin (LYRICA) 100 mg capsule Take 100 mg by mouth two (2) times a day.  montelukast (SINGULAIR) 10 mg tablet Take 10 mg by mouth daily.  acetaminophen (TYLENOL) 325 mg tablet Take  by mouth every four (4) hours as needed for Pain.  pravastatin sodium (PRAVASTATIN PO) Take  by mouth.  hydroxychloroquine (PLAQUENIL) 200 mg tablet Take 200 mg by mouth two (2) times a day.  furosemide (LASIX) 40 mg tablet Take 1 Tab by mouth daily. Indications: Peripheral Edema due to Chronic Heart Failure, For leg edema or SOB 90 Tab 1  
 insulin glargine (LANTUS) 100 unit/mL injection 20 Units by SubCUTAneous route nightly. 1 Vial 0  
 aspirin 81 mg chewable tablet Take 1 Tab by mouth daily. 30 Tab 0  
 DULoxetine (CYMBALTA) 60 mg capsule Take 1 Cap by mouth daily. 30 Cap 0  
 HYDROcodone-acetaminophen (NORCO) 5-325 mg per tablet Take 1 Tab by mouth every six (6) hours as needed for Pain. Max Daily Amount: 4 Tabs. 20 Tab 0  
 loratadine (CLARITIN) 10 mg tablet Take 10 mg by mouth.  omeprazole (PRILOSEC) 10 mg capsule Take 10 mg by mouth daily. No Known Allergies PE: L Hand:  Wounds healed. Small 1cm area of granulation tissue that still has epithelialization to undergo. Sensation grossly intact distally. Cap refill brisk. No signs of infection. She has minimal pain of the 2nd MCP joint with ROM or palpation Imaging: Plain films of L Hand show a screw in 2nd MC head that is in good position. There is joint space obliteration of the 2nd MCP joint. There is also a radial subluxation of the MCP joint. ICD-10-CM ICD-9-CM 1. Open displaced fracture of neck of second metacarpal bone of left hand, sequela S62.331S 905.2 AMB SUPPLY ORDER AMB POC X-RAY HAND 2 VW 2. Left hand pain M79.642 729.5 AMB SUPPLY ORDER AMB POC X-RAY HAND 2 VW Plan: Daily Dressing changes with bandaid that is applied. Apply after showering. Keep dressing dry otherwise. Do not immerse wound in water. Brace worn whenever not doing exercies Work on ROM out of the brace with or without the finger straps 3 times per day with therapy I have had long discussions with the family for possible future surgical needs considering the destruction to the pt's joint. This was not done acutely given the contamination of the wound. She had an interposition arthroplasty so we will see how she does with her current Function The most important things at this point are keeping the granulating wound clean and covered, changed once a day and Finger ROM Plan was reviewed with patient, who verbalized agreement and understanding of the plan

## 2018-10-17 NOTE — PATIENT INSTRUCTIONS
Metacarpal Fracture: Rehab Exercises Your Care Instructions Here are some examples of typical rehabilitation exercises for your condition. Start each exercise slowly. Ease off the exercise if you start to have pain. Your doctor or your physical or occupational therapist will tell you when you can start these exercises and which ones will work best for you. How to do the exercises Wrist flexion and extension 1. Place your forearm on a table. Your affected hand and wrist should extend beyond the table, palm down. 2. Bend your wrist to move your hand upward and allow your hand to close into a fist. Now lower your hand and allow your fingers to relax. Hold each position for about 6 seconds. 3. Repeat 8 to 12 times. Pronation and supination stretch 1. Grasp a pen, pencil, or stick, and wrap your hand around it. If you don't have something to hold on to, make a fist instead. Then lift your arm so that your elbow is at your side, bent at about 90 degrees. 2. Slowly turn your forearm as far as you can back and forth in each direction. Your hand should face up and then down. 3. Hold each position for at least 15 to 30 seconds until you feel a stretch in your forearm. 4. Repeat 2 to 4 times. Radial and ulnar deviation 1. Grasp a water bottle with your affected hand. If you don't have something to hold on to, make a fist instead. 2. With your elbow on a table, slowly bend your wrist upward. Then lower your wrist back to where you started. 3. Repeat 8 to 12 times. Towel squeeze 1. Place a small towel roll on a table. 2. With your palm facing down, grab the towel. Squeeze it for about 6 seconds. Then slowly straighten your fingers to release the towel. 3. Repeat 8 to 12 times. Resisted forearm pronation 1. Sit leaning forward with your legs slightly spread. Then place your forearm on your thigh with your affected hand and wrist in front of your knee. 2. Grasp one end of an exercise band with your palm up. Step on the other end. 3. Keeping your wrist straight, roll your palm inward toward your thigh for a count of 2. Then slowly move your wrist back to the starting position to a count of 5. 
4. Repeat 8 to 12 times. Resisted supination 1. Sit leaning forward with your legs slightly spread. Then place your forearm on your thigh with your affected hand and wrist in front of your knee. 2. Grasp one end of an exercise band with your palm down. Step on the other end. 3. Keeping your wrist straight, roll your palm outward and away from your thigh for a count of 2. Then slowly move your wrist back to the starting position to a count of 5. 
4. Repeat 8 to 12 times. Resisted ulnar deviation 1. Sit leaning forward with your legs slightly spread. Then place your forearm on your thigh with your affected hand and wrist by the inside of your knee. 2. Grasp one end of an exercise band with your palm down. Step on the other end with the foot opposite the hand holding the band. 3. Slowly bend your wrist outward and toward your knee for a count of 2. Then slowly move your wrist back to the starting position to a count of 5. 
4. Repeat 8 to 12 times. Resisted radial deviation 1. Sit leaning forward with your legs slightly spread. Then place your forearm on your thigh with your affected hand and wrist in front of your knee. 2. Grasp one end of an exercise band with your hand facing toward your other thigh. Step on the other end. 
3. Slowly bend your wrist upward for a count of 2. Then lower your wrist slowly to a count of 5. 
4. Repeat 8 to 12 times. Resisted wrist flexion 1. Sit leaning forward with your legs slightly spread. Then place your forearm on your thigh with your affected hand and wrist in front of your knee. 2. Grasp one end of an exercise band with your palm up. Step on the other end. 3. Slowly bend your wrist upward for a count of 2. Then lower your wrist slowly to a count of 5. 
4. Repeat 8 to 12 times. Resisted wrist extension 1. Sit leaning forward with your legs slightly spread. Then place your forearm on your thigh with your affected hand and wrist in front of your knee. 2. Grasp one end of an exercise band with your palm down. Step on the other end. 
3. Slowly bend your wrist upward for a count of 2. Then lower your wrist slowly to a count of 5. 
4. Repeat 8 to 12 times. Follow-up care is a key part of your treatment and safety. Be sure to make and go to all appointments, and call your doctor if you are having problems. It's also a good idea to know your test results and keep a list of the medicines you take. Where can you learn more? Go to http://niurka-michi.info/. Enter D116 in the search box to learn more about \"Metacarpal Fracture: Rehab Exercises. \" Current as of: November 29, 2017 Content Version: 11.8 © 3334-8231 [employer]. Care instructions adapted under license by [employer] (which disclaims liability or warranty for this information). If you have questions about a medical condition or this instruction, always ask your healthcare professional. [address] any warranty or liability for your use of this information.

## 2018-10-18 ENCOUNTER — OFFICE VISIT (OUTPATIENT)
Dept: ORTHOPEDIC SURGERY | Age: 68
End: 2018-10-18

## 2018-10-18 VITALS
BODY MASS INDEX: 32.69 KG/M2 | HEIGHT: 61 IN | TEMPERATURE: 98 F | SYSTOLIC BLOOD PRESSURE: 152 MMHG | RESPIRATION RATE: 16 BRPM | HEART RATE: 55 BPM | OXYGEN SATURATION: 99 % | DIASTOLIC BLOOD PRESSURE: 80 MMHG

## 2018-10-18 DIAGNOSIS — M25.562 LEFT KNEE PAIN, UNSPECIFIED CHRONICITY: Primary | ICD-10-CM

## 2018-10-18 DIAGNOSIS — S72.452D CLOSED DISPLACED SUPRACONDYLAR FRACTURE OF DISTAL END OF LEFT FEMUR WITHOUT INTRACONDYLAR EXTENSION WITH ROUTINE HEALING, SUBSEQUENT ENCOUNTER: ICD-10-CM

## 2018-10-18 PROBLEM — E11.40 TYPE 2 DIABETES MELLITUS WITH DIABETIC NEUROPATHY (HCC): Status: ACTIVE | Noted: 2018-10-18

## 2018-10-18 RX ORDER — CEPHALEXIN 500 MG/1
500 CAPSULE ORAL 4 TIMES DAILY
Qty: 28 CAP | Refills: 0 | Status: SHIPPED | OUTPATIENT
Start: 2018-10-18 | End: 2018-11-21

## 2018-10-18 NOTE — PROGRESS NOTES
[address] [place] [address]  [phone]           Patient: Priscila Jones                MRN: [id number]       SSN: [id number]  YOB: 1950        AGE: 79 y.o. SEX: female  Body mass index is 32.69 kg/m². PCP: Fritz Velasquez NP  10/18/18      This office note has been dictated. REVIEW OF SYSTEMS:  Constitutional: Negative for fever, chills, weight loss and malaise/fatigue. HENT: Negative. Eyes: Negative. Respiratory: Negative. Cardiovascular: Negative. Gastrointestinal: No bowel incontinence or constipation. Genitourinary: No bladder incontinence or saddle anesthesia. Skin: Negative. Neurological: Negative. Endo/Heme/Allergies: Negative. Psychiatric/Behavioral: Negative. Musculoskeletal: As per HPI above. Past Medical History:   Diagnosis Date    Anxiety     Arthritis     Asbestosis ([facility] [address]     Depression     Diabetes ([facility] [address]     Fibromyalgia     GERD (gastroesophageal reflux disease)     Hypercholesteremia     Hypertension     Renal insufficiency     Skin abscess     Sleep apnea          Current Outpatient Medications:     lisinopril (PRINIVIL, ZESTRIL) 40 mg tablet, Take 1 Tab by mouth daily. , Disp: 30 Tab, Rfl: 0    oxyCODONE-acetaminophen (PERCOCET) 5-325 mg per tablet, Take 1 Tab by mouth every six (6) hours as needed. Max Daily Amount: 4 Tabs., Disp: 6 Tab, Rfl: 0    amiodarone (PACERONE) 400 mg tablet, Take 400 mg by mouth daily. , Disp: , Rfl:     metoprolol tartrate (LOPRESSOR) 50 mg tablet, Take 50 mg by mouth two (2) times a day., Disp: , Rfl:     pregabalin (LYRICA) 100 mg capsule, Take 100 mg by mouth two (2) times a day., Disp: , Rfl:     montelukast (SINGULAIR) 10 mg tablet, Take 10 mg by mouth daily. , Disp: , Rfl:     acetaminophen (TYLENOL) 325 mg tablet, Take  by mouth every four (4) hours as needed for Pain., Disp: , Rfl:     loratadine (CLARITIN) 10 mg tablet, Take 10 mg by mouth., Disp: , Rfl:     pravastatin sodium (PRAVASTATIN PO), Take  by mouth., Disp: , Rfl:     omeprazole (PRILOSEC) 10 mg capsule, Take 10 mg by mouth daily. , Disp: , Rfl:     hydroxychloroquine (PLAQUENIL) 200 mg tablet, Take 200 mg by mouth two (2) times a day., Disp: , Rfl:     furosemide (LASIX) 40 mg tablet, Take 1 Tab by mouth daily. Indications: Peripheral Edema due to Chronic Heart Failure, For leg edema or SOB, Disp: 90 Tab, Rfl: 1    insulin glargine (LANTUS) 100 unit/mL injection, 20 Units by SubCUTAneous route nightly., Disp: 1 Vial, Rfl: 0    aspirin 81 mg chewable tablet, Take 1 Tab by mouth daily. , Disp: 30 Tab, Rfl: 0    DULoxetine (CYMBALTA) 60 mg capsule, Take 1 Cap by mouth daily. , Disp: 30 Cap, Rfl: 0    HYDROcodone-acetaminophen (NORCO) 5-325 mg per tablet, Take 1 Tab by mouth every six (6) hours as needed for Pain.  Max Daily Amount: 4 Tabs., Disp: 20 Tab, Rfl: 0    No Known Allergies    Social History     Socioeconomic History    Marital status: LEGALLY      Spouse name: Not on file    Number of children: Not on file    Years of education: Not on file    Highest education level: Not on file   Social Needs    Financial resource strain: Not on file    Food insecurity - worry: Not on file    Food insecurity - inability: Not on file    Transportation needs - medical: Not on file   [place] needs - non-medical: Not on file   Occupational History    Not on file   Tobacco Use    Smoking status: Never Smoker    Smokeless tobacco: Never Used   Substance and Sexual Activity    Alcohol use: No    Drug use: No    Sexual activity: Yes   Other Topics Concern    Not on file   Social History Narrative    Not on file       Past Surgical History:   Procedure Laterality Date    HX BREAST LUMPECTOMY      HX CARPAL TUNNEL RELEASE      HX KNEE REPLACEMENT      bilater    HX PARTIAL THYROIDECTOMY      HX SHOULDER ARTHROSCOPY      HX TUBAL LIGATION We did see Ms. Tiffany Barrow today for followup with regards to her left supracondylar femur fracture. The patient is now about three weeks status post surgery. This is the first time we are seeing her back. The patient has been in a long leg cast.  She is currently at a skilled nursing facility. She has had no fevers, chills, systemic changes, or injuries to report. PHYSICAL EXAMINATION:  In general, the patient is alert and oriented x 3 in no acute distress. The patient is well-developed, well-nourished, with a normal affect. The patient is afebrile. The left lower extremity with the cast off reveals the skin is intact. The surgical wound has healed up nicely. There is no erythema or ecchymosis. There are no signs for infection or cellulitis present. She does have a little scab to the anterior knee without surrounding erythema. There is no underlying fluctuance. Neurovascular status is intact. There is no calf tenderness. There is a negative Negin's sign. There are no signs for DVT present. RADIOGRAPHS:  Radiographs in the office today, including AP and lateral of the left knee, shows the fracture is maintained in very good alignment and position. Hardware is in place. Total knee replacement is without evidence of loosening. ASSESSMENT:  Status post ORIF left supracondylar femur fracture. PLAN:  At this point, the staples are removed and replaced with Steri-Strips without complications. She will be placed back into a well-padded, long-leg cast.  She was instructed on strict nonweightbearing. She will work with physical therapy and occupational therapy for transfers. She is going to followup with us in about two weeks time for evaluation. She will call with any questions or concerns that shall arise. I am going to place her on prophylactic Keflex one po qid times seven days, as she does have just a slight touch of staple irritation about the wound. JR Lombardi MPAS, PAJORDIN, ATC

## 2018-10-24 ENCOUNTER — PATIENT OUTREACH (OUTPATIENT)
Dept: CASE MANAGEMENT | Age: 68
End: 2018-10-24

## 2018-10-31 ENCOUNTER — PATIENT OUTREACH (OUTPATIENT)
Dept: CASE MANAGEMENT | Age: 68
End: 2018-10-31

## 2018-10-31 NOTE — PROGRESS NOTES
Community Care Team Documentation for Patient in [facility]     Patient discharged from [facility] 9/30/2018 - 10/5/2018 to [facility], [address], on 10/5/2018. Hospital Discharge diagnosis:    Comminuted fracture of L 2nd metacarpal head secondary to dog bite injury s/p ORIF with partial     head excision  L femur fracture s/p ORIF  Prerenal azotemia, improved  HTN  HLD  DM 2  Atrial fibrillation history, not on [address] therapy. SNF Attending Provider:  Yasmeen Dawson    Anticipated discharge date from SNF:        PCP : Sadi Townsend, NP    Nurse Navigator: [facility] Team call completed, updates provided by facility. Full Code  Notified today, 10/31/2018 that patient discharged to home with [facility] on 10/29/2018    High Risk            22       Total Score        4 IP Visits Last 12 Months (1-3=4, 4=9, >4=11)    18 Charlson Comorbidity Score (Age + Comorbid Conditions)        Criteria that do not apply:    Has Seen PCP in Last 6 Months (Yes=3, No=0)    . Living with Significant Other. Assisted Living. LTAC. SNF. or   Rehab    Patient Length of Stay (>5 days = 3)    Pt.  Coverage (Medicare=5 , Medicaid, or Self-Pay=4)      Active Ambulatory Problems     Diagnosis Date Noted    Sleep apnea 01/25/2012    Type II diabetes mellitus ([facility] [address] 01/25/2012    Enthesopathy of hip region 05/06/2009    Acquired trigger finger 01/25/2012    Primary localized osteoarthrosis, hand 01/25/2012    Myalgia and myositis 05/06/2009    Osteoarthrosis 11/06/2009    Incomplete bladder emptying 01/23/2015    Stress incontinence 01/23/2015    Cellulitis 04/24/2017    Sepsis ([address] 04/24/2017    Fibromyalgia 04/27/2017    HTN (hypertension) 04/27/2017    Septic joint of right shoulder region [facility]) 04/27/2017    Atrial fibrillation ([facility] [address] 04/28/2017    Gastric bypass status for obesity 04/28/2017    History of pulmonary embolism 04/28/2017    CHF (congestive heart failure) ([facility] [address] 05/12/2017    Type 2 diabetes mellitus without complication ([facility] [address] 82/57/3079    Mild mitral stenosis 05/22/2017    Anemia 05/22/2017    Open fracture 09/22/2018    Dog bite 09/22/2018    Type 2 diabetes with nephropathy ([facility] [address] 09/26/2018    Open displaced fracture of neck of second metacarpal bone of left hand with routine healing 09/26/2018    Periprosthetic fracture around internal prosthetic left knee joint 09/26/2018    Closed left hip fracture ([address] 09/26/2018    Neuropathy 09/30/2018    Dehydration 09/30/2018    Type 2 diabetes mellitus with diabetic neuropathy ([facility] [address] 10/18/2018     Resolved Ambulatory Problems     Diagnosis Date Noted    No Resolved Ambulatory Problems     Past Medical History:   Diagnosis Date    Anxiety     Arthritis     Asbestosis ([address]     Depression     Diabetes ([facility] [address]     Fibromyalgia     GERD (gastroesophageal reflux disease)     Hypercholesteremia     Hypertension     Renal insufficiency     Skin abscess     Sleep apnea

## 2018-10-31 NOTE — PROGRESS NOTES
Community Care Team Documentation for Patient in [facility]     Patient discharged from [facility] 9/30/2018 - 10/5/2018 to [facility], [address], on 10/5/2018. Hospital Discharge diagnosis:    Comminuted fracture of L 2nd metacarpal head secondary to dog bite injury s/p ORIF with partial     head excision  L femur fracture s/p ORIF  Prerenal azotemia, improved  HTN  HLD  DM 2  Atrial fibrillation history, not on [address] therapy. SNF Attending Provider:  Jett Monet    Anticipated discharge date from SNF:  11/9/2018      PCP : Josy Velazquez NP    Nurse Navigator: [facility] Team call completed, updates provided by facility. Full Code  Dc 11/9/2018. Dispo: Home with dtr. PT/OT: [facility] LLE, unable to ambulate, working on transfers. W/C ordered. Cast  LLE brace arm. Keflex started, augmentin dcd. [facility], had prior    High Risk            22       Total Score        4 IP Visits Last 12 Months (1-3=4, 4=9, >4=11)    18 Charlson Comorbidity Score (Age + Comorbid Conditions)        Criteria that do not apply:    Has Seen PCP in Last 6 Months (Yes=3, No=0)    . Living with Significant Other. Assisted Living. LTAC. SNF. or   Rehab    Patient Length of Stay (>5 days = 3)    Pt.  Coverage (Medicare=5 , Medicaid, or Self-Pay=4)      Active Ambulatory Problems     Diagnosis Date Noted    Sleep apnea 01/25/2012    Type II diabetes mellitus ([facility] [address] 01/25/2012    Enthesopathy of hip region 05/06/2009    Acquired trigger finger 01/25/2012    Primary localized osteoarthrosis, hand 01/25/2012    Myalgia and myositis 05/06/2009    Osteoarthrosis 11/06/2009    Incomplete bladder emptying 01/23/2015    Stress incontinence 01/23/2015    Cellulitis 04/24/2017    Sepsis ([address] 04/24/2017    Fibromyalgia 04/27/2017    HTN (hypertension) 04/27/2017    Septic joint of right shoulder region [facility]) 04/27/2017    Atrial fibrillation ([facility] [address] 04/28/2017    Gastric bypass status for obesity 04/28/2017    History of pulmonary embolism 04/28/2017    CHF (congestive heart failure) ([address] 05/12/2017    Type 2 diabetes mellitus without complication ([address] 52/76/3919    Mild mitral stenosis 05/22/2017    Anemia 05/22/2017    Open fracture 09/22/2018    Dog bite 09/22/2018    Type 2 diabetes with nephropathy ([address] 09/26/2018    Open displaced fracture of neck of second metacarpal bone of left hand with routine healing 09/26/2018    Periprosthetic fracture around internal prosthetic left knee joint 09/26/2018    Closed left hip fracture ([address] 09/26/2018    Neuropathy 09/30/2018    Dehydration 09/30/2018    Type 2 diabetes mellitus with diabetic neuropathy ([facility] [address] 10/18/2018     Resolved Ambulatory Problems     Diagnosis Date Noted    No Resolved Ambulatory Problems     Past Medical History:   Diagnosis Date    Anxiety     Arthritis     Asbestosis ([address]     Depression     Diabetes ([address]     Fibromyalgia     GERD (gastroesophageal reflux disease)     Hypercholesteremia     Hypertension     Renal insufficiency     Skin abscess     Sleep apnea

## 2018-10-31 NOTE — PROGRESS NOTES
Community Care Team Documentation for Patient in [facility]     Patient discharged from [facility] 9/30/2018 - 10/5/2018 to [facility], [address], on 10/5/2018. Hospital Discharge diagnosis:    Comminuted fracture of L 2nd metacarpal head secondary to dog bite injury s/p ORIF with partial     head excision  L femur fracture s/p ORIF  Prerenal azotemia, improved  HTN  HLD  DM 2  Atrial fibrillation history, not on [facility] [place] [place] therapy. SNF Attending Provider:  Mona Cortes    Anticipated discharge date from SNF:  11/2/2018      PCP : Suzanna Coates NP    Nurse Navigator: [facility] Team call completed, updates provided by facility. Full Code  Dc 11/2/2018. Dispo: Home with dtr. PT/OT: Nonamb. Trans mod assist. Max assist LE. High Risk            22       Total Score        4 IP Visits Last 12 Months (1-3=4, 4=9, >4=11)    18 Charlson Comorbidity Score (Age + Comorbid Conditions)        Criteria that do not apply:    Has Seen PCP in Last 6 Months (Yes=3, No=0)    . Living with Significant Other. Assisted Living. LTAC. SNF. or   Rehab    Patient Length of Stay (>5 days = 3)    Pt.  Coverage (Medicare=5 , Medicaid, or Self-Pay=4)      Active Ambulatory Problems     Diagnosis Date Noted    Sleep apnea 01/25/2012    Type II diabetes mellitus ([facility] 01/25/2012    Enthesopathy of hip region 05/06/2009    Acquired trigger finger 01/25/2012    Primary localized osteoarthrosis, hand 01/25/2012    Myalgia and myositis 05/06/2009    Osteoarthrosis 11/06/2009    Incomplete bladder emptying 01/23/2015    Stress incontinence 01/23/2015    Cellulitis 04/24/2017    Sepsis ([address] 04/24/2017    Fibromyalgia 04/27/2017    HTN (hypertension) 04/27/2017    Septic joint of right shoulder region [facility]) 04/27/2017    Atrial fibrillation ([facility] 04/28/2017    Gastric bypass status for obesity 04/28/2017    History of pulmonary embolism 04/28/2017    CHF (congestive heart failure) ([address] 05/12/2017    Type 2 diabetes mellitus without complication ([address] 97/59/1097    Mild mitral stenosis 05/22/2017    Anemia 05/22/2017    Open fracture 09/22/2018    Dog bite 09/22/2018    Type 2 diabetes with nephropathy ([address] 09/26/2018    Open displaced fracture of neck of second metacarpal bone of left hand with routine healing 09/26/2018    Periprosthetic fracture around internal prosthetic left knee joint 09/26/2018    Closed left hip fracture ([address] 09/26/2018    Neuropathy 09/30/2018    Dehydration 09/30/2018    Type 2 diabetes mellitus with diabetic neuropathy ([address] 10/18/2018     Resolved Ambulatory Problems     Diagnosis Date Noted    No Resolved Ambulatory Problems     Past Medical History:   Diagnosis Date    Anxiety     Arthritis     Asbestosis ([address]     Depression     Diabetes ([address]     Fibromyalgia     GERD (gastroesophageal reflux disease)     Hypercholesteremia     Hypertension     Renal insufficiency     Skin abscess     Sleep apnea

## 2018-11-06 ENCOUNTER — OFFICE VISIT (OUTPATIENT)
Dept: ORTHOPEDIC SURGERY | Facility: CLINIC | Age: 68
End: 2018-11-06

## 2018-11-06 VITALS
DIASTOLIC BLOOD PRESSURE: 68 MMHG | HEIGHT: 61 IN | TEMPERATURE: 96.1 F | WEIGHT: 182 LBS | HEART RATE: 56 BPM | SYSTOLIC BLOOD PRESSURE: 171 MMHG | RESPIRATION RATE: 16 BRPM | BODY MASS INDEX: 34.36 KG/M2 | OXYGEN SATURATION: 99 %

## 2018-11-06 DIAGNOSIS — M79.642 LEFT HAND PAIN: ICD-10-CM

## 2018-11-06 DIAGNOSIS — S62.391P: Primary | ICD-10-CM

## 2018-11-06 NOTE — PROGRESS NOTES
Jennie Desai is a 79 y.o. female right handed individual, not currently working. Worker's Compensation and legal considerations: not known. Vitals:  
 11/06/18 3664 BP: 171/68 Pulse: (!) 56 Resp: 16 Temp: 96.1 °F (35.6 °C) TempSrc: Oral  
SpO2: 99% Weight: 182 lb (82.6 kg) Height: 5' 1\" (1.549 m) PainSc:   6 PainLoc: Hand Chief Complaint Patient presents with  
 Hand Pain  Leg Pain HPI: F/U s/p L 2nd  head ORIF and partial excision, she is 5 weeks postop. She reports to be doing well. She denies any pain over the metacarpal phalangeal joint. She reports most of her pain is along the second webspace and just distal.  She reports she has been working with therapy and is starting to have more motion compared to last time. She does report that she will be moving more than an hour away. Date of onset:  Surgery:10/1/2018 Injury: Yes: Comment: Dogbite no new injury since her surgery. Prior Treatment:  Yes: Comment: Surgery Numbness/ Tingling: No 
 
ROS: Review of Systems - Negative except HPI Past Medical History:  
Diagnosis Date  Anxiety  Arthritis  Asbestosis ([facility]  Depression  Diabetes ([facility]  Fibromyalgia  GERD (gastroesophageal reflux disease)  Hypercholesteremia  Hypertension  Renal insufficiency  Skin abscess  Sleep apnea Past Surgical History:  
Procedure Laterality Date  HX BREAST LUMPECTOMY  HX CARPAL TUNNEL RELEASE  HX KNEE REPLACEMENT    
 bilater  HX PARTIAL THYROIDECTOMY  HX SHOULDER ARTHROSCOPY    
 HX TUBAL LIGATION Current Outpatient Medications Medication Sig Dispense Refill  cephALEXin (KEFLEX) 500 mg capsule Take 1 Cap by mouth four (4) times daily. 28 Cap 0  
 lisinopril (PRINIVIL, ZESTRIL) 40 mg tablet Take 1 Tab by mouth daily.  30 Tab 0  
 oxyCODONE-acetaminophen (PERCOCET) 5-325 mg per tablet Take 1 Tab by mouth every six (6) hours as needed. Max Daily Amount: 4 Tabs. 6 Tab 0  
 amiodarone (PACERONE) 400 mg tablet Take 400 mg by mouth daily.  metoprolol tartrate (LOPRESSOR) 50 mg tablet Take 50 mg by mouth two (2) times a day.  pregabalin (LYRICA) 100 mg capsule Take 100 mg by mouth two (2) times a day.  HYDROcodone-acetaminophen (NORCO) 5-325 mg per tablet Take 1 Tab by mouth every six (6) hours as needed for Pain. Max Daily Amount: 4 Tabs. 20 Tab 0  
 montelukast (SINGULAIR) 10 mg tablet Take 10 mg by mouth daily.  acetaminophen (TYLENOL) 325 mg tablet Take  by mouth every four (4) hours as needed for Pain.  loratadine (CLARITIN) 10 mg tablet Take 10 mg by mouth.  pravastatin sodium (PRAVASTATIN PO) Take  by mouth.  omeprazole (PRILOSEC) 10 mg capsule Take 10 mg by mouth daily.  hydroxychloroquine (PLAQUENIL) 200 mg tablet Take 200 mg by mouth two (2) times a day.  furosemide (LASIX) 40 mg tablet Take 1 Tab by mouth daily. Indications: Peripheral Edema due to Chronic Heart Failure, For leg edema or SOB 90 Tab 1  
 insulin glargine (LANTUS) 100 unit/mL injection 20 Units by SubCUTAneous route nightly. 1 Vial 0  
 aspirin 81 mg chewable tablet Take 1 Tab by mouth daily. 30 Tab 0  
 DULoxetine (CYMBALTA) 60 mg capsule Take 1 Cap by mouth daily. 30 Cap 0 No Known Allergies PE: L Hand:  Wounds healed. Wounds that had been allowed to granulate and have mostly epithelialized at this point. Her pain today is minimal and tenderness is localized to the first webspace. She has very minimal tenderness over the MCP joint. She has minimal pain with range of motion of the MCP joint. There is no palpable hardware prominence. Sensation is intact distally. Cap refill is less than 2 seconds. Imaging: Plain films of L Hand shows interval resorption of the second metacarpal head.   Since this resorption the screw head is has become prominent. Again there is noted radial subluxation proximal phalanx. ICD-10-CM ICD-9-CM 1. Open displaced fracture of other part of second metacarpal bone of left hand with malunion, subsequent encounter S62.391P 733.81 REFERRAL TO OCCUPATIONAL THERAPY 2. Left hand pain M79.642 729.5 AMB POC XRAY, HAND; 3+ VIEWS Plan: May discontinue daily dressing changes. I have instructed her to continue wearing the brace whenever she is outside or doing anything that could endanger her finger. Additionally we will give her to occupational therapy order as she is moving to continue to continue working on range of motion. I have had long discussions with the family for possible future surgical needs considering the destruction to the pt's joint. This would likely involve a fusion of the joint for stabilization purposes. Additionally at that time the screw would be removed. This was not done acutely given the contamination of the wound. She had an interposition arthroplasty so we will see how she does with her current Function. Currently her pain at the MCP joint is minimal and her range of motion is improving. Follow-up in 6 weeks for reevaluation. Plan was reviewed with patient, who verbalized agreement and understanding of the plan

## 2018-11-09 ENCOUNTER — OFFICE VISIT (OUTPATIENT)
Dept: ORTHOPEDIC SURGERY | Age: 68
End: 2018-11-09

## 2018-11-09 ENCOUNTER — HOSPITAL ENCOUNTER (OUTPATIENT)
Dept: LAB | Age: 68
Discharge: HOME | End: 2018-11-09
Payer: MEDICARE

## 2018-11-09 ENCOUNTER — HOME HEALTH ADMISSION (OUTPATIENT)
Dept: HOME HEALTH SERVICES | Facility: HOME HEALTH | Age: 68
End: 2018-11-09

## 2018-11-09 VITALS
OXYGEN SATURATION: 95 % | TEMPERATURE: 97.6 F | HEART RATE: 79 BPM | HEIGHT: 61 IN | BODY MASS INDEX: 34.36 KG/M2 | RESPIRATION RATE: 16 BRPM | WEIGHT: 182 LBS | DIASTOLIC BLOOD PRESSURE: 56 MMHG | SYSTOLIC BLOOD PRESSURE: 149 MMHG

## 2018-11-09 DIAGNOSIS — L03.90 CELLULITIS, UNSPECIFIED CELLULITIS SITE: ICD-10-CM

## 2018-11-09 DIAGNOSIS — Z87.81 STATUS POST OPEN REDUCTION WITH INTERNAL FIXATION OF FRACTURE: ICD-10-CM

## 2018-11-09 DIAGNOSIS — S72.452D CLOSED SUPRACONDYLAR FRACTURE OF LEFT FEMUR WITH ROUTINE HEALING, SUBSEQUENT ENCOUNTER: ICD-10-CM

## 2018-11-09 DIAGNOSIS — L03.90 CELLULITIS, UNSPECIFIED CELLULITIS SITE: Primary | ICD-10-CM

## 2018-11-09 DIAGNOSIS — Z98.890 STATUS POST OPEN REDUCTION WITH INTERNAL FIXATION OF FRACTURE: ICD-10-CM

## 2018-11-09 DIAGNOSIS — M79.606 PAIN OF LOWER EXTREMITY, UNSPECIFIED LATERALITY: ICD-10-CM

## 2018-11-09 PROCEDURE — 87077 CULTURE AEROBIC IDENTIFY: CPT

## 2018-11-09 PROCEDURE — 87186 SC STD MICRODIL/AGAR DIL: CPT

## 2018-11-09 PROCEDURE — 87070 CULTURE OTHR SPECIMN AEROBIC: CPT

## 2018-11-09 RX ORDER — AMOXICILLIN AND CLAVULANATE POTASSIUM 875; 125 MG/1; MG/1
1 TABLET, FILM COATED ORAL EVERY 12 HOURS
Qty: 20 TAB | Refills: 0 | Status: SHIPPED | OUTPATIENT
Start: 2018-11-09 | End: 2018-11-12

## 2018-11-09 RX ORDER — SULFAMETHOXAZOLE AND TRIMETHOPRIM 400; 80 MG/1; MG/1
1 TABLET ORAL 2 TIMES DAILY
Qty: 19 TAB | Refills: 0 | Status: SHIPPED | OUTPATIENT
Start: 2018-11-09 | End: 2018-11-09 | Stop reason: SDUPTHER

## 2018-11-09 RX ORDER — OXYCODONE AND ACETAMINOPHEN 5; 325 MG/1; MG/1
1 TABLET ORAL
Qty: 28 TAB | Refills: 0 | Status: SHIPPED | OUTPATIENT
Start: 2018-11-09 | End: 2018-11-21

## 2018-11-09 RX ORDER — SULFAMETHOXAZOLE AND TRIMETHOPRIM 400; 80 MG/1; MG/1
1 TABLET ORAL 2 TIMES DAILY
Qty: 20 TAB | Refills: 0 | Status: SHIPPED | OUTPATIENT
Start: 2018-11-09 | End: 2018-11-21

## 2018-11-09 NOTE — H&P (VIEW-ONLY)
Fuad Jackson 1950 HISTORY OF PRESENT ILLNESS Fuad Jackson is a 79 y.o. female who presents today for evaluation s/p ORIF left supracondylar femur fracture on 9/26/18. She comes in today for evaluation and xrays of her left knee. She is in a long leg cast to include the foot today. She has been walking on the cast as it is visibly worn down at the base. She reports not having room for the wheelchair in her bathroom and needing to get up to get in and out. She also reports not having someone to give her a bath so she has done her best on her own with bird baths. Her feet and arms are visibly dirty with what appears to be mud and she has scratches all over. Her first 3 toes on the right foot have Band-Aids  on them because her tennis shoes gave her blisters. She has been noncompliant with her NWB status. Her pain is managed on current pain medication. Patient denies any fever, chills, chest pain, shortness of breath or calf pain. There are no new illness or injuries to report since last seen in the office. PHYSICAL EXAM:  
Visit Vitals /56 Pulse 79 Temp 97.6 °F (36.4 °C) (Oral) Resp 16 Ht 5' 1\" (1.549 m) Wt 182 lb (82.6 kg) SpO2 95% BMI 34.39 kg/m² The patient is a well-developed, well-nourished female in no acute distress. The patient is alert and oriented times three. The patient appears to be well groomed. Mood and affect are normal. 
ORTHOPEDIC EXAM of left knee/distal femur: Inspection: mild swelling, no bruising, long leg cast in place visibly worn on the foot from pt walking on the cast. 
Incision: proximal 1 cm and the 2 cm proximal to the distal end of the incision is a 1 cm area dehiscing open with no drainage but has erythema surrounding the areas dehiscing, steri strips removed today in the office Range of motion: not evaluated today 
ttp incisional 
Stability: Stable Strength: N/A 
 
 XR: of the left knee today shows the fracture is maintained in very good alignment and position. Hardware is in place. Total knee replacement is without evidence of loosening. IMPRESSION:   
  ICD-10-CM ICD-9-CM 1. Cellulitis, unspecified cellulitis site L03.90 682.9 vancomycin/0.9 % sod chloride (VANCOMYCIN IN 0.9% SODIUM CL) 1 gram/150 mL soln CELL COUNT, SYNOVIAL  
   CULTURE, BODY FLUID W GRAM STAIN  
   REFERRAL TO HOME [facility]  
   CULTURE, BODY FLUID W [address] [facility] 2. Pain of lower extremity, unspecified laterality M79.606 729.5 AMB SUPPLY ORDER  
   REFERRAL TO HOME HEALTH 3. Closed supracondylar fracture of left femur with routine healing, subsequent encounter S72.452D V54.15 REFERRAL TO [address] [place] [address] [address]. Status post open reduction with internal fixation of fracture Z96.7 V45.89 [address] [facility]  
 Z87.81 V15.51 CULTURE, BODY FLUID W GRAM STAIN  
   REFERRAL TO HOME HEALTH PLAN:  
Pt comes in for evaluation after ORIF left femur 9/26/18 Long leg cast removed in the office today. Hinge brace locked in extension applied in the office today. She will remain NWB operative leg. Will put her on oral bactrim ds 1 tab BID, IV vancomycin 1 g Q12h until surgery on 11/19/18 Incision cultured in the office today. Sent off for gram stain, culture and sensitivity Incision cleaned and dressing reapplied in the office Orders for H/H - daily saline cleansing, dry dressing to surgical incision and eval and treat heel wound from cast, also IV administration of Vancomycin daily and peripheral line placement for IV ABX. She is going to be scheduled for surgery on 11/19/19 - H&P and consent signed in the office today Pt given a refill of pain medication today. Percocet 5 mg Patient given pain medication for short term acute pain relief.  Goal is to treat patient according to above plan and to ultimately have patient off all pain medications once appropriate. If chronic pain management is required beyond what is expected for current orthopedic problem, will refer patient to pain management.  was reviewed and will be reviewed with every medication refill request.  
 
Patient seen and evaluated by Dr. Jeffy Soria today who agrees with treatment plan Follow-up Disposition: Not on File [facility] and Sp[facility] Specialist

## 2018-11-09 NOTE — PROGRESS NOTES
Radha Srivastava  1950     HISTORY OF PRESENT ILLNESS  Radha Srivastava is a 79 y.o. female who presents today for evaluation s/p ORIF left supracondylar femur fracture on 9/26/18. She comes in today for evaluation and xrays of her left knee. She is in a long leg cast to include the foot today. She has been walking on the cast as it is visibly worn down at the base. She reports not having room for the wheelchair in her bathroom and needing to get up to get in and out. She also reports not having someone to give her a bath so she has done her best on her own with bird baths. Her feet and arms are visibly dirty with what appears to be mud and she has scratches all over. Her first 3 toes on the right foot have Band-Aids  on them because her tennis shoes gave her blisters. She has been noncompliant with her NWB status. Her pain is managed on current pain medication. Patient denies any fever, chills, chest pain, shortness of breath or calf pain. There are no new illness or injuries to report since last seen in the office. PHYSICAL EXAM:   Visit Vitals  /56   Pulse 79   Temp 97.6 °F (36.4 °C) (Oral)   Resp 16   Ht 5' 1\" (1.549 m)   Wt 182 lb (82.6 kg)   SpO2 95%   BMI 34.39 kg/m²      The patient is a well-developed, well-nourished female in no acute distress. The patient is alert and oriented times three. The patient appears to be well groomed. Mood and affect are normal.  ORTHOPEDIC EXAM of left knee/distal femur:   Inspection: mild swelling, no bruising, long leg cast in place visibly worn on the foot from pt walking on the cast.  Incision: proximal 1 cm and the 2 cm proximal to the distal end of the incision is a 1 cm area dehiscing open with no drainage but has erythema surrounding the areas dehiscing, steri strips removed today in the office  Range of motion: not evaluated today  ttp incisional  Stability: Stable  Strength: N/A    XR: of the left knee today shows the fracture is maintained in very good alignment and position. Hardware is in place. Total knee replacement is without evidence of loosening. IMPRESSION:      ICD-10-CM ICD-9-CM    1. Cellulitis, unspecified cellulitis site L03.90 682.9 vancomycin/0.9 % sod chloride (VANCOMYCIN IN 0.9% SODIUM CL) 1 gram/150 mL soln      CELL COUNT, SYNOVIAL      CULTURE, BODY FLUID W GRAM STAIN      REFERRAL TO HOME HEALTH      CULTURE, BODY FLUID W GRAM STAIN      REFERRAL TO HOME HEALTH   2. Pain of lower extremity, unspecified laterality M79.606 729.5 AMB SUPPLY ORDER      REFERRAL TO HOME HEALTH   3. Closed supracondylar fracture of left femur with routine healing, subsequent encounter S72.452D V54.15 REFERRAL TO HOME HEALTH      CULTURE, BODY FLUID W GRAM STAIN      REFERRAL TO HOME HEALTH   4. Status post open reduction with internal fixation of fracture Z96.7 V45.89 200 [facility]    Z87.81 V15.51 CULTURE, BODY FLUID W GRAM STAIN      REFERRAL TO HOME HEALTH        PLAN:   Pt comes in for evaluation after ORIF left femur 9/26/18  Long leg cast removed in the office today. Hinge brace locked in extension applied in the office today. She will remain NWB operative leg. Will put her on oral bactrim ds 1 tab BID, IV vancomycin 1 g Q12h until surgery on 11/19/18  Incision cultured in the office today. Sent off for gram stain, culture and sensitivity  Incision cleaned and dressing reapplied in the office  Orders for H/H - daily saline cleansing, dry dressing to surgical incision and eval and treat heel wound from cast, also IV administration of Vancomycin daily and peripheral line placement for IV ABX. She is going to be scheduled for surgery on 11/19/19 - H&P and consent signed in the office today  Pt given a refill of pain medication today. Percocet 5 mg Patient given pain medication for short term acute pain relief. Goal is to treat patient according to above plan and to ultimately have patient off all pain medications once appropriate.  If chronic pain management is required beyond what is expected for current orthopedic problem, will refer patient to pain management.   was reviewed and will be reviewed with every medication refill request.     Patient seen and evaluated by Dr. Crista Rodriguez today who agrees with treatment plan      Follow-up Disposition: Not on [address] [facility] [place] [place] [address] and [facility]

## 2018-11-11 ENCOUNTER — APPOINTMENT (OUTPATIENT)
Dept: GENERAL RADIOLOGY | Age: 68
End: 2018-11-11
Attending: PHYSICIAN ASSISTANT
Payer: MEDICARE

## 2018-11-11 ENCOUNTER — APPOINTMENT (OUTPATIENT)
Dept: VASCULAR SURGERY | Age: 68
End: 2018-11-11
Attending: EMERGENCY MEDICINE
Payer: MEDICARE

## 2018-11-11 ENCOUNTER — HOSPITAL ENCOUNTER (EMERGENCY)
Age: 68
Discharge: HOME | End: 2018-11-11
Attending: EMERGENCY MEDICINE
Payer: MEDICARE

## 2018-11-11 VITALS
TEMPERATURE: 98 F | DIASTOLIC BLOOD PRESSURE: 75 MMHG | HEIGHT: 62 IN | WEIGHT: 182 LBS | RESPIRATION RATE: 13 BRPM | HEART RATE: 72 BPM | OXYGEN SATURATION: 100 % | BODY MASS INDEX: 33.49 KG/M2 | SYSTOLIC BLOOD PRESSURE: 174 MMHG

## 2018-11-11 DIAGNOSIS — M79.605 LEFT LEG PAIN: ICD-10-CM

## 2018-11-11 DIAGNOSIS — S62.331D: ICD-10-CM

## 2018-11-11 DIAGNOSIS — T81.31XS WOUND DEHISCENCE, SURGICAL, SEQUELA: ICD-10-CM

## 2018-11-11 DIAGNOSIS — T14.8XXA WOUND INFECTION: ICD-10-CM

## 2018-11-11 DIAGNOSIS — L03.116 LEFT LEG CELLULITIS: Primary | ICD-10-CM

## 2018-11-11 DIAGNOSIS — L08.9 WOUND INFECTION: ICD-10-CM

## 2018-11-11 LAB
ANION GAP SERPL CALC-SCNC: 8 MMOL/L (ref 3–18)
BASOPHILS # BLD: 0 K/UL (ref 0–0.1)
BASOPHILS NFR BLD: 0 % (ref 0–2)
BUN SERPL-MCNC: 30 MG/DL (ref 7–18)
BUN/CREAT SERPL: 20 (ref 12–20)
CALCIUM SERPL-MCNC: 8.7 MG/DL (ref 8.5–10.1)
CHLORIDE SERPL-SCNC: 109 MMOL/L (ref 100–108)
CO2 SERPL-SCNC: 27 MMOL/L (ref 21–32)
CREAT SERPL-MCNC: 1.5 MG/DL (ref 0.6–1.3)
DIFFERENTIAL METHOD BLD: ABNORMAL
EOSINOPHIL # BLD: 0.3 K/UL (ref 0–0.4)
EOSINOPHIL NFR BLD: 3 % (ref 0–5)
ERYTHROCYTE [DISTWIDTH] IN BLOOD BY AUTOMATED COUNT: 14.9 % (ref 11.6–14.5)
GLUCOSE BLD STRIP.AUTO-MCNC: 192 MG/DL (ref 70–110)
GLUCOSE BLD STRIP.AUTO-MCNC: 41 MG/DL (ref 70–110)
GLUCOSE SERPL-MCNC: 51 MG/DL (ref 74–99)
HCT VFR BLD AUTO: 37.3 % (ref 35–45)
HGB BLD-MCNC: 11.4 G/DL (ref 12–16)
INR PPP: 0.9 (ref 0.8–1.2)
LACTATE BLD-SCNC: 1.33 MMOL/L (ref 0.4–2)
LYMPHOCYTES # BLD: 2.4 K/UL (ref 0.9–3.6)
LYMPHOCYTES NFR BLD: 26 % (ref 21–52)
MCH RBC QN AUTO: 25.3 PG (ref 24–34)
MCHC RBC AUTO-ENTMCNC: 30.6 G/DL (ref 31–37)
MCV RBC AUTO: 82.9 FL (ref 74–97)
MONOCYTES # BLD: 0.8 K/UL (ref 0.05–1.2)
MONOCYTES NFR BLD: 9 % (ref 3–10)
NEUTS SEG # BLD: 5.7 K/UL (ref 1.8–8)
NEUTS SEG NFR BLD: 62 % (ref 40–73)
PLATELET # BLD AUTO: 425 K/UL (ref 135–420)
PMV BLD AUTO: 10.3 FL (ref 9.2–11.8)
POTASSIUM SERPL-SCNC: 3.8 MMOL/L (ref 3.5–5.5)
PROTHROMBIN TIME: 12 SEC (ref 11.5–15.2)
RBC # BLD AUTO: 4.5 M/UL (ref 4.2–5.3)
SODIUM SERPL-SCNC: 144 MMOL/L (ref 136–145)
WBC # BLD AUTO: 9.2 K/UL (ref 4.6–13.2)

## 2018-11-11 PROCEDURE — 80048 BASIC METABOLIC PNL TOTAL CA: CPT

## 2018-11-11 PROCEDURE — 74011000250 HC RX REV CODE- 250: Performed by: EMERGENCY MEDICINE

## 2018-11-11 PROCEDURE — 85610 PROTHROMBIN TIME: CPT

## 2018-11-11 PROCEDURE — 87040 BLOOD CULTURE FOR BACTERIA: CPT

## 2018-11-11 PROCEDURE — 85025 COMPLETE CBC W/AUTO DIFF WBC: CPT

## 2018-11-11 PROCEDURE — 99285 EMERGENCY DEPT VISIT HI MDM: CPT

## 2018-11-11 PROCEDURE — 74011250636 HC RX REV CODE- 250/636: Performed by: EMERGENCY MEDICINE

## 2018-11-11 PROCEDURE — 83605 ASSAY OF LACTIC ACID: CPT

## 2018-11-11 PROCEDURE — 82962 GLUCOSE BLOOD TEST: CPT

## 2018-11-11 PROCEDURE — 96375 TX/PRO/DX INJ NEW DRUG ADDON: CPT

## 2018-11-11 PROCEDURE — 96366 THER/PROPH/DIAG IV INF ADDON: CPT

## 2018-11-11 PROCEDURE — 93971 EXTREMITY STUDY: CPT

## 2018-11-11 PROCEDURE — 96365 THER/PROPH/DIAG IV INF INIT: CPT

## 2018-11-11 PROCEDURE — 73564 X-RAY EXAM KNEE 4 OR MORE: CPT

## 2018-11-11 RX ORDER — MORPHINE SULFATE 4 MG/ML
4 INJECTION INTRAVENOUS
Status: COMPLETED | OUTPATIENT
Start: 2018-11-11 | End: 2018-11-11

## 2018-11-11 RX ORDER — VANCOMYCIN/0.9 % SOD CHLORIDE 1 G/100 ML
1000 PLASTIC BAG, INJECTION (ML) INTRAVENOUS ONCE
Status: COMPLETED | OUTPATIENT
Start: 2018-11-11 | End: 2018-11-11

## 2018-11-11 RX ORDER — ONDANSETRON 2 MG/ML
4 INJECTION INTRAMUSCULAR; INTRAVENOUS
Status: COMPLETED | OUTPATIENT
Start: 2018-11-11 | End: 2018-11-11

## 2018-11-11 RX ORDER — DEXTROSE 50 % IN WATER (D50W) INTRAVENOUS SYRINGE
Status: DISCONTINUED
Start: 2018-11-11 | End: 2018-11-12 | Stop reason: HOSPADM

## 2018-11-11 RX ORDER — DEXTROSE 50 % IN WATER (D50W) INTRAVENOUS SYRINGE
25
Status: COMPLETED | OUTPATIENT
Start: 2018-11-11 | End: 2018-11-11

## 2018-11-11 RX ORDER — SODIUM CHLORIDE 9 MG/ML
150 INJECTION, SOLUTION INTRAVENOUS CONTINUOUS
Status: DISCONTINUED | OUTPATIENT
Start: 2018-11-11 | End: 2018-11-12 | Stop reason: HOSPADM

## 2018-11-11 RX ADMIN — ONDANSETRON 4 MG: 2 INJECTION INTRAMUSCULAR; INTRAVENOUS at 18:25

## 2018-11-11 RX ADMIN — MORPHINE SULFATE 4 MG: 4 INJECTION INTRAVENOUS at 18:25

## 2018-11-11 RX ADMIN — DEXTROSE MONOHYDRATE 25 G: 25 INJECTION, SOLUTION INTRAVENOUS at 17:09

## 2018-11-11 RX ADMIN — VANCOMYCIN HYDROCHLORIDE 1000 MG: 10 INJECTION, POWDER, LYOPHILIZED, FOR SOLUTION INTRAVENOUS at 18:06

## 2018-11-11 RX ADMIN — SODIUM CHLORIDE 150 ML/HR: 900 INJECTION, SOLUTION INTRAVENOUS at 18:25

## 2018-11-11 NOTE — ED PROVIDER NOTES
[facility] DEPARTMENT HISTORY AND PHYSICAL EXAM 
 
4:12 PM 
 
 
Date: 11/11/2018 Patient Name: Max Anthony History of Presenting Illness Chief Complaint Patient presents with  Post OP Follow Up History Provided By: Patient Chief Complaint: Post-OP complication Duration:  9/26/18 Timing:  Worsening Location: Left knee Quality: N/A Severity: 9/10 Modifying Factors: N/A Associated Symptoms: Drainage, left leg swelling, and SOB. Denies fever, cough, and vomiting. Additional History (Context): Max Anthony is a 79 y.o. female with a history of diabetes who presents with 9/10 worsening post-op complication as the patient had open reduction and internal fixation of left knee fracture on 9/26/18 as she had a mechanical fall. On 11/9/18 patient was seen by [address] and was placed on oral Bacterium and IV Vancomycin. The patient states home health was supposed to administer the IV Vancomycin and change her dressing, but has not been seen yet. Patient's wound culture positive for gram-positive cocci, staph, and rare yeast. Sensitivity pending. Patient is scheduled to have incision and drainage on 11/19/18. Patient reports drainage from her surgical site and left leg swelling. The patient states she was cleaning her wound and \"it poured out all over the bathroom floor\". Reports SOB, but is not resolved. Denies fever, cough, and vomiting. Patient's glucose was 41 in the ED. States she had taken her insulin today. PCP: Richard Johnson NP Past History Past Medical History: 
Past Medical History:  
Diagnosis Date  Anxiety  Arthritis  Asbestosis ([facility] [address]  Depression  Diabetes ([facility] [address]  Fibromyalgia  GERD (gastroesophageal reflux disease)  Hypercholesteremia  Hypertension  Renal insufficiency  Skin abscess  Sleep apnea Past Surgical History: 
Past Surgical History:  
Procedure Laterality Date  HX BREAST LUMPECTOMY  HX CARPAL TUNNEL RELEASE  HX KNEE REPLACEMENT    
 bilater  HX PARTIAL THYROIDECTOMY  HX SHOULDER ARTHROSCOPY    
 HX TUBAL LIGATION Family History: 
Family History Problem Relation Age of Onset  Asthma Mother  Hypertension Mother  Kidney Disease Mother  Thyroid Disease Mother  Diabetes Maternal Grandmother  Thyroid Disease Maternal Grandmother Social History: 
Social History Tobacco Use  Smoking status: Never Smoker  Smokeless tobacco: Never Used Substance Use Topics  Alcohol use: No  
 Drug use: No  
 
 
Allergies: 
No Known Allergies Review of Systems Review of Systems Constitutional: Negative for fever. HENT: Negative for sore throat. Eyes: Negative for redness. Respiratory: Negative for cough and wheezing. Gastrointestinal: Negative for abdominal pain, nausea and vomiting. Genitourinary: Negative for dysuria. Musculoskeletal: Negative for neck stiffness. Positive for left leg pain and swelling. Skin: Negative for pallor. Positive for left knee drainage. Neurological: Negative for headaches. Hematological: Does not bruise/bleed easily. All other systems reviewed and are negative. Physical Exam  
 
Visit Vitals /79 Pulse 79 Temp 98.1 °F (36.7 °C) Resp 13 Ht 5' 2\" (1.575 m) Wt 82.6 kg (182 lb) SpO2 99% BMI 33.29 kg/m² Physical Exam  
Constitutional: She is oriented to person, place, and time. She appears well-developed and well-nourished. No distress. HENT:  
Head: Normocephalic and atraumatic. Mouth/Throat: Oropharynx is clear and moist.  
Eyes: Conjunctivae are normal. Pupils are equal, round, and reactive to light. No scleral icterus. Neck: Normal range of motion. Neck supple. Cardiovascular: Normal rate and intact distal pulses. Capillary refill < 3 seconds. Symmetric DP pulses.  Mild lower extremity edema, left greater than right Eward Medicus Pulmonary/Chest: Effort normal and breath sounds normal. No respiratory distress. She has no wheezes. Abdominal: Soft. Bowel sounds are normal. She exhibits no distension. There is no tenderness. Musculoskeletal: Normal range of motion. She exhibits no edema. Left calf tenderness. No right calf tenderness. Lymphadenopathy:  
  She has no cervical adenopathy. Neurological: She is alert and oriented to person, place, and time. No cranial nerve deficit. Skin: Skin is warm and dry. She is not diaphoretic. Post surgical scar on left hand from dog bite and post surgery. Skin tear heel of left foot from cast. Left knee, bleeding is controlled, arrived with dressing on wound, some erythema around wound with histiance at superior region of wound. Drainage on dressing. Nursing note and vitals reviewed. Diagnostic Study Results Labs - Recent Results (from the past 12 hour(s)) GLUCOSE, POC Collection Time: 11/11/18  4:53 PM  
Result Value Ref Range Glucose (POC) 41 (LL) 70 - 110 mg/dL CBC WITH AUTOMATED DIFF Collection Time: 11/11/18  5:05 PM  
Result Value Ref Range WBC 9.2 4.6 - 13.2 K/uL  
 RBC 4.50 4. 20 - 5.30 M/uL  
 HGB 11.4 (L) 12.0 - 16.0 g/dL HCT 37.3 35.0 - 45.0 % MCV 82.9 74.0 - 97.0 FL  
 MCH 25.3 24.0 - 34.0 PG  
 MCHC 30.6 (L) 31.0 - 37.0 g/dL  
 RDW 14.9 (H) 11.6 - 14.5 % PLATELET 826 (H) 593 - 420 K/uL MPV 10.3 9.2 - 11.8 FL  
 NEUTROPHILS 62 40 - 73 % LYMPHOCYTES 26 21 - 52 % MONOCYTES 9 3 - 10 % EOSINOPHILS 3 0 - 5 % BASOPHILS 0 0 - 2 %  
 ABS. NEUTROPHILS 5.7 1.8 - 8.0 K/UL  
 ABS. LYMPHOCYTES 2.4 0.9 - 3.6 K/UL  
 ABS. MONOCYTES 0.8 0.05 - 1.2 K/UL  
 ABS. EOSINOPHILS 0.3 0.0 - 0.4 K/UL  
 ABS. BASOPHILS 0.0 0.0 - 0.1 K/UL  
 DF AUTOMATED METABOLIC PANEL, BASIC Collection Time: 11/11/18  5:05 PM  
Result Value Ref Range Sodium 144 136 - 145 mmol/L  Potassium 3.8 3.5 - 5.5 mmol/L  
 Chloride 109 (H) 100 - 108 mmol/L  
 CO2 27 21 - 32 mmol/L Anion gap 8 3.0 - 18 mmol/L Glucose 51 (LL) 74 - 99 mg/dL BUN 30 (H) 7.0 - 18 MG/DL Creatinine 1.50 (H) 0.6 - 1.3 MG/DL  
 BUN/Creatinine ratio 20 12 - 20 GFR est AA 42 (L) >60 ml/min/1.73m2 GFR est non-AA 35 (L) >60 ml/min/1.73m2 Calcium 8.7 8.5 - 10.1 MG/DL PROTHROMBIN TIME + INR Collection Time: 11/11/18  5:05 PM  
Result Value Ref Range Prothrombin time 12.0 11.5 - 15.2 sec INR 0.9 0.8 - 1.2 GLUCOSE, POC Collection Time: 11/11/18  5:19 PM  
Result Value Ref Range Glucose (POC) 192 (H) 70 - 110 mg/dL POC LACTIC ACID Collection Time: 11/11/18  6:03 PM  
Result Value Ref Range Lactic Acid (POC) 1.33 0.40 - 2.00 mmol/L Radiologic Studies -  
XR KNEE LT MIN 4 V    (Results Pending) Medical Decision Making I am the first provider for this patient. I reviewed the vital signs, available nursing notes, past medical history, past surgical history, family history and social history. Vital Signs-Reviewed the patient's vital signs. Pulse Oximetry Analysis -  100% on room air (Interpretation) WNL Cardiac Monitor: 
Rate: 93 
Rhythm:  Normal Sinus Rhythm Records Reviewed: Nursing Notes and Old Medical Records (Time of Review: 4:12 PM) Provider Notes (Medical Decision Making): MDM Number of Diagnoses or Management Options Diagnosis management comments: DDx: Wound dehiscence, already seen by orthopedics, scheduled for procedure on 11/19/18. Wound cultures 2 days ago. Stated on antibiotic. Patient not on Vancomycin yet. Possible DVT. Open wound with drainage on dressing. Bleeding controlled. Will start IV Vancomycin now. Will give pain medication. Will get duplex for evaluate for blood clot. Will consult ortho. Medications  
dextrose (D50W) 50% injection syrg (  Held 11/11/18 4035) vancomycin (VANCOCIN) 1000 mg in  ml infusion (1,000 mg IntraVENous New Bag 11/11/18 1806)  
0.9% sodium chloride infusion (150 mL/hr IntraVENous New Bag 11/11/18 1825) dextrose (D50W) injection syrg 25 g (25 g IntraVENous Given 11/11/18 1709) ondansetron (ZOFRAN) injection 4 mg (4 mg IntraVENous Given 11/11/18 1825) morphine injection 4 mg (4 mg IntraVENous Given 11/11/18 1825) ED Course: Progress Notes, Reevaluation, and Consults: 
 
Wbc WNL LA wnl Cr stable Received analgesia and vancomycin 6:27 PM Consult: I discussed care with Dr. Bhakti Schmidt (Orthopedics). It was a standard discussion including patient history, chief complaint, available diagnostic results, and predicted treatment course. Agrees to give a dose of Vancomycin here in ED. Apply good dressing on wound. Have her use walker to prevent injury. She is going to use walker to prevent injury. She is going to contact the office regarding the patient. If DVT site negative and patient stable, can D/C home and proceed with surgery on 11/19/18. DUPLEX LLE: Right Lower Venous For comparison purposes, the right common femoral vein was briefly interrogated. The vein demonstrates normal color filing and compressibility. Doppler flow was phasic and spontaneous. Left Lower Venous The common femoral, saphenous femoral junction, proximal femoral, mid femoral, distal femoral, popliteal, posterior tibial and peroneal vein(s) were imaged in the transverse and longitudinal planes. The vessels showed normal color filling and compressibility. Doppler interrogation of the veins showed phasic and spontaneous flow. The left posterior tibial artery has biphasic waveforms. Redressed wound I have reassessed the patient. I have discussed the workup, results and plan with the patient and patient is in agreement. Patient is feeling better. Patient was discharge in stable condition. Patient was given outpatient follow up.   Patient is to return to emergency department if any new or worsening condition. 7:57 PM November 11, 2018 Diagnosis Clinical Impression: 1. Left leg cellulitis 2. Left leg pain 3. Wound dehiscence, surgical, sequela 4. Wound infection 5. Open displaced fracture of neck of second metacarpal bone of left hand with routine healing Disposition:  
 
 
 
 
Follow-up Information Follow up With Specialties Details Why Contact Info Richard Zamora MD Orthopedic Surgery  Keep your scheduled appointment without fail [address] [address] 
[phone] Ayleen Key NP Nurse Practitioner Schedule an appointment as soon as possible for a visit  [address] [facility] [address] [phone] [facility] EMERGENCY DEPT Emergency Medicine  As needed, If symptoms worsen [address] [place] [id number] 
[phone] Medication List  
  
CHANGE how you take these medications   
oxyCODONE-acetaminophen 5-325 mg per tablet Commonly known as:  PERCOCET Take 1 Tab by mouth every eight (8) hours as needed for Pain. Max Daily Amount: 3 Tabs. What changed:  Another medication with the same name was removed. Continue taking this medication, and follow the directions you see here. CONTINUE taking these medications   
trimethoprim-sulfamethoxazole  mg per tablet Commonly known as:  Fariba Bedoya Take 1 Tab by mouth two (2) times a day. Vancomycin in 0.9% Sodium Cl 1 gram/150 mL Soln 
1 g by IntraVENous route every twelve (12) hours. STOP taking these medications HYDROcodone-acetaminophen 5-325 mg per tablet Commonly known as:  Belem Wells ASK your doctor about these medications   
amiodarone 400 mg tablet Commonly known as:  PACERONE 
  
amoxicillin-clavulanate 875-125 mg per tablet Commonly known as:  AUGMENTIN Take 1 Tab by mouth every twelve (12) hours. aspirin 81 mg chewable tablet Take 1 Tab by mouth daily. cephALEXin 500 mg capsule Commonly known as:  Antoine Little Take 1 Cap by mouth four (4) times daily. CLARITIN 10 mg tablet Generic drug:  loratadine DULoxetine 60 mg capsule Commonly known as:  CYMBALTA Take 1 Cap by mouth daily. furosemide 40 mg tablet Commonly known as:  LASIX Take 1 Tab by mouth daily. Indications: Peripheral Edema due to Chronic Heart Failure, For leg edema or SOB 
  
LANTUS U-100 INSULIN 100 unit/mL injection Generic drug:  insulin glargine 
  
lisinopril 40 mg tablet Commonly known as:  [place] Macy Take 1 Tab by mouth daily. LYRICA 100 mg capsule Generic drug:  pregabalin 
  
metoprolol tartrate 50 mg tablet Commonly known as:  LOPRESSOR 
  
PLAQUENIL 200 mg tablet Generic drug:  hydroxychloroquine PRAVASTATIN PO 
  
PriLOSEC 10 mg capsule Generic drug:  omeprazole SINGULAIR 10 mg tablet Generic drug:  montelukast 
  
TYLENOL 325 mg tablet Generic drug:  acetaminophen 
  
  
 
_______________________________ Attestations: 
Scribe Attestation Shreya Taveras acting as a scribe for and in the presence of Buddy BrennerRatliff CityDO November 11, 2018 at 8:01 PM 
    
Provider Attestation:     
I personally performed the services described in the documentation, reviewed the documentation, as recorded by the scribe in my presence, and it accurately and completely records my words and actions. November 11, 2018 at 8:01 PM - Isma Loera DO   
_______________________________

## 2018-11-11 NOTE — ED TRIAGE NOTES
Patient presents to the ED for evaluation of postop bleeding to left knee area. Patient was washing up when the wound \"popped open and started gushing blood. \"

## 2018-11-12 RX ORDER — LEVOFLOXACIN 750 MG/1
750 TABLET ORAL DAILY
Qty: 7 TAB | Refills: 0 | Status: SHIPPED | OUTPATIENT
Start: 2018-11-12 | End: 2018-11-21

## 2018-11-12 NOTE — ED NOTES
Discharge instructions reviewed with pt. Pt voiced understanding. Pt assisted to wheelchair via family,  And taken out to lobby. Dressing on left leg dry and intact

## 2018-11-12 NOTE — TELEPHONE ENCOUNTER
Contacted pt at her home number. Informed pt that prescription was sent to [place] [facility]. Pt thanked writer for call and information.

## 2018-11-12 NOTE — DISCHARGE INSTRUCTIONS
Cellulitis: Care Instructions  Your Care Instructions    Cellulitis is a skin infection caused by bacteria, most often strep or staph. It often occurs after a break in the skin from a scrape, cut, bite, or puncture, or after a rash. Cellulitis may be treated without doing tests to find out what caused it. But your doctor may do tests, if needed, to look for a specific bacteria, like methicillin-resistant Staphylococcus aureus (MRSA). The doctor has checked you carefully, but problems can develop later. If you notice any problems or new symptoms, get medical treatment right away. Follow-up care is a key part of your treatment and safety. Be sure to make and go to all appointments, and call your doctor if you are having problems. It's also a good idea to know your test results and keep a list of the medicines you take. How can you care for yourself at home? · Take your antibiotics as directed. Do not stop taking them just because you feel better. You need to take the full course of antibiotics. · Prop up the infected area on pillows to reduce pain and swelling. Try to keep the area above the level of your heart as often as you can. · If your doctor told you how to care for your wound, follow your doctor's instructions. If you did not get instructions, follow this general advice:  ? Wash the wound with clean water 2 times a day. Don't use hydrogen peroxide or alcohol, which can slow healing. ? You may cover the wound with a thin layer of petroleum jelly, such as Vaseline, and a nonstick bandage. ? Apply more petroleum jelly and replace the bandage as needed. · Be safe with medicines. Take pain medicines exactly as directed. ? If the doctor gave you a prescription medicine for pain, take it as prescribed. ? If you are not taking a prescription pain medicine, ask your doctor if you can take an over-the-counter medicine.   To prevent cellulitis in the future  · Try to prevent cuts, scrapes, or other injuries to your skin. Cellulitis most often occurs where there is a break in the skin. · If you get a scrape, cut, mild burn, or bite, wash the wound with clean water as soon as you can to help avoid infection. Don't use hydrogen peroxide or alcohol, which can slow healing. · If you have swelling in your legs (edema), support stockings and good skin care may help prevent leg sores and cellulitis. · Take care of your feet, especially if you have diabetes or other conditions that increase the risk of infection. Wear shoes and socks. Do not go barefoot. If you have athlete's foot or other skin problems on your feet, talk to your doctor about how to treat them. When should you call for help? Call your doctor now or seek immediate medical care if:    · You have signs that your infection is getting worse, such as:  ? Increased pain, swelling, warmth, or redness. ? Red streaks leading from the area. ? Pus draining from the area. ? A fever.     · You get a rash.    Watch closely for changes in your health, and be sure to contact your doctor if:    · You do not get better as expected. Where can you learn more? Go to http://niurka-michi.info/. Ryann Benito in the search box to learn more about \"Cellulitis: Care Instructions. \"  Current as of: April 18, 2018  Content Version: 11.8  © 3168-0658 [employer]. Care instructions adapted under license by [employer] (which disclaims liability or warranty for this information). If you have questions about a medical condition or this instruction, always ask your healthcare professional. [place] [address] any warranty or liability for your use of this information. Leg Pain: Care Instructions  Your Care Instructions  Many things can cause leg pain. Too much exercise or overuse can cause a muscle cramp (or charley horse).  You can get leg cramps from not eating a balanced diet that has enough potassium, calcium, and other minerals. If you do not drink enough fluids or are taking certain medicines, you may develop leg cramps. Other causes of leg pain include injuries, blood flow problems, nerve damage, and twisted and enlarged veins (varicose veins). You can usually ease pain with self-care. Your doctor may recommend that you rest your leg and keep it elevated. Follow-up care is a key part of your treatment and safety. Be sure to make and go to all appointments, and call your doctor if you are having problems. It's also a good idea to know your test results and keep a list of the medicines you take. How can you care for yourself at home? · Take pain medicines exactly as directed. ? If the doctor gave you a prescription medicine for pain, take it as prescribed. ? If you are not taking a prescription pain medicine, ask your doctor if you can take an over-the-counter medicine. · Take any other medicines exactly as prescribed. Call your doctor if you think you are having a problem with your medicine. · Rest your leg while you have pain, and avoid standing for long periods of time. · Prop up your leg at or above the level of your heart when possible. · Make sure you are eating a balanced diet that is rich in calcium, potassium, and magnesium, especially if you are pregnant. · If directed by your doctor, put ice or a cold pack on the area for 10 to 20 minutes at a time. Put a thin cloth between the ice and your skin. · Your leg may be in a splint, a brace, or an elastic bandage, and you may have crutches to help you walk. Follow your doctor's directions about how long to wear supports and how to use the crutches. When should you call for help? Call 911 anytime you think you may need emergency care.  For example, call if:    · You have sudden chest pain and shortness of breath, or you cough up blood.     · Your leg is cool or pale or changes color.    Call your doctor now or seek immediate medical care if:    · You have increasing or severe pain.     · Your leg suddenly feels weak and you cannot move it.     · You have signs of a blood clot, such as:  ? Pain in your calf, back of the knee, thigh, or groin. ? Redness and swelling in your leg or groin.     · You have signs of infection, such as:  ? Increased pain, swelling, warmth, or redness. ? Red streaks leading from the sore area. ? Pus draining from a place on your leg. ? A fever.     · You cannot bear weight on your leg.    Watch closely for changes in your health, and be sure to contact your doctor if:    · You do not get better as expected. Where can you learn more? Go to http://niurka-michi.info/. Enter C945 in the search box to learn more about \"Leg Pain: Care Instructions. \"  Current as of: November 20, 2017  Content Version: 11.8  © 1920-8988 [employer]. Care instructions adapted under license by [employer] (which disclaims liability or warranty for this information). If you have questions about a medical condition or this instruction, always ask your healthcare professional. [place] [address] any warranty or liability for your use of this information. Opened Cut After Surgery: Care Instructions  Your Care Instructions  Sometimes a cut made during surgery opens when it isn't supposed to. This may be because of an infection or another problem that keeps the cut's edges from staying together. The doctor has checked your open cut. He or she may have put a dressing in the cut but left it open to heal. This lets the cut heal from the bottom up. Your doctor may have given you a vacuum device to take home that helps close the cut. A cut may be left open when it is infected or likely to become infected. This is because closing the cut may make an existing infection worse and a new infection more likely. You will have a bandage. Follow-up care is a key part of your treatment and safety. Be sure to make and go to all appointments, and call your doctor if you are having problems. It's also a good idea to know your test results and keep a list of the medicines you take. How can you care for yourself at home? · You may shower with soap and water. Your doctor will tell you when it is safe to use a bathtub or go swimming. · If your doctor told you how to care for your cut, follow your doctor's instructions. If you did not get instructions, follow this general advice:  ? Wash around the cut with clean water 2 times a day. Don't use hydrogen peroxide or alcohol, which can slow healing. · Avoid any activity that could cause your cut to get worse. For example, if your cut is in the belly, avoid lifting anything that would make you strain. This may include heavy grocery bags and milk containers, a heavy briefcase or backpack, cat litter or dog food bags, a vacuum , or a child. · Take pain medicines exactly as directed. ? If the doctor gave you a prescription medicine for pain, take it as prescribed. ? If you are not taking a prescription pain medicine, ask your doctor if you can take an over-the-counter medicine. · If your doctor prescribed antibiotics, take them as directed. Do not stop taking them just because you feel better. You need to take the full course of antibiotics. · If your cut is packed (gauze is put into the cut), follow your doctor's instructions on how often and how to repack the cut. A home health worker may do this for you. When should you call for help? Call your doctor now or seek immediate medical care if:    · The cut gets bigger.     · You can see organs under the skin.     · You have new pain, or your pain gets worse.     · The cut starts to bleed, and blood soaks through the bandage.  Oozing small amounts of blood is normal.     · The skin near the cut is cold or pale or changes color.     · You have tingling, weakness, or numbness near the cut.     · You have trouble moving the area near the cut.     · You have symptoms of infection, such as:  ? Increased pain, swelling, warmth, or redness around the cut.  ? Red streaks leading from the cut.  ? Pus draining from the cut.  ? A fever.    Watch closely for changes in your health, and be sure to contact your doctor if:    · The cut is not closing (getting smaller).     · You do not get better as expected. Where can you learn more? Go to http://niurka-michi.info/. Enter Z374 in the search box to learn more about \"Opened Cut After Surgery: Care Instructions. \"  Current as of: November 20, 2017  Content Version: 11.8  © 0404-9746 [employer]. Care instructions adapted under license by [employer] (which disclaims liability or warranty for this information). If you have questions about a medical condition or this instruction, always ask your healthcare professional. [address] any warranty or liability for your use of this information.

## 2018-11-12 NOTE — ED NOTES
Bedside shift change report given to Kong Sepulveda (oncoming nurse) by Terese Sun (offgoing nurse). Report included the following information ED Summary.

## 2018-11-13 LAB
BACTERIA SPEC CULT: ABNORMAL
GRAM STN SPEC: ABNORMAL
SERVICE CMNT-IMP: ABNORMAL
SERVICE CMNT-IMP: ABNORMAL

## 2018-11-17 LAB
BACTERIA SPEC CULT: NORMAL
BACTERIA SPEC CULT: NORMAL
SERVICE CMNT-IMP: NORMAL
SERVICE CMNT-IMP: NORMAL

## 2018-11-19 ENCOUNTER — ANESTHESIA EVENT (OUTPATIENT)
Dept: SURGERY | Age: 68
DRG: 857 | End: 2018-11-19
Payer: MEDICARE

## 2018-11-19 ENCOUNTER — ANESTHESIA (OUTPATIENT)
Dept: SURGERY | Age: 68
DRG: 857 | End: 2018-11-19
Payer: MEDICARE

## 2018-11-19 ENCOUNTER — HOSPITAL ENCOUNTER (INPATIENT)
Age: 68
LOS: 2 days | Discharge: NURSING, REHAB OR LONG-TERM CARE | DRG: 857 | End: 2018-11-21
Attending: ORTHOPAEDIC SURGERY | Admitting: ORTHOPAEDIC SURGERY
Payer: MEDICARE

## 2018-11-19 DIAGNOSIS — T81.30XA WOUND DEHISCENCE: Primary | ICD-10-CM

## 2018-11-19 LAB
ALBUMIN SERPL-MCNC: 2.7 G/DL (ref 3.4–5)
ALBUMIN/GLOB SERPL: 0.8 {RATIO} (ref 0.8–1.7)
ALP SERPL-CCNC: 328 U/L (ref 45–117)
ALT SERPL-CCNC: 38 U/L (ref 13–56)
ANION GAP SERPL CALC-SCNC: 11 MMOL/L (ref 3–18)
ANION GAP SERPL CALC-SCNC: 7 MMOL/L (ref 3–18)
AST SERPL-CCNC: 51 U/L (ref 15–37)
BASOPHILS # BLD: 0 K/UL (ref 0–0.1)
BASOPHILS NFR BLD: 0 % (ref 0–2)
BILIRUB SERPL-MCNC: 0.2 MG/DL (ref 0.2–1)
BUN SERPL-MCNC: 38 MG/DL (ref 7–18)
BUN SERPL-MCNC: 40 MG/DL (ref 7–18)
BUN/CREAT SERPL: 21 (ref 12–20)
BUN/CREAT SERPL: 23 (ref 12–20)
CALCIUM SERPL-MCNC: 8.1 MG/DL (ref 8.5–10.1)
CALCIUM SERPL-MCNC: 8.4 MG/DL (ref 8.5–10.1)
CHLORIDE SERPL-SCNC: 111 MMOL/L (ref 100–108)
CHLORIDE SERPL-SCNC: 111 MMOL/L (ref 100–108)
CO2 SERPL-SCNC: 24 MMOL/L (ref 21–32)
CO2 SERPL-SCNC: 25 MMOL/L (ref 21–32)
CREAT SERPL-MCNC: 1.74 MG/DL (ref 0.6–1.3)
CREAT SERPL-MCNC: 1.79 MG/DL (ref 0.6–1.3)
DIFFERENTIAL METHOD BLD: ABNORMAL
EOSINOPHIL # BLD: 0.1 K/UL (ref 0–0.4)
EOSINOPHIL NFR BLD: 1 % (ref 0–5)
ERYTHROCYTE [DISTWIDTH] IN BLOOD BY AUTOMATED COUNT: 15.5 % (ref 11.6–14.5)
EST. AVERAGE GLUCOSE BLD GHB EST-MCNC: 140 MG/DL
GLOBULIN SER CALC-MCNC: 3.4 G/DL (ref 2–4)
GLUCOSE BLD STRIP.AUTO-MCNC: 107 MG/DL (ref 70–110)
GLUCOSE BLD STRIP.AUTO-MCNC: 118 MG/DL (ref 70–110)
GLUCOSE BLD STRIP.AUTO-MCNC: 78 MG/DL (ref 70–110)
GLUCOSE SERPL-MCNC: 135 MG/DL (ref 74–99)
GLUCOSE SERPL-MCNC: 97 MG/DL (ref 74–99)
HBA1C MFR BLD: 6.5 % (ref 4.2–5.6)
HCT VFR BLD AUTO: 30.3 % (ref 35–45)
HGB BLD-MCNC: 9.4 G/DL (ref 12–16)
LYMPHOCYTES # BLD: 1.1 K/UL (ref 0.9–3.6)
LYMPHOCYTES NFR BLD: 16 % (ref 21–52)
MCH RBC QN AUTO: 25.5 PG (ref 24–34)
MCHC RBC AUTO-ENTMCNC: 31 G/DL (ref 31–37)
MCV RBC AUTO: 82.1 FL (ref 74–97)
MONOCYTES # BLD: 0.6 K/UL (ref 0.05–1.2)
MONOCYTES NFR BLD: 9 % (ref 3–10)
NEUTS SEG # BLD: 5.1 K/UL (ref 1.8–8)
NEUTS SEG NFR BLD: 74 % (ref 40–73)
PLATELET # BLD AUTO: 303 K/UL (ref 135–420)
PMV BLD AUTO: 9.7 FL (ref 9.2–11.8)
POTASSIUM SERPL-SCNC: 3.9 MMOL/L (ref 3.5–5.5)
POTASSIUM SERPL-SCNC: 4.3 MMOL/L (ref 3.5–5.5)
PROT SERPL-MCNC: 6.1 G/DL (ref 6.4–8.2)
RBC # BLD AUTO: 3.69 M/UL (ref 4.2–5.3)
SODIUM SERPL-SCNC: 143 MMOL/L (ref 136–145)
SODIUM SERPL-SCNC: 146 MMOL/L (ref 136–145)
WBC # BLD AUTO: 6.9 K/UL (ref 4.6–13.2)

## 2018-11-19 PROCEDURE — 36415 COLL VENOUS BLD VENIPUNCTURE: CPT

## 2018-11-19 PROCEDURE — 74011000250 HC RX REV CODE- 250: Performed by: ORTHOPAEDIC SURGERY

## 2018-11-19 PROCEDURE — 77030020782 HC GWN BAIR PAWS FLX 3M -B: Performed by: ORTHOPAEDIC SURGERY

## 2018-11-19 PROCEDURE — 77030018836 HC SOL IRR NACL ICUM -A: Performed by: ORTHOPAEDIC SURGERY

## 2018-11-19 PROCEDURE — 76060000033 HC ANESTHESIA 1 TO 1.5 HR: Performed by: ORTHOPAEDIC SURGERY

## 2018-11-19 PROCEDURE — 77030012890: Performed by: ORTHOPAEDIC SURGERY

## 2018-11-19 PROCEDURE — 3E0U029 INTRODUCTION OF OTHER ANTI-INFECTIVE INTO JOINTS, OPEN APPROACH: ICD-10-PCS | Performed by: ORTHOPAEDIC SURGERY

## 2018-11-19 PROCEDURE — 77030019605: Performed by: ORTHOPAEDIC SURGERY

## 2018-11-19 PROCEDURE — 80053 COMPREHEN METABOLIC PANEL: CPT

## 2018-11-19 PROCEDURE — 74011000250 HC RX REV CODE- 250: Performed by: ANESTHESIOLOGY

## 2018-11-19 PROCEDURE — 77030032490 HC SLV COMPR SCD KNE COVD -B: Performed by: ORTHOPAEDIC SURGERY

## 2018-11-19 PROCEDURE — 74011250637 HC RX REV CODE- 250/637: Performed by: ORTHOPAEDIC SURGERY

## 2018-11-19 PROCEDURE — 87070 CULTURE OTHR SPECIMN AEROBIC: CPT

## 2018-11-19 PROCEDURE — 0J9M0ZZ DRAINAGE OF LEFT UPPER LEG SUBCUTANEOUS TISSUE AND FASCIA, OPEN APPROACH: ICD-10-PCS | Performed by: ORTHOPAEDIC SURGERY

## 2018-11-19 PROCEDURE — 77030002933 HC SUT MCRYL J&J -A: Performed by: ORTHOPAEDIC SURGERY

## 2018-11-19 PROCEDURE — 74011250636 HC RX REV CODE- 250/636: Performed by: ORTHOPAEDIC SURGERY

## 2018-11-19 PROCEDURE — 77030031139 HC SUT VCRL2 J&J -A: Performed by: ORTHOPAEDIC SURGERY

## 2018-11-19 PROCEDURE — 74011250636 HC RX REV CODE- 250/636

## 2018-11-19 PROCEDURE — 77030002916 HC SUT ETHLN J&J -A: Performed by: ORTHOPAEDIC SURGERY

## 2018-11-19 PROCEDURE — 74011250636 HC RX REV CODE- 250/636: Performed by: ANESTHESIOLOGY

## 2018-11-19 PROCEDURE — 77030013708 HC HNDPC SUC IRR PULS STRY –B: Performed by: ORTHOPAEDIC SURGERY

## 2018-11-19 PROCEDURE — 65270000029 HC RM PRIVATE

## 2018-11-19 PROCEDURE — 77030027138 HC INCENT SPIROMETER -A

## 2018-11-19 PROCEDURE — 85025 COMPLETE CBC W/AUTO DIFF WBC: CPT

## 2018-11-19 PROCEDURE — 83036 HEMOGLOBIN GLYCOSYLATED A1C: CPT

## 2018-11-19 PROCEDURE — 77030028907 HC WRP KNEE WO BGS SOLM -B: Performed by: ORTHOPAEDIC SURGERY

## 2018-11-19 PROCEDURE — 76010000149 HC OR TIME 1 TO 1.5 HR: Performed by: ORTHOPAEDIC SURGERY

## 2018-11-19 PROCEDURE — 76210000006 HC OR PH I REC 0.5 TO 1 HR: Performed by: ORTHOPAEDIC SURGERY

## 2018-11-19 PROCEDURE — C1713 ANCHOR/SCREW BN/BN,TIS/BN: HCPCS | Performed by: ORTHOPAEDIC SURGERY

## 2018-11-19 PROCEDURE — 0HDJXZZ EXTRACTION OF LEFT UPPER LEG SKIN, EXTERNAL APPROACH: ICD-10-PCS | Performed by: ORTHOPAEDIC SURGERY

## 2018-11-19 PROCEDURE — 77030008683 HC TU ET CUF COVD -A: Performed by: ANESTHESIOLOGY

## 2018-11-19 PROCEDURE — L1830 KO IMMOB CANVAS LONG PRE OTS: HCPCS | Performed by: ORTHOPAEDIC SURGERY

## 2018-11-19 PROCEDURE — 82962 GLUCOSE BLOOD TEST: CPT

## 2018-11-19 PROCEDURE — 87075 CULTR BACTERIA EXCEPT BLOOD: CPT

## 2018-11-19 DEVICE — STIMULAN® RAPID CURE PROVIDED STERILE FOR SINGLE PATIENT USE. STIMULAN® RAPID CURE CONTAINS CALCIUM SULFATE POWDER AND MIXING SOLUTION IN PRE-MEASURED QUANTITIES SO THAT WHEN MIXED TOGETHER IN A STERILE MIXING BOWL, THE RESULTANT PASTE IS TO BE DIGITALLY PACKED INTO OPEN BONE VOID/GAP TO SET INSITU OR PLACED INTO THE MOULD PROVIDED, THE MIXTURE SETS TO FORM BEADS. THE BIODEGRADABLE, RADIOPAQUE BEADS ARE RESORBED IN APPROXIMATELY 30 – 60 DAYS WHEN USED IN ACCORDANCE WITH THE DEVICE LABELLING. STIMULAN® RAPID CURE IS MANUFACTURED FROM SYNTHETIC IMPLANT GRADE CALCIUM SULFATE DIHYDRATE(CASO4.2H2O) THAT RESORBS AND IS REPLACED WITH BONE DURING THE HEALING PROCESS. ALSO, AS THE BONE VOID FILLER BEADS ARE BIODEGRADABLE AND BIOCOMPATIBLE, THEY MAY BE USED AT AN INFECTED SITE.
Type: IMPLANTABLE DEVICE | Site: LEG | Status: FUNCTIONAL
Brand: STIMULAN® RAPID CURE

## 2018-11-19 RX ORDER — FUROSEMIDE 40 MG/1
40 TABLET ORAL DAILY
Status: DISCONTINUED | OUTPATIENT
Start: 2018-11-20 | End: 2018-11-21

## 2018-11-19 RX ORDER — MONTELUKAST SODIUM 10 MG/1
10 TABLET ORAL DAILY
Status: DISCONTINUED | OUTPATIENT
Start: 2018-11-20 | End: 2018-11-21 | Stop reason: HOSPADM

## 2018-11-19 RX ORDER — MAGNESIUM SULFATE 100 %
4 CRYSTALS MISCELLANEOUS AS NEEDED
Status: DISCONTINUED | OUTPATIENT
Start: 2018-11-19 | End: 2018-11-21 | Stop reason: HOSPADM

## 2018-11-19 RX ORDER — VANCOMYCIN HYDROCHLORIDE 1 G/20ML
INJECTION, POWDER, LYOPHILIZED, FOR SOLUTION INTRAVENOUS AS NEEDED
Status: DISCONTINUED | OUTPATIENT
Start: 2018-11-19 | End: 2018-11-19 | Stop reason: HOSPADM

## 2018-11-19 RX ORDER — SUCCINYLCHOLINE CHLORIDE 20 MG/ML INJECTION SOLUTION
SOLUTION AS NEEDED
Status: DISCONTINUED | OUTPATIENT
Start: 2018-11-19 | End: 2018-11-19 | Stop reason: HOSPADM

## 2018-11-19 RX ORDER — TOBRAMYCIN 1.2 G/30ML
INJECTION, POWDER, LYOPHILIZED, FOR SOLUTION INTRAVENOUS AS NEEDED
Status: DISCONTINUED | OUTPATIENT
Start: 2018-11-19 | End: 2018-11-19 | Stop reason: HOSPADM

## 2018-11-19 RX ORDER — CELECOXIB 100 MG/1
200 CAPSULE ORAL 2 TIMES DAILY
Status: DISCONTINUED | OUTPATIENT
Start: 2018-11-19 | End: 2018-11-21

## 2018-11-19 RX ORDER — AMIODARONE HYDROCHLORIDE 200 MG/1
400 TABLET ORAL DAILY
Status: DISCONTINUED | OUTPATIENT
Start: 2018-11-20 | End: 2018-11-21 | Stop reason: HOSPADM

## 2018-11-19 RX ORDER — PROPOFOL 10 MG/ML
INJECTION, EMULSION INTRAVENOUS AS NEEDED
Status: DISCONTINUED | OUTPATIENT
Start: 2018-11-19 | End: 2018-11-19 | Stop reason: HOSPADM

## 2018-11-19 RX ORDER — SODIUM CHLORIDE 0.9 % (FLUSH) 0.9 %
5-10 SYRINGE (ML) INJECTION EVERY 8 HOURS
Status: DISCONTINUED | OUTPATIENT
Start: 2018-11-19 | End: 2018-11-21 | Stop reason: HOSPADM

## 2018-11-19 RX ORDER — MAGNESIUM SULFATE 100 %
4 CRYSTALS MISCELLANEOUS AS NEEDED
Status: DISCONTINUED | OUTPATIENT
Start: 2018-11-19 | End: 2018-11-19 | Stop reason: HOSPADM

## 2018-11-19 RX ORDER — NALOXONE HYDROCHLORIDE 0.4 MG/ML
0.4 INJECTION, SOLUTION INTRAMUSCULAR; INTRAVENOUS; SUBCUTANEOUS AS NEEDED
Status: DISCONTINUED | OUTPATIENT
Start: 2018-11-19 | End: 2018-11-21 | Stop reason: HOSPADM

## 2018-11-19 RX ORDER — FENTANYL CITRATE 50 UG/ML
25 INJECTION, SOLUTION INTRAMUSCULAR; INTRAVENOUS
Status: DISCONTINUED | OUTPATIENT
Start: 2018-11-19 | End: 2018-11-19 | Stop reason: HOSPADM

## 2018-11-19 RX ORDER — VANCOMYCIN/0.9 % SOD CHLORIDE 1 G/100 ML
1000 PLASTIC BAG, INJECTION (ML) INTRAVENOUS EVERY 12 HOURS
Status: DISCONTINUED | OUTPATIENT
Start: 2018-11-19 | End: 2018-11-19 | Stop reason: DRUGHIGH

## 2018-11-19 RX ORDER — ONDANSETRON 2 MG/ML
4 INJECTION INTRAMUSCULAR; INTRAVENOUS ONCE
Status: DISCONTINUED | OUTPATIENT
Start: 2018-11-19 | End: 2018-11-19 | Stop reason: HOSPADM

## 2018-11-19 RX ORDER — VANCOMYCIN/0.9 % SOD CHLORIDE 1 G/100 ML
1000 PLASTIC BAG, INJECTION (ML) INTRAVENOUS EVERY 24 HOURS
Status: DISCONTINUED | OUTPATIENT
Start: 2018-11-20 | End: 2018-11-20 | Stop reason: DRUGHIGH

## 2018-11-19 RX ORDER — POLYMYXIN B 500000 [USP'U]/1
INJECTION, POWDER, LYOPHILIZED, FOR SOLUTION INTRAMUSCULAR; INTRATHECAL; INTRAVENOUS; OPHTHALMIC AS NEEDED
Status: DISCONTINUED | OUTPATIENT
Start: 2018-11-19 | End: 2018-11-19 | Stop reason: HOSPADM

## 2018-11-19 RX ORDER — ZOLPIDEM TARTRATE 5 MG/1
5 TABLET ORAL
Status: DISCONTINUED | OUTPATIENT
Start: 2018-11-19 | End: 2018-11-21 | Stop reason: HOSPADM

## 2018-11-19 RX ORDER — SODIUM CHLORIDE, SODIUM LACTATE, POTASSIUM CHLORIDE, CALCIUM CHLORIDE 600; 310; 30; 20 MG/100ML; MG/100ML; MG/100ML; MG/100ML
75 INJECTION, SOLUTION INTRAVENOUS CONTINUOUS
Status: DISCONTINUED | OUTPATIENT
Start: 2018-11-19 | End: 2018-11-19 | Stop reason: HOSPADM

## 2018-11-19 RX ORDER — INSULIN LISPRO 100 [IU]/ML
INJECTION, SOLUTION INTRAVENOUS; SUBCUTANEOUS ONCE
Status: DISCONTINUED | OUTPATIENT
Start: 2018-11-19 | End: 2018-11-19 | Stop reason: HOSPADM

## 2018-11-19 RX ORDER — ACETAMINOPHEN 325 MG/1
650 TABLET ORAL
Status: DISCONTINUED | OUTPATIENT
Start: 2018-11-19 | End: 2018-11-21 | Stop reason: HOSPADM

## 2018-11-19 RX ORDER — ONDANSETRON 2 MG/ML
4 INJECTION INTRAMUSCULAR; INTRAVENOUS
Status: DISCONTINUED | OUTPATIENT
Start: 2018-11-19 | End: 2018-11-21 | Stop reason: HOSPADM

## 2018-11-19 RX ORDER — INSULIN LISPRO 100 [IU]/ML
INJECTION, SOLUTION INTRAVENOUS; SUBCUTANEOUS
Status: DISCONTINUED | OUTPATIENT
Start: 2018-11-19 | End: 2018-11-21 | Stop reason: HOSPADM

## 2018-11-19 RX ORDER — SODIUM CHLORIDE 0.9 % (FLUSH) 0.9 %
5-10 SYRINGE (ML) INJECTION AS NEEDED
Status: DISCONTINUED | OUTPATIENT
Start: 2018-11-19 | End: 2018-11-21 | Stop reason: HOSPADM

## 2018-11-19 RX ORDER — ONDANSETRON 2 MG/ML
INJECTION INTRAMUSCULAR; INTRAVENOUS AS NEEDED
Status: DISCONTINUED | OUTPATIENT
Start: 2018-11-19 | End: 2018-11-19 | Stop reason: HOSPADM

## 2018-11-19 RX ORDER — DEXTROSE 50 % IN WATER (D50W) INTRAVENOUS SYRINGE
25-50 AS NEEDED
Status: DISCONTINUED | OUTPATIENT
Start: 2018-11-19 | End: 2018-11-21 | Stop reason: HOSPADM

## 2018-11-19 RX ORDER — ASPIRIN 325 MG/1
325 TABLET, FILM COATED ORAL 2 TIMES DAILY
Status: DISCONTINUED | OUTPATIENT
Start: 2018-11-20 | End: 2018-11-21 | Stop reason: HOSPADM

## 2018-11-19 RX ORDER — VANCOMYCIN/0.9 % SOD CHLORIDE 1 G/100 ML
1000 PLASTIC BAG, INJECTION (ML) INTRAVENOUS ONCE
Status: COMPLETED | OUTPATIENT
Start: 2018-11-19 | End: 2018-11-19

## 2018-11-19 RX ORDER — LISINOPRIL 40 MG/1
40 TABLET ORAL DAILY
Status: DISCONTINUED | OUTPATIENT
Start: 2018-11-20 | End: 2018-11-21

## 2018-11-19 RX ORDER — SODIUM CHLORIDE 9 MG/ML
100 INJECTION, SOLUTION INTRAVENOUS CONTINUOUS
Status: DISPENSED | OUTPATIENT
Start: 2018-11-19 | End: 2018-11-20

## 2018-11-19 RX ORDER — SODIUM CHLORIDE 0.9 % (FLUSH) 0.9 %
5-10 SYRINGE (ML) INJECTION AS NEEDED
Status: DISCONTINUED | OUTPATIENT
Start: 2018-11-19 | End: 2018-11-19 | Stop reason: HOSPADM

## 2018-11-19 RX ORDER — OXYCODONE AND ACETAMINOPHEN 7.5; 325 MG/1; MG/1
1-2 TABLET ORAL
Status: DISCONTINUED | OUTPATIENT
Start: 2018-11-19 | End: 2018-11-21 | Stop reason: HOSPADM

## 2018-11-19 RX ORDER — LIDOCAINE HYDROCHLORIDE 20 MG/ML
INJECTION, SOLUTION EPIDURAL; INFILTRATION; INTRACAUDAL; PERINEURAL AS NEEDED
Status: DISCONTINUED | OUTPATIENT
Start: 2018-11-19 | End: 2018-11-19 | Stop reason: HOSPADM

## 2018-11-19 RX ORDER — FENTANYL CITRATE 50 UG/ML
INJECTION, SOLUTION INTRAMUSCULAR; INTRAVENOUS AS NEEDED
Status: DISCONTINUED | OUTPATIENT
Start: 2018-11-19 | End: 2018-11-19 | Stop reason: HOSPADM

## 2018-11-19 RX ORDER — DEXTROSE 50 % IN WATER (D50W) INTRAVENOUS SYRINGE
25-50 AS NEEDED
Status: DISCONTINUED | OUTPATIENT
Start: 2018-11-19 | End: 2018-11-19 | Stop reason: HOSPADM

## 2018-11-19 RX ORDER — SODIUM CHLORIDE, SODIUM LACTATE, POTASSIUM CHLORIDE, CALCIUM CHLORIDE 600; 310; 30; 20 MG/100ML; MG/100ML; MG/100ML; MG/100ML
75 INJECTION, SOLUTION INTRAVENOUS ONCE
Status: COMPLETED | OUTPATIENT
Start: 2018-11-19 | End: 2018-11-19

## 2018-11-19 RX ORDER — PANTOPRAZOLE SODIUM 40 MG/1
40 TABLET, DELAYED RELEASE ORAL
Status: DISCONTINUED | OUTPATIENT
Start: 2018-11-20 | End: 2018-11-21 | Stop reason: HOSPADM

## 2018-11-19 RX ORDER — LANOLIN ALCOHOL/MO/W.PET/CERES
1 CREAM (GRAM) TOPICAL 2 TIMES DAILY WITH MEALS
Status: DISCONTINUED | OUTPATIENT
Start: 2018-11-19 | End: 2018-11-21 | Stop reason: HOSPADM

## 2018-11-19 RX ORDER — VANCOMYCIN/0.9 % SOD CHLORIDE 1 G/100 ML
1000 PLASTIC BAG, INJECTION (ML) INTRAVENOUS EVERY 24 HOURS
Status: DISCONTINUED | OUTPATIENT
Start: 2018-11-20 | End: 2018-11-19

## 2018-11-19 RX ORDER — METOPROLOL TARTRATE 50 MG/1
50 TABLET ORAL 2 TIMES DAILY
Status: DISCONTINUED | OUTPATIENT
Start: 2018-11-19 | End: 2018-11-21 | Stop reason: HOSPADM

## 2018-11-19 RX ORDER — DULOXETIN HYDROCHLORIDE 60 MG/1
60 CAPSULE, DELAYED RELEASE ORAL DAILY
Status: DISCONTINUED | OUTPATIENT
Start: 2018-11-20 | End: 2018-11-21 | Stop reason: HOSPADM

## 2018-11-19 RX ORDER — DIPHENHYDRAMINE HYDROCHLORIDE 50 MG/ML
12.5 INJECTION, SOLUTION INTRAMUSCULAR; INTRAVENOUS
Status: DISCONTINUED | OUTPATIENT
Start: 2018-11-19 | End: 2018-11-21 | Stop reason: HOSPADM

## 2018-11-19 RX ORDER — DOCUSATE SODIUM 100 MG/1
100 CAPSULE, LIQUID FILLED ORAL 2 TIMES DAILY
Status: DISCONTINUED | OUTPATIENT
Start: 2018-11-19 | End: 2018-11-21 | Stop reason: HOSPADM

## 2018-11-19 RX ADMIN — ONDANSETRON 4 MG: 2 INJECTION INTRAMUSCULAR; INTRAVENOUS at 14:37

## 2018-11-19 RX ADMIN — PROPOFOL 120 MG: 10 INJECTION, EMULSION INTRAVENOUS at 14:44

## 2018-11-19 RX ADMIN — FENTANYL CITRATE 100 MCG: 50 INJECTION, SOLUTION INTRAMUSCULAR; INTRAVENOUS at 14:41

## 2018-11-19 RX ADMIN — METOPROLOL TARTRATE 50 MG: 50 TABLET ORAL at 17:57

## 2018-11-19 RX ADMIN — SODIUM CHLORIDE 100 ML/HR: 900 INJECTION, SOLUTION INTRAVENOUS at 18:01

## 2018-11-19 RX ADMIN — VANCOMYCIN HYDROCHLORIDE 1000 MG: 10 INJECTION, POWDER, LYOPHILIZED, FOR SOLUTION INTRAVENOUS at 13:55

## 2018-11-19 RX ADMIN — Medication 10 ML: at 17:58

## 2018-11-19 RX ADMIN — SODIUM CHLORIDE, SODIUM LACTATE, POTASSIUM CHLORIDE, AND CALCIUM CHLORIDE 75 ML/HR: 600; 310; 30; 20 INJECTION, SOLUTION INTRAVENOUS at 13:56

## 2018-11-19 RX ADMIN — DOCUSATE SODIUM 100 MG: 100 CAPSULE, LIQUID FILLED ORAL at 17:57

## 2018-11-19 RX ADMIN — CELECOXIB 200 MG: 100 CAPSULE ORAL at 17:57

## 2018-11-19 RX ADMIN — SUCCINYLCHOLINE CHLORIDE 20 MG/ML INJECTION SOLUTION 120 MG: SOLUTION at 14:44

## 2018-11-19 RX ADMIN — FAMOTIDINE 20 MG: 10 INJECTION INTRAVENOUS at 13:56

## 2018-11-19 RX ADMIN — OXYCODONE HYDROCHLORIDE AND ACETAMINOPHEN 2 TABLET: 7.5; 325 TABLET ORAL at 21:29

## 2018-11-19 RX ADMIN — LIDOCAINE HYDROCHLORIDE 60 MG: 20 INJECTION, SOLUTION EPIDURAL; INFILTRATION; INTRACAUDAL; PERINEURAL at 14:44

## 2018-11-19 RX ADMIN — FERROUS SULFATE TAB 325 MG (65 MG ELEMENTAL FE) 325 MG: 325 (65 FE) TAB at 17:57

## 2018-11-19 RX ADMIN — WATER 2 G: 1 INJECTION INTRAMUSCULAR; INTRAVENOUS; SUBCUTANEOUS at 14:50

## 2018-11-19 NOTE — INTERVAL H&P NOTE
H&P Update:  Eli Drew was seen and examined. History and physical has been reviewed. The patient has been examined.  There have been no significant clinical changes since the completion of the originally dated History and Physical.    Signed By: Thelma Shields MD     November 19, 2018 2:15 PM

## 2018-11-19 NOTE — OP NOTES
[address] [place] [address] Dr  OPERATIVE REPORT    Kiko Bonds  MR#: [id number]  : 1950  ACCOUNT #: [de-identified]   DATE OF SERVICE: 2018    PREOPERATIVE DIAGNOSIS:  Wound breakdown, likely secondary to patient positioning due to direct pressure, lateral femur, with subsequent likely bacterial contamination secondary to noncompliance. POSTOPERATIVE DIAGNOSIS:  Wound breakdown, likely secondary to patient positioning due to direct pressure, lateral femur, with subsequent likely bacterial contamination secondary to noncompliance. PROCEDURE PERFORMED:  Wound exploration, incision, drainage, and placement of antibiotic bead pellets, thorough irrigation, and closure, with removing a 3 cm elliptical area of the skin ulcer. SURGEON:  Melissa Montgomery MD    COMPLICATIONS:  None. SPECIMENS REMOVED:  Cultures. ESTIMATED BLOOD LOSS:  10 mL. ASSISTANT:  Michelle Woods. SECOND ASSISTANT:  Susan Bond. ANESTHESIOLOGIST:  Dr. Vivica Osgood. ANESTHESIA:  General.    IMPLANTS:  n/a    Michelle Woods was the first assistant and assisted with all phases of the surgery, commencing with patient positioning, patient prep, patient drape, leg positioning during surgery, retractoring, assisting with the surgery itself, closure, dressing placement, and transfer. INDICATIONS:  The patient is a pleasant enough lady who is willfully noncompliant, unfortunately. Her tale of woe started when she initially had a dog bite and waited 12 or 14 hours after her open fracture to report to the hospital, and I initially took care of, did initial irrigation and debridement, and then a couple of days later, my associate, Dr. David Ugarte, did definitive open reduction and internal fixation for her. Several days later, she fell and sustained a supracondylar femur fracture above a total knee replacement that was done elsewhere.     Due to known and suspected patient compliance issues, the patient was initially placed in a cast, and the patient essentially signed herself out against medical advice and threatened to cut the cast off herself, and one of my associates cut her cast off. She was noncompliant at home and did not do well, and then came back again in to the hospital, this time with medical advice, and she was educated on nonweightbearing, which she was ignoring and was walking on it, and we placed another cast.  We had a lengthy discussion regarding the importance of nonweightbearing, and we also told her not to lie on her side on that cast as it would put pressure over the wound, which she was doing as well. She missed an appointment in the office, and then finally when she returned, the cast was destroyed due to her walking on it. There was mud and dirt on her feet. She had been outside with the cast, and she admitted that she was not using her walker and she was just walking on it, also had been lying on her side. The cast was removed and she had approximately a 2 cm area where she had had some obvious pressure applied, and she had wound dehiscence at that level. It was cultured and I placed her on some antibiotics with plans to revise the wound and explore, and because her distal femur fracture is supracondylar to a total knee replacement, my concerns are for seeding of the knee. With this in mind, I will have Infectious Disease see her, and I explained to her the seriousness of the situation, that if her knee replacement gets infected, then it is going to mean removal of the knee replacement as well. We also talked about limb loss and revision of fixation. Her last x-rays showed that the fracture fragments actually were quite well reduced. She had perhaps just slight posterior angulation, but otherwise stable fixation.      DESCRIPTION OF PROCEDURE:  With this in mind, the patient was brought to the operating room, and after the anesthetic was successfully induced, we confirmed that she received an antibiotic and standard prep and drape. I marked the previous incision, and after the timeout was performed, I removed an ellipse of skin approximately 2-3 cm to incorporate the central area where the wound had broken down, and interestingly enough, this was not in the usual location. Usually it is more distal where they are going to have an issue, if they do. We opened up the skin at that level. The fascia was intact except for the very distal aspect of the wound. In fact, over the area of ulceration, the fascia was intact; however, the area distally, about 4 cm, did communicate with the joint itself, and even though there was not any drainage coming from that region, I would consider it to be communicating. With this in mind, I did open up the fascia another couple of centimeters and cultures were taken that looked very clean in appearance. There was no purulent activity. Again, cultures were taken. We then liberally irrigated out. We did fashion antibiotic beads. I looked back at the tissues. There was nothing further to debride. The ellipse took care of the problem, and then we placed the antibiotic beads in the joint itself as well as superficial, and closed with monofilament suture, I did not think there was any need for a drain, and nylon closure for the skin. At the end of the case, instrument, sponge, and needle count was correct. There were no complications. There was no tourniquet utilized. I will have Infectious Disease see her again. Plan will likely be for a good 4 weeks of intravenous antibiotics, if not longer, given what is at stake. Unfortunately, the patient is a very nice lady who is severely noncompliant, and hopefully this will be beneficial to her and will not have any further surgery. I did spend a few extra minutes with her today discussing the serious nature of the situation.       Fernanda Nova MD AM / BHARGAV  D: 11/19/2018 15:33     T: 11/19/2018 16:31  JOB #: [id number]  CC: Oc Tobar Dorothea Herrmann MD

## 2018-11-19 NOTE — PERIOP NOTES
TRANSFER - OUT REPORT:    Verbal report given to [facility] ADA RN(name) on Thomas Henry  being transferred to [address] [place] [address](unit) for routine post - op       Report consisted of patients Situation, Background, Assessment and   Recommendations(SBAR). Information from the following report(s) SBAR, Kardex, OR Summary, Procedure Summary, Intake/Output, MAR, Recent Results and Med Rec Status was reviewed with the receiving nurse. Lines:   Peripheral IV 11/19/18 Right Antecubital (Active)   Site Assessment Clean, dry, & intact 11/19/2018  3:41 PM   Phlebitis Assessment 0 11/19/2018  3:41 PM   Infiltration Assessment 0 11/19/2018  3:41 PM   Dressing Status Clean, dry, & intact 11/19/2018  3:41 PM   Dressing Type Tape;Transparent 11/19/2018  3:41 PM   Hub Color/Line Status Pink; Infusing 11/19/2018  3:41 PM   Action Taken Blood drawn 11/19/2018  1:56 PM   Alcohol Cap Used No 11/19/2018  1:56 PM        Opportunity for questions and clarification was provided.       Patient transported with:   Registered Nurse  Tech

## 2018-11-19 NOTE — ANESTHESIA PREPROCEDURE EVALUATION
Anesthetic History No history of anesthetic complications Review of Systems / Medical History Patient summary reviewed and pertinent labs reviewed Pulmonary Sleep apnea: No treatment Neuro/Psych Psychiatric history Cardiovascular Hypertension: well controlled Dysrhythmias Exercise tolerance: <4 METS 
  
GI/Hepatic/Renal 
  
GERD: well controlled Renal disease: CRI Endo/Other Diabetes: well controlled, type 2 Morbid obesity, arthritis and anemia Other Findings Comments: Documentation of current medication Current medications obtained, documented and obtained? YES Risk Factors for Postoperative nausea/vomiting: 
     History of postoperative nausea/vomiting? NO Female? YES Motion sickness? NO Intended opioid administration for postoperative analgesia? YES Smoking Abstinence: 
Current Smoker? NO Elective Surgery? NO Seen preoperatively by anesthesiologist or proxy prior to day of surgery? YES Pt abstained from smoking 24 hours prior to anesthesia? N/A Preventive care/screening for High Blood Pressure: 
Aged 18 years and older: YES Screened for high blood pressure: YES Patients with high blood pressure referred to primary care provider 
 for BP management: YES Physical Exam 
 
Airway Mallampati: III 
TM Distance: 4 - 6 cm Neck ROM: decreased range of motion Mouth opening: Diminished (comment) Cardiovascular Rhythm: regular Rate: normal 
 
 
 
 Dental 
 
Dentition: Edentulous Pulmonary Breath sounds clear to auscultation Abdominal 
GI exam deferred Other Findings Anesthetic Plan ASA: 3 Anesthesia type: general - supraclavicular block Induction: Intravenous Anesthetic plan and risks discussed with: Patient

## 2018-11-19 NOTE — ANESTHESIA POSTPROCEDURE EVALUATION
Procedure(s): 
INCISION AND DRAINAGE, WOUND REVISION, INSERTION OF ANTIOBIOTIC CEMEMT BEADS LEFT FEMUR. Anesthesia Post Evaluation Multimodal analgesia: multimodal analgesia used between 6 hours prior to anesthesia start to PACU discharge Patient location during evaluation: bedside Patient participation: complete - patient participated Level of consciousness: awake Pain management: adequate Airway patency: patent Anesthetic complications: no 
Cardiovascular status: acceptable Respiratory status: acceptable Hydration status: acceptable Post anesthesia nausea and vomiting:  none Visit Vitals /68 Pulse 63 Temp 36.8 °C (98.2 °F) Resp 13 Ht 5' 2\" (1.575 m) Wt 82.6 kg (182 lb) SpO2 100% BMI 33.29 kg/m²

## 2018-11-19 NOTE — BRIEF OP NOTE
BRIEF OPERATIVE NOTE    Date of Procedure: 11/19/2018   Preoperative Diagnosis: L03.90 CELLULITIS  Postoperative Diagnosis: L03.90 CELLULITIS    Procedure(s):  INCISION AND DRAINAGE REVISION LEFT FEMUR, wound revision, abx beads  Surgeon(s) and Role:     * Nani Ansari MD - Primary         Surgical Assistant: Natty Zavala    Surgical Staff:  Circ-1: Snehal Dietz RN  Physician Assistant: Roseline Cervantes PA-C  Scrub Tech-1: Ron Cambemeli  Surg Asst-1: Kelly Woods  Event Time In Time Out   Incision Start 1459    Incision Close       Anesthesia: General   Estimated Blood Loss: 25ml  Specimens:   ID Type Source Tests Collected by Time Destination   1 : left leg Wound Leg, Left CULTURE, ANAEROBIC, CULTURE, WOUND W Byrnes Veronica STAIN Babatunde Ramirez MD 11/19/2018 1507 Microbiology      Findings: same with communication to plate and tkr  Complications: none  Implants: * No implants in log *

## 2018-11-20 LAB
BASOPHILS # BLD: 0 K/UL (ref 0–0.1)
BASOPHILS NFR BLD: 0 % (ref 0–2)
CK SERPL-CCNC: 63 U/L (ref 26–192)
CRP SERPL-MCNC: 0.9 MG/DL (ref 0–0.3)
DIFFERENTIAL METHOD BLD: ABNORMAL
EOSINOPHIL # BLD: 0.1 K/UL (ref 0–0.4)
EOSINOPHIL NFR BLD: 2 % (ref 0–5)
ERYTHROCYTE [DISTWIDTH] IN BLOOD BY AUTOMATED COUNT: 15.6 % (ref 11.6–14.5)
GLUCOSE BLD STRIP.AUTO-MCNC: 100 MG/DL (ref 70–110)
GLUCOSE BLD STRIP.AUTO-MCNC: 143 MG/DL (ref 70–110)
GLUCOSE BLD STRIP.AUTO-MCNC: 183 MG/DL (ref 70–110)
GLUCOSE BLD STRIP.AUTO-MCNC: 184 MG/DL (ref 70–110)
GLUCOSE BLD STRIP.AUTO-MCNC: 45 MG/DL (ref 70–110)
HCT VFR BLD AUTO: 26.5 % (ref 35–45)
HGB BLD-MCNC: 8.3 G/DL (ref 12–16)
LYMPHOCYTES # BLD: 1.5 K/UL (ref 0.9–3.6)
LYMPHOCYTES NFR BLD: 19 % (ref 21–52)
MCH RBC QN AUTO: 25.8 PG (ref 24–34)
MCHC RBC AUTO-ENTMCNC: 31.3 G/DL (ref 31–37)
MCV RBC AUTO: 82.3 FL (ref 74–97)
MONOCYTES # BLD: 0.7 K/UL (ref 0.05–1.2)
MONOCYTES NFR BLD: 9 % (ref 3–10)
NEUTS SEG # BLD: 5.3 K/UL (ref 1.8–8)
NEUTS SEG NFR BLD: 70 % (ref 40–73)
PLATELET # BLD AUTO: 246 K/UL (ref 135–420)
PMV BLD AUTO: 9.7 FL (ref 9.2–11.8)
RBC # BLD AUTO: 3.22 M/UL (ref 4.2–5.3)
WBC # BLD AUTO: 7.6 K/UL (ref 4.6–13.2)

## 2018-11-20 PROCEDURE — 65270000029 HC RM PRIVATE

## 2018-11-20 PROCEDURE — 97161 PT EVAL LOW COMPLEX 20 MIN: CPT

## 2018-11-20 PROCEDURE — 97165 OT EVAL LOW COMPLEX 30 MIN: CPT

## 2018-11-20 PROCEDURE — 74011636637 HC RX REV CODE- 636/637: Performed by: ORTHOPAEDIC SURGERY

## 2018-11-20 PROCEDURE — 74011250636 HC RX REV CODE- 250/636: Performed by: ORTHOPAEDIC SURGERY

## 2018-11-20 PROCEDURE — 97530 THERAPEUTIC ACTIVITIES: CPT

## 2018-11-20 PROCEDURE — 36415 COLL VENOUS BLD VENIPUNCTURE: CPT

## 2018-11-20 PROCEDURE — 82962 GLUCOSE BLOOD TEST: CPT

## 2018-11-20 PROCEDURE — 86140 C-REACTIVE PROTEIN: CPT

## 2018-11-20 PROCEDURE — 74011000250 HC RX REV CODE- 250: Performed by: ORTHOPAEDIC SURGERY

## 2018-11-20 PROCEDURE — 74011250637 HC RX REV CODE- 250/637: Performed by: ORTHOPAEDIC SURGERY

## 2018-11-20 PROCEDURE — 82550 ASSAY OF CK (CPK): CPT

## 2018-11-20 PROCEDURE — 85025 COMPLETE CBC W/AUTO DIFF WBC: CPT

## 2018-11-20 RX ORDER — VANCOMYCIN/0.9 % SOD CHLORIDE 1.5G/250ML
1500 PLASTIC BAG, INJECTION (ML) INTRAVENOUS
Status: DISCONTINUED | OUTPATIENT
Start: 2018-11-20 | End: 2018-11-21

## 2018-11-20 RX ADMIN — OXYCODONE HYDROCHLORIDE AND ACETAMINOPHEN 2 TABLET: 7.5; 325 TABLET ORAL at 10:37

## 2018-11-20 RX ADMIN — OXYCODONE HYDROCHLORIDE AND ACETAMINOPHEN 2 TABLET: 7.5; 325 TABLET ORAL at 21:40

## 2018-11-20 RX ADMIN — INSULIN LISPRO 2 UNITS: 100 INJECTION, SOLUTION INTRAVENOUS; SUBCUTANEOUS at 11:32

## 2018-11-20 RX ADMIN — AMIODARONE HYDROCHLORIDE 400 MG: 200 TABLET ORAL at 09:00

## 2018-11-20 RX ADMIN — FERROUS SULFATE TAB 325 MG (65 MG ELEMENTAL FE) 325 MG: 325 (65 FE) TAB at 17:37

## 2018-11-20 RX ADMIN — FERROUS SULFATE TAB 325 MG (65 MG ELEMENTAL FE) 325 MG: 325 (65 FE) TAB at 08:34

## 2018-11-20 RX ADMIN — CELECOXIB 200 MG: 100 CAPSULE ORAL at 08:51

## 2018-11-20 RX ADMIN — DOCUSATE SODIUM 100 MG: 100 CAPSULE, LIQUID FILLED ORAL at 08:54

## 2018-11-20 RX ADMIN — METOPROLOL TARTRATE 50 MG: 50 TABLET ORAL at 08:54

## 2018-11-20 RX ADMIN — DULOXETINE HYDROCHLORIDE 60 MG: 60 CAPSULE, DELAYED RELEASE ORAL at 08:54

## 2018-11-20 RX ADMIN — DEXTROSE MONOHYDRATE 25 G: 25 INJECTION, SOLUTION INTRAVENOUS at 06:17

## 2018-11-20 RX ADMIN — Medication 10 ML: at 17:38

## 2018-11-20 RX ADMIN — MONTELUKAST SODIUM 10 MG: 10 TABLET, FILM COATED ORAL at 08:51

## 2018-11-20 RX ADMIN — Medication 10 ML: at 21:47

## 2018-11-20 RX ADMIN — OXYCODONE HYDROCHLORIDE AND ACETAMINOPHEN 2 TABLET: 7.5; 325 TABLET ORAL at 01:25

## 2018-11-20 RX ADMIN — FUROSEMIDE 40 MG: 40 TABLET ORAL at 08:54

## 2018-11-20 RX ADMIN — CELECOXIB 200 MG: 100 CAPSULE ORAL at 17:36

## 2018-11-20 RX ADMIN — VANCOMYCIN HYDROCHLORIDE 1500 MG: 10 INJECTION, POWDER, LYOPHILIZED, FOR SOLUTION INTRAVENOUS at 13:23

## 2018-11-20 RX ADMIN — OXYCODONE HYDROCHLORIDE AND ACETAMINOPHEN 2 TABLET: 7.5; 325 TABLET ORAL at 05:33

## 2018-11-20 RX ADMIN — PANTOPRAZOLE SODIUM 40 MG: 40 TABLET, DELAYED RELEASE ORAL at 06:33

## 2018-11-20 RX ADMIN — ASPIRIN 325 MG: 325 TABLET, FILM COATED ORAL at 08:51

## 2018-11-20 RX ADMIN — LISINOPRIL 40 MG: 40 TABLET ORAL at 08:54

## 2018-11-20 RX ADMIN — ASPIRIN 325 MG: 325 TABLET, FILM COATED ORAL at 17:37

## 2018-11-20 RX ADMIN — DOCUSATE SODIUM 100 MG: 100 CAPSULE, LIQUID FILLED ORAL at 17:36

## 2018-11-20 RX ADMIN — METOPROLOL TARTRATE 50 MG: 50 TABLET ORAL at 17:36

## 2018-11-20 NOTE — PROGRESS NOTES
Problem: Self Care Deficits Care Plan (Adult)  Goal: *Acute Goals and Plan of Care (Insert Text)  Occupational Therapy Goals  Initiated 11/20/2018 within 7 day(s). 1.  Patient will perform lower body dressing with contact guard assist   2. Patient will perform toileting with contact guard assist.  3.  Patient will perform toilet transfer with contact guard assist.  4.  Patient will participate in upper extremity therapeutic exercise/activities with independence for 10-15 minutes. 5. Patient will maintain LLE NWB status 100% of the time during ADLs and functional transfers/mobility, for added safety during functional activity. Outcome: Progressing Towards Goal  Occupational Therapy EVALUATION    Patient: Simin Hung (27 y.o. female)  Date: 11/20/2018  Primary Diagnosis: L03.90 CELLULITIS  Wound dehiscence  Procedure(s) (LRB):  INCISION AND DRAINAGE, WOUND REVISION, INSERTION OF ANTIOBIOTIC CEMEMT BEADS LEFT FEMUR (Left) 1 Day Post-Op   Precautions: (LLE NWB; LLE brace at all times)    ASSESSMENT :  Ms. Lyudmila Bay is a 79 yr old female admitted to the hospital 11-19-18 with wound breakdown, likely secondary to patient positioning due to direct pressure, lateral L femur, with subsequent likely bacterial contamination secondary to noncompliance; she is status-post a revision and I & D on 11-19-18. During the OT eval today, the pt required stand-by assist for supine to sit, mod assist for lower body dressing, mod assist for sit to stand with a RW, and min assist for L LE management for sit to supine. The pt is limited by L LE pain and ROM limitations, impaired standing balance, and difficulty maintaining L LE NWB status in standing; pt was only able to maintain NWB status ~50% of the time, however she required cues in order to do so. The pt will benefit from further OT services for ADL instruction and to maximize her overall functional independence.  Pt was left in semi-rowland's with needs in reach at the end of the session. Patient will benefit from skilled intervention to address the above impairments. Patients rehabilitation potential is considered to be Good  Factors which may influence rehabilitation potential include:   []             None noted  []             Mental ability/status  []             Medical condition  []             Home/family situation and support systems  []             Safety awareness  [x]             Pain tolerance/management  []             Other:      PLAN :  Recommendations and Planned Interventions:  [x]               Self Care Training                  [x]        Therapeutic Activities  [x]               Functional Mobility Training    []        Cognitive Retraining  [x]               Therapeutic Exercises           [x]        Endurance Activities  [x]               Balance Training                   []        Neuromuscular Re-Education  []               Visual/Perceptual Training     [x]   Home Safety Training  [x]               Patient Education                 []        Family Training/Education  []               Other (comment):    Frequency/Duration: Patient will be followed by occupational therapy 1-2 times per day/4-7 days per week to address goals. Discharge Recommendations: Diego Ahmadi  Further Equipment Recommendations for Discharge: To be determined pending functional progress      Barriers to Learning/Limitations: None  Compensate with: visual, verbal, tactile, kinesthetic cues/model     PATIENT COMPLEXITY      Eval Complexity: History: LOW Complexity : Brief history review ; Examination: LOW Complexity : 1-3 performance deficits relating to physical, cognitive , or psychosocial skils that result in activity limitations and / or participation restrictions ;  Decision Making:LOW Complexity : No comorbidities that affect functional and no verbal or physical assistance needed to complete eval tasks  Assessment: Low Complexity     G-CODES:     Self Care  Current  CK= 40-59%  [id number] Goal  CJ= 20-39%. The severity rating is based on the Level of Assistance required for Functional Mobility and ADLs. SUBJECTIVE:   Patient stated, \"I sat on a bucket. \" (pt was referring to using a bucket as a shower seat)    OBJECTIVE DATA SUMMARY:     Past Medical History:   Diagnosis Date    Anxiety     Arthritis     [facility]bestosis ([facility]     Depression     [facility]     [facility]     right leg    Fibromyalgia     GERD (gastroesophageal reflux disease)     Hypercholesteremia     Hypertension     Pulmonary emboli ([facility] 2016    Renal insufficiency     Skin abscess     Sleep apnea     does not use cpap regularly     Past Surgical History:   Procedure Laterality Date    HX BREAST LUMPECTOMY      HX CARPAL TUNNEL RELEASE      HX GASTRIC BYPASS  2015    HX HYSTERECTOMY      HX KNEE REPLACEMENT      bilater    HX ORTHOPAEDIC Left 09/26/2018    ORIF left patella    HX PARTIAL THYROIDECTOMY      HX SHOULDER ARTHROSCOPY      HX TUBAL LIGATION       Prior Level of Function/Home Situation: Pt was modified independent with toileting, bathing, and dressing. Home Situation  Home Environment: Private residence(house)  # Steps to Enter: 3  Rails to Enter: Yes  Hand Rails : Bilateral  One/Two Story Residence: One story  Living Alone: No(Lives with her daughter who is home during the day.)  Support Systems: Child(ham)  Patient Expects to be Discharged to[de-identified] Rehabilitation facility  Current DME Used/Available at Home: Georgina Hoffmann, straight, Hospital bed, Walker, rollator, Walker, rolling;transport w/c  Tub or Shower Type: (walk-in shower)  [x]  Right hand dominant   []  Left hand dominant  Cognitive/Behavioral Status:  Neurologic State: Alert  Orientation Level: Oriented X4  Cognition: Appropriate decision making; Appropriate for age attention/concentration; Follows commands     Coordination:  Coordination: Within functional limits(BUE and BLE) Balance:  Sitting: Impaired; With support  Sitting - Static: (normal)  Sitting - Dynamic: (good)  Standing: Impaired; With support  Standing - Static: (poor with RW)  Standing - Dynamic : Poor    Strength:  Strength: Within functional limits for BUE; BUE biceps, triceps, &  strength 4/5 to 4+/5)        Tone & Sensation:  Tone: Normal(BUE)      Range of Motion:  AROM: (LLE knee ROM limited, due to applied brace; BUE & RLE AROM WFL)      Functional Mobility and Transfers for ADLs:  Bed Mobility:     Supine to Sit: Stand-by assistance  Sit to Supine: Stand-by assistance  Scooting: Stand-by assistance     Transfers:  Sit to Stand: Moderate assistance; Adaptive equipment     Treatment: Functional transfer training was provided, in order to maximize the pt's overall functional participation needed for ADL completion. The pt required stand-by assist with mildly increased time for supine to sit & scooting to EOB. Next, the pt required mod assist with mod verbal cues for BLE placement, L LE NWB status, and pushing up from the bed with her UE, in order to complete sit to stand (RW used for support). Pt was able to maintain LLE NWB status ~50% of the time, however she required verbal cues to maintain. Lastly, pt required mod assist for stand to sit, min assist for L LE management for sit to supine, and stand-by assist with verbal cues for pushing with RLE in order to complete scooting in supine (bed placed in gravity eliminated position for scooting). ADL Assessment:  Feeding: Independent    Oral Facial Hygiene/Grooming: Setup    Bathing: Moderate assistance    Upper Body Dressing: Setup    Lower Body Dressing: Moderate assistance    Toileting: Moderate assistance        Pain:  Pt reports 6/10 pain or discomfort prior to treatment.    Pt reports 6/10 pain or discomfort post treatment. Activity Tolerance:   Good    Please refer to the flowsheet for vital signs taken during this treatment.   After treatment: [] Patient left in no apparent distress sitting up in chair  [x] Patient left in no apparent distress in bed  [x] Call bell left within reach  [] Nursing notified  [] Caregiver present  [] Bed alarm activated    COMMUNICATION/EDUCATION:   [] Home safety education was provided and the patient/caregiver indicated understanding. [x] Patient/family have participated as able in goal setting and plan of care. [x] Patient/family agree to work toward stated goals and plan of care. [] Patient understands intent and goals of therapy, but is neutral about his/her participation. [] Patient is unable to participate in goal setting and plan of care.     Thank you for this referral.  Rupa Jade OT  Time Calculation: 25 mins

## 2018-11-20 NOTE — PROGRESS NOTES
Problem: Mobility Impaired (Adult and Pediatric)  Goal: *Acute Goals and Plan of Care (Insert Text)  Physical Therapy Goals  Initiated 11/20/2018 and to be accomplished within 7 day(s) while maintaining NWB LLE:  1. Patient will move from supine to sit and sit to supine, scoot up and down and roll side to side in bed with supervision/set-up. 2.  Patient will transfer from bed to chair and chair to bed with minimal assistance via SPT or with LRAD. 3.  Patient will perform sit to stand with minimal assistance. 4.  Patient will perform transfer from bed <> drop arm BSC/WC with minimum assistance. physical Therapy EVALUATION    Patient: Angela Davidson (79 y.o. female)  Date: 11/20/2018  Primary Diagnosis: L03.90 CELLULITIS  Wound dehiscence  Procedure(s) (LRB):  INCISION AND DRAINAGE, WOUND REVISION, INSERTION OF ANTIOBIOTIC CEMEMT BEADS LEFT FEMUR (Left) 1 Day Post-Op   Precautions:  NWB(LLE; brace at all times)     ASSESSMENT :  Patient is 80 yo F admitted to hospital for left leg wound revision due to noncompliance with NWB from fracture. Patient presents today alert and agreeable to therapy and was educated on weight bearing status, role of therapy and equipment in room including role of SCDs and ice pack. Patient demonstrated poor compliance requiring MaxA for WB status. Patient performed objective assessment and was given demo with instruction on sit <> stand transfer and pivot to bed. With gait belt donned and therapists foot under LLE for compliance, patient transferred to standing and transferred to bed. Gave demo with instructions on transfer board and scooting and patient practiced several scoots. At conclusion of session patient transferred back to sitting in recliner and was left resting with call bell by the side and instructions not to get up without assist. Patient denied need for further assistance.  Patient demonstrates decreased strength, mobility, and endurance and will benefit from skilled intervention to address the above impairments. Patients rehabilitation potential is considered to be Fair  Factors which may influence rehabilitation potential include:   []         None noted  [x]         Mental ability/status  [x]         Medical condition  [x]         Home/family situation and support systems  [x]         Safety awareness  [x]         Pain tolerance/management  []         Other:      PLAN :  Recommendations and Planned Interventions:  [x]           Bed Mobility Training             [x]    Neuromuscular Re-Education  [x]           Transfer Training                   []    Orthotic/Prosthetic Training  [x]           Gait Training                          []    Modalities  [x]           Therapeutic Exercises          []    Edema Management/Control  [x]           Therapeutic Activities            [x]    Patient and Family Training/Education  []           Other (comment):    Frequency/Duration: Patient will be followed by physical therapy 1-2 times per day/4-7 days per week to address goals. Discharge Recommendations: Diego Ahmadi  Further Equipment Recommendations for Discharge: N/A     G-CODES     Mobility  Current  CL= 60-79%   Goal  CJ= 20-39%. The severity rating is based on the Level of Assistance required for Functional Mobility and ADLs.        G-CODES     Eval Complexity: History: MEDIUM  Complexity : 1-2 comorbidities / personal factors will impact the outcome/ POC Exam:LOW Complexity : 1-2 Standardized tests and measures addressing body structure, function, activity limitation and / or participation in recreation  Presentation: LOW Complexity : Stable, uncomplicated  Clinical Decision Making:Low Complexity   Overall Complexity:LOW     SUBJECTIVE:   Patient stated It's really hard to do this.     OBJECTIVE DATA SUMMARY:     Past Medical History:   Diagnosis Date    Anxiety     Arthritis     [facility]bestosis [facility]     Depression     Diabetes ([facility]     DVT (deep venous thrombosis) (HCC)     right leg    Fibromyalgia     GERD (gastroesophageal reflux disease)     Hypercholesteremia     Hypertension     Pulmonary emboli ([facility] 2016    Renal insufficiency     Skin abscess     Sleep apnea     does not use cpap regularly     Past Surgical History:   Procedure Laterality Date    HX BREAST LUMPECTOMY      HX CARPAL TUNNEL RELEASE      HX GASTRIC BYPASS  2015    HX HYSTERECTOMY      HX KNEE REPLACEMENT      bilater    HX ORTHOPAEDIC Left 09/26/2018    ORIF left patella    HX PARTIAL THYROIDECTOMY      HX SHOULDER ARTHROSCOPY      HX TUBAL LIGATION       Barriers to Learning/Limitations: yes;  cognitive  Compensate with: Visual Cues, Verbal Cues and Tactile Cues  Prior Level of Function/Home Situation: Patient plans to return to her daughter's home and has WC, hospital bed, and RW ordered for home. Patient reports her daughter's house is 1 story home with 2STE. PTA patient was performing basin bath and was non-compliant with NWB LLE. Home Situation  Home Environment: Apartment  One/Two Story Residence: One story  Living Alone: No  Support Systems: Child(ham), Family member(s), Friends \ neighbors  Patient Expects to be Discharged to[de-identified] Apartment  Current DME Used/Available at Home: Hospital bed, Walker, rolling, Wheelchair  Critical Behavior:    A&Ox4  Strength:    Strength: Generally decreased, functional(Did not test LLE)   Tone & Sensation:   Tone: Normal(BLE)    Sensation: Intact(BLE)   Range Of Motion:  AROM: Generally decreased, functional(Did not test LLE)   Functional Mobility:  Bed Mobility:   Scooting: Moderate assistance(On transfer board for demo)  Transfers:  Sit to Stand: Moderate assistance;Maximum assistance(to maintain NWB LLE)  Stand to Sit: Moderate assistance;Maximum assistance    Balance:   Sitting: Impaired; With support  Sitting - Static: Good (unsupported)  Sitting - Dynamic: Fair (occasional)  Standing: Impaired; With support  Standing - Static: Poor  Standing - Dynamic : Poor  Pain:  Pt reports 7/10 pain or discomfort prior to treatment.    Pt reports 8/10 pain or discomfort post treatment. (patient notified RN of request for pain meds)    Activity Tolerance:   Patient demos poor tolerance to NWB with MaxA required from PT for compliance with verbal cues and placing foot under patient's to gauge compliance. Please refer to the flowsheet for vital signs taken during this treatment. After treatment:   [x]         Patient left in no apparent distress sitting up in chair  []         Patient left in no apparent distress in bed  [x]         Call bell left within reach  [x]         Nursing notified Brianna Concepcion)  []         Caregiver present  []         Bed alarm activated  []         SCDs in place to B LE     COMMUNICATION/EDUCATION:   [x]         Fall prevention education was provided and the patient/caregiver indicated understanding. [x]         Patient/family have participated as able in goal setting and plan of care. [x]         Patient/family agree to work toward stated goals and plan of care. []         Patient understands intent and goals of therapy, but is neutral about his/her participation. []         Patient is unable to participate in goal setting and plan of care.     Thank you for this referral.  Popeye Avalos, PT   Time Calculation: 24 mins

## 2018-11-20 NOTE — PROGRESS NOTES
Bedside and Verbal shift change report given to CHESTER Moody (oncoming nurse) by Lila Pineda RN (offgoing nurse). Report included the following information SBAR, Kardex, Procedure Summary, Intake/Output and Recent Results.

## 2018-11-20 NOTE — PROGRESS NOTES
Reason for Admission:   L03.90 CELLULITIS  Wound dehiscence               RRAT Score:     34             Resources/supports as identified by patient/family:   She has [employer] as well as Medicaid for insurance. She will be moving with her daughter: Amy Faria upon discharge. Her new address will be [address] [place] [address], [address] (daughter's home). Top Challenges facing patient (as identified by patient/family and CM): Finances/Medication cost?     She did not report any financial strain. She states that she can obtain her medications from [facility], and take her medications as directed. She'll be transferring her care (doctors, & pharmacy) to [facility]. upon discharge. Transportation      Daughter    Support system or lack thereof? Daughter    Living arrangements? As mentioned above, she will be moving in with her daughter: Rosa Espitia in [facility].     Self-care/ADLs/Cognition? A/Ox4. She states that she can do her ADLs, but she cannot do her IADLs. She states that her daughter will help her. Current Advanced Directive/Advance Care Plan (ACP):   yes, with [facility]; this NN asked the patient to obtain a copy for us. Plan for utilizing home health:    yes; Freedom of choice signed: yes, for [facility] since patient is moving to [facility]. Likelihood of readmission:   HIGH    Transition of Care Plan:                Initial assessment completed with patient. Face sheet information confirmed:  yes. This patient lives in a single family home with her daughter. Patient is not able to navigate steps as needed. Prior to hospitalization, patient was considered to be independent : no . If not independent,  patient needs assist with cleaning, and cooking. She states that she can do her ADLs.   Cognitive status of patient:  oriented to time, place, person and situation    The patient's daughter will be available to transport patient home upon discharge. The patient has hospital bed, trapeze, RW, WC, & bedside commode available in the home. She states that she needs a shower chair. Patient is currently active with home health- [address]. Patient has stayed in a skilled nursing facility or rehab- [address]; she was discharged home with Suzanne Brenner on 10/29/18. Was  stay within last 60 days : yes. List of available Home Health was reviewed with the patient prior to discharge. Freedom of choice signed: yes, for [facility] since patient is moving. Currently, the discharge plan is Home with [address]. Care Management Interventions  PCP Verified by CM: Yes(Last seen by Maritza Smith PCP last week)  Mode of Transport at Discharge: Self  Transition of Care Consult (CM Consult): Discharge Planning, [address]: No  Reason Outside [place]: Out of service area  Discharge Durable Medical Equipment: (She needs a shower chair as per patient; she has a hospital bed, trapeze, WC, RW, & bedside commode)  Physical Therapy Consult: Yes  Occupational Therapy Consult: Yes  Current Support Network:  Other(She will be living with her daughter: Jojo Puckett)  Confirm Follow Up Transport: Family(daughter)  Plan discussed with Pt/Family/Caregiver: Yes  Freedom of Choice Offered: [facility] (2-[facility]) Home Care)  Discharge Location  Discharge Placement: Home with home health      Arvind Cotto [facility]  Care Management  [phone]

## 2018-11-20 NOTE — CONSULTS
[facility]  Pulmonary, Critical Care, and Sleep Medicine    Initial Patient Consult    Name: Amelia Musa MRN: [id number]   : 1950 Hospital: [address] [place] [address] Dr   Date: 2018        IMPRESSION:   · Left leg surgical would dehiscence concerning for possible prosthetic infection  · DM last A1C 6.5  · REBECA noncompliant with CPAP. Persistent of daytime hypersomnolence consistent with untreated REBECA  · History of PE with high prob VQ scan    · HTN  · GERD  · Pulmonary HTN peak pressure 47 mm Hg unchanged from 2014 differential include Grp 3 and CTEPH  · Acute on Chronic kidney disease baseline Creatinine 1.5  · Poor compliance with REBECA and follow up visits posing barrier to care      RECOMMENDATIONS:   · Regular progression of post op care  · Pt instructed to bring in home CPAP for use HS while in hospital, see order  · Resume HTN medications today and monitor response  · Mobilization per PT/OT  · Pain management and DVT prophylaxis per Ortho service. Caution on use of potentially sedating medications in light of REBECA and underlying EDS  · Monitor electroytes and renal indices. Would pt benefit from balanced crystalloids rather than normal saline. Possible nephrology consult if JAMES worsens  · Encouraged incentive spirometry and bronchial hygiene  · Instructed to follow up with own pulmonologist after discharge  · Antibiotics per ID guidance     Subjective: This patient has been seen and evaluated at the request of Dr. Vanessa Kendall for perioperative medical management. Patient is a 79 y.o. female with a complicated medical history who was admitted for repair of left knee post op wound dehiscence. Pt has a history of PE in  with 2 Echos echo showing RVSP 47. Pt also with a history of REBECA and admitted poor adherence to CPAP, last seen by her pulmonologist 2 years ago. DM appears to be better controlled as is HTN.     Pt denies chest pain or SOB but reports daytime hypersomnolence where she falls asleep while driving. Also admits to poor compliance with post op instructions including non weight bearing. Past Medical History:   Diagnosis Date    Anxiety     Arthritis     Asbestosis ([facility]     Depression     Diabetes ([facility]     D[facility]) ([facility])     right leg    Fibromyalgia     GERD (gastroesophageal reflux disease)     Hypercholesteremia     Hypertension     Pulmonary emboli ([facility] 2016    Renal insufficiency     Skin abscess     Sleep apnea     does not use cpap regularly      Past Surgical History:   Procedure Laterality Date    HX BREAST LUMPECTOMY      HX CARPAL TUNNEL RELEASE      HX GASTRIC BYPASS  2015    HX HYSTERECTOMY      HX KNEE REPLACEMENT      bilater    HX ORTHOPAEDIC Left 09/26/2018    ORIF left patella    HX PARTIAL THYROIDECTOMY      HX SHOULDER ARTHROSCOPY      HX TUBAL LIGATION        Prior to Admission medications    Medication Sig Start Date End Date Taking? Authorizing Provider   levoFLOXacin (LEVAQUIN) 750 mg tablet Take 1 Tab by mouth daily. 1 po q day 11/12/18  Yes José Miguel Santiago PA-C   trimethoprim-sulfamethoxazole (BACTRIM, SEPTRA)  mg per tablet Take 1 Tab by mouth two (2) times a day. 11/9/18  Yes Tasneem Burns PA   cephALEXin (KEFLEX) 500 mg capsule Take 1 Cap by mouth four (4) times daily. 10/18/18  Yes Bill ARNOLD PA-C   lisinopril (PRINIVIL, ZESTRIL) 40 mg tablet Take 1 Tab by mouth daily. 10/6/18  Yes Raisa Queen MD   amiodarone (PACERONE) 400 mg tablet Take 400 mg by mouth daily. Yes Provider, Historical   metoprolol tartrate (LOPRESSOR) 50 mg tablet Take 50 mg by mouth two (2) times a day. Yes Provider, Historical   pregabalin (LYRICA) 100 mg capsule Take 100 mg by mouth two (2) times a day. Yes Provider, Historical   montelukast (SINGULAIR) 10 mg tablet Take 10 mg by mouth daily.    Yes Other, MD Yahir   acetaminophen (TYLENOL) 325 mg tablet Take  by mouth every four (4) hours as needed for Pain. Yes Eric, MD Yahir   loratadine (CLARITIN) 10 mg tablet Take 10 mg by mouth. Yes Yahir Reyes MD   pravastatin sodium (PRAVASTATIN PO) Take 20 mg by mouth daily. Yes Yahir Reyes MD   omeprazole (PRILOSEC) 10 mg capsule Take 10 mg by mouth daily. Yes Yahir Reyes MD   hydroxychloroquine (PLAQUENIL) 200 mg tablet Take 200 mg by mouth two (2) times a day. Yes Eric, MD Yahir   furosemide (LASIX) 40 mg tablet Take 1 Tab by mouth daily. Indications: Peripheral Edema due to Chronic Heart Failure, For leg edema or SOB 1/30/18  Yes Rosetta Espinoza MD   insulin glargine (LANTUS) 100 unit/mL injection 20 Units by SubCUTAneous route nightly. 5/14/17  Yes Priya Alford MD   aspirin 81 mg chewable tablet Take 1 Tab by mouth daily. 5/14/17  Yes Priya Alford MD   DULoxetine (CYMBALTA) 60 mg capsule Take 1 Cap by mouth daily. Patient taking differently: Take 60 mg by mouth two (2) times a day. 5/2/17  Yes Serjio Nash MD   oxyCODONE-acetaminophen (PERCOCET) 5-325 mg per tablet Take 1 Tab by mouth every eight (8) hours as needed for Pain. Max Daily Amount: 3 Tabs.  11/9/18   RJ Hannah     No Known Allergies   Social History     Tobacco Use    Smoking status: Never Smoker    Smokeless tobacco: Never Used   Substance Use Topics    Alcohol use: No      Family History   Problem Relation Age of Onset    Asthma Mother     Hypertension Mother     Kidney Disease Mother     Thyroid Disease Mother     Diabetes Maternal Grandmother     Thyroid Disease Maternal Grandmother         Current Facility-Administered Medications   Medication Dose Route Frequency    amiodarone (CORDARONE) tablet 400 mg  400 mg Oral DAILY    DULoxetine (CYMBALTA) capsule 60 mg  60 mg Oral DAILY    furosemide (LASIX) tablet 40 mg  40 mg Oral DAILY    lisinopril (PRINIVIL, ZESTRIL) tablet 40 mg  40 mg Oral DAILY    metoprolol tartrate (LOPRESSOR) tablet 50 mg  50 mg Oral BID    montelukast (SINGULAIR) tablet 10 mg  10 mg Oral DAILY    pantoprazole (PROTONIX) tablet 40 mg  40 mg Oral ACB    0.9% sodium chloride infusion  100 mL/hr IntraVENous CONTINUOUS    sodium chloride (NS) flush 5-10 mL  5-10 mL IntraVENous Q8H    ferrous sulfate tablet 325 mg  1 Tab Oral BID WITH MEALS    insulin lispro (HUMALOG) injection   SubCUTAneous AC&HS    docusate sodium (COLACE) capsule 100 mg  100 mg Oral BID    buffered aspirin (BUFFERIN) tablet 325 mg  325 mg Oral BID    celecoxib (CELEBREX) capsule 200 mg  200 mg Oral BID    vancomycin (VANCOCIN) 1000 mg in  ml infusion  1,000 mg IntraVENous Q24H       Review of Systems:  Pertinent items are noted in HPI. Objective:   Vital Signs:    Visit Vitals  /73 (BP 1 Location: Left arm, BP Patient Position: At rest)   Pulse 61   Temp 98.2 °F (36.8 °C)   Resp 15   Ht 5' 2\" (1.575 m)   Wt 82.6 kg (182 lb)   SpO2 97%   BMI 33.29 kg/m²       O2 Device: Room air       Temp (24hrs), Av.1 °F (36.7 °C), Min:97.5 °F (36.4 °C), Max:98.9 °F (37.2 °C)       Intake/Output:   Last shift:       07 -  190  In: 120 [P.O.:120]  Out: -   Last 3 shifts: 1901 -  0700  In: 3411.7 [P.O.:960; I.V.:2451.7]  Out: 1850 [Urine:1850]    Intake/Output Summary (Last 24 hours) at 2018 1037  Last data filed at 2018 0936  Gross per 24 hour   Intake 3531.67 ml   Output 1850 ml   Net 1681.67 ml      Physical Exam:   General:  Drowsy, falls asleep while conversing, no distress, appears stated age. Head:  Normocephalic, without obvious abnormality, atraumatic. Eyes:  Conjunctivae/corneas clear. PERRL, EOMs intact. Nose: Nares normal. Mucosa normal. No drainage or sinus tenderness. Throat: Lips, mucosa, and tongue normal.    Neck: Supple, symmetrical, trachea midline, no adenopathy, thyroid: no enlargment/tenderness/nodules, no carotid bruit and no JVD. Back:   Symmetric    Lungs:   Clear to auscultation bilaterally.    Chest wall: No tenderness or deformity. Heart:  Regular rate and rhythm, S1, S2 normal, no murmur, click, rub or gallop. Abdomen:   Soft, non-tender. Bowel sounds normal. No masses,  No organomegaly. Extremities: Extremities normal, left knee in immobilizer, no cyanosis, trace edema. Pulses: 2+ and symmetric all extremities. Skin: Skin color, texture, turgor normal. No rashes or lesions   Lymph nodes: Cervical  nodes normal.   Neurologic: Grossly nonfocal     Data review:     Recent Results (from the past 24 hour(s))   GLUCOSE, POC    Collection Time: 11/19/18  1:41 PM   Result Value Ref Range    Glucose (POC) 107 70 - 110 mg/dL   CBC WITH AUTOMATED DIFF    Collection Time: 11/19/18  1:45 PM   Result Value Ref Range    WBC 6.9 4.6 - 13.2 K/uL    RBC 3.69 (L) 4.20 - 5.30 M/uL    HGB 9.4 (L) 12.0 - 16.0 g/dL    HCT 30.3 (L) 35.0 - 45.0 %    MCV 82.1 74.0 - 97.0 FL    MCH 25.5 24.0 - 34.0 PG    MCHC 31.0 31.0 - 37.0 g/dL    RDW 15.5 (H) 11.6 - 14.5 %    PLATELET 466 883 - 891 K/uL    MPV 9.7 9.2 - 11.8 FL    NEUTROPHILS 74 (H) 40 - 73 %    LYMPHOCYTES 16 (L) 21 - 52 %    MONOCYTES 9 3 - 10 %    EOSINOPHILS 1 0 - 5 %    BASOPHILS 0 0 - 2 %    ABS. NEUTROPHILS 5.1 1.8 - 8.0 K/UL    ABS. LYMPHOCYTES 1.1 0.9 - 3.6 K/UL    ABS. MONOCYTES 0.6 0.05 - 1.2 K/UL    ABS. EOSINOPHILS 0.1 0.0 - 0.4 K/UL    ABS.  BASOPHILS 0.0 0.0 - 0.1 K/UL    DF AUTOMATED     METABOLIC PANEL, COMPREHENSIVE    Collection Time: 11/19/18  1:45 PM   Result Value Ref Range    Sodium 146 (H) 136 - 145 mmol/L    Potassium 3.9 3.5 - 5.5 mmol/L    Chloride 111 (H) 100 - 108 mmol/L    CO2 24 21 - 32 mmol/L    Anion gap 11 3.0 - 18 mmol/L    Glucose 97 74 - 99 mg/dL    BUN 40 (H) 7.0 - 18 MG/DL    Creatinine 1.74 (H) 0.6 - 1.3 MG/DL    BUN/Creatinine ratio 23 (H) 12 - 20      GFR est AA 35 (L) >60 ml/min/1.73m2    GFR est non-AA 29 (L) >60 ml/min/1.73m2    Calcium 8.4 (L) 8.5 - 10.1 MG/DL    Bilirubin, total 0.2 0.2 - 1.0 MG/DL    ALT (SGPT) 38 13 - 56 U/L AST (SGOT) 51 (H) 15 - 37 U/L    Alk.  phosphatase 328 (H) 45 - 117 U/L    Protein, total 6.1 (L) 6.4 - 8.2 g/dL    Albumin 2.7 (L) 3.4 - 5.0 g/dL    Globulin 3.4 2.0 - 4.0 g/dL    A-G Ratio 0.8 0.8 - 1.7     HEMOGLOBIN A1C WITH EAG    Collection Time: 11/19/18  1:45 PM   Result Value Ref Range    Hemoglobin A1c 6.5 (H) 4.2 - 5.6 %    Est. average glucose 140 mg/dL   CULTURE, ANAEROBIC    Collection Time: 11/19/18  3:07 PM   Result Value Ref Range    Special Requests: LEFT LEG     Culture result: CULTURE IN PROGRESS,FURTHER UPDATES TO FOLLOW     CULTURE, SURGICAL WOUND Bridgett Jenkins STAIN    Collection Time: 11/19/18  3:07 PM   Result Value Ref Range    Special Requests: LEFT LEG     GRAM STAIN NO WBC'S SEEN      GRAM STAIN NO ORGANISMS SEEN      Culture result: PENDING    GLUCOSE, POC    Collection Time: 11/19/18  4:01 PM   Result Value Ref Range    Glucose (POC) 78 70 - 262 mg/dL   METABOLIC PANEL, BASIC    Collection Time: 11/19/18  6:00 PM   Result Value Ref Range    Sodium 143 136 - 145 mmol/L    Potassium 4.3 3.5 - 5.5 mmol/L    Chloride 111 (H) 100 - 108 mmol/L    CO2 25 21 - 32 mmol/L    Anion gap 7 3.0 - 18 mmol/L    Glucose 135 (H) 74 - 99 mg/dL    BUN 38 (H) 7.0 - 18 MG/DL    Creatinine 1.79 (H) 0.6 - 1.3 MG/DL    BUN/Creatinine ratio 21 (H) 12 - 20      GFR est AA 34 (L) >60 ml/min/1.73m2    GFR est non-AA 28 (L) >60 ml/min/1.73m2    Calcium 8.1 (L) 8.5 - 10.1 MG/DL   GLUCOSE, POC    Collection Time: 11/19/18  9:31 PM   Result Value Ref Range    Glucose (POC) 118 (H) 70 - 110 mg/dL   CBC WITH AUTOMATED DIFF    Collection Time: 11/20/18  3:53 AM   Result Value Ref Range    WBC 7.6 4.6 - 13.2 K/uL    RBC 3.22 (L) 4.20 - 5.30 M/uL    HGB 8.3 (L) 12.0 - 16.0 g/dL    HCT 26.5 (L) 35.0 - 45.0 %    MCV 82.3 74.0 - 97.0 FL    MCH 25.8 24.0 - 34.0 PG    MCHC 31.3 31.0 - 37.0 g/dL    RDW 15.6 (H) 11.6 - 14.5 %    PLATELET 675 670 - 507 K/uL    MPV 9.7 9.2 - 11.8 FL    NEUTROPHILS 70 40 - 73 %    LYMPHOCYTES 19 (L) 21 - 52 %    MONOCYTES 9 3 - 10 %    EOSINOPHILS 2 0 - 5 %    BASOPHILS 0 0 - 2 %    ABS. NEUTROPHILS 5.3 1.8 - 8.0 K/UL    ABS. LYMPHOCYTES 1.5 0.9 - 3.6 K/UL    ABS. MONOCYTES 0.7 0.05 - 1.2 K/UL    ABS. EOSINOPHILS 0.1 0.0 - 0.4 K/UL    ABS. BASOPHILS 0.0 0.0 - 0.1 K/UL    DF AUTOMATED     GLUCOSE, POC    Collection Time: 11/20/18  6:13 AM   Result Value Ref Range    Glucose (POC) 45 (LL) 70 - 110 mg/dL   GLUCOSE, POC    Collection Time: 11/20/18  6:39 AM   Result Value Ref Range    Glucose (POC) 184 (H) 70 - 110 mg/dL       Imaging:  I have personally reviewed the patients radiographs and have reviewed the reports:  XR Results (most recent):  Results from Hospital Encounter encounter on 11/16/18   XR KNEE LT 3 V    Narrative Left knee radiograph 3 views    INDICATION: Pain    COMPARISON: 6/7/2010      Impression IMPRESSION:  Knee replacement noted. Soft tissue swelling seen. No patellar tilt. Vascular  calcifications seen. Side plate and screws transfix the lateral distal femur but  fracture line remains visible. Question of lipoma hemarthrosis.                Santiago Seaman MD

## 2018-11-20 NOTE — PROGRESS NOTES
conducted an initial consultation and Spiritual Assessment for Sasha Boyd, who is a 79 y.o.,female. Patients Primary Language is: [place]. According to the patients EMR Restorationist Affiliation is: [place]. The reason the Patient came to the hospital is:   Patient Active Problem List    Diagnosis Date Noted    Wound dehiscence 11/19/2018    Type 2 diabetes mellitus with diabetic neuropathy ([address] 10/18/2018    Neuropathy 09/30/2018    Dehydration 09/30/2018    Type 2 diabetes with nephropathy ([address] 09/26/2018    Open displaced fracture of neck of second metacarpal bone of left hand with routine healing 09/26/2018    Periprosthetic fracture around internal prosthetic left knee joint 09/26/2018    Closed left hip fracture ([address] 09/26/2018    Open fracture 09/22/2018    Dog bite 09/22/2018    Type 2 diabetes mellitus without complication ([address] 37/64/8170    Mild mitral stenosis 05/22/2017    Anemia 05/22/2017    CHF (congestive heart failure) ([address] 05/12/2017    Atrial fibrillation ([address] 04/28/2017    Gastric bypass status for obesity 04/28/2017    History of pulmonary embolism 04/28/2017    Fibromyalgia 04/27/2017    HTN (hypertension) 04/27/2017    Septic joint of right shoulder region ([address] 04/27/2017    Cellulitis 04/24/2017    Sepsis ([address] 04/24/2017    Incomplete bladder emptying 01/23/2015    Stress incontinence 01/23/2015    Sleep apnea 01/25/2012    Type II diabetes mellitus ([address] 01/25/2012    Acquired trigger finger 01/25/2012    Primary localized osteoarthrosis, hand 01/25/2012    Osteoarthrosis 11/06/2009    Enthesopathy of hip region 05/06/2009    Myalgia and myositis 05/06/2009        The  provided the following Interventions:  Initiated a relationship of care and support. Explored issues of pamela, belief, spirituality and Judaism/ritual needs while hospitalized. Listened empathically.   Provided information about Spiritual Care Services. Offered prayer and assurance of continued prayers on patient's behalf. Chart reviewed. The following outcomes where achieved:   confirmed Patient's Muslim Affiliation. Patient expressed gratitude for 's visit. Assessment:  Patient loss her dog last week and she is more concern about her dog than herself. Patient does not have any Bahai/cultural needs that will affect patients preferences in health care. Plan:  Chaplains will continue to follow and will provide pastoral care on an as needed/requested basis.  recommends bedside caregivers page  on duty if patient shows signs of acute spiritual or emotional distress.     [address]  [phone])

## 2018-11-20 NOTE — PROGRESS NOTES
IV orders sent to [facility] via [employer].     DAXA RandhawaN, RN  Pager # [phone]  Care Manager

## 2018-11-20 NOTE — ROUTINE PROCESS
Bedside and Verbal shift change report given to Cruz Calloway RN (oncoming nurse) by Castillo Ortiz RN (offgoing nurse). Report included the following information SBAR, Kardex, MAR and Recent Results.     SITUATION:    Code Status: Prior   Reason for Admission: L03.90 [place] dehiscence    Major Hospital day: 1   Problem List:       Hospital Problems  Date Reviewed: 11/9/2018          Codes Class Noted POA    Wound dehiscence ICD-10-CM: T81.30XA  ICD-9-CM: 998.30  11/19/2018 Unknown              BACKGROUND:    Past Medical History:   Past Medical History:   Diagnosis Date    Anxiety     Arthritis     [facility]     Depression     Diabetes ([facility]     D[facility] thrombosis[facility]     right leg    Fibromyalgia     GERD (gastroesophageal reflux disease)     Hypercholesteremia     Hypertension     Pulmonary emboli ([facility] 2016    Renal insufficiency     Skin abscess     Sleep apnea     does not use cpap regularly         Patient taking anticoagulants no     ASSESSMENT:    Changes in Assessment Throughout Shift: post-op/ low BS     Patient has Central Line: no Reasons if yes:    Patient has Fuentes Cath: no Reasons if yes:       Last Vitals:     Vitals:    11/19/18 1757 11/19/18 1911 11/20/18 0151 11/20/18 0336   BP: 193/78 148/84 177/81 177/76   Pulse: 73 73 (!) 59 (!) 58   Resp: 16 17 15 16   Temp: 97.5 °F (36.4 °C) 98.1 °F (36.7 °C) 97.9 °F (36.6 °C) 98.1 °F (36.7 °C)   SpO2: 100% 100% 96% 96%   Weight:       Height:            IV and DRAINS (will only show if present)   Peripheral IV 11/19/18 Right Antecubital-Site Assessment: Clean, dry, & intact     WOUND (if present)   Wound Type:  knee   Dressing present yes   Wound Concerns/Notes:  braced     PAIN    Pain Assessment    Pain Intensity 1: 2 (11/20/18 0620)    Pain Location 1: Shoulder, Leg    Pain Intervention(s) 1: Medication (see MAR)    Patient Stated Pain Goal: 2  o Interventions for Pain:  See mar  o Intervention effective: yes  o Time of last intervention: see mar   o Reassessment Completed: yes      Last 3 Weights:  Last 3 Recorded Weights in this Encounter    11/12/18 1356 11/19/18 1401   Weight: 82.6 kg (182 lb) 82.6 kg (182 lb)     Weight change:      INTAKE/OUPUT    Current Shift: 11/19 1901 - 11/20 0700  In: 1411.7 [P.O.:960; I.V.:451.7]  Out: 550 [Urine:550]    Last three shifts: 11/18 0701 - 11/19 1900  In: 2000 [I.V.:2000]  Out: 900 [Urine:900]     LAB RESULTS     Recent Labs     11/20/18  0353 11/19/18  1345   WBC 7.6 6.9   HGB 8.3* 9.4*   HCT 26.5* 30.3*    303        Recent Labs     11/19/18  1800 11/19/18  1345    146*   K 4.3 3.9   * 97   BUN 38* 40*   CREA 1.79* 1.74*   CA 8.1* 8.4*       RECOMMENDATIONS AND DISCHARGE PLANNING     1. Pending tests/procedures/ Plan of Care or Other Needs: none     2. Discharge plan for patient and Needs/Barriers: Rehab? 3. Estimated Discharge Date: unknown Posted on Whiteboard in Patients Room: no      4. The patient's care plan was reviewed with the oncoming nurse. \"HEALS\" SAFETY CHECK      Fall Risk    Total Score: 3    Safety Measures: Safety Measures: Bed/Chair-Wheels locked, Bed in low position, Caregiver at bedside, Call light within reach, Gripper socks, Side rails X 3    A safety check occurred in the patient's room between off going nurse and oncoming nurse listed above.     The safety check included the below items  Area Items   H  High Alert Medications - Verify all high alert medication drips (heparin, PCA, etc.)   E  Equipment - Suction is set up for ALL patients (with yanker)  - Red plugs utilized for all equipment (IV pumps, etc.)  - WOWs wiped down at end of shift.  - Room stocked with oxygen, suction, and other unit-specific supplies   A  Alarms - Bed alarm is set for fall risk patients  - Ensure chair alarm is in place and activated if patient is up in a chair   L  Lines - Check IV for any infiltration  - Fuentes bag is empty if patient has a Fuentes   - Tubing and IV bags are labeled   S  Safety   - Room is clean, patient is clean, and equipment is clean. - Hallways are clear from equipment besides carts. - Fall bracelet on for fall risk patients  - Ensure room is clear and free of clutter  - Suction is set up for ALL patients (with yanker)  - Hallways are clear from equipment besides carts.    - Isolation precautions followed, supplies available outside room, sign posted     Lesly Cadena RN

## 2018-11-20 NOTE — PROGRESS NOTES
Ortho    Pt seen and evaluated  Doing well  Pain well controlled  No cp, sob, abd pain    Visit Vitals  /67 (BP 1 Location: Left arm, BP Patient Position: At rest)   Pulse (!) 52   Temp 97.3 °F (36.3 °C)   Resp 16   Ht 5' 2\" (1.575 m)   Wt 182 lb (82.6 kg)   SpO2 98%   BMI 33.29 kg/m²     Left LE  Dressing cdi  Compartments soft  nv intact  Neg calf tenderness    Labs  Lab Results   Component Value Date/Time    WBC 7.6 11/20/2018 03:53 AM    HGB 8.3 (L) 11/20/2018 03:53 AM    HCT 26.5 (L) 11/20/2018 03:53 AM    PLATELET 715 22/08/9188 03:53 AM    MCV 82.3 11/20/2018 03:53 AM     Micro-  Component Value Ref Range & Units Status   Special Requests: LEFT LEG   Preliminary   GRAM STAIN NO WBC'S SEEN    Preliminary   GRAM STAIN NO ORGANISMS SEEN    Preliminary   Culture result: NO GROWTH AFTER 19 HOURS    Preliminary     A: sp wound revision, I&D left femur    P:  Pain control, nwb, knee immobilizer at all times. Appreciate ID input. Plan for PICC line and out pt IV abx.

## 2018-11-20 NOTE — NURSE NAVIGATOR
This NN spoke to Jonnie at [facility], who stated that they do have an active case with this patient. However, since the patient will be relocating to [place], [place]. They will be discharging her from their services since she'll be outside their service area. This NN spoke to PETAR at [facility]. He stated that [place], [place]. is in their service area. He stated that they use [facility] for their IV antibiotics. He asked that we send him the orders via [facility] [facility] [address],  sent referral with IV antibiotics orders. He asked that once we have her scripts we fax it to him- Isabelle Bloch,  notified.  He was notified of potential patient discharge tomorrow; he stated that he will see the patient tomorrow morning around 9:30am.

## 2018-11-20 NOTE — CONSULTS
Infectious Disease Consultation Note    Requested by: dr. Perez Zepeda    Reason: left knee wound infection     Current abx Prior abx   Vancomycin since 11/19 Cefazolin 11/19     Lines:       Assessment :    79 y.o. female with h/o type 2 DM (last hgbA1C 6.5 on 11/19/18), left second metacarpal fracture secondary to dog bite s/p surgery 10/1/18,  s/p ORIF left supracondylar femur fracture on 9/26/18 admitted to [facility] on 11/19/18 with left knee wound dehiscence/infection. Clinical presentation consistent with left leg surgical wound dehiscence, infection. Close proximity of infection to the knee joint is concerning for early left prosthetic knee joint septic arthritis. Hence, would recommend to treat for probable left prosthetic knee joint infection. Wound cultures 11/9- staph epidermidis, p. Acne, candida glabrata. Latoya likely colonizer. Will use abx to cover for these bacteria. Patient's management is complicated due to h/o non compliance and this may interfere with her future care. Recommendations:    1. Continue vancomycin  2. F/u intra op cultures  3. picc line for outpatient iv abx  4. Will start making arrangements for outpatient iv daptomycin. 5. Obtain baseline crp, cpk    Advance Care planning: full code: discussed  with patient/surrogate decision maker:Ramsey Saunders: [phone]    Thank you for consultation request. Above plan was discussed in details with patient, and dr Perez Zepeda. Please call me if any further questions or concerns. Will continue to participate in the care of this patient. HPI:    79 y.o. female with h/o type 2 DM (last hgbA1C 6.5 on 11/19/18), left second metacarpal fracture secondary to dog bite s/p surgery 10/1/18,  s/p ORIF left supracondylar femur fracture on 9/26/18 admitted to [facility] on 11/19/18 with left knee wound dehiscence/infection. Apparently patient had a cast on LLE post op. she was seen in ortho office on 11/9 for evaluation and xrays of her left knee. Her cast was visibly worn down at the base. She reports not having room for the wheelchair in her bathroom and needing to get up to get in and out. Her feet and arms were visibly dirty with what appeared to be mud. She was noted to have some drainage from surgical site. This was sent for cultures and she was started on bactrim, vancomycin. Apparently iv vancomycin couldn't be arranged. So she was prescribed levofloxacin. Her wound cultures 11/9 were positive for staph epidermidis, candida glabrata, p. Acne. She had repeat wound cultures on 11/16 which are negative. Patient underwent I&D left knee on 11/19/18. Intra op patient was noted to have wound infection with some defect  In fascia and concerns for communication with knee joint. I have been consulted for further recommendations. Patient denies fever, headaches, visual disturbances, sore throat, runny nose, earaches, cp, sob, chills, cough, abdominal pain, diarrhea, burning micturition, or weakness in extremities. she denies back pain/flank pain. He denies recent sick contacts. No h/o recent travel.  No known h/o MRSA colonization or infection in the past.               Past Medical History:   Diagnosis Date    Anxiety     Arthritis     [facility]     Depression     Diabetes ([facility]     D[facility] ([facility]     right leg    Fibromyalgia     GERD (gastroesophageal reflux disease)     Hypercholesteremia     Hypertension     Pulmonary emboli ([facility] 2016    Renal insufficiency     Skin abscess     Sleep apnea     does not use cpap regularly       Past Surgical History:   Procedure Laterality Date    HX BREAST LUMPECTOMY      HX CARPAL TUNNEL RELEASE      HX GASTRIC BYPASS  2015    HX HYSTERECTOMY      HX KNEE REPLACEMENT      bilater    HX ORTHOPAEDIC Left 09/26/2018    ORIF left patella    HX PARTIAL THYROIDECTOMY      HX SHOULDER ARTHROSCOPY      HX TUBAL LIGATION            Medication List      ASK your doctor about these medications    amiodarone 400 mg tablet  Commonly known as:  PACERONE     aspirin 81 mg chewable tablet  Take 1 Tab by mouth daily. cephALEXin 500 mg capsule  Commonly known as:  KEFLEX  Take 1 Cap by mouth four (4) times daily. CLARITIN 10 mg tablet  Generic drug:  loratadine     DULoxetine 60 mg capsule  Commonly known as:  CYMBALTA  Take 1 Cap by mouth daily. furosemide 40 mg tablet  Commonly known as:  LASIX  Take 1 Tab by mouth daily. Indications: Peripheral Edema due to Chronic Heart Failure, For leg edema or SOB     LANTUS U-100 INSULIN 100 unit/mL injection  Generic drug:  insulin glargine     levoFLOXacin 750 mg tablet  Commonly known as:  LEVAQUIN  Take 1 Tab by mouth daily. 1 po q day     lisinopril 40 mg tablet  Commonly known as:  PRINIVIL, ZESTRIL  Take 1 Tab by mouth daily. LYRICA 100 mg capsule  Generic drug:  pregabalin     metoprolol tartrate 50 mg tablet  Commonly known as:  LOPRESSOR     oxyCODONE-acetaminophen 5-325 mg per tablet  Commonly known as:  PERCOCET  Take 1 Tab by mouth every eight (8) hours as needed for Pain. Max Daily Amount: 3 Tabs. PLAQUENIL 200 mg tablet  Generic drug:  hydroxychloroquine     PRAVASTATIN PO     PriLOSEC 10 mg capsule  Generic drug:  omeprazole     SINGULAIR 10 mg tablet  Generic drug:  montelukast     trimethoprim-sulfamethoxazole  mg per tablet  Commonly known as:  BACTRIM, SEPTRA  Take 1 Tab by mouth two (2) times a day.      TYLENOL 325 mg tablet  Generic drug:  acetaminophen            Current Facility-Administered Medications   Medication Dose Route Frequency    amiodarone (CORDARONE) tablet 400 mg  400 mg Oral DAILY    DULoxetine (CYMBALTA) capsule 60 mg  60 mg Oral DAILY    furosemide (LASIX) tablet 40 mg  40 mg Oral DAILY    lisinopril (PRINIVIL, ZESTRIL) tablet 40 mg  40 mg Oral DAILY    metoprolol tartrate (LOPRESSOR) tablet 50 mg  50 mg Oral BID    montelukast (SINGULAIR) tablet 10 mg  10 mg Oral DAILY    pantoprazole (PROTONIX) tablet 40 mg  40 mg Oral ACB    0.9% sodium chloride infusion  100 mL/hr IntraVENous CONTINUOUS    sodium chloride (NS) flush 5-10 mL  5-10 mL IntraVENous Q8H    sodium chloride (NS) flush 5-10 mL  5-10 mL IntraVENous PRN    naloxone (NARCAN) injection 0.4 mg  0.4 mg IntraVENous PRN    ferrous sulfate tablet 325 mg  1 Tab Oral BID WITH MEALS    diphenhydrAMINE (BENADRYL) injection 12.5 mg  12.5 mg IntraVENous Q6H PRN    zolpidem (AMBIEN) tablet 5 mg  5 mg Oral QHS PRN    insulin lispro (HUMALOG) injection   SubCUTAneous AC&HS    glucose chewable tablet 16 g  4 Tab Oral PRN    glucagon (GLUCAGEN) injection 1 mg  1 mg IntraMUSCular PRN    dextrose (D50W) injection syrg 12.5-25 g  25-50 mL IntraVENous PRN    acetaminophen (TYLENOL) tablet 650 mg  650 mg Oral Q4H PRN    oxyCODONE-acetaminophen (PERCOCET 7.5) 7.5-325 mg per tablet 1-2 Tab  1-2 Tab Oral Q4H PRN    ondansetron (ZOFRAN) injection 4 mg  4 mg IntraVENous Q4H PRN    docusate sodium (COLACE) capsule 100 mg  100 mg Oral BID    buffered aspirin (BUFFERIN) tablet 325 mg  325 mg Oral BID    celecoxib (CELEBREX) capsule 200 mg  200 mg Oral BID    vancomycin (VANCOCIN) 1000 mg in  ml infusion  1,000 mg IntraVENous Q24H       Allergies: Patient has no known allergies.     Family History   Problem Relation Age of Onset   [facility] Asthma Mother     Hypertension Mother     Kidney Disease Mother     Thyroid Disease Mother     Diabetes Maternal Grandmother     Thyroid Disease Maternal Grandmother      Social History     Socioeconomic History    Marital status: LEGALLY      Spouse name: Not on file    Number of children: Not on file    Years of education: Not on file    Highest education level: Not on file   Social Needs    Financial resource strain: Not on file    Food insecurity - worry: Not on file    Food insecurity - inability: Not on file    Transportation needs - medical: Not on file   [employer] needs - non-medical: Not on file   Occupational History    Not on file   Tobacco Use    Smoking status: Never Smoker    Smokeless tobacco: Never Used   Substance and Sexual Activity    Alcohol use: No    Drug use: No    Sexual activity: Yes   Other Topics Concern    Not on file   Social History Narrative    Not on file     Social History     Tobacco Use   Smoking Status Never Smoker   Smokeless Tobacco Never Used        Temp (24hrs), Av.1 °F (36.7 °C), Min:97.5 °F (36.4 °C), Max:98.9 °F (37.2 °C)    Visit Vitals  /73 (BP 1 Location: Left arm, BP Patient Position: At rest)   Pulse 61   Temp 98.2 °F (36.8 °C)   Resp 15   Ht 5' 2\" (1.575 m)   Wt 82.6 kg (182 lb)   SpO2 97%   BMI 33.29 kg/m²       ROS: 12 point ROS obtained in details. Pertinent positives as mentioned in HPI,   otherwise negative    Physical Exam:    General: Well developed, well nourished female laying on the bed  AAOx3 in no acute distress. General:   awake alert and oriented   HEENT:  Normocephalic, atraumatic, PERRL, EOMI, no scleral icterus or pallor; no conjunctival hemmohage;  nasal and oral mucous are moist and without evidence of lesions. No thrush. Dentition good. Neck supple, no bruits. Lymph Nodes:   no cervical, axillary or inguinal adenopathy   Lungs:   non-labored, bilaterally clear to auscultation- no crackles wheezes rales or rhonchi   Heart:  RRR, s1 and s2; 3/6 systolic murmur left sternal border/apex, no rubs or gallops, no edema, + pedal pulses   Abdomen:  soft, non-distended, active bowel sounds, no hepatomegaly, no splenomegaly. Non-tender   Genitourinary:  deferred   Extremities:   no clubbing, cyanosis; no joint effusions or swelling; Full ROM of all large joints to the upper and lower extremities; muscle mass appropriate for age   Neurologic:  No gross focal sensory abnormalities; 5/5 muscle strength to upper and lower extremities. Speech appropriate.  Cranial nerves intact                        Skin: ;left knee surgical changes per report   Back:  no spinal or paraspinal muscle tenderness or rigidity, no CVA tenderness     Psychiatric:  No suicidal or homicidal ideations, appropriate mood and affect         Labs: Results:   Chemistry Recent Labs     11/19/18  1800 11/19/18  1345   * 97    146*   K 4.3 3.9   * 111*   CO2 25 24   BUN 38* 40*   CREA 1.79* 1.74*   CA 8.1* 8.4*   AGAP 7 11   BUCR 21* 23*   AP  --  328*   TP  --  6.1*   ALB  --  2.7*   GLOB  --  3.4   AGRAT  --  0.8      CBC w/Diff Recent Labs     11/20/18  0353 11/19/18  1345   WBC 7.6 6.9   RBC 3.22* 3.69*   HGB 8.3* 9.4*   HCT 26.5* 30.3*    303   GRANS 70 74*   LYMPH 19* 16*   EOS 2 1      Microbiology Recent Labs     11/19/18  1507   CULT CULTURE IN PROGRESS,FURTHER UPDATES TO FOLLOW  PENDING          RADIOLOGY:    All available imaging studies/reports in [facility] for this admission were reviewed    Dr. Pina Krishnamurthy, Infectious Disease Specialist  [phone]  November 20, 2018  9:55 AM

## 2018-11-20 NOTE — PROGRESS NOTES
Received call from [facility] from [facility] concerning pt's address. Pt will be moving and gave correct address for discharge. ([address] [place] [address], [facility], [address]).     DAXA HenaoN, RN  Pager # [phone]  Care Manager

## 2018-11-21 ENCOUNTER — APPOINTMENT (OUTPATIENT)
Dept: INTERVENTIONAL RADIOLOGY/VASCULAR | Age: 68
DRG: 857 | End: 2018-11-21
Attending: INTERNAL MEDICINE
Payer: MEDICARE

## 2018-11-21 VITALS
DIASTOLIC BLOOD PRESSURE: 76 MMHG | TEMPERATURE: 97.6 F | RESPIRATION RATE: 17 BRPM | HEART RATE: 61 BPM | WEIGHT: 178 LBS | OXYGEN SATURATION: 96 % | HEIGHT: 62 IN | SYSTOLIC BLOOD PRESSURE: 168 MMHG | BODY MASS INDEX: 32.76 KG/M2

## 2018-11-21 LAB
ANION GAP SERPL CALC-SCNC: 7 MMOL/L (ref 3–18)
BUN SERPL-MCNC: 43 MG/DL (ref 7–18)
BUN/CREAT SERPL: 20 (ref 12–20)
CALCIUM SERPL-MCNC: 8.7 MG/DL (ref 8.5–10.1)
CHLORIDE SERPL-SCNC: 111 MMOL/L (ref 100–108)
CO2 SERPL-SCNC: 23 MMOL/L (ref 21–32)
CREAT SERPL-MCNC: 2.19 MG/DL (ref 0.6–1.3)
GLUCOSE BLD STRIP.AUTO-MCNC: 125 MG/DL (ref 70–110)
GLUCOSE BLD STRIP.AUTO-MCNC: 130 MG/DL (ref 70–110)
GLUCOSE BLD STRIP.AUTO-MCNC: 142 MG/DL (ref 70–110)
GLUCOSE SERPL-MCNC: 118 MG/DL (ref 74–99)
POTASSIUM SERPL-SCNC: 4.4 MMOL/L (ref 3.5–5.5)
SODIUM SERPL-SCNC: 141 MMOL/L (ref 136–145)

## 2018-11-21 PROCEDURE — 74011250637 HC RX REV CODE- 250/637: Performed by: ORTHOPAEDIC SURGERY

## 2018-11-21 PROCEDURE — 74011250636 HC RX REV CODE- 250/636: Performed by: INTERNAL MEDICINE

## 2018-11-21 PROCEDURE — 82962 GLUCOSE BLOOD TEST: CPT

## 2018-11-21 PROCEDURE — 97110 THERAPEUTIC EXERCISES: CPT

## 2018-11-21 PROCEDURE — 97535 SELF CARE MNGMENT TRAINING: CPT

## 2018-11-21 PROCEDURE — 97530 THERAPEUTIC ACTIVITIES: CPT

## 2018-11-21 PROCEDURE — 80048 BASIC METABOLIC PNL TOTAL CA: CPT

## 2018-11-21 PROCEDURE — 02HV33Z INSERTION OF INFUSION DEVICE INTO SUPERIOR VENA CAVA, PERCUTANEOUS APPROACH: ICD-10-PCS | Performed by: RADIOLOGY

## 2018-11-21 PROCEDURE — 76937 US GUIDE VASCULAR ACCESS: CPT

## 2018-11-21 PROCEDURE — 36415 COLL VENOUS BLD VENIPUNCTURE: CPT

## 2018-11-21 RX ORDER — ASPIRIN 325 MG/1
325 TABLET, FILM COATED ORAL 2 TIMES DAILY
Qty: 60 TAB | Refills: 0 | Status: SHIPPED | OUTPATIENT
Start: 2018-11-21 | End: 2019-10-12

## 2018-11-21 RX ORDER — OXYCODONE AND ACETAMINOPHEN 7.5; 325 MG/1; MG/1
1-2 TABLET ORAL
Qty: 56 TAB | Refills: 0 | Status: SHIPPED | OUTPATIENT
Start: 2018-11-21 | End: 2018-12-13

## 2018-11-21 RX ORDER — LANOLIN ALCOHOL/MO/W.PET/CERES
325 CREAM (GRAM) TOPICAL 2 TIMES DAILY WITH MEALS
Qty: 60 TAB | Refills: 1 | Status: SHIPPED | OUTPATIENT
Start: 2018-11-21

## 2018-11-21 RX ORDER — SODIUM CHLORIDE 9 MG/ML
100 INJECTION, SOLUTION INTRAVENOUS CONTINUOUS
Status: DISCONTINUED | OUTPATIENT
Start: 2018-11-21 | End: 2018-11-21 | Stop reason: HOSPADM

## 2018-11-21 RX ORDER — DOCUSATE SODIUM 100 MG/1
100 CAPSULE, LIQUID FILLED ORAL 2 TIMES DAILY
Qty: 60 CAP | Refills: 2 | Status: SHIPPED | OUTPATIENT
Start: 2018-11-21 | End: 2019-02-19

## 2018-11-21 RX ADMIN — OXYCODONE HYDROCHLORIDE AND ACETAMINOPHEN 2 TABLET: 7.5; 325 TABLET ORAL at 09:13

## 2018-11-21 RX ADMIN — FERROUS SULFATE TAB 325 MG (65 MG ELEMENTAL FE) 325 MG: 325 (65 FE) TAB at 09:07

## 2018-11-21 RX ADMIN — DOCUSATE SODIUM 100 MG: 100 CAPSULE, LIQUID FILLED ORAL at 17:42

## 2018-11-21 RX ADMIN — OXYCODONE HYDROCHLORIDE AND ACETAMINOPHEN 2 TABLET: 7.5; 325 TABLET ORAL at 04:36

## 2018-11-21 RX ADMIN — MONTELUKAST SODIUM 10 MG: 10 TABLET, FILM COATED ORAL at 09:08

## 2018-11-21 RX ADMIN — FERROUS SULFATE TAB 325 MG (65 MG ELEMENTAL FE) 325 MG: 325 (65 FE) TAB at 17:42

## 2018-11-21 RX ADMIN — SODIUM CHLORIDE 100 ML/HR: 900 INJECTION, SOLUTION INTRAVENOUS at 16:20

## 2018-11-21 RX ADMIN — ASPIRIN 325 MG: 325 TABLET, FILM COATED ORAL at 17:42

## 2018-11-21 RX ADMIN — DULOXETINE HYDROCHLORIDE 60 MG: 60 CAPSULE, DELAYED RELEASE ORAL at 09:09

## 2018-11-21 RX ADMIN — OXYCODONE HYDROCHLORIDE AND ACETAMINOPHEN 2 TABLET: 7.5; 325 TABLET ORAL at 17:42

## 2018-11-21 RX ADMIN — DOCUSATE SODIUM 100 MG: 100 CAPSULE, LIQUID FILLED ORAL at 09:08

## 2018-11-21 RX ADMIN — FUROSEMIDE 40 MG: 40 TABLET ORAL at 09:08

## 2018-11-21 RX ADMIN — CELECOXIB 200 MG: 100 CAPSULE ORAL at 09:07

## 2018-11-21 RX ADMIN — ASPIRIN 325 MG: 325 TABLET, FILM COATED ORAL at 09:07

## 2018-11-21 RX ADMIN — Medication 10 ML: at 06:50

## 2018-11-21 RX ADMIN — Medication 10 ML: at 16:20

## 2018-11-21 RX ADMIN — PANTOPRAZOLE SODIUM 40 MG: 40 TABLET, DELAYED RELEASE ORAL at 06:48

## 2018-11-21 RX ADMIN — LISINOPRIL 40 MG: 40 TABLET ORAL at 09:09

## 2018-11-21 NOTE — PROGRESS NOTES
Please copy and Paste into a Physician Note          Face to Face Encounter    Patients Name: Eli Noble  YOB: 1950    Primary Diagnosis:     Date of Face to Face:   11/21/2018                                    Face to Face Encounter findings are related to primary reason for home care:   yes    1. I certify that the patient needs intermittent skilled nursing care, physical therapy and/or speech therapy. I will not be following this patient in the Community and Dr. Mika Ortiz will be responsible for signing the [address] of Care. 2. Initial Orders for Care: Must be completed only if Face to Face MD will not be signing the [address] [facility] Skilled Nursing and Physical Therapy    3. I certify that this patient is homebound for the following reason(s): Only leaves the home for medical reasons or Jainism services and are infrequent and of short duration for other reasons     4. I certify that this patient is under my care and that I, or a nurse practitioner or [facility] working with me, had a Face-to-Face Encounter that meets the physician Face-to-Face Encounter requirements. Document the physical findings from the Face-to-Face Encounter that support the need for skilled services: Has wound that requires skilled nursing assessment and treatment , Requires IV Therapy and instruction and Recent surgical procedure I & D that requires wound assessment, teaching, lab monitoring and physical, occupational and/or speech therapy services for evaluation and interventions.

## 2018-11-21 NOTE — PROGRESS NOTES
Per Dr. Sandy Toledo, pt will need SNF due to unable to pay the copay for IV medications. Cancelled referral to [facility] and [place] [address]. Notified Mitchell Lee. Gave pt list of SNF's. Dr. Sandy Toledo will notify Dr. Kiara Castañeda. CM will continue to monitor for transitional needs.     DAXA KyleN, RN  Pager # [phone]  Care Manager

## 2018-11-21 NOTE — PROGRESS NOTES
Progress Note  Pulmonary, Critical Care, and Sleep Medicine    Name: Mayelin Mohr MRN: [id number]   : 1950 Hospital: [address] [place] [address] Dr   Date: 2018        IMPRESSION:   · Left leg surgical would dehiscence concerning for possible prosthetic infection  · Acute on chronic kidney injury prob combination of ATN and ? Vancomycin  · DM last A1C 6.5  · REBECA noncompliant with CPAP. Persistence of daytime hypersomnolence consistent with untreated REBECA  · History of PE with high prob VQ scan    · HTN  · GERD  · Pulmonary HTN peak pressure 47 mm Hg unchanged from  differential include Grp 3 and CTEPH  · Acute on Chronic kidney disease baseline Creatinine ~ 1.5  · Poor compliance with REBECA and follow up visits posing barrier to care             PLAN:   · Pt to use own CPAP device once available  · Discontinue ACE inhibitor as Creatinine increased  · Glycemic control reasonable  · Nephrology consult for worsening GRF. Closely trend renal indices   · Antibiotics per ID  · Pharmacy to dose Vancomycin  · Incentive spirometry and bronchial hygiene  · Discussed with ID     Subjective/Interval History:   I have reviewed the flowsheet and previous days notes. Reviewed interval history. Discussed management with nursing staff. No new complaints, afebrile    ROS:Pertinent items are noted in HPI. Orders reviewed including medications. Changes made if indicated. Telemetry monitor reviewed at the bedside. Objective:   Vital Signs:    Visit Vitals  /67 (BP 1 Location: Left arm, BP Patient Position: Supine; At rest)   Pulse (!) 54   Temp 98 °F (36.7 °C)   Resp 15   Ht 5' 2\" (1.575 m)   Wt 80.7 kg (178 lb)   SpO2 100%   BMI 32.56 kg/m²       O2 Device: Room air       Temp (24hrs), Av.8 °F (36.6 °C), Min:97.3 °F (36.3 °C), Max:98.7 °F (37.1 °C)       Intake/Output:   Last shift:      701 - 1900  In: 50 [P.O.:50]  Out: -   Last 3 shifts: 1901 - 700  In: 2011.7 [P.O.:1560; I.V.:451.7]  Out: 1900 [Urine:1900]    Intake/Output Summary (Last 24 hours) at 11/21/2018 0959  Last data filed at 11/21/2018 0900  Gross per 24 hour   Intake 530 ml   Output 950 ml   Net -420 ml        Physical Exam:    General:  Alert, cooperative, in no distress, appears stated age. Head:  Normocephalic, without obvious abnormality, atraumatic. Eyes:  ANicteric, PERRL,   Nose: Nares normal.  Mucosa normal. No drainage or sinus tenderness. Throat: Lips, mucosa, and tongue normal. NO Thrush   Neck: Supple, symmetrical, trachea midline, no adenopathy, thyroid: no enlargment/tenderness/nodules, no carotid bruit and no JVD. Back:   Symmetric    Lungs:   Bilateral auscultation no rales or wheezes_   Chest wall:  No tenderness or deformity. NO intercostal retractions   Heart:  Regular rate and rhythm, S1, S2 normal, no murmur, click, rub or gallop. Abdomen:   Soft, non-tender. Bowel sounds normal. No masses,  No organomegaly. NO paradoxical motion   Extremities: normal,  Left knee post op, no cyanosis or edema. Pulses: 2+ and symmetric all extremities. Skin: Skin color, texture, turgor normal. No rashes or lesions. NO clubbing   Lymph nodes: Cervical nodes normal.   Neurologic: Grossly nonfocal      :        Devices:             Drips:    DATA:  Labs:  Recent Labs     11/20/18  0353 11/19/18  1345   WBC 7.6 6.9   HGB 8.3* 9.4*   HCT 26.5* 30.3*    303     Recent Labs     11/21/18  0415 11/19/18  1800 11/19/18  1345    143 146*   K 4.4 4.3 3.9   * 111* 111*   CO2 23 25 24   * 135* 97   BUN 43* 38* 40*   CREA 2.19* 1.79* 1.74*   CA 8.7 8.1* 8.4*   ALB  --   --  2.7*   SGOT  --   --  51*   ALT  --   --  38     No results for input(s): PH, PCO2, PO2, HCO3, FIO2 in the last 72 hours.     Imaging:  []        I have personally reviewed the patients radiographs and reports  []         []        No change from prior, tubes and lines in adequate position  []        Improved   [] Worsening  High complexity decision making was performed during this consultation and evaluation.      Ankit Chavez MD

## 2018-11-21 NOTE — PROGRESS NOTES
Problem: Self Care Deficits Care Plan (Adult)  Goal: *Acute Goals and Plan of Care (Insert Text)  Occupational Therapy Goals  Initiated 11/20/2018 within 7 day(s). 1.  Patient will perform lower body dressing with contact guard assist   2. Patient will perform toileting with contact guard assist.  3.  Patient will perform toilet transfer with contact guard assist.  4.  Patient will participate in upper extremity therapeutic exercise/activities with independence for 10-15 minutes. 5. Patient will maintain LLE NWB status 100% of the time during ADLs and functional transfers/mobility, for added safety during functional activity. Outcome: Progressing Towards Goal  Occupational Therapy TREATMENT    Patient: Preethi Gale (59 y.o. female)  Date: 11/21/2018  Diagnosis: L03.90 CELLULITIS  Wound dehiscence <principal problem not specified>  Procedure(s) (LRB):  INCISION AND DRAINAGE, WOUND REVISION, INSERTION OF ANTIOBIOTIC CEMEMT BEADS LEFT FEMUR (Left) 2 Days Post-Op  Precautions: (LLE NWB; LLE brace at all times)  Chart, occupational therapy assessment, plan of care, and goals were reviewed. ASSESSMENT:   The pt was found to be in semi-rowland's position in bed at the start of the session. She was agreeable to participate in the session. The pt required supervision for supine to sit & scooting to EOB. OT educated pt on BUE therapeutic exercises, using a mod resistance exercise band; as well as BLE therapeutic exercises (only ankle dorsi-flexion and plantar-flexion of the LLE completed, due to the applied LE brace); pt presented with good tolerance for exercises. Next, OT provided functional transfer training, for transferring from the bed to bedside commode & vice versa.  The pt required min assist for sit to stand with a RW, with mod verbal cues for LLE placement, RLE placement, pusing up with UE from the bed, and LLE NWB status; mod assist x1 with mod verbal and manual cues for pivoting on RLE, RW placement, and cues to maintain LLE NWB status was needed. Pt required min assist for balance/steadying to complete stand to sit onto bedside commode. Pt able to maintain LLE NWB status ~50% of the time in standing. Pt required mod assist overall for toileting, as she required mod assist for hygiene thoroughness in standing, constant min assist for steadying/balance in standing, and cues to maintain LLE NWB status,as well as upright posture in standing. Then, the pt required mod assist for a stand-pivot transfer from bedside commode to bed. Lastly, the pt required min assist for LLE management for sit to supine. Pt was left in semi-rowland's with needs in reach at the end of the session. Progression toward goals:  [x]          Improving appropriately and progressing toward goals  []          Improving slowly and progressing toward goals  []          Not making progress toward goals and plan of care will be adjusted     PLAN:  Patient continues to benefit from skilled intervention to address the above impairments. Continue treatment per established plan of care. Discharge Recommendations:  [facility]  Further Equipment Recommendations for Discharge: To be determined at next level of care, pending functional progress      G-CODES:     Self Care  Current  CK= 40-59%   Goal  CI= 1-19%. The severity rating is based on the Level of Assistance required for Functional Mobility and ADLs. SUBJECTIVE:   Patient stated This is better than using that pan in the bed.  (pt was referring to completing toileting at bedside commode level)    OBJECTIVE DATA SUMMARY:   Cognitive/Behavioral Status:  Neurologic State: Alert  Orientation Level: Oriented X4  Cognition: Follows commands       Functional Mobility and Transfers for ADLs:   Bed Mobility:  Rolling: Stand-by assistance  Supine to Sit: Supervision  Sit to Supine: Minimum assistance  Scooting: Supervision      Transfers:  Sit to Stand: Minimum assistance           Toilet Transfer : Moderate assistance(bedside commode transfer)               Balance:  Sitting - Static: (normal)  Sitting - Dynamic: (good+)  Standing: Impaired; With support  Standing - Static: (fair with RW)  Standing - Dynamic : (fair with RW)    ADL Intervention:  Lower Body Dressing: Minimum assistance (pt required min assist in sitting at the EOB to don her L sock)  Toileting: Moderate assistance (bedside commode level)    Therapeutic Exercises:   15 reps and 2 sets of RLE AROM/exercises completed with pt itting EOB include: ankle dorsi-flexion, ankle plantar flexion, and knee flexion, knee extension; LLE AROM for ankle dorsi-flexion & plantar flexion also completed. BUE theraband exercises completed. Pain:  Pt reports 10/10 pain or discomfort prior to treatment (LLE pain)  Pt reports 10/10 pain or discomfort post treatment. Activity Tolerance:    good    Please refer to the flowsheet for vital signs taken during this treatment. After treatment:   []  Patient left in no apparent distress sitting up in chair  [x]  Patient left in no apparent distress in bed  [x]  Call bell left within reach  []  Nursing notified   []  Caregiver present  []  Bed alarm activated    COMMUNICATION/EDUCATION:   [] Home safety education was provided and the patient/caregiver indicated understanding. [x] Patient/family have participated as able in goal setting and plan of care. [x] Patient/family agree to work toward stated goals and plan of care. [] Patient understands intent and goals of therapy, but is neutral about his/her participation. [] Patient is unable to participate in goal setting and plan of care.       Marietta Bustos OT  Time Calculation: 31 mins

## 2018-11-21 NOTE — PROGRESS NOTES
Discharge planning: This writer sent skilled nursing referrals out to patient's top three choices of SNF placement. Care manager will follow up to secure a bed and discharge appropriately.      Kaylynn Wiggins [facility] Manager  [facility]#[phone]

## 2018-11-21 NOTE — PROGRESS NOTES
Problem: Mobility Impaired (Adult and Pediatric)  Goal: *Acute Goals and Plan of Care (Insert Text)  Physical Therapy Goals  Initiated 11/20/2018 and to be accomplished within 7 day(s) while maintaining NWB LLE:  1. Patient will move from supine to sit and sit to supine, scoot up and down and roll side to side in bed with supervision/set-up. 2.  Patient will transfer from bed to chair and chair to bed with minimal assistance via SPT or with LRAD. 3.  Patient will perform sit to stand with minimal assistance. 4.  Patient will perform transfer from bed <> drop arm BSC/WC with minimum assistance. Summer Vicente 6 nursing assistant currently in room giving pt a bath. Will f/u later in day. 56 - MD present in room. Pt also just received lunch. Will f/u at later date.

## 2018-11-21 NOTE — PROGRESS NOTES
Problem: Mobility Impaired (Adult and Pediatric)  Goal: *Acute Goals and Plan of Care (Insert Text)  Physical Therapy Goals  Initiated 11/20/2018 and to be accomplished within 7 day(s) while maintaining NWB LLE:  1. Patient will move from supine to sit and sit to supine, scoot up and down and roll side to side in bed with supervision/set-up. 2.  Patient will transfer from bed to chair and chair to bed with minimal assistance via SPT or with LRAD. 3.  Patient will perform sit to stand with minimal assistance. 4.  Patient will perform transfer from bed <> drop arm BSC/WC with minimum assistance. Outcome: Progressing Towards Goal  physical Therapy TREATMENT    Patient: Simin Hung (86 y.o. female)  Date: 11/21/2018  Diagnosis: L03.90 CELLULITIS  Wound dehiscence <principal problem not specified>  Procedure(s) (LRB):  INCISION AND DRAINAGE, WOUND REVISION, INSERTION OF ANTIOBIOTIC CEMEMT BEADS LEFT FEMUR (Left) 2 Days Post-Op  Precautions: (LLE NWB; LLE brace at all times)   Chart, physical therapy assessment, plan of care and goals were reviewed. OBJECTIVE/ ASSESSMENT:  Patient found supine HOB elevated willing to work with PT. Pt performed sup to sit req SBA w/ good core stability and little increase in hip pain. Pt sat at EOB w/ good balance and peformed sit <> stand from EOB w/ manual cueing using LPTA's foot for guidance. Pt able to maintain NWB w/ standing w/ mod A and maintain status CGA w/ static balance. When sitting, pt places left foot on top of right foot req cueing. Pt repeated process w/ min A this time and maintained status. Pt amb laterally for 2' using shuffling pattern and scooting foot over. Pt returned to supine min A w/ donning LE and left in supine w/ all needs in reach. Pt provided further education on importance of maintaining NWBing for proper wound heeling.  It is highly recommended pt use BSC as pt req little assistance OOB to chair to reduce risk of infection and improve tolerance OOB. Pt would also benefit from replaced compression sock for right foot as sock slightly saturated from small wound over great toe. Education:  [x]         Bed mobility  [x]         Transfers  [x]         Ambulation / gait  [x]         Assistive device management  [x]         Stairs  [x]         Body mechanics  [x]         Position change   [x]         Therapeutic exercise  [x]         Activity pacing / energy conservation  []         Other:    Progression toward goals:  [x]      Improving appropriately and progressing toward goals  []      Improving slowly and progressing toward goals  []      Not making progress toward goals and plan of care will be adjusted     PLAN:  Patient continues to benefit from skilled intervention to address the above impairments. Continue treatment per established plan of care. Discharge Recommendations:  [facility]  Further Equipment Recommendations for Discharge:  rolling walker     SUBJECTIVE:   Patient stated My foot really itches.     OBJECTIVE DATA SUMMARY:   Critical Behavior:  Neurologic State: Alert  Orientation Level: Oriented X4  Cognition: Appropriate decision making, Appropriate for age attention/concentration, Follows commands     Functional Mobility Training:  Bed Mobility:  Rolling: Stand-by assistance  Supine to Sit: Stand-by assistance  Sit to Supine: Moderate assistance  Transfers:  Sit to Stand: Moderate assistance;Minimum assistance  Stand to Sit: Minimum assistance  Balance:  Sitting: Intact  Standing: Impaired; With support  Ambulation/Gait Training:  Distance (ft): 2 Feet (ft)  Assistive Device: Walker, rolling  Ambulation - Level of Assistance:  Moderate assistance  Gait Abnormalities: Decreased step clearance;Shuffling gait  Base of Support: Center of gravity altered;Shift to right  Speed/Angelica: Shuffled  Step Length: Right shortened    Therapeutic Exercises:       EXERCISE   Sets   Reps   Active Active Assist   Passive Self- assited ROM   Comments   Ankle Pumps 1 10  [x] [] [] []    Quad Sets   [] [] [] []    Glut Sets   [] [] [] []    Short Arc Quads   [] [] [] []    Heel Slides   [] [] [] []    Straight Leg Raises   [] [] [] []    Hip Abd   [] [] [] []    Long Arc Quads   [] [] [] []    Seated Marching   [] [] [] []    Seated Knee Flexion   [] [] [] []    Standing Marching   [] [] [] []      Pain: 0/10   Activity Tolerance:   Good   Please refer to the flowsheet for vital signs taken during this treatment. After treatment:   [] Patient left in no apparent distress sitting up in chair  [x] Patient left in no apparent distress in bed  [x] Call bell left within reach  [x] Nursing notified  [] Caregiver present  [] Bed alarm activated  [x] SCDs applied  [] Ice applied      Wilner Taylor PTA   Time Calculation: 17 mins    Mobility  Current  CK= 40-59%. The severity rating is based on the Level of Assistance required for Functional Mobility and ADLs. Mobility   Goal  CJ= 20-39%. The severity rating is based on the Level of Assistance required for Functional Mobility and ADLs.

## 2018-11-21 NOTE — CONSULTS
Consult Note  Consult requested by: Dr. Severiano Sander is a 79 y.o. female [facility] who is being seen on consult for JAMES  No chief complaint on file. Admission diagnosis: operated for left leg surgical wound dehiscence  68y F with PMH , HTN, DM, admitted for infected wound infection, seen for JAMES  Impression & Plan:   IMPRESSION:   JAMES; creatinine jumped from 1.7 to 2.1; received multiple meds who carries higher risk for nephrotoxicity, also has sepsis. CKD 3   HTN  DM  Left leg surgical wound dehiscence   PLAN:    DC nsaids, lisionpril . Hold lasix for now  Ordered NS 500cc for now. Dose vanco appropriately  Check urine protein. Repeat lab  Tomorrow, if established ATN, creatinine may continue to rise,Will follow very closely. Discussed with Dr. Kamini Taylor  Thank you very much for allowing me to participate in the care of this patient. We will continue to monitor with you and make recommendations as needed. HPI:  75y F with PMH HTN, DM, h/o left second metacarpal fracture s/p surgery last month , s/p ORIF left suparcondylar fracture on 9/26 admitted with left knee wound dehiscence. She was operated on 11/19. On arrival her creatinine was at 1. 7. Her creatinine has been gradually rising over last few days, nephrology service consulted for further assistance. Over last 48-72 hrs she received lasix, lisnopril, colecoxib, polymyxin, and tobramycine. She denies for any change in urine output at present. She has history of DM and HTN for very long time.    Past Medical History:   Diagnosis Date    Anxiety     Arthritis     [facility]     Depression     Diabetes [facility]     D[facility]deep venous thrombosis) [facility]     right leg    Fibromyalgia     GERD (gastroesophageal reflux disease)     Hypercholesteremia     Hypertension     Pulmonary emboli ([facility] 2016    Renal insufficiency     Skin abscess     Sleep apnea     does not use cpap regularly      Past Surgical History:   Procedure Laterality Date    HX BREAST LUMPECTOMY      HX CARPAL TUNNEL RELEASE      HX GASTRIC BYPASS  2015    HX HYSTERECTOMY      HX KNEE REPLACEMENT      bilater    HX ORTHOPAEDIC Left 09/26/2018    ORIF left patella    HX PARTIAL THYROIDECTOMY      HX SHOULDER ARTHROSCOPY      HX TUBAL LIGATION         Social History     Socioeconomic History    Marital status: LEGALLY      Spouse name: Not on file    Number of children: Not on file    Years of education: Not on file    Highest education level: Not on file   Social Needs    Financial resource strain: Not on file    Food insecurity - worry: Not on file    Food insecurity - inability: Not on file   [email or web address] needs - medical: Not on file   [email or web address] needs - non-medical: Not on file   Occupational History    Not on file   Tobacco Use    Smoking status: Never Smoker    Smokeless tobacco: Never Used   Substance and Sexual Activity    Alcohol use: No    Drug use: No    Sexual activity: Yes   Other Topics Concern    Not on file   Social History Narrative    Not on file       Family History   Problem Relation Age of Onset    Asthma Mother     Hypertension Mother     Kidney Disease Mother     Thyroid Disease Mother     Diabetes Maternal Grandmother     Thyroid Disease Maternal Grandmother      No Known Allergies     Home Medications:     Prior to Admission Medications   Prescriptions Last Dose Informant Patient Reported? Taking? DULoxetine (CYMBALTA) 60 mg capsule 11/19/2018 at Unknown time  No Yes   Sig: Take 1 Cap by mouth daily. Patient taking differently: Take 60 mg by mouth two (2) times a day. acetaminophen (TYLENOL) 325 mg tablet 11/12/2018 at Unknown time  Yes Yes   Sig: Take  by mouth every four (4) hours as needed for Pain. amiodarone (PACERONE) 400 mg tablet 11/19/2018 at Unknown time  Yes Yes   Sig: Take 400 mg by mouth daily.    aspirin 81 mg chewable tablet 11/12/2018 at Unknown time  No Yes   Sig: Take 1 Tab by mouth daily. cephALEXin (KEFLEX) 500 mg capsule 2018 at Unknown time  No Yes   Sig: Take 1 Cap by mouth four (4) times daily. furosemide (LASIX) 40 mg tablet 2018 at Unknown time  No Yes   Sig: Take 1 Tab by mouth daily. Indications: Peripheral Edema due to Chronic Heart Failure, For leg edema or SOB   hydroxychloroquine (PLAQUENIL) 200 mg tablet 2018 at Unknown time  Yes Yes   Sig: Take 200 mg by mouth two (2) times a day. insulin glargine (LANTUS) 100 unit/mL injection 2018 at Unknown time  Yes Yes   Si Units by SubCUTAneous route nightly. levoFLOXacin (LEVAQUIN) 750 mg tablet 2018 at Unknown time  No Yes   Sig: Take 1 Tab by mouth daily. 1 po q day   lisinopril (PRINIVIL, ZESTRIL) 40 mg tablet 2018 at Unknown time  No Yes   Sig: Take 1 Tab by mouth daily. loratadine (CLARITIN) 10 mg tablet 2018 at Unknown time  Yes Yes   Sig: Take 10 mg by mouth.   metoprolol tartrate (LOPRESSOR) 50 mg tablet 2018 at Unknown time  Yes Yes   Sig: Take 50 mg by mouth two (2) times a day. montelukast (SINGULAIR) 10 mg tablet 2018 at Unknown time  Yes Yes   Sig: Take 10 mg by mouth daily. omeprazole (PRILOSEC) 10 mg capsule 2018 at Unknown time  Yes Yes   Sig: Take 10 mg by mouth daily. oxyCODONE-acetaminophen (PERCOCET) 5-325 mg per tablet Not Taking at Unknown time  No No   Sig: Take 1 Tab by mouth every eight (8) hours as needed for Pain. Max Daily Amount: 3 Tabs. pravastatin sodium (PRAVASTATIN PO) 2018 at Unknown time  Yes Yes   Sig: Take 20 mg by mouth daily. pregabalin (LYRICA) 100 mg capsule 2018 at Unknown time  Yes Yes   Sig: Take 100 mg by mouth two (2) times a day. trimethoprim-sulfamethoxazole (BACTRIM, SEPTRA)  mg per tablet 2018 at Unknown time  No Yes   Sig: Take 1 Tab by mouth two (2) times a day.       Facility-Administered Medications: None       Current Facility-Administered Medications   Medication Dose Route Frequency    VANCOMYCIN INFORMATION NOTE   Other Rx Dosing/Monitoring    amiodarone (CORDARONE) tablet 400 mg  400 mg Oral DAILY    DULoxetine (CYMBALTA) capsule 60 mg  60 mg Oral DAILY    furosemide (LASIX) tablet 40 mg  40 mg Oral DAILY    lisinopril (PRINIVIL, ZESTRIL) tablet 40 mg  40 mg Oral DAILY    metoprolol tartrate (LOPRESSOR) tablet 50 mg  50 mg Oral BID    montelukast (SINGULAIR) tablet 10 mg  10 mg Oral DAILY    pantoprazole (PROTONIX) tablet 40 mg  40 mg Oral ACB    sodium chloride (NS) flush 5-10 mL  5-10 mL IntraVENous Q8H    sodium chloride (NS) flush 5-10 mL  5-10 mL IntraVENous PRN    naloxone (NARCAN) injection 0.4 mg  0.4 mg IntraVENous PRN    ferrous sulfate tablet 325 mg  1 Tab Oral BID WITH MEALS    diphenhydrAMINE (BENADRYL) injection 12.5 mg  12.5 mg IntraVENous Q6H PRN    zolpidem (AMBIEN) tablet 5 mg  5 mg Oral QHS PRN    insulin lispro (HUMALOG) injection   SubCUTAneous AC&HS    glucose chewable tablet 16 g  4 Tab Oral PRN    glucagon (GLUCAGEN) injection 1 mg  1 mg IntraMUSCular PRN    dextrose (D50W) injection syrg 12.5-25 g  25-50 mL IntraVENous PRN    acetaminophen (TYLENOL) tablet 650 mg  650 mg Oral Q4H PRN    oxyCODONE-acetaminophen (PERCOCET 7.5) 7.5-325 mg per tablet 1-2 Tab  1-2 Tab Oral Q4H PRN    ondansetron (ZOFRAN) injection 4 mg  4 mg IntraVENous Q4H PRN    docusate sodium (COLACE) capsule 100 mg  100 mg Oral BID    buffered aspirin (BUFFERIN) tablet 325 mg  325 mg Oral BID    celecoxib (CELEBREX) capsule 200 mg  200 mg Oral BID       Review of Systems:      Complete 10-point review of systems were obtained and discussed in length  with the patient. Complete review of systems was negative/unremarkable  except as mentioned in HPI section.   Data Review:    Labs: Results:       Chemistry Recent Labs     11/21/18  0415 11/19/18  1800 11/19/18  1345   * 135* 97    143 146*   K 4.4 4.3 3.9   * 111* 111*   CO2 23 25 24   BUN 43* 38* 40*   CREA 2.19* 1.79* 1.74*   CA 8.7 8.1* 8.4*   AGAP 7 7 11   BUCR 20 21* 23*   AP  --   --  328*   TP  --   --  6.1*   ALB  --   --  2.7*   GLOB  --   --  3.4   AGRAT  --   --  0.8      CBC w/Diff Recent Labs     11/20/18  0353 11/19/18  1345   WBC 7.6 6.9   RBC 3.22* 3.69*   HGB 8.3* 9.4*   HCT 26.5* 30.3*    303   GRANS 70 74*   LYMPH 19* 16*   EOS 2 1      Coagulation No results for input(s): PTP, INR, APTT in the last 72 hours. No lab exists for component: INREXT    Iron/Ferritin No results for input(s): IRON in the last 72 hours. No lab exists for component: TIBCCALC   BNP No results for input(s): BNPP in the last 72 hours. Cardiac Enzymes Recent Labs     11/20/18  0353   CPK 63      Liver Enzymes Recent Labs     11/19/18  1345   TP 6.1*   ALB 2.7*   *   SGOT 51*      Thyroid Studies Lab Results   Component Value Date/Time    TSH 3.470 05/12/2017 04:10 AM         EKG: sinus .     Physical Assessment:     Visit Vitals  /70 (BP 1 Location: Left arm, BP Patient Position: At rest;Supine)   Pulse 64   Temp 97.1 °F (36.2 °C)   Resp 17   Ht 5' 2\" (1.575 m)   Wt 80.7 kg (178 lb)   SpO2 100%   BMI 32.56 kg/m²     Weight change: -1.814 kg ()    Intake/Output Summary (Last 24 hours) at 11/21/2018 1234  Last data filed at 11/21/2018 1158  Gross per 24 hour   Intake 740 ml   Output 1650 ml   Net -910 ml     Physical Exam:   General: comfortable, no acute distress   HEENT sclera anicteric, supple neck, no thyromegaly  CVS: S1S2 heard,  no rub  RS: + air entry b/l,   Abd: Soft, Non tender, Not distended, Positive bowel sounds, no organomegaly, no CVA / supra pubic tenderness  Neuro: non focal, awake, alert , CN II-XII are grossly intact  Extrm: no  edema, no cyanosis, left leg in cast  Skin: no visible  Rash  Musculoskeletal: No gross joints or bone deformities     Procedures/imaging: see electronic medical records for all procedures, Xrays and details which were not copied into this note but were reviewed prior to creation of Jing Schuler MD  November 21, 2018  [facility]  Office [phone]

## 2018-11-21 NOTE — PROGRESS NOTES
D/C order noted for today. Pt has been accepted to [facility]. Confirmed with April  that bed is available today. Met with pt and agreeable to the transition plan today. [employer] authorization has been obtained. **  Notified daughter and granddaughter per pt's request. Family will transport to facility  Pt's discharge summary has been forwarded to SNF via [address] Bedside RN has been updated to the plan. Please call report to [phone]. K.  DAXA UngerN, RN  Pager # [phone]  Care Manager

## 2018-11-21 NOTE — PROGRESS NOTES
Pt accepted to [facility] and agreed to this facility.   DAXA NegreteN, RN  Pager # [phone]  Care Manager

## 2018-11-21 NOTE — PROGRESS NOTES
Received insurance authorization for [address]. Notified  [facility]. [facility] have bed now for pt.   BENITA Leblanc, RN  Pager # [phone]  Care Manager

## 2018-11-21 NOTE — NURSE NAVIGATOR
This NN completed a UAI in [facility] [address] [facility] for this patient at the request of Scot Garcia, . Accepting facility would like a UAI done; explained that the patient would not meet criteria for long term services through Medicaid since she's fairly independent.

## 2018-11-21 NOTE — PROGRESS NOTES
Ortho    Pt seen and evaluated  Doing well  Pain well controlled  No cp, sob, abd pain    Visit Vitals  /70 (BP 1 Location: Left arm, BP Patient Position: At rest;Supine)   Pulse 64   Temp 97.1 °F (36.2 °C)   Resp 17   Ht 5' 2\" (1.575 m)   Wt 80.7 kg (178 lb)   SpO2 100%   BMI 32.56 kg/m²     Left LE  Dressing cdi  nv intact  Neg calf tenderness    Labs  Lab Results   Component Value Date/Time    WBC 7.6 11/20/2018 03:53 AM    HGB 8.3 (L) 11/20/2018 03:53 AM    HCT 26.5 (L) 11/20/2018 03:53 AM    PLATELET 226 60/84/1548 03:53 AM    MCV 82.3 11/20/2018 03:53 AM     Micro:  Special Requests: LEFT LEG   Preliminary   GRAM STAIN NO WBC'S SEEN    Preliminary   GRAM STAIN NO ORGANISMS SEEN    Preliminary   Culture result: NO GROWTH 2 DAYS    Preliminary     A: sp wound revision, I&D left LE    P:  Continue current treatment, abx per Dr. Coleman Khan, infectious disease. Will plan on snf placement once accepted. nwb left LE. Continue with knee immobilizer.

## 2018-11-21 NOTE — PROGRESS NOTES
Insurance authorization has been initiated at [address] [place] [address].  Marquise Cooper, [phone]

## 2018-11-21 NOTE — PROGRESS NOTES
Problem: Falls - Risk of  Goal: *Absence of Falls  Document Lauren Fall Risk and appropriate interventions in the flowsheet.   Outcome: Progressing Towards Goal  Fall Risk Interventions:  Mobility Interventions: Patient to call before getting OOB, Utilize walker, cane, or other assistive device         Medication Interventions: Patient to call before getting OOB, Teach patient to arise slowly    Elimination Interventions: Call light in reach    History of Falls Interventions: Door open when patient unattended

## 2018-11-21 NOTE — DISCHARGE SUMMARY
11/19/2018  1:01 PM    11/21/2018, 5:15 PM    Primary Dx:left Orthopedic / Rheumatologic: LE wound dehiscence  Secondary Dx: Etiological Diagnoses: none    HPI:  Pt had a previous ORIF of a distal femur fracture and presented to the office with a wound dehiscence. She presented with evidence of extreme non-compliance. She was filthy with cast breakdown indicating she had been walking on her left LE. Due to the current findings and affected activity of daily living surgical intervention is indicated.   The alternatives, risks, complications as well as expected outcome were discussed, the patient understands and wishes to proceed with surgery    Past Medical History:   Diagnosis Date    Anxiety     Arthritis     [facility]     Depression     Diabetes ([facility]     [facility]     right leg    Fibromyalgia     GERD (gastroesophageal reflux disease)     Hypercholesteremia     Hypertension     [facility] 2016    Renal insufficiency     Skin abscess     Sleep apnea     does not use cpap regularly         Current Facility-Administered Medications:     VANCOMYCIN INFORMATION NOTE, , Other, Rx Dosing/Monitoring, Cynthia Grider MD    0.9% sodium chloride infusion, 100 mL/hr, IntraVENous, CONTINUOUS, Ashkan Tobias MD, Last Rate: 100 mL/hr at 11/21/18 1620, 100 mL/hr at 11/21/18 1620    amiodarone (CORDARONE) tablet 400 mg, 400 mg, Oral, DAILY, Krysten Hawkins MD, Stopped at 11/21/18 0907    DULoxetine (CYMBALTA) capsule 60 mg, 60 mg, Oral, DAILY, Krysten Hawkins MD, 60 mg at 11/21/18 0909    metoprolol tartrate (LOPRESSOR) tablet 50 mg, 50 mg, Oral, BID, Krysten Hawkins MD, Stopped at 11/21/18 0908    montelukast (SINGULAIR) tablet 10 mg, 10 mg, Oral, DAILY, Krysten Hawkins MD, 10 mg at 11/21/18 0908    pantoprazole (PROTONIX) tablet 40 mg, 40 mg, Oral, ACB, Krysten Hawkins MD, 40 mg at 11/21/18 0648    sodium chloride (NS) flush 5-10 mL, 5-10 mL, IntraVENous, Q8H, Joycelyn De Paz MD, 10 mL at 11/21/18 1620    sodium chloride (NS) flush 5-10 mL, 5-10 mL, IntraVENous, PRN, Joycelyn De Paz MD    naloxone [facility]) injection 0.4 mg, 0.4 mg, IntraVENous, PRN, Joycelyn De Paz MD    ferrous sulfate tablet 325 mg, 1 Tab, Oral, BID WITH MEALS, Joycelyn De Paz MD, 325 mg at 11/21/18 0907    diphenhydrAMINE (BENADRYL) injection 12.5 mg, 12.5 mg, IntraVENous, Q6H PRN, Joycelyn De Paz MD    zolpidem (AMBIEN) tablet 5 mg, 5 mg, Oral, QHS PRN, Joycelyn De Paz MD    insulin lispro (HUMALOG) injection, , SubCUTAneous, AC&HS, Joycelyn De Paz MD, Stopped at 11/20/18 1630    glucose chewable tablet 16 g, 4 Tab, Oral, PRN, Joycelyn De Paz MD    glucagon (GLUCAGEN) injection 1 mg, 1 mg, IntraMUSCular, PRN, Joycelyn De Paz MD    dextrose (D50W) injection syrg 12.5-25 g, 25-50 mL, IntraVENous, PRN, Joycelyn De Paz MD, 25 g at 11/20/18 0617    acetaminophen (TYLENOL) tablet 650 mg, 650 mg, Oral, Q4H PRN, Joycelyn De Paz MD    oxyCODONE-acetaminophen (PERCOCET 7.5) 7.5-325 mg per tablet 1-2 Tab, 1-2 Tab, Oral, Q4H PRN, Joycelyn De Paz MD, 2 Tab at 11/21/18 0913    ondansetron (ZOFRAN) injection 4 mg, 4 mg, IntraVENous, Q4H PRN, Joycelyn De Paz MD    docusate sodium (COLACE) capsule 100 mg, 100 mg, Oral, BID, Joycelyn De Paz MD, 100 mg at 11/21/18 0908    buffered aspirin (BUFFERIN) tablet 325 mg, 325 mg, Oral, BID, Joycelyn De Paz MD, 325 mg at 11/21/18 6847      Patient has no known allergies. Physical Exam:  General A&O x3 NAD, well developed, well nourished, normal affect  Heart: S1-S2, RRR  Lungs: CTA Bilat  Abd: soft NT, ND  Ext: n/v intact    Hospital Course:    Pt. Had leftOrthopedic / Rheumatologic: wound revision, I&D    Post -op Course: The patient tolerated the procedure well. They were followed by internal medicine for help with medical management. Dr. Cecil Land, infectious disease, was consulted for help with abx management. Pt.  Was place on Abx pre and post-op for prophylaxis against infection as well as aspirin  post-op for prophylaxis against DVT. Vitals signs remained stable, remained af. The wound wasclean, dry, no drainage. Pain was well controlled. Pt. Had negative calf tenderness or swelling, no evidence for DVT. Patient had PT/OT consult for evaluation and treatment. A PICC line was placed for long term abx. CBC  Lab Results   Component Value Date/Time    WBC 7.6 11/20/2018 03:53 AM    RBC 3.22 (L) 11/20/2018 03:53 AM    HCT 26.5 (L) 11/20/2018 03:53 AM    MCV 82.3 11/20/2018 03:53 AM    MCH 25.8 11/20/2018 03:53 AM    MCHC 31.3 11/20/2018 03:53 AM    RDW 15.6 (H) 11/20/2018 03:53 AM     Coagulation  Lab Results   Component Value Date    INR 0.9 11/11/2018    APTT 31.1 10/01/2018      Basic Metabolic Profile  Lab Results   Component Value Date     11/21/2018    CO2 23 11/21/2018    BUN 43 (H) 11/21/2018       Discharge Plan:  The patient will be d/c'd to snf, PT/OT eval and treat, NWB to left LE. She will continue in the knee immobilizer at all times. She will continue on IV vancomycin daily. 1g IV q24h with the pharmacy to dose. A wheelchair, walker, bedside commode, and shower chair will be utilized for ADL's. Follow up with Dr. Ledy Taveras in 10-12 days. Call with any questions or concerns.

## 2018-11-21 NOTE — PROGRESS NOTES
Infectious Disease progress Note    Requested by: dr. Karon Shook    Reason: left knee wound infection     Current abx Prior abx   Vancomycin since 11/19 Cefazolin 11/19     Lines:       Assessment :    79 y.o. female with h/o type 2 DM (last hgbA1C 6.5 on 11/19/18), left second metacarpal fracture secondary to dog bite s/p surgery 10/1/18,  s/p ORIF left supracondylar femur fracture on 9/26/18 admitted to [facility] on 11/19/18 with left knee wound dehiscence/infection. Clinical presentation consistent with left leg surgical wound dehiscence, infection. Close proximity of infection to the knee joint is concerning for early left prosthetic knee joint septic arthritis. Hence, would recommend to treat for probable left prosthetic knee joint infection. Wound cultures 11/9- staph epidermidis, p. Acne, candida glabrata. Latoya likely colonizer. Wound cultures 11/19 negative. Prior abx will likely mask the cultures    Patient's management is complicated due to h/o non compliance and this may interfere with her future care. Recommendations:    1. Continue vancomycin till 12/31/18  2. F/u intra op cultures  3. picc line for outpatient iv abx  4. Weekly cbc, bun/creatinine, crp while on abx    Patient should follow up with dr. Adonis Mendieta ([facility] outpatient infectious disease physician - [phone]) after 2 weeks. Please have outpatient labs faxed to her @ [phone]. D/w , patient. She can't afford copay for home iv abx/homehealth. Will need SNF placement to complete iv abx therapy. All questions answered to their full satisfaction. Spent additional 35 minutes in management and evaluation of this patient. >50% time spent in counselling and coordination of care. Advance Care planning: full code: discussed  with patient/surrogate decision maker:Ramsey Saunders: [phone]    D/w  dr Karon Shook. Please call me if any further questions or concerns.  Will continue to participate in the care of this patient. Subjective:    No new complaints    Patient denies fever, headaches, visual disturbances, sore throat, runny nose, earaches, cp, sob, chills, cough, abdominal pain, diarrhea, burning micturition, or weakness in extremities. she denies back pain/flank pain. Medication List      ASK your doctor about these medications    amiodarone 400 mg tablet  Commonly known as:  PACERONE     aspirin 81 mg chewable tablet  Take 1 Tab by mouth daily. cephALEXin 500 mg capsule  Commonly known as:  KEFLEX  Take 1 Cap by mouth four (4) times daily. CLARITIN 10 mg tablet  Generic drug:  loratadine     DULoxetine 60 mg capsule  Commonly known as:  CYMBALTA  Take 1 Cap by mouth daily. furosemide 40 mg tablet  Commonly known as:  LASIX  Take 1 Tab by mouth daily. Indications: Peripheral Edema due to Chronic Heart Failure, For leg edema or SOB     LANTUS U-100 INSULIN 100 unit/mL injection  Generic drug:  insulin glargine     levoFLOXacin 750 mg tablet  Commonly known as:  LEVAQUIN  Take 1 Tab by mouth daily. 1 po q day     lisinopril 40 mg tablet  Commonly known as:  PRINIVIL, ZESTRIL  Take 1 Tab by mouth daily. LYRICA 100 mg capsule  Generic drug:  pregabalin     metoprolol tartrate 50 mg tablet  Commonly known as:  LOPRESSOR     oxyCODONE-acetaminophen 5-325 mg per tablet  Commonly known as:  PERCOCET  Take 1 Tab by mouth every eight (8) hours as needed for Pain. Max Daily Amount: 3 Tabs. PLAQUENIL 200 mg tablet  Generic drug:  hydroxychloroquine     PRAVASTATIN PO     PriLOSEC 10 mg capsule  Generic drug:  omeprazole     SINGULAIR 10 mg tablet  Generic drug:  montelukast     trimethoprim-sulfamethoxazole  mg per tablet  Commonly known as:  BACTRIM, SEPTRA  Take 1 Tab by mouth two (2) times a day.      TYLENOL 325 mg tablet  Generic drug:  acetaminophen            Current Facility-Administered Medications   Medication Dose Route Frequency    vancomycin (VANCOCIN) 1500 mg in  ml infusion  1,500 mg IntraVENous Q48H    amiodarone (CORDARONE) tablet 400 mg  400 mg Oral DAILY    DULoxetine (CYMBALTA) capsule 60 mg  60 mg Oral DAILY    furosemide (LASIX) tablet 40 mg  40 mg Oral DAILY    lisinopril (PRINIVIL, ZESTRIL) tablet 40 mg  40 mg Oral DAILY    metoprolol tartrate (LOPRESSOR) tablet 50 mg  50 mg Oral BID    montelukast (SINGULAIR) tablet 10 mg  10 mg Oral DAILY    pantoprazole (PROTONIX) tablet 40 mg  40 mg Oral ACB    sodium chloride (NS) flush 5-10 mL  5-10 mL IntraVENous Q8H    sodium chloride (NS) flush 5-10 mL  5-10 mL IntraVENous PRN    naloxone (NARCAN) injection 0.4 mg  0.4 mg IntraVENous PRN    ferrous sulfate tablet 325 mg  1 Tab Oral BID WITH MEALS    diphenhydrAMINE (BENADRYL) injection 12.5 mg  12.5 mg IntraVENous Q6H PRN    zolpidem (AMBIEN) tablet 5 mg  5 mg Oral QHS PRN    insulin lispro (HUMALOG) injection   SubCUTAneous AC&HS    glucose chewable tablet 16 g  4 Tab Oral PRN    glucagon (GLUCAGEN) injection 1 mg  1 mg IntraMUSCular PRN    dextrose (D50W) injection syrg 12.5-25 g  25-50 mL IntraVENous PRN    acetaminophen (TYLENOL) tablet 650 mg  650 mg Oral Q4H PRN    oxyCODONE-acetaminophen (PERCOCET 7.5) 7.5-325 mg per tablet 1-2 Tab  1-2 Tab Oral Q4H PRN    ondansetron (ZOFRAN) injection 4 mg  4 mg IntraVENous Q4H PRN    docusate sodium (COLACE) capsule 100 mg  100 mg Oral BID    buffered aspirin (BUFFERIN) tablet 325 mg  325 mg Oral BID    celecoxib (CELEBREX) capsule 200 mg  200 mg Oral BID       Allergies: Patient has no known allergies. Temp (24hrs), Av.8 °F (36.6 °C), Min:97.3 °F (36.3 °C), Max:98.7 °F (37.1 °C)    Visit Vitals  /67 (BP 1 Location: Left arm, BP Patient Position: Supine; At rest)   Pulse (!) 54   Temp 98 °F (36.7 °C)   Resp 15   Ht 5' 2\" (1.575 m)   Wt 80.7 kg (178 lb)   SpO2 100%   BMI 32.56 kg/m²       ROS: 12 point ROS obtained in details.  Pertinent positives as mentioned in HPI,   otherwise negative    Physical Exam:    General: Well developed, well nourished female laying on the bed  AAOx3 in no acute distress. General:   awake alert and oriented   HEENT:  Normocephalic, atraumatic, PERRL, EOMI, no scleral icterus or pallor; no conjunctival hemmohage;  nasal and oral mucous are moist and without evidence of lesions. No thrush. Dentition good. Neck supple, no bruits. Lymph Nodes:   no cervical, axillary or inguinal adenopathy   Lungs:   non-labored, bilaterally clear to auscultation- no crackles wheezes rales or rhonchi   Heart:  RRR, s1 and s2; 3/6 systolic murmur left sternal border/apex, no rubs or gallops, no edema, + pedal pulses   Abdomen:  soft, non-distended, active bowel sounds, no hepatomegaly, no splenomegaly. Non-tender   Genitourinary:  deferred   Extremities:   no clubbing, cyanosis; no joint effusions or swelling; Full ROM of all large joints to the upper and lower extremities; muscle mass appropriate for age   Neurologic:  No gross focal sensory abnormalities; 5/5 muscle strength to upper and lower extremities. Speech appropriate.  Cranial nerves intact                        Skin:  ;left knee surgical changes per report   Back:  no spinal or paraspinal muscle tenderness or rigidity, no CVA tenderness     Psychiatric:  No suicidal or homicidal ideations, appropriate mood and affect         Labs: Results:   Chemistry Recent Labs     11/21/18  0415 11/19/18  1800 11/19/18  1345   * 135* 97    143 146*   K 4.4 4.3 3.9   * 111* 111*   CO2 23 25 24   BUN 43* 38* 40*   CREA 2.19* 1.79* 1.74*   CA 8.7 8.1* 8.4*   AGAP 7 7 11   BUCR 20 21* 23*   AP  --   --  328*   TP  --   --  6.1*   ALB  --   --  2.7*   GLOB  --   --  3.4   AGRAT  --   --  0.8      CBC w/Diff Recent Labs     11/20/18  0353 11/19/18  1345   WBC 7.6 6.9   RBC 3.22* 3.69*   HGB 8.3* 9.4*   HCT 26.5* 30.3*    303   GRANS 70 74*   LYMPH 19* 16*   EOS 2 1      Microbiology Recent Labs     11/19/18  1507   CULT NO ANAEROBES ISOLATED 2 DAYS  NO GROWTH AFTER 19 HOURS          RADIOLOGY:    All available imaging studies/reports in [facility] for this admission were reviewed    Dr. Lexx Tenorio, Infectious Disease Specialist  [phone]  November 21, 2018  9:55 AM

## 2018-11-22 LAB
BACTERIA SPEC CULT: NORMAL
GRAM STN SPEC: NORMAL
GRAM STN SPEC: NORMAL
SERVICE CMNT-IMP: NORMAL

## 2018-11-22 NOTE — PROGRESS NOTES
Patient has been discharged. Talked to ortho team this morning. Talked to Ms. Becka King son and discussed need to check lab tomorrow morning, provided my contact information. I left message for [facility] juanis ([phone]). My plan is to check her lab, and do med reconcile from renal stand point. If renal function stable will see her in clinic asap next week.

## 2018-11-23 ENCOUNTER — DOCUMENTATION ONLY (OUTPATIENT)
Dept: ORTHOPEDIC SURGERY | Facility: CLINIC | Age: 68
End: 2018-11-23

## 2018-11-23 DIAGNOSIS — Z98.890 POST-OPERATIVE STATE: Primary | ICD-10-CM

## 2018-11-23 NOTE — PROGRESS NOTES
Received a return call from [facility] from [facility], she stated the lab was on the way to draw the labs. She will notify Dr. Gretta Garcia with the results.

## 2018-11-23 NOTE — PROGRESS NOTES
Spoke with Claudia Jones from [facility] in [place], I explained Ms. Sunny Turk has a STAT lab ordered that needs completed; if her facility is unable to draw the lab, she must be taken to the hospital.

## 2018-11-24 LAB
BACTERIA SPEC CULT: NORMAL
SERVICE CMNT-IMP: NORMAL

## 2018-11-28 ENCOUNTER — PATIENT OUTREACH (OUTPATIENT)
Dept: CASE MANAGEMENT | Age: 68
End: 2018-11-28

## 2018-12-05 ENCOUNTER — PATIENT OUTREACH (OUTPATIENT)
Dept: CASE MANAGEMENT | Age: 68
End: 2018-12-05

## 2018-12-05 NOTE — PROGRESS NOTES
Community Care Team Documentation for Patient in [facility]     Patient discharged from [facility] 9/30/2018 - 10/5/2018 to [facility], [address] [place] [address] Road, on 10/5/2018. Discharged to home 10/29/2018. [facility] 11/19/2018 - 11/21/2018 to [facility], [facility], on 11/21/2018    Hospital Discharge diagnosis: LE wound dehiscence    SNF Attending Provider:  Amy Yanez    Anticipated discharge date from SNF:        PCP : None    Nurse Navigator: [facility] call completed, updates provided by facility. Full Code   lyrica added, fall yesterday. Noncompliant with safety instructions. Sutures to back of head at ED yesterday s/p fall. Dispo: home. PT/OT/SP: participating. [facility] soft thin liq. High Risk            25       Total Score        3 Has Seen PCP in Last 6 Months (Yes=3, No=0)    4 IP Visits Last 12 Months (1-3=4, 4=9, >4=11)    18 Charlson Comorbidity Score (Age + Comorbid Conditions)        Criteria that do not apply:    . Living with Significant Other. Assisted Living. LTAC. SNF. or   Rehab    Patient Length of Stay (>5 days = 3)    Pt.  Coverage (Medicare=5 , Medicaid, or Self-Pay=4)      Active Ambulatory Problems     Diagnosis Date Noted    Sleep apnea 01/25/2012    Type II diabetes mellitus ([address] 01/25/2012    Enthesopathy of hip region 05/06/2009    Acquired trigger finger 01/25/2012    Primary localized osteoarthrosis, hand 01/25/2012    Myalgia and myositis 05/06/2009    Osteoarthrosis 11/06/2009    Incomplete bladder emptying 01/23/2015    Stress incontinence 01/23/2015    Cellulitis 04/24/2017    Sepsis ([address] 04/24/2017    Fibromyalgia 04/27/2017    HTN (hypertension) 04/27/2017    Septic joint of right shoulder region [facility]) 04/27/2017    Atrial fibrillation ([address] 04/28/2017    Gastric bypass status for obesity 04/28/2017    History of pulmonary embolism 04/28/2017    CHF (congestive heart failure) ([address] 05/12/2017    Type 2 diabetes mellitus without complication ([address] 67/17/7909    Mild mitral stenosis 05/22/2017    Anemia 05/22/2017    Open fracture 09/22/2018    Dog bite 09/22/2018    Type 2 diabetes with nephropathy ([address] 09/26/2018    Open displaced fracture of neck of second metacarpal bone of left hand with routine healing 09/26/2018    Periprosthetic fracture around internal prosthetic left knee joint 09/26/2018    Closed left hip fracture ([address] 09/26/2018    Neuropathy 09/30/2018    Dehydration 09/30/2018    Type 2 diabetes mellitus with diabetic neuropathy ([address] 10/18/2018    Wound dehiscence 11/19/2018     Resolved Ambulatory Problems     Diagnosis Date Noted    No Resolved Ambulatory Problems     Past Medical History:   Diagnosis Date    Anxiety     Arthritis     Asbestosis ([address]     Atrial fibrillation ([address]     Depression     Diabetes ([address]     DVT (deep venous thrombosis) ([facility])     Fibromyalgia     GERD (gastroesophageal reflux disease)     Hypercholesteremia     Hypertension     Pulmonary emboli ([address] 2016    Renal insufficiency     Skin abscess     Sleep apnea

## 2018-12-05 NOTE — PROGRESS NOTES
Community Care Team Documentation for Patient in [facility]     Patient discharged from [facility] 9/30/2018 - 10/5/2018 to [facility], [address] [place] [address] Road, on 10/5/2018. Discharged to home 10/29/2018. [facility] 11/19/2018 - 11/21/2018 to [facility], [facility] [address], on 11/21/2018    Hospital Discharge diagnosis: LE wound dehiscence    SNF Attending Provider:  Amy Major    Anticipated discharge date from SNF:  12/14/2018      PCP : None    Nurse Navigator: [facility] call completed, updates provided by facility. Full Code   dc 12/14. Home with dtr. Vanco via PICC. PT/OT: [facility], making progress. Decreased safety awareness. High Risk            25       Total Score        3 Has Seen PCP in Last 6 Months (Yes=3, No=0)    4 IP Visits Last 12 Months (1-3=4, 4=9, >4=11)    18 Charlson Comorbidity Score (Age + Comorbid Conditions)        Criteria that do not apply:    . Living with Significant Other. Assisted Living. LTAC. SNF. or   Rehab    Patient Length of Stay (>5 days = 3)    Pt.  Coverage (Medicare=5 , Medicaid, or Self-Pay=4)      Active Ambulatory Problems     Diagnosis Date Noted    Sleep apnea 01/25/2012    Type II diabetes mellitus ([address] 01/25/2012    Enthesopathy of hip region 05/06/2009    Acquired trigger finger 01/25/2012    Primary localized osteoarthrosis, hand 01/25/2012    Myalgia and myositis 05/06/2009    Osteoarthrosis 11/06/2009    Incomplete bladder emptying 01/23/2015    Stress incontinence 01/23/2015    Cellulitis 04/24/2017    Sepsis ([address] 04/24/2017    Fibromyalgia 04/27/2017    HTN (hypertension) 04/27/2017    Septic joint of right shoulder region [facility]) 04/27/2017    Atrial fibrillation ([address] 04/28/2017    Gastric bypass status for obesity 04/28/2017    History of pulmonary embolism 04/28/2017    CHF (congestive heart failure) ([address] 05/12/2017    Type 2 diabetes mellitus without complication ([address] 05/22/2017    Mild mitral stenosis 05/22/2017    Anemia 05/22/2017    Open fracture 09/22/2018    Dog bite 09/22/2018    Type 2 diabetes with nephropathy ([address] 09/26/2018    Open displaced fracture of neck of second metacarpal bone of left hand with routine healing 09/26/2018    Periprosthetic fracture around internal prosthetic left knee joint 09/26/2018    Closed left hip fracture ([address] 09/26/2018    Neuropathy 09/30/2018    Dehydration 09/30/2018    Type 2 diabetes mellitus with diabetic neuropathy ([address] 10/18/2018    Wound dehiscence 11/19/2018     Resolved Ambulatory Problems     Diagnosis Date Noted    No Resolved Ambulatory Problems     Past Medical History:   Diagnosis Date    Anxiety     Arthritis     Asbestosis ([address]     Atrial fibrillation ([address]     Depression     Diabetes ([address]     DVT (deep venous thrombosis) (HCC)     Fibromyalgia     GERD (gastroesophageal reflux disease)     Hypercholesteremia     Hypertension     Pulmonary emboli ([address] 2016    Renal insufficiency     Skin abscess     Sleep apnea

## 2018-12-12 ENCOUNTER — PATIENT OUTREACH (OUTPATIENT)
Dept: CASE MANAGEMENT | Age: 68
End: 2018-12-12

## 2018-12-13 NOTE — PROGRESS NOTES
Community Care Team Documentation for Patient in [facility]     Patient discharged from [facility] 9/30/2018 - 10/5/2018 to [facility], [address] [place] [address] Road, on 10/5/2018. Discharged to home 10/29/2018. [facility] 11/19/2018 - 11/21/2018 to [facility], [facility] [facility] [address], on 11/21/2018    Hospital Discharge diagnosis: LE wound dehiscence    SNF Attending Provider:  Grupo Smith    Anticipated discharge date from SNF:  12/14/2018      PCP : None    Nurse Navigator: [facility] call completed, updates provided by facility. Full Code   Notified today, 12/12/2018, that patient discharged 12/10/2018. Moved into [facility] home in [place]. [facility] to follow patient until local PCP is established. High Risk            25       Total Score        3 Has Seen PCP in Last 6 Months (Yes=3, No=0)    4 IP Visits Last 12 Months (1-3=4, 4=9, >4=11)    18 Charlson Comorbidity Score (Age + Comorbid Conditions)        Criteria that do not apply:    . Living with Significant Other. Assisted Living. LTAC. SNF. or   Rehab    Patient Length of Stay (>5 days = 3)    Pt.  Coverage (Medicare=5 , Medicaid, or Self-Pay=4)      Active Ambulatory Problems     Diagnosis Date Noted    Sleep apnea 01/25/2012    Type II diabetes mellitus ([facility] 01/25/2012    Enthesopathy of hip region 05/06/2009    Acquired trigger finger 01/25/2012    Primary localized osteoarthrosis, hand 01/25/2012    Myalgia and myositis 05/06/2009    Osteoarthrosis 11/06/2009    Incomplete bladder emptying 01/23/2015    Stress incontinence 01/23/2015    Cellulitis 04/24/2017    Sepsis ([address] 04/24/2017    Fibromyalgia 04/27/2017    HTN (hypertension) 04/27/2017    Septic joint of right shoulder region [facility]) 04/27/2017    Atrial fibrillation ([facility] 04/28/2017    Gastric bypass status for obesity 04/28/2017    History of pulmonary embolism 04/28/2017    CHF (congestive heart failure) ([address] 05/12/2017    Type 2 diabetes mellitus without complication ([address] 96/34/5550    Mild mitral stenosis 05/22/2017    Anemia 05/22/2017    Open fracture 09/22/2018    Dog bite 09/22/2018    Type 2 diabetes with nephropathy ([address] 09/26/2018    Open displaced fracture of neck of second metacarpal bone of left hand with routine healing 09/26/2018    Periprosthetic fracture around internal prosthetic left knee joint 09/26/2018    Closed left hip fracture ([address] 09/26/2018    Neuropathy 09/30/2018    Dehydration 09/30/2018    Type 2 diabetes mellitus with diabetic neuropathy ([address] 10/18/2018    Wound dehiscence 11/19/2018     Resolved Ambulatory Problems     Diagnosis Date Noted    No Resolved Ambulatory Problems     Past Medical History:   Diagnosis Date    Anxiety     Arthritis     Asbestosis ([address]     Atrial fibrillation ([address]     Depression     Diabetes ([address]     DVT (deep venous thrombosis) (HCC)     Fibromyalgia     GERD (gastroesophageal reflux disease)     Hypercholesteremia     Hypertension     Pulmonary emboli ([address] 2016    Renal insufficiency     Skin abscess     Sleep apnea

## 2018-12-19 ENCOUNTER — OFFICE VISIT (OUTPATIENT)
Dept: ORTHOPEDIC SURGERY | Facility: CLINIC | Age: 68
End: 2018-12-19

## 2018-12-19 VITALS
RESPIRATION RATE: 18 BRPM | DIASTOLIC BLOOD PRESSURE: 87 MMHG | BODY MASS INDEX: 30.73 KG/M2 | OXYGEN SATURATION: 99 % | SYSTOLIC BLOOD PRESSURE: 183 MMHG | TEMPERATURE: 97 F | HEIGHT: 62 IN | HEART RATE: 72 BPM

## 2018-12-19 DIAGNOSIS — L03.90 CELLULITIS, UNSPECIFIED CELLULITIS SITE: ICD-10-CM

## 2018-12-19 DIAGNOSIS — S72.452D CLOSED SUPRACONDYLAR FRACTURE OF LEFT FEMUR WITH ROUTINE HEALING, SUBSEQUENT ENCOUNTER: Primary | ICD-10-CM

## 2018-12-19 NOTE — PROGRESS NOTES
[address], [address] [facility]  [phone]           Patient: Quincy Friend                MRN: [id number]       SSN: [id number]  YOB: 1950        AGE: 79 y.o. SEX: female  Body mass index is 30.73 kg/m². PCP: Other, MD Yahir  12/19/18      This office note has been dictated. REVIEW OF SYSTEMS:  Constitutional: Negative for fever, chills, weight loss and malaise/fatigue. HENT: Negative. Eyes: Negative. Respiratory: Negative. Cardiovascular: Negative. Gastrointestinal: No bowel incontinence or constipation. Genitourinary: No bladder incontinence or saddle anesthesia. Skin: Negative. Neurological: Negative. Endo/Heme/Allergies: Negative. Psychiatric/Behavioral: Negative. Musculoskeletal: As per HPI above. Past Medical History:   Diagnosis Date    Anxiety     Arthritis     [facility]     Atrial fibrillation ([facility]     Depression     Diabetes ([facility]     [facility]     right leg    Fibromyalgia     GERD (gastroesophageal reflux disease)     Hypercholesteremia     Hypertension     Pulmonary emboli ([facility]) 2016    Renal insufficiency     Skin abscess     Sleep apnea     does not use cpap regularly         Current Outpatient Medications:     acetaminophen (TYLENOL) 500 mg tablet, Take 500 mg by mouth every six (6) hours as needed for Pain., Disp: , Rfl:     amiodarone (CORDARONE) 200 mg tablet, Take 200 mg by mouth., Disp: , Rfl:     hydroxychloroquine (PLAQUENIL) 200 mg tablet, Take 200 mg by mouth two (2) times a day., Disp: , Rfl:     hydrALAZINE (APRESOLINE) 25 mg tablet, Take 25 mg by mouth three (3) times daily. , Disp: , Rfl:     ergocalciferol (VITAMIN D2) 50,000 unit capsule, Take 50,000 Units by mouth every seven (7) days. , Disp: , Rfl:     naloxone (NARCAN) 4 mg/actuation nasal spray, 1 [place] by IntraNASal route once as needed.  Use 1 spray intranasally, then discard. Repeat with new spray every 2 min as needed for opioid overdose symptoms, alternating nostrils. , Disp: , Rfl:     magnesium hydroxide (DAVIS MILK OF MAGNESIA) 400 mg/5 mL suspension, Take 30 mL by mouth daily as needed for Constipation. , Disp: , Rfl:     buffered aspirin (BUFFERIN) 325 mg tablet, Take 1 Tab by mouth two (2) times a day. (Patient taking differently: Take 325 mg by mouth daily.), Disp: 60 Tab, Rfl: 0    docusate sodium (COLACE) 100 mg capsule, Take 1 Cap by mouth two (2) times a day for 90 days. , Disp: 60 Cap, Rfl: 2    ferrous sulfate 325 mg (65 mg iron) tablet, Take 1 Tab by mouth two (2) times daily (with meals). , Disp: 60 Tab, Rfl: 1    vancomycin/0.9 % sod chloride (VANCOMYCIN IN 0.9 % SODIUM CHL) 1 gram/100 mL soln, 1 g by IntraVENous route every twenty-four (24) hours for 42 days. (Patient taking differently: by IntraVENous route every twenty-four (24) hours. Pharmacy to dose), Disp: 42 Bag, Rfl: 0    lisinopril (PRINIVIL, ZESTRIL) 40 mg tablet, Take 1 Tab by mouth daily. , Disp: 30 Tab, Rfl: 0    pregabalin (LYRICA) 100 mg capsule, Take 100 mg by mouth two (2) times a day., Disp: , Rfl:     loratadine (CLARITIN) 10 mg tablet, Take 10 mg by mouth daily as needed. , Disp: , Rfl:     pravastatin sodium (PRAVASTATIN PO), Take 20 mg by mouth every evening., Disp: , Rfl:     omeprazole (PRILOSEC) 10 mg capsule, Take 20 mg by mouth daily. , Disp: , Rfl:     furosemide (LASIX) 40 mg tablet, Take 1 Tab by mouth daily. Indications: Peripheral Edema due to Chronic Heart Failure, For leg edema or SOB, Disp: 90 Tab, Rfl: 1    insulin glargine (LANTUS) 100 unit/mL injection, 20 Units by SubCUTAneous route daily. , Disp: 1 Vial, Rfl: 0    DULoxetine (CYMBALTA) 60 mg capsule, Take 1 Cap by mouth daily. (Patient taking differently: Take 60 mg by mouth two (2) times a day.), Disp: 30 Cap, Rfl: 0  No current facility-administered medications for this visit. Facility-Administered Medications Ordered in Other Visits:     vancomycin (VANCOCIN) 750 mg in 0.9% sodium chloride 250 mL IVPB, 750 mg, IntraVENous, ONCE, Nandini Muller NP    sodium chloride (NS) flush 10-20 mL, 10-20 mL, InterCATHeter, PRN, Roberta Quiroga MD, 10 mL at 12/18/18 1115    heparin (porcine) pf 50 Units, 50 Units, InterCATHeter, PRN, Roberta Quiroga MD, 50 Units at 12/18/18 1114    No Known Allergies    Social History     Socioeconomic History    Marital status: LEGALLY      Spouse name: Not on file    Number of children: Not on file    Years of education: Not on file    Highest education level: Not on file   Social Needs    Financial resource strain: Not on file    Food insecurity - worry: Not on file    Food insecurity - inability: Not on file   [employer] needs - medical: Not on file   [employer] needs - non-medical: Not on file   Occupational History    Not on file   Tobacco Use    Smoking status: Never Smoker    Smokeless tobacco: Never Used   Substance and Sexual Activity    Alcohol use: No    Drug use: No    Sexual activity: Yes   Other Topics Concern    Not on file   Social History Narrative    Not on file       Past Surgical History:   Procedure Laterality Date    HX BREAST LUMPECTOMY      HX CARPAL TUNNEL RELEASE      HX GASTRIC BYPASS  2015    HX HYSTERECTOMY      HX KNEE REPLACEMENT      bilater    HX ORTHOPAEDIC Left 09/26/2018    ORIF left patella    HX PARTIAL THYROIDECTOMY      HX SHOULDER ARTHROSCOPY      HX TUBAL LIGATION               SUBJECTIVE: The patient is seen today for reevaluation of her left knee. The patient is status post ORIF left supracondylar femur fracture. The patient then had a wound revision I and D. She is one month out of that. She is extremely noncompliant. Upon her last visit, which was delayed due to no-shows, she showed up very dirty with the cast broken down, with the wound problem.   Today, in the office, she is now four weeks status post wound revision in her first appointment today showing up. She has no troubles with the wound. She denies any fevers or chills or systemic changes. She reports putting a little bit of weight on her leg at times. She has had the knee immobilizer on. PHYSICAL EXAMINATION:  In general, the patient is alert and oriented x 3 in no acute distress. The patient is well-developed, well-nourished, with a normal affect. The patient is afebrile. HEENT:  Head is normocephalic and atraumatic. Pupils are equally round and reactive to light and accommodation. Extraocular eye movements are intact. Neck is supple. Trachea is midline. No JVD is present. Breathing is nonlabored. Examination of the left lower extremity reveals the skin is intact. The surgical wound is healing nicely. There is no erythema, ecchymosis, and no warmth. She has negative joint effusion, negative patellar ballottement, and no signs for infection or cellulitis present. Range of motion:  She has full extension. She flexes to 90° easily. No extensor lag is noted. There is no calf tenderness. There is a negative Negin's sign. There are no signs for DVT present. The left heel reveals the skin is intact. There is no skin breakdown without erythema currently. RADIOGRAPHS:  Radiographs in the office today, including AP and lateral of the left knee, show that the fracture is in excellent alignment and position with good healing noted and good callus formation. No new abnormality is seen. ASSESSMENT:      1. Status post left supracondylar femur fracture. 2. Status post I and D left leg wound. PLAN:  At this point, the patient is on antibiotics daily through the PICC line. She will continue with this as scheduled. The sutures were removed today in the office without complications. She was put in a hinged brace.   She will be partial weightbearing, full range of motion as tolerated, and followup with us in three to four weeks for evaluation and repeat x-ray of the left knee at that point.                 JR Harjit SANDERS, DANIELLE, ATC

## 2019-01-09 ENCOUNTER — APPOINTMENT (OUTPATIENT)
Dept: CT IMAGING | Age: 69
End: 2019-01-09
Attending: EMERGENCY MEDICINE
Payer: MEDICARE

## 2019-01-09 ENCOUNTER — APPOINTMENT (OUTPATIENT)
Dept: GENERAL RADIOLOGY | Age: 69
End: 2019-01-09
Attending: EMERGENCY MEDICINE
Payer: MEDICARE

## 2019-01-09 ENCOUNTER — HOSPITAL ENCOUNTER (EMERGENCY)
Age: 69
Discharge: HOME | End: 2019-01-09
Attending: EMERGENCY MEDICINE
Payer: MEDICARE

## 2019-01-09 VITALS
HEART RATE: 96 BPM | DIASTOLIC BLOOD PRESSURE: 51 MMHG | RESPIRATION RATE: 16 BRPM | SYSTOLIC BLOOD PRESSURE: 141 MMHG | TEMPERATURE: 99.1 F | OXYGEN SATURATION: 99 %

## 2019-01-09 DIAGNOSIS — S01.01XA LACERATION OF SCALP, INITIAL ENCOUNTER: ICD-10-CM

## 2019-01-09 DIAGNOSIS — S39.012A LUMBAR STRAIN, INITIAL ENCOUNTER: ICD-10-CM

## 2019-01-09 DIAGNOSIS — M54.50 ACUTE MIDLINE LOW BACK PAIN WITHOUT SCIATICA: ICD-10-CM

## 2019-01-09 DIAGNOSIS — S09.90XA CLOSED HEAD INJURY, INITIAL ENCOUNTER: ICD-10-CM

## 2019-01-09 DIAGNOSIS — S00.03XA SCALP HEMATOMA, INITIAL ENCOUNTER: ICD-10-CM
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
## 2019-01-09 PROCEDURE — 74011250636 HC RX REV CODE- 250/636: Performed by: EMERGENCY MEDICINE

## 2019-01-09 PROCEDURE — 96372 THER/PROPH/DIAG INJ SC/IM: CPT

## 2019-01-09 PROCEDURE — 70450 CT HEAD/BRAIN W/O DYE: CPT

## 2019-01-09 PROCEDURE — 75810000293 HC SIMP/SUPERF WND  RPR

## 2019-01-09 PROCEDURE — 72100 X-RAY EXAM L-S SPINE 2/3 VWS: CPT

## 2019-01-09 PROCEDURE — 74011250637 HC RX REV CODE- 250/637: Performed by: EMERGENCY MEDICINE

## 2019-01-09 PROCEDURE — 77030018836 HC SOL IRR NACL ICUM -A

## 2019-01-09 PROCEDURE — 77030008460 HC STPLR SKN PRECIS 3M -A

## 2019-01-09 PROCEDURE — 99285 EMERGENCY DEPT VISIT HI MDM: CPT

## 2019-01-09 RX ORDER — MORPHINE SULFATE 2 MG/ML
2 INJECTION, SOLUTION INTRAMUSCULAR; INTRAVENOUS
Status: COMPLETED | OUTPATIENT
Start: 2019-01-09 | End: 2019-01-09

## 2019-01-09 RX ORDER — ACETAMINOPHEN 325 MG/1
325 TABLET ORAL
Status: DISCONTINUED | OUTPATIENT
Start: 2019-01-09 | End: 2019-01-09

## 2019-01-09 RX ORDER — OXYCODONE AND ACETAMINOPHEN 5; 325 MG/1; MG/1
2 TABLET ORAL
Status: COMPLETED | OUTPATIENT
Start: 2019-01-09 | End: 2019-01-09

## 2019-01-09 RX ORDER — METHOCARBAMOL 750 MG/1
750 TABLET, FILM COATED ORAL
Qty: 10 TAB | Refills: 0 | Status: SHIPPED | OUTPATIENT
Start: 2019-01-09 | End: 2019-10-12

## 2019-01-09 RX ORDER — HYDROCODONE BITARTRATE AND ACETAMINOPHEN 5; 325 MG/1; MG/1
1 TABLET ORAL ONCE
Status: DISCONTINUED | OUTPATIENT
Start: 2019-01-09 | End: 2019-01-09

## 2019-01-09 RX ADMIN — OXYCODONE AND ACETAMINOPHEN 2 TABLET: 5; 325 TABLET ORAL at 19:09

## 2019-01-09 RX ADMIN — MORPHINE SULFATE 2 MG: 2 INJECTION, SOLUTION INTRAMUSCULAR; INTRAVENOUS at 21:08

## 2019-01-10 NOTE — DISCHARGE INSTRUCTIONS
Cuts: Care Instructions  Your Care Instructions  A cut can happen anywhere on your body. Stitches, staples, skin adhesives, or pieces of tape called Steri-Strips are sometimes used to keep the edges of a cut together and help it heal. Steri-Strips can be used by themselves or with stitches or staples. Sometimes cuts are left open. If the cut went deep and through the skin, the doctor may have closed the cut in two layers. A deeper layer of stitches brings the deep part of the cut together. These stitches will dissolve and don't need to be removed. The upper layer closure, which could be stitches, staples, Steri-Strips, or adhesive, is what you see on the cut. A cut is often covered by a bandage. The doctor has checked you carefully, but problems can develop later. If you notice any problems or new symptoms, get medical treatment right away. Follow-up care is a key part of your treatment and safety. Be sure to make and go to all appointments, and call your doctor if you are having problems. It's also a good idea to know your test results and keep a list of the medicines you take. How can you care for yourself at home? If a cut is open or closed  · Prop up the sore area on a pillow anytime you sit or lie down during the next 3 days. Try to keep it above the level of your heart. This will help reduce swelling. · Keep the cut dry for the first 24 to 48 hours. After this, you can shower if your doctor okays it. Pat the cut dry. · Don't soak the cut, such as in a bathtub. Your doctor will tell you when it's safe to get the cut wet. · After the first 24 to 48 hours, clean the cut with soap and water 2 times a day unless your doctor gives you different instructions. ? Don't use hydrogen peroxide or alcohol, which can slow healing. ? You may cover the cut with a thin layer of petroleum jelly and a nonstick bandage.   ? If the doctor put a bandage over the cut, put on a new bandage after cleaning the cut or if the bandage gets wet or dirty. · Avoid any activity that could cause your cut to reopen. · Be safe with medicines. Read and follow all instructions on the label. ? If the doctor gave you a prescription medicine for pain, take it as prescribed. ? If you are not taking a prescription pain medicine, ask your doctor if you can take an over-the-counter medicine. If the cut is closed with stitches, staples, or Steri-Strips  · Follow the above instructions for open or closed cuts. · Do not remove the stitches or staples on your own. Your doctor will tell you when to come back to have the stitches or staples removed. · Leave Steri-Strips on until they fall off. If the cut is closed with a skin adhesive  · Follow the above instructions for open or closed cuts. · Leave the skin adhesive on your skin until it falls off on its own. This may take 5 to 10 days. · Do not scratch, rub, or pick at the adhesive. · Do not put the sticky part of a bandage directly on the adhesive. · Do not put any kind of ointment, cream, or lotion over the area. This can make the adhesive fall off too soon. Do not use hydrogen peroxide or alcohol, which can slow healing. When should you call for help? Call your doctor now or seek immediate medical care if:    · You have new pain, or your pain gets worse.     · The skin near the cut is cold or pale or changes color.     · You have tingling, weakness, or numbness near the cut.     · The cut starts to bleed, and blood soaks through the bandage. Oozing small amounts of blood is normal.     · You have trouble moving the area near the cut.     · You have symptoms of infection, such as:  ? Increased pain, swelling, warmth, or redness around the cut.  ? Red streaks leading from the cut.  ? Pus draining from the cut.  ? A fever.    Watch closely for changes in your health, and be sure to contact your doctor if:    · The cut reopens.     · You do not get better as expected.    Where can you learn more? Go to http://niurka-michi.info/. Enter M735 in the search box to learn more about \"Cuts: Care Instructions. \"  Current as of: November 20, 2017  Content Version: 11.8  © 9434-3954 [employer]. Care instructions adapted under license by [email or web address] (which disclaims liability or warranty for this information). If you have questions about a medical condition or this instruction, always ask your healthcare professional. [address] any warranty or liability for your use of this information. Patient Education        If you were prescribed any medication take as directed. Do not drive or use heavy equipment if prescribed narcotics. Follow up with your primary care physician or with specialist as directed. Return to the emergency room with any new or worsening conditions. Back Strain: Care Instructions  Overview    A back strain happens when you overstretch, or pull, a muscle in your back. You may hurt your back in an accident or when you exercise or lift something. Sometimes you may not know how you hurt your back. Most back pain will get better with rest and time. You can take care of yourself at home to help your back heal.  Follow-up care is a key part of your treatment and safety. Be sure to make and go to all appointments, and call your doctor if you are having problems. It's also a good idea to know your test results and keep a list of the medicines you take. How can you care for yourself at home? · Try to stay as active as you can, but stop or reduce any activity that causes pain. · Put ice or a cold pack on the sore muscle for 10 to 20 minutes at a time to stop swelling. Try this every 1 to 2 hours for 3 days (when you are awake) or until the swelling goes down. Put a thin cloth between the ice pack and your skin. · After 2 or 3 days, apply a heating pad on low or a warm cloth to your back.  Some doctors suggest that you go back and forth between hot and cold treatments. · Take pain medicines exactly as directed. ? If the doctor gave you a prescription medicine for pain, take it as prescribed. ? If you are not taking a prescription pain medicine, ask your doctor if you can take an over-the-counter medicine. · Try sleeping on your side with a pillow between your legs. Or put a pillow under your knees when you lie on your back. These measures can ease pain in your lower back. · Return to your usual level of activity slowly. When should you call for help? Call 911 anytime you think you may need emergency care. For example, call if:    · You are unable to move a leg at all.   [facility] your doctor now or seek immediate medical care if:    · You have new or worse symptoms in your legs, belly, or buttocks. Symptoms may include:  ? Numbness or tingling. ? Weakness. ? Pain.     · You lose bladder or bowel control.    Watch closely for changes in your health, and be sure to contact your doctor if:    · You have a fever, lose weight, or don't feel well.     · You are not getting better as expected. Where can you learn more? Go to http://niurka-michi.info/. Enter Z978 in the search box to learn more about \"Back Strain: Care Instructions. \"  Current as of: November 29, 2017  Content Version: 11.8  © 7487-4268 [employer]. Care instructions adapted under license by [employer] (which disclaims liability or warranty for this information). If you have questions about a medical condition or this instruction, always ask your healthcare professional. [place] [address] any warranty or liability for your use of this information. Patient Education        Preventing Falls: Care Instructions  Your Care Instructions    Getting around your home safely can be a challenge if you have injuries or health problems that make it easy for you to fall.  Loose rugs and furniture in walkways are among the dangers for many older people who have problems walking or who have poor eyesight. People who have conditions such as arthritis, osteoporosis, or dementia also have to be careful not to fall. You can make your home safer with a few simple measures. Follow-up care is a key part of your treatment and safety. Be sure to make and go to all appointments, and call your doctor if you are having problems. It's also a good idea to know your test results and keep a list of the medicines you take. How can you care for yourself at home? Taking care of yourself  · You may get dizzy if you do not drink enough water. To prevent dehydration, drink plenty of fluids, enough so that your urine is light yellow or clear like water. Choose water and other caffeine-free clear liquids. If you have kidney, heart, or liver disease and have to limit fluids, talk with your doctor before you increase the amount of fluids you drink. · Exercise regularly to improve your strength, muscle tone, and balance. Walk if you can. Swimming may be a good choice if you cannot walk easily. · Have your vision and hearing checked each year or any time you notice a change. If you have trouble seeing and hearing, you might not be able to avoid objects and could lose your balance. · Know the side effects of the medicines you take. Ask your doctor or pharmacist whether the medicines you take can affect your balance. Sleeping pills or sedatives can affect your balance. · Limit the amount of alcohol you drink. Alcohol can impair your balance and other senses. · Ask your doctor whether calluses or corns on your feet need to be removed. If you wear loose-fitting shoes because of calluses or corns, you can lose your balance and fall. · Talk to your doctor if you have numbness in your feet. Preventing falls at home  · Remove raised doorway thresholds, throw rugs, and clutter. Repair loose carpet or raised areas in the floor.   · Move furniture and electrical cords to keep them out of walking paths. · Use nonskid floor wax, and wipe up spills right away, especially on ceramic tile floors. · If you use a walker or cane, put rubber tips on it. If you use crutches, clean the bottoms of them regularly with an abrasive pad, such as steel wool. · Keep your house well lit, especially [place], and outside walkways. Use night-lights in areas such as hallways and bathrooms. Add extra light switches or use remote switches (such as switches that go on or off when you clap your hands) to make it easier to turn lights on if you have to get up during the night. · Install sturdy handrails on stairways. · Move items in your cabinets so that the things you use a lot are on the lower shelves (about waist level). · Keep a cordless phone and a flashlight with new batteries by your bed. If possible, put a phone in each of the main rooms of your house, or carry a cell phone in case you fall and cannot reach a phone. Or, you can wear a device around your neck or wrist. You push a button that sends a signal for help. · Wear low-heeled shoes that fit well and give your feet good support. Use footwear with nonskid soles. Check the heels and soles of your shoes for wear. Repair or replace worn heels or soles. · Do not wear socks without shoes on wood floors. · Walk on the grass when the sidewalks are slippery. If you live in an area that gets snow and ice in the winter, sprinkle salt on slippery steps and sidewalks. Preventing falls in the bath  · Install grab bars and nonskid mats inside and outside your shower or tub and near the toilet and sinks. · Use shower chairs and bath benches. · Use a hand-held shower head that will allow you to sit while showering.   · Get into a tub or shower by putting the weaker leg in first. Get out of a tub or shower with your strong side first.  · Repair loose toilet seats and consider installing a raised toilet seat to make getting on and off the toilet easier. · Keep your bathroom door unlocked while you are in the shower. Where can you learn more? Go to http://niurka-michi.info/. Enter 0476 [id number] in the search box to learn more about \"Preventing Falls: Care Instructions. \"  Current as of: March 16, 2018  Content Version: 11.8  © 2084-6494 [employer]. Care instructions adapted under license by [employer] (which disclaims liability or warranty for this information). If you have questions about a medical condition or this instruction, always ask your healthcare professional. [place] [address] any warranty or liability for your use of this information. Patient Education        Learning About a Closed Head Injury  What is a closed head injury? A closed head injury happens when your head gets hit hard. The strong force of the blow causes your brain to shake in your skull. This movement can cause the brain to bruise, swell, or tear. Sometimes nerves or blood vessels also get damaged. This can cause bleeding in or around the brain. A concussion is a type of closed head injury. What are the symptoms? If you have a mild concussion, you may have a mild headache or feel \"not quite right. \" These symptoms are common. They usually go away over a few days to 4 weeks. But sometimes after a concussion, you feel like you can't function as well as before the injury. And you have new symptoms. This is called postconcussive syndrome. You may:  · Find it harder to solve problems, think, concentrate, or remember. · Have headaches. · Have changes in your sleep patterns, such as not being able to sleep or sleeping all the time. · Have changes in your personality. · Not be interested in your usual activities. · Feel angry or anxious without a clear reason. · Lose your sense of taste or smell. · Be dizzy, lightheaded, or unsteady. It may be hard to stand or walk.   How is a closed head injury treated? Any person who may have a concussion needs to see a doctor. Some people have to stay in the hospital to be watched. Others can go home safely. If you go home, follow your doctor's instructions. He or she will tell you if you need someone to watch you closely for the next 24 hours or longer. Rest is the best treatment. Get plenty of sleep at night. And try to rest during the day. · Avoid activities that are physically or mentally demanding. These include housework, exercise, and schoolwork. And don't play video games, send text messages, or use the computer. You may need to change your school or work schedule to be able to avoid these activities. · Ask your doctor when it's okay to drive, ride a bike, or operate machinery. · Take an over-the-counter pain medicine, such as acetaminophen (Tylenol), ibuprofen (Advil, Motrin), or naproxen (Aleve). Be safe with medicines. Read and follow all instructions on the label. · Check with your doctor before you use any other medicines for pain. · Do not drink alcohol or use illegal drugs. They can slow recovery. They can also increase your risk of getting a second head injury. Follow-up care is a key part of your treatment and safety. Be sure to make and go to all appointments, and call your doctor if you are having problems. It's also a good idea to know your test results and keep a list of the medicines you take. Where can you learn more? Go to http://niurka-michi.info/. Enter E235 in the search box to learn more about \"Learning About a Closed Head Injury. \"  Current as of: June 4, 2018  Content Version: 11.8  © 3981-3292 [employer]. Care instructions adapted under license by [employer] (which disclaims liability or warranty for this information).  If you have questions about a medical condition or this instruction, always ask your healthcare professional. Jennifer Meraz disclaims any warranty or liability for your use of this information.

## 2019-01-10 NOTE — ROUTINE PROCESS
I have reviewed discharge instructions with the patient. The patient verbalized understanding. Patient armband removed and shredded. Pt transferred via wheelchair to car without any distress.

## 2019-01-10 NOTE — ED PROVIDER NOTES
EMERGENCY DEPARTMENT HISTORY AND PHYSICAL EXAM 
 
7:38 PM 
 
 
Date: 1/9/2019 Patient Name: Indiana Jeter History of Presenting Illness Chief Complaint Patient presents with  Fall History Provided By: Patient Chief Complaint: Headache Duration:  PTA Timing:  Acute Location: Generalized Quality: Not obtained at this time Severity: Moderate Modifying Factors: Not obtained at this time Associated Symptoms: Lower back pain and laceration to posterior portion of head Additional History (Context): 7:40 PM Indiana Jeter is a 79 y.o. female with h/o diabetes, HTN, depression, anxiety, DVT and atrial fibrillation who presents to ED complaining of acute, moderate generalized headache with associated symptoms of lower back pain and laceration to posterior portion of head onset PTA. The pt stated she tripped when turning the corner at [facility], and hit the back of her head on the floor. The pt denies any nausea, vomiting, hip or neck pain. Last tetanus update 4 years ago per patient. PCP: Other, MD Yahir 
  
 
 
 
Past History Past Medical History: 
Past Medical History:  
Diagnosis Date  Anxiety  Arthritis  Asbestosis ([address]  Atrial fibrillation ([address]  Depression  Diabetes ([address]  DVT (deep venous thrombosis) ([address] right leg  Fibromyalgia  GERD (gastroesophageal reflux disease)  Hypercholesteremia  Hypertension  Pulmonary emboli ([address] 2016  Renal insufficiency  Skin abscess  Sleep apnea   
 does not use cpap regularly Past Surgical History: 
Past Surgical History:  
Procedure Laterality Date  HX BREAST LUMPECTOMY  HX CARPAL TUNNEL RELEASE  HX GASTRIC BYPASS  2015  HX HYSTERECTOMY  HX KNEE REPLACEMENT    
 bilater  HX ORTHOPAEDIC Left 09/26/2018 ORIF left patella  HX PARTIAL THYROIDECTOMY  HX SHOULDER ARTHROSCOPY    
 HX TUBAL LIGATION Family History: 
Family History Problem Relation Age of Onset  Asthma Mother  Hypertension Mother  Kidney Disease Mother  Thyroid Disease Mother  Diabetes Maternal Grandmother  Thyroid Disease Maternal Grandmother Social History: 
Social History Tobacco Use  Smoking status: Never Smoker  Smokeless tobacco: Never Used Substance Use Topics  Alcohol use: No  
 Drug use: No  
 
 
Allergies: 
No Known Allergies Review of Systems Review of Systems Constitutional: Negative for chills and fever. HENT: Negative for congestion, rhinorrhea, sore throat and trouble swallowing. Eyes: Negative for visual disturbance. Respiratory: Negative for cough, shortness of breath and wheezing. Cardiovascular: Negative for chest pain. Gastrointestinal: Negative for abdominal pain, nausea and vomiting. Endocrine: Negative for polyuria. Genitourinary: Negative for dysuria. Musculoskeletal: Positive for back pain (Lower). Negative for arthralgias, neck pain and neck stiffness. Skin: Negative for pallor and rash. Laceration on posterior head Neurological: Positive for headaches (Generalized). Negative for dizziness, weakness and numbness. Hematological: Does not bruise/bleed easily. Psychiatric/Behavioral: Negative for confusion and dysphoric mood. All other systems reviewed and are negative. Physical Exam  
 
Visit Vitals /46 Pulse 96 Temp 99.1 °F (37.3 °C) Resp 16 SpO2 100% Physical Exam  
Constitutional: She is oriented to person, place, and time. She appears well-developed and well-nourished. No distress. HENT:  
Head: Normocephalic and atraumatic. Mouth/Throat: Oropharynx is clear and moist.  
Large head hematoma 
-hematoma bleeding is currently controlled Eyes: Conjunctivae are normal. Pupils are equal, round, and reactive to light. No scleral icterus. Neck: Normal range of motion. Neck supple. Cardiovascular: Normal rate and intact distal pulses. Capillary refill < 3 seconds Pulmonary/Chest: Effort normal and breath sounds normal. No respiratory distress. She has no wheezes. Abdominal: Soft. Bowel sounds are normal. She exhibits no distension. There is no tenderness. Musculoskeletal: Normal range of motion. She exhibits no edema. +Midline lumbar tenderness 
-no other midline tenderness +Bilateral paraspinal lumbar tenderness No hip tenderness Pelvis stable No other bony tenderness Brace on left leg from prior injury Lymphadenopathy:  
  She has no cervical adenopathy. Neurological: She is alert and oriented to person, place, and time. No cranial nerve deficit or sensory deficit. She exhibits normal muscle tone. Coordination normal.  
No facial droop Strength 5/5 x 4 limbs Sensation intact Skin: Skin is warm and dry. She is not diaphoretic. Psychiatric: Her behavior is normal. Her speech is not slurred. Nursing note and vitals reviewed. Diagnostic Study Results Labs - No results found for this or any previous visit (from the past 12 hour(s)). Radiologic Studies -  
CT HEAD WO CONT Final Result IMPRESSION:  
  
Left posterior scalp hematoma. There is no hemorrhage, mass, nor acute infarct. Report provided to the emergency department at [facility]. XR SPINE LUMB 2 OR 3 V    (Results Pending) Per Dr Son Castellano prelim read: no fx or subluxation Medical Decision Making I am the first provider for this patient. I reviewed the vital signs, available nursing notes, past medical history, past surgical history, family history and social history. Vital Signs-Reviewed the patient's vital signs. Pulse Oximetry Analysis -  95% on room air Cardiac Monitor: 
Rate: 96 bpm  
 
Records Reviewed: Nursing Notes and Old Medical Records (Time of Review: 7:38 PM) Provider Notes (Medical Decision Making): KAJAL 
 Number of Diagnoses or Management Options Diagnosis management comments: Mechanical fall. Closed head injury. ICH. Lumbar contusion, fracture. Did xray of lumbar and CT of head. Amount and/or Complexity of Data Reviewed Tests in the radiology section of CPT®: ordered Tests in the medicine section of CPT®: ordered and reviewed Medications diph,Pertuss(AC),Tet Vac-PF (BOOSTRIX) suspension 0.5 mL (not administered)  
oxyCODONE-acetaminophen (PERCOCET) 5-325 mg per tablet 2 Tab (2 Tabs Oral Given 1/9/19 2559) Procedures: Wound Repair 
Date/Time: 1/9/2019 9:01 PM 
Performed by: NPPre-procedure re-eval: Immediately prior to the procedure, the patient was reevaluated and found suitable for the planned procedure and any planned medications. Time out: Immediately prior to the procedure a time out was called to verify the correct patient, procedure, equipment, staff and marking as appropriate. Andrew Oxford Location details: scalp Wound length:2.5 cm or less Foreign bodies: no foreign bodies Irrigation solution: saline Irrigation method: syringe Debridement: moderate Skin closure: staples Number of sutures: 7 (staples) Patient tolerance: Patient tolerated the procedure well with no immediate complications My total time at bedside, performing this procedure was 1-15 minutes. Core Measures:  
 
Critical Care Time:  
 
ED Course: Progress Notes, Reevaluation, and Consults: 
 
I ordered pt norco, she refused and RN states pt specifically asking for percocet. Did give her a dose. Nothing acute on xray. Hematoma on CT Pain returning, gave more analgesia Clean and staple scalp laceration. I have reassessed the patient. I have discussed the workup, results and plan with the patient and patient is in agreement. Patient is feeling better. Patient will be prescribed robaxin. Patient was discharge in stable condition. Patient was given outpatient follow up.   Patient is to return to emergency department if any new or worsening condition. Diagnosis Clinical Impression: 1. Fall, initial encounter 2. Scalp hematoma, initial encounter 3. Lumbar strain, initial encounter 4. Closed head injury, initial encounter 5. Acute midline low back pain without sciatica 6. Laceration of scalp, initial encounter Disposition: d/c Follow-up Information Follow up With Specialties Details Why Contact Info [facility]  Schedule an appointment as soon as possible for a visit in 2 days Hanna Nunez MD Orthopedic Surgery Schedule an appointment as soon as possible for a visit in 3 days  [facility] [place] [address] 
[phone] [facility] EMERGENCY DEPT Emergency Medicine  Return here in 7-10 days for staple removal [address] [place] [id number] 
[phone] Medication List  
  
ASK your doctor about these medications   
acetaminophen 500 mg tablet Commonly known as:  TYLENOL 
  
amiodarone 200 mg tablet Commonly known as:  CORDARONE 
  
buffered aspirin 325 mg tablet Commonly known as:  BUFFERIN Take 1 Tab by mouth two (2) times a day. CLARITIN 10 mg tablet Generic drug:  loratadine 
  
docusate sodium 100 mg capsule Commonly known as:  Marry Parsley Take 1 Cap by mouth two (2) times a day for 90 days. DULoxetine 60 mg capsule Commonly known as:  CYMBALTA Take 1 Cap by mouth daily. ferrous sulfate 325 mg (65 mg iron) tablet Take 1 Tab by mouth two (2) times daily (with meals). furosemide 40 mg tablet Commonly known as:  LASIX Take 1 Tab by mouth daily. Indications: Peripheral Edema due to Chronic Heart Failure, For leg edema or SOB 
  
hydrALAZINE 25 mg tablet Commonly known as:  APRESOLINE 
  
hydroxychloroquine 200 mg tablet Commonly known as:  PLAQUENIL 
  
LANTUS U-100 INSULIN 100 unit/mL injection Generic drug:  insulin glargine 
  
lisinopril 40 mg tablet Commonly known as:  Slava Ledezma Take 1 Tab by mouth daily. LYRICA 100 mg capsule Generic drug:  pregabalin NARCAN 4 mg/actuation nasal spray Generic drug:  naloxone DAVIS MILK OF MAGNESIA 400 mg/5 mL suspension Generic drug:  magnesium hydroxide PRAVASTATIN PO 
  
PriLOSEC 10 mg capsule Generic drug:  omeprazole VITAMIN D2 50,000 unit capsule Generic drug:  ergocalciferol 
  
  
 
_______________________________ Attestations: 
Scribe Attestation HENRIQUE-Dalia Sequeira acting as a scribe for and in the presence of Clark Yi DO     
January 09, 2019 at 7:38 PM 
    
Provider Attestation:     
I personally performed the services described in the documentation, reviewed the documentation, as recorded by the scribe in my presence, and it accurately and completely records my words and actions. January 09, 2019 at 7:38 PM - Clark Yi DO   
_______________________________

## 2019-01-24 ENCOUNTER — HOSPITAL ENCOUNTER (EMERGENCY)
Age: 69
Discharge: HOME | End: 2019-01-24
Attending: EMERGENCY MEDICINE
Payer: MEDICARE

## 2019-01-24 VITALS
SYSTOLIC BLOOD PRESSURE: 172 MMHG | OXYGEN SATURATION: 100 % | DIASTOLIC BLOOD PRESSURE: 83 MMHG | RESPIRATION RATE: 14 BRPM | TEMPERATURE: 97.9 F | HEART RATE: 68 BPM

## 2019-01-24 DIAGNOSIS — Z48.02 ENCOUNTER FOR REMOVAL OF SUTURES: Primary | ICD-10-CM

## 2019-01-24 PROCEDURE — 75810000275 HC EMERGENCY DEPT VISIT NO LEVEL OF CARE

## 2019-01-24 NOTE — ED PROVIDER NOTES
EMERGENCY DEPARTMENT HISTORY AND PHYSICAL EXAM 
 
Date: 1/24/2019 Patient Name: Angela Davidson History of Presenting Illness Chief Complaint Patient presents with  Suture Removal  
 
 
 
History Provided By: Patient Chief Complaint: staple removal 
Duration: 12 Days Timing:  Acute Location: scalp Quality:  
Severity:  
Modifying Factors: na 
Associated Symptoms: denies any other associated signs or symptoms Additional History (Context): Angela Davidson is a 79 y.o. female with history of  laceration who presents to the ED with a complaint of needing suture removal.  PT has no other complaints PCP: Yahir Reyes MD 
 
Current Outpatient Medications Medication Sig Dispense Refill  vancomycin HCl (VANCOMYCIN IV) by IntraVENous route daily.  methocarbamol (ROBAXIN-750) 750 mg tablet Take 1 Tab by mouth every twelve (12) hours as needed for up to 5 doses. Pain; muscle spasms 10 Tab 0  
 acetaminophen (TYLENOL) 500 mg tablet Take 500 mg by mouth every six (6) hours as needed for Pain.  amiodarone (CORDARONE) 200 mg tablet Take 200 mg by mouth.  hydroxychloroquine (PLAQUENIL) 200 mg tablet Take 200 mg by mouth two (2) times a day.  hydrALAZINE (APRESOLINE) 25 mg tablet Take 25 mg by mouth three (3) times daily.  ergocalciferol (VITAMIN D2) 50,000 unit capsule Take 50,000 Units by mouth every seven (7) days.  naloxone (NARCAN) 4 mg/actuation nasal spray 1 [place] by IntraNASal route once as needed. Use 1 spray intranasally, then discard. Repeat with new spray every 2 min as needed for opioid overdose symptoms, alternating nostrils.  magnesium hydroxide (DAVIS MILK OF MAGNESIA) 400 mg/5 mL suspension Take 30 mL by mouth daily as needed for Constipation.  buffered aspirin (BUFFERIN) 325 mg tablet Take 1 Tab by mouth two (2) times a day.  (Patient taking differently: Take 325 mg by mouth daily.) 60 Tab 0  
  docusate sodium (COLACE) 100 mg capsule Take 1 Cap by mouth two (2) times a day for 90 days. 60 Cap 2  ferrous sulfate 325 mg (65 mg iron) tablet Take 1 Tab by mouth two (2) times daily (with meals). 60 Tab 1  
 lisinopril (PRINIVIL, ZESTRIL) 40 mg tablet Take 1 Tab by mouth daily. 30 Tab 0  pregabalin (LYRICA) 100 mg capsule Take 100 mg by mouth two (2) times a day.  loratadine (CLARITIN) 10 mg tablet Take 10 mg by mouth daily as needed.  pravastatin sodium (PRAVASTATIN PO) Take 20 mg by mouth every evening.  omeprazole (PRILOSEC) 10 mg capsule Take 20 mg by mouth daily.  furosemide (LASIX) 40 mg tablet Take 1 Tab by mouth daily. Indications: Peripheral Edema due to Chronic Heart Failure, For leg edema or SOB 90 Tab 1  
 insulin glargine (LANTUS) 100 unit/mL injection 20 Units by SubCUTAneous route daily. 1 Vial 0  
 DULoxetine (CYMBALTA) 60 mg capsule Take 1 Cap by mouth daily. (Patient taking differently: Take 60 mg by mouth two (2) times a day.) 30 Cap 0 Past History Past Medical History: 
Past Medical History:  
Diagnosis Date  Anxiety  Arthritis  Asbestosis ([address]  Atrial fibrillation ([address]  Depression  Diabetes ([address]  DVT (deep venous thrombosis) ([address] right leg  Fibromyalgia  GERD (gastroesophageal reflux disease)  Hypercholesteremia  Hypertension  Pulmonary emboli ([address] 2016  Renal insufficiency  Skin abscess  Sleep apnea   
 does not use cpap regularly Past Surgical History: 
Past Surgical History:  
Procedure Laterality Date  HX BREAST LUMPECTOMY  HX CARPAL TUNNEL RELEASE  HX GASTRIC BYPASS  2015  HX HYSTERECTOMY  HX KNEE REPLACEMENT    
 bilater  HX ORTHOPAEDIC Left 09/26/2018 ORIF left patella  HX PARTIAL THYROIDECTOMY  HX SHOULDER ARTHROSCOPY    
 HX TUBAL LIGATION Family History: 
Family History Problem Relation Age of Onset  Asthma Mother  Hypertension Mother  Kidney Disease Mother  Thyroid Disease Mother  Diabetes Maternal Grandmother  Thyroid Disease Maternal Grandmother Social History: 
Social History Tobacco Use  Smoking status: Never Smoker  Smokeless tobacco: Never Used Substance Use Topics  Alcohol use: No  
 Drug use: No  
 
 
Allergies: 
No Known Allergies Review of Systems Review of Systems Constitutional: Negative for fatigue and fever. HENT: Negative for congestion. Eyes: Negative for pain. Respiratory: Negative for cough. Cardiovascular: Negative for chest pain. Gastrointestinal: Negative for abdominal pain. Genitourinary: Negative for dysuria. Musculoskeletal: Negative for back pain. Skin: Positive for wound. All other systems reviewed and are negative. All Other Systems Negative Physical Exam  
 
Vitals:  
 01/24/19 0302 BP: 172/83 Pulse: 68 Resp: 14 Temp: 97.9 °F (36.6 °C) SpO2: 100% Physical Exam  
Constitutional: She is oriented to person, place, and time. She appears well-developed and well-nourished. No distress. HENT:  
Head: Normocephalic and atraumatic. Nose: Nose normal.  
Eyes: Conjunctivae are normal.  
Neck: Normal range of motion. Cardiovascular: Normal rate. Pulmonary/Chest: Effort normal. No respiratory distress. Musculoskeletal: Normal range of motion. Neurological: She is alert and oriented to person, place, and time. Skin: Laceration noted. Well healing laceration to occiput. 7 Sutures in place. No erythema, swelling, tenderness, discharge or dehiscence appreciated Diagnostic Study Results Labs - No results found for this or any previous visit (from the past 12 hour(s)). Radiologic Studies - No orders to display CT Results  (Last 48 hours) None CXR Results  (Last 48 hours) None Medical Decision Making I am the first provider for this patient. I reviewed the vital signs, available nursing notes, past medical history, past surgical history, family history and social history. Vital Signs-Reviewed the patient's vital signs. Pulse Oximetry Analysis - 100% on RA Records Reviewed: Old Medical Records Procedures: 
Suture/Staple Removal 
Date/Time: 1/24/2019 4:31 PM 
Performed by: Harsh Medellin [place] Authorized by: Harsh Medellin [place] Consent:  
  Consent obtained:  Verbal 
  Consent given by:  Patient Risks discussed:  Bleeding, pain and wound separation Alternatives discussed:  No treatment Location: Location:  Head/neck Head/neck location:  Scalp Procedure details:  
  Wound appearance:  No signs of infection and good wound healing Number of staples removed:  7 Post-procedure details: Post-removal:  No dressing applied Patient tolerance of procedure: Tolerated well, no immediate complications Provider Notes (Medical Decision Making): Laceration healing well, no signs of infection, will remove staples and discharge home MED RECONCILIATION: 
No current facility-administered medications for this encounter. Current Outpatient Medications Medication Sig  
 vancomycin HCl (VANCOMYCIN IV) by IntraVENous route daily.  methocarbamol (ROBAXIN-750) 750 mg tablet Take 1 Tab by mouth every twelve (12) hours as needed for up to 5 doses. Pain; muscle spasms  acetaminophen (TYLENOL) 500 mg tablet Take 500 mg by mouth every six (6) hours as needed for Pain.  amiodarone (CORDARONE) 200 mg tablet Take 200 mg by mouth.  hydroxychloroquine (PLAQUENIL) 200 mg tablet Take 200 mg by mouth two (2) times a day.  hydrALAZINE (APRESOLINE) 25 mg tablet Take 25 mg by mouth three (3) times daily.  ergocalciferol (VITAMIN D2) 50,000 unit capsule Take 50,000 Units by mouth every seven (7) days.   
 naloxone (NARCAN) 4 mg/actuation nasal spray 1 [place] by IntraNASal route once as needed. Use 1 spray intranasally, then discard. Repeat with new spray every 2 min as needed for opioid overdose symptoms, alternating nostrils.  magnesium hydroxide (DAVIS MILK OF MAGNESIA) 400 mg/5 mL suspension Take 30 mL by mouth daily as needed for Constipation.  buffered aspirin (BUFFERIN) 325 mg tablet Take 1 Tab by mouth two (2) times a day. (Patient taking differently: Take 325 mg by mouth daily.)  docusate sodium (COLACE) 100 mg capsule Take 1 Cap by mouth two (2) times a day for 90 days.  ferrous sulfate 325 mg (65 mg iron) tablet Take 1 Tab by mouth two (2) times daily (with meals).  lisinopril (PRINIVIL, ZESTRIL) 40 mg tablet Take 1 Tab by mouth daily.  pregabalin (LYRICA) 100 mg capsule Take 100 mg by mouth two (2) times a day.  loratadine (CLARITIN) 10 mg tablet Take 10 mg by mouth daily as needed.  pravastatin sodium (PRAVASTATIN PO) Take 20 mg by mouth every evening.  omeprazole (PRILOSEC) 10 mg capsule Take 20 mg by mouth daily.  furosemide (LASIX) 40 mg tablet Take 1 Tab by mouth daily. Indications: Peripheral Edema due to Chronic Heart Failure, For leg edema or SOB  insulin glargine (LANTUS) 100 unit/mL injection 20 Units by SubCUTAneous route daily.  DULoxetine (CYMBALTA) 60 mg capsule Take 1 Cap by mouth daily. (Patient taking differently: Take 60 mg by mouth two (2) times a day.) Disposition: 
discharge DISCHARGE NOTE:  
 
Pt has been reexamined. Patient has no new complaints, changes, or physical findings. Care plan outlined and precautions discussed. Results of visit were reviewed with the patient. All medications were reviewed with the patient; will d/c home. All of pt's questions and concerns were addressed. Patient was instructed and agrees to follow up with PCP, as well as to return to the ED upon further deterioration. Patient is ready to go home. Follow-up Information Follow up With Specialties Details Why Contact Info Other, MD Yahir  Call As needed, follow up Patient can only remember the practice name and not the physician [facility] Lizzy [facility] EMERGENCY DEPT Emergency Medicine  If symptoms worsen [address] [facility] [address] 
[phone] Current Discharge Medication List  
  
 
 
 
 
Diagnosis Clinical Impression: 1. Encounter for removal of sutures

## 2019-01-24 NOTE — ED TRIAGE NOTES
Pt arrived c/o need for staple removal, states they were placed in scalp 8 days ago, states staples were placed here [facility]

## 2019-01-24 NOTE — DISCHARGE INSTRUCTIONS
Patient Education        Learning About Stitches and Staples Removal  When are stitches and staples removed? Your doctor will tell you when to have your stitches or staples removed, usually in 7 to 14 days. How long you'll be told to wait will depend on things like where the wound is located, how big and how deep the wound is, and what your general health is like. Do not remove the stitches on your own. Stitches on the face are usually removed within a week. But stitches and staples on other areas of the body, such as on the back or belly or over a joint, may need to stay in place longer, often a week or two. Be sure to follow your doctor's instructions. How are stitches and staples removed? It usually doesn't hurt when the doctor removes the stitches or staples. You may feel a tug as each stitch or staple is removed. · You will either be seated or lying down. · To remove stitches, the doctor will use scissors to cut each of the knots and then pull the threads out. · To remove staples, the doctor will use a tool to take out the staples one at a time. · The area may still feel tender after the stitches or staples are gone. But it should feel better within a few minutes or up to a few hours. What can you expect after stitches and staples are removed? Depending on the type and location of the cut, you will have a scar. Scars usually fade over time. Keep the area clean, but you won't need a bandage. When should you call for help? Call your doctor now or seek immediate medical care if :  · You have new pain, or your pain gets worse. · You have trouble moving the area near the scar. · You have symptoms of infection, such as:  ? Increased pain, swelling, warmth, or redness around the scar. ? Red streaks leading from the scar. ? Pus draining from the scar. ? A fever. Watch closely for changes in your health, and be sure to contact your doctor if:  · The scar opens.   · You do not get better as expected. Follow-up care is a key part of your treatment and safety. Be sure to make and go to all appointments, and call your doctor if you do not get better as expected. It's also a good idea to keep a list of the medicines you take. Where can you learn more? Go to http://niurka-michi.info/. Enter W013 in the search box to learn more about \"Learning About Stitches and Staples Removal.\"  Current as of: September 23, 2018  Content Version: 11.9  © 0943-7124 [employer]. Care instructions adapted under license by [employer] (which disclaims liability or warranty for this information). If you have questions about a medical condition or this instruction, always ask your healthcare professional. [address] any warranty or liability for your use of this information.

## 2019-01-24 NOTE — ED NOTES
Pt discharged per MD order. Pt verbalized understanding of discharge instructions and follow up care. Opportunity provided to ask questions. Pt wheeled out of ED by ED staff.

## 2019-01-25 ENCOUNTER — OFFICE VISIT (OUTPATIENT)
Dept: ORTHOPEDIC SURGERY | Age: 69
End: 2019-01-25

## 2019-01-25 VITALS
OXYGEN SATURATION: 91 % | DIASTOLIC BLOOD PRESSURE: 65 MMHG | SYSTOLIC BLOOD PRESSURE: 150 MMHG | TEMPERATURE: 97.2 F | WEIGHT: 168 LBS | HEIGHT: 62 IN | RESPIRATION RATE: 13 BRPM | HEART RATE: 70 BPM | BODY MASS INDEX: 30.91 KG/M2

## 2019-01-25 DIAGNOSIS — G89.29 CHRONIC PAIN OF LEFT KNEE: ICD-10-CM

## 2019-01-25 DIAGNOSIS — S72.452D CLOSED SUPRACONDYLAR FRACTURE OF LEFT FEMUR WITH ROUTINE HEALING, SUBSEQUENT ENCOUNTER: Primary | ICD-10-CM

## 2019-01-25 DIAGNOSIS — M79.605 LEFT LEG PAIN: ICD-10-CM

## 2019-01-25 DIAGNOSIS — M25.562 CHRONIC PAIN OF LEFT KNEE: ICD-10-CM

## 2019-01-25 NOTE — PROGRESS NOTES
[address] [place] [address]  [phone]           Patient: Mohini Mac                MRN: [id number]       SSN: [id number]  YOB: 1950        AGE: 79 y.o. SEX: female  Body mass index is 30.73 kg/m². PCP: Yahir Reyes MD  01/25/19      This office note has been dictated. REVIEW OF SYSTEMS:  Constitutional: Negative for fever, chills, weight loss and malaise/fatigue. HENT: Negative. Eyes: Negative. Respiratory: Negative. Cardiovascular: Negative. Gastrointestinal: No bowel incontinence or constipation. Genitourinary: No bladder incontinence or saddle anesthesia. Skin: Negative. Neurological: Negative. Endo/Heme/Allergies: Negative. Psychiatric/Behavioral: Negative. Musculoskeletal: As per HPI above. Past Medical History:   Diagnosis Date    Anxiety     Arthritis     Asbestosis ([address]     Atrial fibrillation ([address]     Depression     Diabetes ([address]     DVT [facility]deep venous thrombosis) ([facility])     right leg    Fibromyalgia     GERD (gastroesophageal reflux disease)     Hypercholesteremia     Hypertension     Pulmonary emboli ([address] 2016    Renal insufficiency     Skin abscess     Sleep apnea     does not use cpap regularly         Current Outpatient Medications:     vancomycin HCl (VANCOMYCIN IV), by IntraVENous route daily. , Disp: , Rfl:     methocarbamol (ROBAXIN-750) 750 mg tablet, Take 1 Tab by mouth every twelve (12) hours as needed for up to 5 doses. Pain; muscle spasms, Disp: 10 Tab, Rfl: 0    acetaminophen (TYLENOL) 500 mg tablet, Take 500 mg by mouth every six (6) hours as needed for Pain., Disp: , Rfl:     amiodarone (CORDARONE) 200 mg tablet, Take 200 mg by mouth., Disp: , Rfl:     hydroxychloroquine (PLAQUENIL) 200 mg tablet, Take 200 mg by mouth two (2) times a day., Disp: , Rfl:     hydrALAZINE (APRESOLINE) 25 mg tablet, Take 25 mg by mouth three (3) times daily. , Disp: , Rfl:     ergocalciferol (VITAMIN D2) 50,000 unit capsule, Take 50,000 Units by mouth every seven (7) days. , Disp: , Rfl:     naloxone (NARCAN) 4 mg/actuation nasal spray, 1 [place] by IntraNASal route once as needed. Use 1 spray intranasally, then discard. Repeat with new spray every 2 min as needed for opioid overdose symptoms, alternating nostrils. , Disp: , Rfl:     magnesium hydroxide (DAVIS MILK OF MAGNESIA) 400 mg/5 mL suspension, Take 30 mL by mouth daily as needed for Constipation. , Disp: , Rfl:     buffered aspirin (BUFFERIN) 325 mg tablet, Take 1 Tab by mouth two (2) times a day. (Patient taking differently: Take 325 mg by mouth daily.), Disp: 60 Tab, Rfl: 0    docusate sodium (COLACE) 100 mg capsule, Take 1 Cap by mouth two (2) times a day for 90 days. , Disp: 60 Cap, Rfl: 2    ferrous sulfate 325 mg (65 mg iron) tablet, Take 1 Tab by mouth two (2) times daily (with meals). , Disp: 60 Tab, Rfl: 1    lisinopril (PRINIVIL, ZESTRIL) 40 mg tablet, Take 1 Tab by mouth daily. , Disp: 30 Tab, Rfl: 0    pregabalin (LYRICA) 100 mg capsule, Take 100 mg by mouth two (2) times a day., Disp: , Rfl:     loratadine (CLARITIN) 10 mg tablet, Take 10 mg by mouth daily as needed. , Disp: , Rfl:     pravastatin sodium (PRAVASTATIN PO), Take 20 mg by mouth every evening., Disp: , Rfl:     omeprazole (PRILOSEC) 10 mg capsule, Take 20 mg by mouth daily. , Disp: , Rfl:     furosemide (LASIX) 40 mg tablet, Take 1 Tab by mouth daily. Indications: Peripheral Edema due to Chronic Heart Failure, For leg edema or SOB, Disp: 90 Tab, Rfl: 1    insulin glargine (LANTUS) 100 unit/mL injection, 20 Units by SubCUTAneous route daily. , Disp: 1 Vial, Rfl: 0    DULoxetine (CYMBALTA) 60 mg capsule, Take 1 Cap by mouth daily.  (Patient taking differently: Take 60 mg by mouth two (2) times a day.), Disp: 30 Cap, Rfl: 0    No Known Allergies    Social History     Socioeconomic History    Marital status: LEGALLY      Spouse name: Not on file    Number of children: Not on file    Years of education: Not on file    Highest education level: Not on file   Social Needs    Financial resource strain: Not on file    Food insecurity - worry: Not on file    Food insecurity - inability: Not on file    Transportation needs - medical: Not on file   Nanophthalmics needs - non-medical: Not on file   Occupational History    Not on file   Tobacco Use    Smoking status: Never Smoker    Smokeless tobacco: Never Used   Substance and Sexual Activity    Alcohol use: No    Drug use: No    Sexual activity: Yes   Other Topics Concern    Not on file   Social History Narrative    Not on file       Past Surgical History:   Procedure Laterality Date    HX BREAST LUMPECTOMY      HX CARPAL TUNNEL RELEASE      HX GASTRIC BYPASS  2015    HX HYSTERECTOMY      HX KNEE REPLACEMENT      bilater    HX ORTHOPAEDIC Left 09/26/2018    ORIF left patella    HX PARTIAL THYROIDECTOMY      HX SHOULDER ARTHROSCOPY      HX TUBAL LIGATION                 SUBJECTIVE:  The patient is seen today for evaluation of her left supracondylar femur fracture. The patient is now about four months status post ORIF. She also had a wound revision, and she is about two months out of this. She finished up her I.V. antibiotics this past weekend. She is doing well. She denies any fevers or chills. She has been toe-touch weightbearing to the left lower extremity. She does continue in the hinged brace and compliant in doing without. PHYSICAL EXAMINATION:  In general, the patient is alert and oriented x 3 in no acute distress. The patient is well-developed, well-nourished, with a normal affect. The patient is afebrile. HEENT:  Head is normocephalic and atraumatic. Pupils are equally round and reactive to light and accommodation. Extraocular eye movements are intact. Neck is supple. Trachea is midline. No JVD is present. Breathing is nonlabored.   Examination of the left leg reveals the skin is intact. The surgical wounds are healed nicely. There is no erythema, no ecchymosis, no warmth, and no signs of infection or cellulitis present. She has negative joint effusion and negative patellar ballottement. She is able to do full extension and hold the leg out against gravity without defects noted to the extensor mechanism. She flexes to about 95°. She does have a little bit of laxity in the knee laterally both in extension and mid-flexion. RADIOGRAPHS:  Radiographs in the office today, including AP and lateral of the left femur shows that the fracture is healing. It does have some new bone formed to the fracture area in the lateral view. It is filling in. No new abnormalities are seen. There is a jocelyn off the superior pole of the patella, which appears to be old. ASSESSMENT:      1. Status post left supracondylar femur fracture. 2. Status post I and D left wound. PLAN:  At this point, we are going to move get some infectious labs, CBC, ESR, CRP, IL-6, and uric acid. I expect these to come back as negative. Certainly, there are no signs for septic joint. We are going to get a bone stimulator to apply to the knee for healing. She will go 25% weightbearing to the left lower extremity and follow up with us in the office in about four to six weeks time for evaluation.               JR Harjit SANDERS PA-C, ATC

## 2019-01-25 NOTE — PROGRESS NOTES
1. Have you been to the ER, urgent care clinic since your last visit? Hospitalized since your last visit? Yes When: [facility] FOR HEAD INJURY 2WEEKS AGO    2. Have you seen or consulted any other health care providers outside of the [address] [place] [address] since your last visit? Include any pap smears or colon screening.  No

## 2019-01-28 ENCOUNTER — TELEPHONE (OUTPATIENT)
Dept: ORTHOPEDIC SURGERY | Facility: CLINIC | Age: 69
End: 2019-01-28

## 2019-01-28 NOTE — TELEPHONE ENCOUNTER
Fawn from [facility] called to check weight bearing and states that pt is non compliant with weight bearing status and putting more weight on lower extremity than advised by ortho team. They just wanted to advise dr Te Cleaning and Anabella Blanco. Advised of return in feb. Please call to advise.  She is consistent with brace

## 2019-01-29 NOTE — TELEPHONE ENCOUNTER
Left message on Fawn's personal voicemail notifying her of weight bearing status for this patient per RJ Asencio. Advised her to call us back with any further questions.

## 2019-02-20 ENCOUNTER — TELEPHONE (OUTPATIENT)
Dept: ORTHOPEDIC SURGERY | Facility: CLINIC | Age: 69
End: 2019-02-20

## 2019-02-20 NOTE — TELEPHONE ENCOUNTER
Yuko from [facility] called for [address] . Glenda Johns said she was going to fax over four orders for Occupational Therapy, Physical Therapy and from the . Glenda Johns said she needs the orders signed by [address] . That she will fax it over today to the [facility] location fax # [phone]. Yuko just wanted us to know this. That if  can sign it and fax it back to them tomorrow . [facility]. [phone]. Fax [phone].

## 2019-02-26 NOTE — TELEPHONE ENCOUNTER
Yuko from [facility] called in checking on status of previous message. She can be reached at [phone].

## 2019-02-27 NOTE — TELEPHONE ENCOUNTER
Suzanne called again to check status of multiple faxes for patients plan of care. Advised that provider is out of office until next week.

## 2019-02-28 NOTE — TELEPHONE ENCOUNTER
Plan of care signed by RJ Baldwin in Dr. Willy Goodman absence and placed on Jose Eduardo's desk @  for processing.

## 2019-08-01 ENCOUNTER — OFFICE VISIT (OUTPATIENT)
Dept: ORTHOPEDIC SURGERY | Age: 69
End: 2019-08-01

## 2019-08-01 VITALS
OXYGEN SATURATION: 97 % | WEIGHT: 177.6 LBS | TEMPERATURE: 97.9 F | BODY MASS INDEX: 32.68 KG/M2 | HEIGHT: 62 IN | HEART RATE: 75 BPM | RESPIRATION RATE: 11 BRPM | SYSTOLIC BLOOD PRESSURE: 135 MMHG | DIASTOLIC BLOOD PRESSURE: 73 MMHG

## 2019-08-01 DIAGNOSIS — S62.391P: Primary | ICD-10-CM

## 2019-08-01 NOTE — PROGRESS NOTES
1. Have you been to the ER, urgent care clinic since your last visit? Hospitalized since your last visit? No    2. Have you seen or consulted any other health care providers outside of the [address] [place] [address] since your last visit? Include any pap smears or colon screening.  No

## 2019-08-01 NOTE — PROGRESS NOTES
Radha Quigley is a 81 y.o. female right handed individual, not currently working. Worker's Compensation and legal considerations: not known. Vitals:    08/01/19 1128   BP: 135/73   Pulse: 75   Resp: 11   Temp: 97.9 °F (36.6 °C)   TempSrc: Oral   SpO2: 97%   Weight: 177 lb 9.6 oz (80.6 kg)   Height: 5' 2\" (1.575 m)   PainSc:   0 - No pain   PainLoc: Hand           Chief Complaint   Patient presents with    Hand Pain     LEFT HAND PAIN       HPI: Patient came in today for follow-up 8 months since her last visit. Approximately 10 months ago she had ORIF of her second metacarpal head. She reports sometimes she has pain but not having pain most of the time she has other health issues she is also dealing with    Previous HPI: F/U s/p L 2nd [facility] head ORIF and partial excision, she is 5 weeks postop. She reports to be doing well. She denies any pain over the metacarpal phalangeal joint. She reports most of her pain is along the second webspace and just distal.  She reports she has been working with therapy and is starting to have more motion compared to last time. She does report that she will be moving more than an hour away. Date of onset:  Surgery:10/1/2018    Injury: Yes: Comment: Dogbite no new injury since her surgery.     Prior Treatment:  Yes: Comment: Surgery    Numbness/ Tingling: No    ROS: Review of Systems - Negative except HPI    Past Medical History:   Diagnosis Date    Anxiety     Arthritis     Asbestosis ([address]     [facility]     Depression     Diabetes [facility]     D[facility] thrombosis) ([facility])     right leg    Fibromyalgia     GERD (gastroesophageal reflux disease)     Hypercholesteremia     Hypertension     Pulmonary emboli ([address] 2016    Renal insufficiency     Skin abscess     Sleep apnea     does not use cpap regularly       Past Surgical History:   Procedure Laterality Date    HX BREAST LUMPECTOMY      HX CARPAL TUNNEL RELEASE      HX GASTRIC BYPASS 2015    HX HYSTERECTOMY      HX KNEE REPLACEMENT      bilater    HX ORTHOPAEDIC Left 09/26/2018    ORIF left patella    HX PARTIAL THYROIDECTOMY      HX SHOULDER ARTHROSCOPY      HX TUBAL LIGATION         Current Outpatient Medications   Medication Sig Dispense Refill    vancomycin HCl (VANCOMYCIN IV) by IntraVENous route daily.  methocarbamol (ROBAXIN-750) 750 mg tablet Take 1 Tab by mouth every twelve (12) hours as needed for up to 5 doses. Pain; muscle spasms 10 Tab 0    acetaminophen (TYLENOL) 500 mg tablet Take 500 mg by mouth every six (6) hours as needed for Pain.  amiodarone (CORDARONE) 200 mg tablet Take 200 mg by mouth.  hydroxychloroquine (PLAQUENIL) 200 mg tablet Take 200 mg by mouth two (2) times a day.  hydrALAZINE (APRESOLINE) 25 mg tablet Take 25 mg by mouth three (3) times daily.  ergocalciferol (VITAMIN D2) 50,000 unit capsule Take 50,000 Units by mouth every seven (7) days.  naloxone (NARCAN) 4 mg/actuation nasal spray 1 [place] by IntraNASal route once as needed. Use 1 spray intranasally, then discard. Repeat with new spray every 2 min as needed for opioid overdose symptoms, alternating nostrils.  magnesium hydroxide (DAVIS MILK OF MAGNESIA) 400 mg/5 mL suspension Take 30 mL by mouth daily as needed for Constipation.  buffered aspirin (BUFFERIN) 325 mg tablet Take 1 Tab by mouth two (2) times a day. (Patient taking differently: Take 325 mg by mouth daily.) 60 Tab 0    ferrous sulfate 325 mg (65 mg iron) tablet Take 1 Tab by mouth two (2) times daily (with meals). 60 Tab 1    lisinopril (PRINIVIL, ZESTRIL) 40 mg tablet Take 1 Tab by mouth daily. 30 Tab 0    pregabalin (LYRICA) 100 mg capsule Take 100 mg by mouth two (2) times a day.  loratadine (CLARITIN) 10 mg tablet Take 10 mg by mouth daily as needed.  pravastatin sodium (PRAVASTATIN PO) Take 20 mg by mouth every evening.       omeprazole (PRILOSEC) 10 mg capsule Take 20 mg by mouth daily.      furosemide (LASIX) 40 mg tablet Take 1 Tab by mouth daily. Indications: Peripheral Edema due to Chronic Heart Failure, For leg edema or SOB 90 Tab 1    insulin glargine (LANTUS) 100 unit/mL injection 20 Units by SubCUTAneous route daily. 1 Vial 0    DULoxetine (CYMBALTA) 60 mg capsule Take 1 Cap by mouth daily. (Patient taking differently: Take 60 mg by mouth two (2) times a day.) 30 Cap 0       No Known Allergies      PE: L Hand: There is mild tenderness to palpation about the index finger MCP joint. Patient has a functional range of motion of the palm to pulp distance of 2 to 3 cm. Sensation is intact distally and cap refill is brisk. Imaging: Plain films of L Hand shows interval resorption of the second metacarpal head. Since this resorption the screw head is has become prominent. Again there is noted radial subluxation proximal phalanx. ICD-10-CM ICD-9-CM    1. Open displaced fracture of other part of second metacarpal bone of left hand with malunion, subsequent encounter S62.391P 733.81 AMB POC XRAY, HAND; 3+ VIEWS       Plan: At this point I had a discussion with patient regarding operative versus nonoperative treatment. I discussed with her the possibility of screw removal with possible interposition arthroplasty versus fusion. She would like to hold off on this at this point as she is addressing other health issues. Follow-up PRN    She would like a referral for someone to discuss her right shoulder pain she says she has a history of rotator cuff tear on the right. I have given her Dr. Marly Santoyo card.     Plan was reviewed with patient, who verbalized agreement and understanding of the plan

## 2019-08-09 PROBLEM — R41.82 ALTERED MENTAL STATUS: Status: ACTIVE | Noted: 2019-08-09

## 2019-08-09 PROBLEM — N17.9 ACUTE ON CHRONIC KIDNEY FAILURE (HCC): Status: ACTIVE | Noted: 2019-08-09

## 2019-08-09 PROBLEM — N18.9 ACUTE ON CHRONIC KIDNEY FAILURE (HCC): Status: ACTIVE | Noted: 2019-08-09

## 2019-08-09 PROBLEM — J69.0 ASPIRATION PNEUMONIA (HCC): Status: ACTIVE | Noted: 2019-08-09

## 2019-10-09 ENCOUNTER — APPOINTMENT (OUTPATIENT)
Dept: GENERAL RADIOLOGY | Age: 69
DRG: 917 | End: 2019-10-09
Attending: EMERGENCY MEDICINE
Payer: MEDICARE

## 2019-10-09 ENCOUNTER — APPOINTMENT (OUTPATIENT)
Dept: CT IMAGING | Age: 69
DRG: 917 | End: 2019-10-09
Attending: EMERGENCY MEDICINE
Payer: MEDICARE

## 2019-10-09 ENCOUNTER — HOSPITAL ENCOUNTER (INPATIENT)
Age: 69
LOS: 3 days | Discharge: HOME HEALTH | DRG: 917 | End: 2019-10-12
Attending: EMERGENCY MEDICINE | Admitting: HOSPITALIST
Payer: MEDICARE

## 2019-10-09 DIAGNOSIS — M79.7 FIBROMYALGIA: Primary | ICD-10-CM

## 2019-10-09 DIAGNOSIS — R41.82 ALTERED MENTAL STATUS, UNSPECIFIED ALTERED MENTAL STATUS TYPE: ICD-10-CM

## 2019-10-09 DIAGNOSIS — N18.30 CHRONIC RENAL FAILURE, STAGE 3 (MODERATE) (HCC): ICD-10-CM

## 2019-10-09 PROBLEM — N18.9 CHRONIC RENAL FAILURE: Status: ACTIVE | Noted: 2019-10-09

## 2019-10-09 LAB
ALBUMIN SERPL-MCNC: 2.6 G/DL (ref 3.4–5)
ALBUMIN/GLOB SERPL: 0.6 {RATIO} (ref 0.8–1.7)
ALP SERPL-CCNC: 278 U/L (ref 45–117)
ALT SERPL-CCNC: 21 U/L (ref 13–56)
AMPHET UR QL SCN: POSITIVE
ANION GAP SERPL CALC-SCNC: 12 MMOL/L (ref 3–18)
APPEARANCE UR: CLEAR
AST SERPL-CCNC: 21 U/L (ref 10–38)
BACTERIA URNS QL MICRO: ABNORMAL /HPF
BARBITURATES UR QL SCN: NEGATIVE
BASOPHILS # BLD: 0 K/UL (ref 0–0.1)
BASOPHILS NFR BLD: 0 % (ref 0–2)
BENZODIAZ UR QL: NEGATIVE
BILIRUB SERPL-MCNC: 0.5 MG/DL (ref 0.2–1)
BILIRUB UR QL: NEGATIVE
BUN SERPL-MCNC: 120 MG/DL (ref 7–18)
BUN/CREAT SERPL: 32 (ref 12–20)
CALCIUM SERPL-MCNC: 8.3 MG/DL (ref 8.5–10.1)
CANNABINOIDS UR QL SCN: NEGATIVE
CHLORIDE SERPL-SCNC: 96 MMOL/L (ref 100–111)
CK MB CFR SERPL CALC: 3 % (ref 0–4)
CK MB SERPL-MCNC: 2.4 NG/ML (ref 5–25)
CK SERPL-CCNC: 79 U/L (ref 26–192)
CO2 SERPL-SCNC: 19 MMOL/L (ref 21–32)
COCAINE UR QL SCN: NEGATIVE
COLOR UR: YELLOW
CREAT SERPL-MCNC: 3.75 MG/DL (ref 0.6–1.3)
DIFFERENTIAL METHOD BLD: ABNORMAL
EOSINOPHIL # BLD: 0 K/UL (ref 0–0.4)
EOSINOPHIL NFR BLD: 0 % (ref 0–5)
EPITH CASTS URNS QL MICRO: ABNORMAL /LPF (ref 0–5)
ERYTHROCYTE [DISTWIDTH] IN BLOOD BY AUTOMATED COUNT: 14.8 % (ref 11.6–14.5)
ETHANOL SERPL-MCNC: <3 MG/DL (ref 0–3)
GLOBULIN SER CALC-MCNC: 4.5 G/DL (ref 2–4)
GLUCOSE BLD STRIP.AUTO-MCNC: 237 MG/DL (ref 70–110)
GLUCOSE SERPL-MCNC: 211 MG/DL (ref 74–99)
GLUCOSE UR STRIP.AUTO-MCNC: NEGATIVE MG/DL
HCT VFR BLD AUTO: 25.1 % (ref 35–45)
HDSCOM,HDSCOM: ABNORMAL
HGB BLD-MCNC: 8.1 G/DL (ref 12–16)
HGB UR QL STRIP: NEGATIVE
HYALINE CASTS URNS QL MICRO: ABNORMAL /LPF (ref 0–2)
KETONES UR QL STRIP.AUTO: NEGATIVE MG/DL
LACTATE BLD-SCNC: 0.51 MMOL/L (ref 0.4–2)
LACTATE BLD-SCNC: <0.3 MMOL/L (ref 0.4–2)
LEUKOCYTE ESTERASE UR QL STRIP.AUTO: ABNORMAL
LYMPHOCYTES # BLD: 1.3 K/UL (ref 0.9–3.6)
LYMPHOCYTES NFR BLD: 9 % (ref 21–52)
MCH RBC QN AUTO: 24.5 PG (ref 24–34)
MCHC RBC AUTO-ENTMCNC: 32.3 G/DL (ref 31–37)
MCV RBC AUTO: 75.8 FL (ref 74–97)
METHADONE UR QL: NEGATIVE
MONOCYTES # BLD: 1.6 K/UL (ref 0.05–1.2)
MONOCYTES NFR BLD: 11 % (ref 3–10)
NEUTS SEG # BLD: 12.5 K/UL (ref 1.8–8)
NEUTS SEG NFR BLD: 80 % (ref 40–73)
NITRITE UR QL STRIP.AUTO: NEGATIVE
OPIATES UR QL: POSITIVE
PCP UR QL: NEGATIVE
PH UR STRIP: 5 [PH] (ref 5–8)
PLATELET # BLD AUTO: 485 K/UL (ref 135–420)
PMV BLD AUTO: 10 FL (ref 9.2–11.8)
POTASSIUM SERPL-SCNC: 4.3 MMOL/L (ref 3.5–5.5)
PROT SERPL-MCNC: 7.1 G/DL (ref 6.4–8.2)
PROT UR STRIP-MCNC: 30 MG/DL
RBC # BLD AUTO: 3.31 M/UL (ref 4.2–5.3)
SODIUM SERPL-SCNC: 127 MMOL/L (ref 136–145)
SP GR UR REFRACTOMETRY: 1.01 (ref 1–1.03)
TROPONIN I SERPL-MCNC: 0.06 NG/ML (ref 0–0.04)
UROBILINOGEN UR QL STRIP.AUTO: 0.2 EU/DL (ref 0.2–1)
WBC # BLD AUTO: 15.5 K/UL (ref 4.6–13.2)
WBC URNS QL MICRO: ABNORMAL /HPF (ref 0–4)

## 2019-10-09 PROCEDURE — 80307 DRUG TEST PRSMV CHEM ANLYZR: CPT

## 2019-10-09 PROCEDURE — 83735 ASSAY OF MAGNESIUM: CPT

## 2019-10-09 PROCEDURE — 99285 EMERGENCY DEPT VISIT HI MDM: CPT

## 2019-10-09 PROCEDURE — 83605 ASSAY OF LACTIC ACID: CPT

## 2019-10-09 PROCEDURE — 82962 GLUCOSE BLOOD TEST: CPT

## 2019-10-09 PROCEDURE — 70450 CT HEAD/BRAIN W/O DYE: CPT

## 2019-10-09 PROCEDURE — 87077 CULTURE AEROBIC IDENTIFY: CPT

## 2019-10-09 PROCEDURE — 74011250636 HC RX REV CODE- 250/636: Performed by: EMERGENCY MEDICINE

## 2019-10-09 PROCEDURE — 65660000004 HC RM CVT STEPDOWN

## 2019-10-09 PROCEDURE — 82550 ASSAY OF CK (CPK): CPT

## 2019-10-09 PROCEDURE — 87040 BLOOD CULTURE FOR BACTERIA: CPT

## 2019-10-09 PROCEDURE — 80053 COMPREHEN METABOLIC PANEL: CPT

## 2019-10-09 PROCEDURE — 85025 COMPLETE CBC W/AUTO DIFF WBC: CPT

## 2019-10-09 PROCEDURE — 81001 URINALYSIS AUTO W/SCOPE: CPT

## 2019-10-09 PROCEDURE — 74011250636 HC RX REV CODE- 250/636: Performed by: NURSE PRACTITIONER

## 2019-10-09 PROCEDURE — 87086 URINE CULTURE/COLONY COUNT: CPT

## 2019-10-09 PROCEDURE — 71045 X-RAY EXAM CHEST 1 VIEW: CPT

## 2019-10-09 PROCEDURE — 84100 ASSAY OF PHOSPHORUS: CPT

## 2019-10-09 RX ORDER — MONTELUKAST SODIUM 10 MG/1
10 TABLET ORAL DAILY
Status: DISCONTINUED | OUTPATIENT
Start: 2019-10-10 | End: 2019-10-12 | Stop reason: HOSPADM

## 2019-10-09 RX ORDER — ACETAMINOPHEN 325 MG/1
650 TABLET ORAL
Status: DISCONTINUED | OUTPATIENT
Start: 2019-10-09 | End: 2019-10-12 | Stop reason: HOSPADM

## 2019-10-09 RX ORDER — INSULIN LISPRO 100 [IU]/ML
INJECTION, SOLUTION INTRAVENOUS; SUBCUTANEOUS
Status: DISCONTINUED | OUTPATIENT
Start: 2019-10-09 | End: 2019-10-12 | Stop reason: HOSPADM

## 2019-10-09 RX ORDER — ONDANSETRON 2 MG/ML
4 INJECTION INTRAMUSCULAR; INTRAVENOUS
Status: DISCONTINUED | OUTPATIENT
Start: 2019-10-09 | End: 2019-10-12 | Stop reason: HOSPADM

## 2019-10-09 RX ORDER — MAGNESIUM SULFATE 100 %
16 CRYSTALS MISCELLANEOUS AS NEEDED
Status: DISCONTINUED | OUTPATIENT
Start: 2019-10-09 | End: 2019-10-12 | Stop reason: HOSPADM

## 2019-10-09 RX ORDER — HEPARIN SODIUM 5000 [USP'U]/ML
5000 INJECTION, SOLUTION INTRAVENOUS; SUBCUTANEOUS EVERY 8 HOURS
Status: DISCONTINUED | OUTPATIENT
Start: 2019-10-09 | End: 2019-10-12 | Stop reason: HOSPADM

## 2019-10-09 RX ORDER — PANTOPRAZOLE SODIUM 40 MG/1
40 TABLET, DELAYED RELEASE ORAL
Status: DISCONTINUED | OUTPATIENT
Start: 2019-10-10 | End: 2019-10-12 | Stop reason: HOSPADM

## 2019-10-09 RX ORDER — DOCUSATE SODIUM 100 MG/1
100 CAPSULE, LIQUID FILLED ORAL DAILY
Status: DISCONTINUED | OUTPATIENT
Start: 2019-10-10 | End: 2019-10-12 | Stop reason: HOSPADM

## 2019-10-09 RX ORDER — DEXTROSE 50 % IN WATER (D50W) INTRAVENOUS SYRINGE
25-50 AS NEEDED
Status: DISCONTINUED | OUTPATIENT
Start: 2019-10-09 | End: 2019-10-12 | Stop reason: HOSPADM

## 2019-10-09 RX ORDER — SODIUM CHLORIDE 9 MG/ML
75 INJECTION, SOLUTION INTRAVENOUS CONTINUOUS
Status: DISCONTINUED | OUTPATIENT
Start: 2019-10-09 | End: 2019-10-12

## 2019-10-09 RX ORDER — POLYETHYLENE GLYCOL 3350 17 G/17G
17 POWDER, FOR SOLUTION ORAL DAILY
Status: DISCONTINUED | OUTPATIENT
Start: 2019-10-10 | End: 2019-10-12 | Stop reason: HOSPADM

## 2019-10-09 RX ADMIN — HEPARIN SODIUM 5000 UNITS: 5000 INJECTION INTRAVENOUS; SUBCUTANEOUS at 17:50

## 2019-10-09 RX ADMIN — SODIUM CHLORIDE 250 ML: 9 INJECTION, SOLUTION INTRAVENOUS at 17:47

## 2019-10-09 RX ADMIN — SODIUM CHLORIDE 1000 ML: 900 INJECTION, SOLUTION INTRAVENOUS at 13:18

## 2019-10-09 RX ADMIN — SODIUM CHLORIDE 100 ML/HR: 900 INJECTION, SOLUTION INTRAVENOUS at 16:21

## 2019-10-09 NOTE — ED NOTES
Patient alert and orientated,   Assisted to bedside commode. Patient passed moderate amount of formed stool. Small amount of blood on stool and on wipe. Nida care given,   Patient returned to stretcher.   Pt tolerated well

## 2019-10-09 NOTE — PROGRESS NOTES
Patient was not available to be assessed when  conducted an ER visit; Patient was sleeping.  prayed silently for Patient's wellbeing.        [id number] [address]   [phone]

## 2019-10-09 NOTE — ED PROVIDER NOTES
EMERGENCY DEPARTMENT HISTORY AND PHYSICAL EXAM    1:50 PM      Date: 10/9/2019  Patient Name: Missael Velásquez    History of Presenting Illness     Chief Complaint   Patient presents with   Lambert Fall    Hypotension         History Provided By: Patient    Additional History (Context): Missael Velásquez is a 81 y.o. female with hypertension, hyperlipidemia and pulmonary embolism who presents with altered mental status. Patient is here with son who is giving the history. Since son states patient is taking a lot of medication for pain. He states he spoke to his mother last night. She is very sleepy. Patient is currently denying any chest pain, nausea vomiting or diarrhea. Denies smoking alcohol or recreational drug use. Kamran Ko PCP: Yahir Reyes MD      Current Facility-Administered Medications   Medication Dose Route Frequency Provider Last Rate Last Dose    acetaminophen (TYLENOL) tablet 650 mg  650 mg Oral Q4H PRN Kodak Lal NP        ondansetron (ZOFRAN) injection 4 mg  4 mg IntraVENous Q4H PRN Kodak Lal NP        heparin (porcine) injection 5,000 Units  5,000 Units SubCUTAneous Q8H Shayna Auguste NP        0.9% sodium chloride infusion  100 mL/hr IntraVENous CONTINUOUS Kodak Lal NP        [START ON 10/10/2019] montelukast (SINGULAIR) tablet 10 mg  10 mg Oral DAILY Kodak Lal NP        [START ON 10/10/2019] pantoprazole (PROTONIX) tablet 40 mg  40 mg Oral ACB Shayna Auguste NP        [START ON 10/10/2019] docusate sodium (COLACE) capsule 100 mg  100 mg Oral DAILY Shayna Auguste NP        [START ON 10/10/2019] polyethylene glycol (MIRALAX) packet 17 g  17 g Oral DAILY Kodak Lal NP         Current Outpatient Medications   Medication Sig Dispense Refill    pregabalin (LYRICA) 150 mg capsule Take 150 mg by mouth three (3) times daily.  lisinopril-hydroCHLOROthiazide (PRINZIDE, ZESTORETIC) 20-25 mg per tablet Take 2 Tabs by mouth daily.       montelukast (SINGULAIR) 10 mg tablet Take 10 mg by mouth daily.  Loratadine 5 mg chew Take 1 Tab by mouth daily.  traZODone (DESYREL) 100 mg tablet Take  mg by mouth nightly.  dextroamphetamine-amphetamine (ADDERALL) 30 mg tablet Take 30 mg by mouth three (3) times daily.  omeprazole (PRILOSEC) 40 mg capsule Take 40 mg by mouth daily.  methocarbamol (ROBAXIN-750) 750 mg tablet Take 1 Tab by mouth every twelve (12) hours as needed for up to 5 doses. Pain; muscle spasms 10 Tab 0    acetaminophen (TYLENOL) 500 mg tablet Take 500 mg by mouth every six (6) hours as needed for Pain.  amiodarone (CORDARONE) 200 mg tablet Take 200 mg by mouth daily.  naloxone (NARCAN) 4 mg/actuation nasal spray 1 [place] by IntraNASal route once as needed. Use 1 spray intranasally, then discard. Repeat with new spray every 2 min as needed for opioid overdose symptoms, alternating nostrils.  magnesium hydroxide (Predikt MILK OF MAGNESIA) 400 mg/5 mL suspension Take 30 mL by mouth daily as needed for Constipation.  buffered aspirin (BUFFERIN) 325 mg tablet Take 1 Tab by mouth two (2) times a day. (Patient taking differently: Take 325 mg by mouth daily.) 60 Tab 0    ferrous sulfate 325 mg (65 mg iron) tablet Take 1 Tab by mouth two (2) times daily (with meals). 60 Tab 1    furosemide (LASIX) 40 mg tablet Take 1 Tab by mouth daily. Indications: Peripheral Edema due to Chronic Heart Failure, For leg edema or SOB 90 Tab 1    insulin glargine (LANTUS) 100 unit/mL injection 20 Units by SubCUTAneous route daily. 1 Vial 0    DULoxetine (CYMBALTA) 60 mg capsule Take 1 Cap by mouth daily.  (Patient taking differently: Take 120 mg by mouth daily.) 30 Cap 0       Past History     Past Medical History:  Past Medical History:   Diagnosis Date    Anxiety     Arthritis     Asbestosis ([facility] [place] [address]     Atrial fibrillation ([facility] [place] [address]     Depression     Diabetes ([facility] [place] [address]     DVT (deep venous thrombosis) ([facility] [place] [address] right leg    Fibromyalgia     GERD (gastroesophageal reflux disease)     Hypercholesteremia     Hypertension     Pulmonary emboli ([facility] [address] 2016    Renal insufficiency     Skin abscess     Sleep apnea     does not use cpap regularly       Past Surgical History:  Past Surgical History:   Procedure Laterality Date    HX BREAST LUMPECTOMY      HX CARPAL TUNNEL RELEASE      HX GASTRIC BYPASS  2015    HX HYSTERECTOMY      HX KNEE REPLACEMENT      bilater    HX ORTHOPAEDIC Left 09/26/2018    ORIF left patella    HX PARTIAL THYROIDECTOMY      HX SHOULDER ARTHROSCOPY      HX TUBAL LIGATION         Family History:  Family History   Problem Relation Age of Onset   [place] Asthma Mother     Hypertension Mother     Kidney Disease Mother     Thyroid Disease Mother     Diabetes Maternal Grandmother     Thyroid Disease Maternal Grandmother        Social History:  Social History     Tobacco Use    Smoking status: Never Smoker    Smokeless tobacco: Never Used   Substance Use Topics    Alcohol use: No    Drug use: No       Allergies:  No Known Allergies      Review of Systems       Review of Systems   Constitutional: Positive for activity change. Negative for chills, diaphoresis and fever. HENT: Negative. Negative for congestion, rhinorrhea and sore throat. Eyes: Negative. Negative for pain, discharge and redness. Respiratory: Negative. Negative for cough, chest tightness, shortness of breath and wheezing. Cardiovascular: Negative. Negative for chest pain. Gastrointestinal: Negative. Negative for abdominal pain, constipation, diarrhea, nausea and vomiting. Genitourinary: Negative. Negative for dysuria, flank pain, frequency, hematuria and urgency. Musculoskeletal: Negative. Negative for back pain and neck pain. Skin: Negative. Negative for rash. Neurological: Positive for weakness. Negative for syncope, numbness and headaches. Psychiatric/Behavioral: Negative.     All other systems reviewed and are negative. Physical Exam     Visit Vitals  BP (!) 80/47   Pulse 62   Temp 97.5 °F (36.4 °C)   Resp 12   Wt 76.2 kg (168 lb)   SpO2 98%   BMI 30.73 kg/m²         Physical Exam   Constitutional: She appears well-developed and well-nourished. She appears lethargic. Non-toxic appearance. She does not have a sickly appearance. She appears ill. No distress. HENT:   Head: Normocephalic and atraumatic. Mouth/Throat: Oropharynx is clear and moist. No oropharyngeal exudate. Eyes: Pupils are equal, round, and reactive to light. Conjunctivae and EOM are normal. No scleral icterus. Neck: Trachea normal and normal range of motion. Neck supple. No hepatojugular reflux and no JVD present. No tracheal deviation present. No thyromegaly present. Cardiovascular: Normal rate, regular rhythm, S1 normal, S2 normal, normal heart sounds, intact distal pulses and normal pulses. Exam reveals no gallop, no S3 and no S4. No murmur heard. Pulses:       Radial pulses are 2+ on the right side, and 2+ on the left side. Dorsalis pedis pulses are 2+ on the right side, and 2+ on the left side. Pulmonary/Chest: Effort normal and breath sounds normal. No respiratory distress. She has no decreased breath sounds. She has no wheezes. She has no rhonchi. She has no rales. Abdominal: Soft. Normal appearance and bowel sounds are normal. She exhibits no distension and no mass. There is no hepatosplenomegaly. There is no tenderness. There is no rigidity, no rebound, no guarding, no CVA tenderness, no tenderness at McBurney's point and negative Morrow's sign. Musculoskeletal: Normal range of motion. Strength 2 out of 5 throughout. Lymphadenopathy:        Head (right side): No submental, no submandibular, no preauricular and no occipital adenopathy present. Head (left side): No submental, no submandibular, no preauricular and no occipital adenopathy present. She has no cervical adenopathy. Right: No supraclavicular adenopathy present. Left: No supraclavicular adenopathy present. Neurological: She has normal strength and normal reflexes. She appears lethargic. She is not disoriented. No cranial nerve deficit or sensory deficit. Coordination and gait normal. GCS eye subscore is 4. GCS verbal subscore is 5. GCS motor subscore is 6. Grossly intact. Skin: Skin is warm, dry and intact. No rash noted. She is not diaphoretic. Psychiatric: She has a normal mood and affect. Her speech is normal and behavior is normal. Judgment and thought content normal. Cognition and memory are normal.   Nursing note and vitals reviewed. Diagnostic Study Results     Labs -  Recent Results (from the past 12 hour(s))   URINALYSIS W/ RFLX MICROSCOPIC    Collection Time: 10/09/19 12:12 PM   Result Value Ref Range    Color YELLOW      Appearance CLEAR      Specific gravity 1.013 1.005 - 1.030      pH (UA) 5.0 5.0 - 8.0      Protein 30 (A) NEG mg/dL    Glucose NEGATIVE  NEG mg/dL    Ketone NEGATIVE  NEG mg/dL    Bilirubin NEGATIVE  NEG      Blood NEGATIVE  NEG      Urobilinogen 0.2 0.2 - 1.0 EU/dL    Nitrites NEGATIVE  NEG      Leukocyte Esterase SMALL (A) NEG     URINE MICROSCOPIC ONLY    Collection Time: 10/09/19 12:12 PM   Result Value Ref Range    WBC 5 to 10 0 - 4 /hpf    Epithelial cells 1+ 0 - 5 /lpf    Bacteria 1+ (A) NEG /hpf    Hyaline cast 0 to 1 0 - 2 /lpf   CBC WITH AUTOMATED DIFF    Collection Time: 10/09/19 12:20 PM   Result Value Ref Range    WBC 15.5 (H) 4.6 - 13.2 K/uL    RBC 3.31 (L) 4.20 - 5.30 M/uL    HGB 8.1 (L) 12.0 - 16.0 g/dL    HCT 25.1 (L) 35.0 - 45.0 %    MCV 75.8 74.0 - 97.0 FL    MCH 24.5 24.0 - 34.0 PG    MCHC 32.3 31.0 - 37.0 g/dL    RDW 14.8 (H) 11.6 - 14.5 %    PLATELET 861 (H) 733 - 420 K/uL    MPV 10.0 9.2 - 11.8 FL    NEUTROPHILS 80 (H) 40 - 73 %    LYMPHOCYTES 9 (L) 21 - 52 %    MONOCYTES 11 (H) 3 - 10 %    EOSINOPHILS 0 0 - 5 %    BASOPHILS 0 0 - 2 %    ABS.  NEUTROPHILS 12.5 (H) 1.8 - 8.0 K/UL    ABS. LYMPHOCYTES 1.3 0.9 - 3.6 K/UL    ABS. MONOCYTES 1.6 (H) 0.05 - 1.2 K/UL    ABS. EOSINOPHILS 0.0 0.0 - 0.4 K/UL    ABS. BASOPHILS 0.0 0.0 - 0.1 K/UL    DF AUTOMATED     METABOLIC PANEL, COMPREHENSIVE    Collection Time: 10/09/19 12:20 PM   Result Value Ref Range    Sodium 127 (L) 136 - 145 mmol/L    Potassium 4.3 3.5 - 5.5 mmol/L    Chloride 96 (L) 100 - 111 mmol/L    CO2 19 (L) 21 - 32 mmol/L    Anion gap 12 3.0 - 18 mmol/L    Glucose 211 (H) 74 - 99 mg/dL     (H) 7.0 - 18 MG/DL    Creatinine 3.75 (H) 0.6 - 1.3 MG/DL    BUN/Creatinine ratio 32 (H) 12 - 20      GFR est AA 15 (L) >60 ml/min/1.73m2    GFR est non-AA 12 (L) >60 ml/min/1.73m2    Calcium 8.3 (L) 8.5 - 10.1 MG/DL    Bilirubin, total 0.5 0.2 - 1.0 MG/DL    ALT (SGPT) 21 13 - 56 U/L    AST (SGOT) 21 10 - 38 U/L    Alk. phosphatase 278 (H) 45 - 117 U/L    Protein, total 7.1 6.4 - 8.2 g/dL    Albumin 2.6 (L) 3.4 - 5.0 g/dL    Globulin 4.5 (H) 2.0 - 4.0 g/dL    A-G Ratio 0.6 (L) 0.8 - 1.7     CARDIAC PANEL,(CK, CKMB & TROPONIN)    Collection Time: 10/09/19 12:20 PM   Result Value Ref Range    CK 79 26 - 192 U/L    CK - MB 2.4 <3.6 ng/ml    CK-MB Index 3.0 0.0 - 4.0 %    Troponin-I, QT 0.06 (H) 0.0 - 0.045 NG/ML   ETHYL ALCOHOL    Collection Time: 10/09/19 12:20 PM   Result Value Ref Range    ALCOHOL(ETHYL),SERUM <3 0 - 3 MG/DL   GLUCOSE, POC    Collection Time: 10/09/19 12:24 PM   Result Value Ref Range    Glucose (POC) 237 (H) 70 - 110 mg/dL   POC LACTIC ACID    Collection Time: 10/09/19  1:34 PM   Result Value Ref Range    Lactic Acid (POC) 0.51 0.40 - 2.00 mmol/L       Radiologic Studies -   CT HEAD WO CONT   Final Result   IMPRESSION:                  1.  No acute intracranial abnormalities. 2.  Slight chronic small vessel ischemic changes. XR CHEST PORT   Final Result   Impression:      Linear scar or atelectasis left midlung. Otherwise no acute abnormalities.             Medical Decision Making Provider Notes (Medical Decision Making):  MDM  Number of Diagnoses or Management Options  Diagnosis management comments: AMS  Infection  Intracranial bleed        I am the first provider for this patient. I reviewed the vital signs, available nursing notes, past medical history, past surgical history, family history and social history. Vital Signs-Reviewed the patient's vital signs. Records Reviewed: Nursing Notes (Time of Review: 1:50 PM)    ED Course: Progress Notes, Reevaluation, and Consults:    Labs essentially normal with the exception of hemoglobin of 8.1, creatinine of 3.75, glucose of 211, sodium of 127, troponin of 0.06. Chest X-Ray showed No acute process. CT head results reviewed and discussed with patient. 4:50 PM 10/9/2019        Diagnosis       Clinical Impression:   1. Altered mental status, unspecified altered mental status type    2. Chronic renal failure, stage 3 (moderate) ([facility] [address]        Disposition: Admitted         Attestation        Provider Attestation:     I personally performed the services described in the documentation, reviewed the documentation and it accurately and completely records my words and actions utilizing the [address] [place] October 09, 2019 at 5:08 PM - Bonnie Humphrey DO    Disclaimer. It is dictated using utilizing voice recognition software. Unfortunately this leads to occasional typographical errors. I apologize in advance if the situation occurs. If questions arise please do not hesitate to contact me or call our department.

## 2019-10-09 NOTE — ED TRIAGE NOTES
\"I fell and broke my head in the back. \" Pt. States she was trying to turn and get in her wheelchair and she hit her head on the coffee table. Pt. Very groggy and can barely keep her eyes open. Daughter-in-law states \"she's not herself. \" Per family, pt. Had one Norco tab this morning.

## 2019-10-09 NOTE — H&P
History & Physical    Patient: Tisha Ramirez MRN: [id number]  CSN: [id number]    YOB: 1950  Age: 81 y.o. Sex: female      DOA: 10/9/2019    Chief Complaint   Patient presents with    Fall    Hypotension          HPI:     Tisha Ramirez is a 81 y.o. female with past medical history significant for acute metabolic encephalopathy secondry to unintended polypharmacy use, hypotension d/t hypovolemia, CKD 3, paroxysmal A. Fib not on anticoagulation, DM type 2 with hyperglycemia, HLD, depression, chronic pain and anxiety who presented to ED for evaluation of altered mental status, fall, and hypotension. She is alert and oriented X 2-3 with some forgetfulness. States her legs gave out this morning while transferring to her wheelchair, she feel back and hit her head. She denies fever or chills. No chest pain / chest discomfort. She has no cough or shortness of breathing. Denies abdominal pain nausea vomiting or diarrhea. She does report she has had worsening numbness / tingling to fingers over the past year, worsening generalized weakness over the past 3 days. She is unclear of her routine home however EMR shows Lyrica, Trazodone, Adderall, Robaxin, HCTZ, and Amiodarone.      Past Medical History:   Diagnosis Date    Anxiety     Arthritis     Asbestosis ([address]     Atrial fibrillation ([address]     Depression     Diabetes ([address]     DVT (deep venous thrombosis) ([facility])     right leg    Fibromyalgia     GERD (gastroesophageal reflux disease)     Hypercholesteremia     Hypertension     Pulmonary emboli ([address] 2016    Renal insufficiency     Skin abscess     Sleep apnea     does not use cpap regularly       Past Surgical History:   Procedure Laterality Date    HX BREAST LUMPECTOMY      HX CARPAL TUNNEL RELEASE      HX GASTRIC BYPASS  2015    HX HYSTERECTOMY      HX KNEE REPLACEMENT      bilater    HX ORTHOPAEDIC Left 09/26/2018    ORIF left patella    HX PARTIAL THYROIDECTOMY      HX SHOULDER ARTHROSCOPY      HX TUBAL LIGATION         Family History   Problem Relation Age of Onset   Jacquie Murphy Asthma Mother     Hypertension Mother     Kidney Disease Mother     Thyroid Disease Mother     Diabetes Maternal Grandmother     Thyroid Disease Maternal Grandmother        Prior to Admission medications    Medication Sig Start Date End Date Taking? Authorizing Provider   pregabalin (LYRICA) 150 mg capsule Take 150 mg by mouth three (3) times daily. Provider, Historical   lisinopril-hydroCHLOROthiazide (PRINZIDE, ZESTORETIC) 20-25 mg per tablet Take 2 Tabs by mouth daily. Provider, Historical   montelukast (SINGULAIR) 10 mg tablet Take 10 mg by mouth daily. Provider, Historical   Loratadine 5 mg chew Take 1 Tab by mouth daily. Provider, Historical   traZODone (DESYREL) 100 mg tablet Take  mg by mouth nightly. Provider, Historical   dextroamphetamine-amphetamine (ADDERALL) 30 mg tablet Take 30 mg by mouth three (3) times daily. Provider, Historical   omeprazole (PRILOSEC) 40 mg capsule Take 40 mg by mouth daily. Provider, Historical   methocarbamol (ROBAXIN-750) 750 mg tablet Take 1 Tab by mouth every twelve (12) hours as needed for up to 5 doses. Pain; muscle spasms 1/9/19   Robinson Hernadez,    acetaminophen (TYLENOL) 500 mg tablet Take 500 mg by mouth every six (6) hours as needed for Pain. Yahir Reyes MD   amiodarone (CORDARONE) 200 mg tablet Take 200 mg by mouth daily. Yahir Reyes MD   naloxone [facility]) 4 mg/actuation nasal spray 1 [place] by IntraNASal route once as needed. Use 1 spray intranasally, then discard. Repeat with new spray every 2 min as needed for opioid overdose symptoms, alternating nostrils. Yahir Reyes MD   magnesium hydroxide (DAVIS MILK OF MAGNESIA) 400 mg/5 mL suspension Take 30 mL by mouth daily as needed for Constipation. Yahir Reyes MD   buffered aspirin (BUFFERIN) 325 mg tablet Take 1 Tab by mouth two (2) times a day.   Patient taking differently: Take 325 mg by mouth daily. 18   George Buckley PA-C   ferrous sulfate 325 mg (65 mg iron) tablet Take 1 Tab by mouth two (2) times daily (with meals). 18   George Buckley PA-C   furosemide (LASIX) 40 mg tablet Take 1 Tab by mouth daily. Indications: Peripheral Edema due to Chronic Heart Failure, For leg edema or SOB 18   Emi Sousa MD   insulin glargine (LANTUS) 100 unit/mL injection 20 Units by SubCUTAneous route daily. 17   Hailee Cooper MD   DULoxetine (CYMBALTA) 60 mg capsule Take 1 Cap by mouth daily. Patient taking differently: Take 120 mg by mouth daily. 17   Christian Alpers, MD       No Known Allergies    Review of Systems  GENERAL: No fevers or chills. HEENT: No change in vision, no earache, tinnitus, sore throat or sinus congestion. NECK: No pain or stiffness. CARDIOVASCULAR: No chest pain or pressure. No palpitations. PULMONARY: No shortness of breath, cough or wheeze. GASTROINTESTINAL: No abdominal pain, nausea, vomiting or diarrhea, melena or  bright red blood per rectum. GENITOURINARY: No urinary frequency, urgency, hesitancy or dysuria. MUSCULOSKELETAL: +generalized weakness. DERMATOLOGIC: No rash, no itching, no lesions. ENDOCRINE: No polyuria, polydipsia, no heat or cold intolerance. No recent change in weight. HEMATOLOGICAL: No anemia or easy bruising or bleeding. NEUROLOGIC: No headache, seizures, numbness, tingling or weakness. PSYCHIATRIC: No depression, anxiety, mood disorder, no loss of interest in normal  activity or change in sleep pattern. Physical Exam:     Physical Exam:  Visit Vitals  BP (!) 80/47   Pulse 62   Temp 97.5 °F (36.4 °C)   Resp 12   Wt 76.2 kg (168 lb)   SpO2 98%   BMI 30.73 kg/m²      O2 Device: Room air    Temp (24hrs), Av.5 °F (36.4 °C), Min:97.5 °F (36.4 °C), Max:97.5 °F (36.4 °C)       10/09 07 - 10/09 1900  In: 500 [I.V.:500]  Out: -      No intake/output data recorded.     General:  Alert, cooperative, no distress, appears stated age. Head:  Normocephalic, without obvious abnormality, atraumatic. Lungs:   Clear to auscultation bilaterally. Chest wall:  No tenderness or deformity. Heart:  Regular rate and rhythm, S1, S2 normal, no murmur, click, rub or gallop. Abdomen: Soft, non-tender. Bowel sounds normal.    Extremities: Extremities normal, atraumatic, no cyanosis or edema. Pulses: 2+ and symmetric all extremities. Skin: Skin color, texture, turgor normal. No rashes or lesions   Neurologic: Alert and oriented X 2-3 (some forgetfulness. No focal motor or sensory deficit. Labs Reviewed:    Recent Results (from the past 24 hour(s))   URINALYSIS W/ RFLX MICROSCOPIC    Collection Time: 10/09/19 12:12 PM   Result Value Ref Range    Color YELLOW      Appearance CLEAR      Specific gravity 1.013 1.005 - 1.030      pH (UA) 5.0 5.0 - 8.0      Protein 30 (A) NEG mg/dL    Glucose NEGATIVE  NEG mg/dL    Ketone NEGATIVE  NEG mg/dL    Bilirubin NEGATIVE  NEG      Blood NEGATIVE  NEG      Urobilinogen 0.2 0.2 - 1.0 EU/dL    Nitrites NEGATIVE  NEG      Leukocyte Esterase SMALL (A) NEG     URINE MICROSCOPIC ONLY    Collection Time: 10/09/19 12:12 PM   Result Value Ref Range    WBC 5 to 10 0 - 4 /hpf    Epithelial cells 1+ 0 - 5 /lpf    Bacteria 1+ (A) NEG /hpf    Hyaline cast 0 to 1 0 - 2 /lpf   CBC WITH AUTOMATED DIFF    Collection Time: 10/09/19 12:20 PM   Result Value Ref Range    WBC 15.5 (H) 4.6 - 13.2 K/uL    RBC 3.31 (L) 4.20 - 5.30 M/uL    HGB 8.1 (L) 12.0 - 16.0 g/dL    HCT 25.1 (L) 35.0 - 45.0 %    MCV 75.8 74.0 - 97.0 FL    MCH 24.5 24.0 - 34.0 PG    MCHC 32.3 31.0 - 37.0 g/dL    RDW 14.8 (H) 11.6 - 14.5 %    PLATELET 136 (H) 209 - 420 K/uL    MPV 10.0 9.2 - 11.8 FL    NEUTROPHILS 80 (H) 40 - 73 %    LYMPHOCYTES 9 (L) 21 - 52 %    MONOCYTES 11 (H) 3 - 10 %    EOSINOPHILS 0 0 - 5 %    BASOPHILS 0 0 - 2 %    ABS. NEUTROPHILS 12.5 (H) 1.8 - 8.0 K/UL    ABS.  LYMPHOCYTES 1.3 0.9 - 3.6 K/UL ABS. MONOCYTES 1.6 (H) 0.05 - 1.2 K/UL    ABS. EOSINOPHILS 0.0 0.0 - 0.4 K/UL    ABS. BASOPHILS 0.0 0.0 - 0.1 K/UL    DF AUTOMATED     METABOLIC PANEL, COMPREHENSIVE    Collection Time: 10/09/19 12:20 PM   Result Value Ref Range    Sodium 127 (L) 136 - 145 mmol/L    Potassium 4.3 3.5 - 5.5 mmol/L    Chloride 96 (L) 100 - 111 mmol/L    CO2 19 (L) 21 - 32 mmol/L    Anion gap 12 3.0 - 18 mmol/L    Glucose 211 (H) 74 - 99 mg/dL     (H) 7.0 - 18 MG/DL    Creatinine 3.75 (H) 0.6 - 1.3 MG/DL    BUN/Creatinine ratio 32 (H) 12 - 20      GFR est AA 15 (L) >60 ml/min/1.73m2    GFR est non-AA 12 (L) >60 ml/min/1.73m2    Calcium 8.3 (L) 8.5 - 10.1 MG/DL    Bilirubin, total 0.5 0.2 - 1.0 MG/DL    ALT (SGPT) 21 13 - 56 U/L    AST (SGOT) 21 10 - 38 U/L    Alk. phosphatase 278 (H) 45 - 117 U/L    Protein, total 7.1 6.4 - 8.2 g/dL    Albumin 2.6 (L) 3.4 - 5.0 g/dL    Globulin 4.5 (H) 2.0 - 4.0 g/dL    A-G Ratio 0.6 (L) 0.8 - 1.7     CARDIAC PANEL,(CK, CKMB & TROPONIN)    Collection Time: 10/09/19 12:20 PM   Result Value Ref Range    CK 79 26 - 192 U/L    CK - MB 2.4 <3.6 ng/ml    CK-MB Index 3.0 0.0 - 4.0 %    Troponin-I, QT 0.06 (H) 0.0 - 0.045 NG/ML   ETHYL ALCOHOL    Collection Time: 10/09/19 12:20 PM   Result Value Ref Range    ALCOHOL(ETHYL),SERUM <3 0 - 3 MG/DL   GLUCOSE, POC    Collection Time: 10/09/19 12:24 PM   Result Value Ref Range    Glucose (POC) 237 (H) 70 - 110 mg/dL   POC LACTIC ACID    Collection Time: 10/09/19  1:34 PM   Result Value Ref Range    Lactic Acid (POC) 0.51 0.40 - 2.00 mmol/L       Procedures/imaging: see electronic medical records for all procedures/Xrays and details which were not copied into this note but were reviewed prior to creation of Plan    Assessment/Plan     1. Acute metabolic encephalopathy secondry to unintended polypharmacy use  2. Hypotension shock d/t hypovolemia  3. AKF on CKD 1-2 d/t #2.   4. Mild leukocytosis -without obvious s/s of infectious process.    5. Hyponatremia d/t hypovolemia    6. Mildly elevated troponin of 0.06.   7. Mechanical fall. On injuries on CT. PT/OT. 8. Chronic anemia of chronic disease - stable. 9. Chronic Paroxysmal A. Fib; on amiodarone  not on anticoagulation  10. DM type 2 with hyperglycemia A1c 7.8  11. HLD  12. Depression and anxiety -chronic   13. chronic pain- lower back    PLAN  1. Admit to CVT step down d/t hypotension d/t hypovolemia. AMS at baseline. CT head no acute findings. She received IV bolus of 250mls NS with HR improved to 91/48. IVF increased to 150ml/hr. With hold all home antihypertensives and pain management at this time. If BP does not improve, consider Vasopressor gtt. Close monitor. 2. Nephrology following. Sodium 127, cont IVF. will repeat BMP at 1800.   3. Indeterminate troponin likely d/t demand ischemia. No chest pain. No acute ischemic ECG changes. Repeat troponin and echo pending. 4. HR 60s. Will hold BB given hypovolemic shock. Continue tele. 5. SSI. 6. Will hold all chronic pain Meds until BP and AMS is at baseline. Close monitor. 7. Please consult PT/OT when BP stable. 8. THIS PATIENT IS A DNR. 9. DVT prophylaxis: Heparin.        Bruno Neil NP  October 9, 2019

## 2019-10-10 LAB
ANION GAP SERPL CALC-SCNC: 11 MMOL/L (ref 3–18)
ANION GAP SERPL CALC-SCNC: 12 MMOL/L (ref 3–18)
BASOPHILS # BLD: 0 K/UL (ref 0–0.1)
BASOPHILS # BLD: 0 K/UL (ref 0–0.1)
BASOPHILS NFR BLD: 0 % (ref 0–2)
BASOPHILS NFR BLD: 0 % (ref 0–2)
BUN SERPL-MCNC: 109 MG/DL (ref 7–18)
BUN SERPL-MCNC: 116 MG/DL (ref 7–18)
BUN/CREAT SERPL: 34 (ref 12–20)
BUN/CREAT SERPL: 37 (ref 12–20)
CALCIUM SERPL-MCNC: 7.4 MG/DL (ref 8.5–10.1)
CALCIUM SERPL-MCNC: 8 MG/DL (ref 8.5–10.1)
CHLORIDE SERPL-SCNC: 104 MMOL/L (ref 100–111)
CHLORIDE SERPL-SCNC: 106 MMOL/L (ref 100–111)
CK MB CFR SERPL CALC: 5.4 % (ref 0–4)
CK MB SERPL-MCNC: 2.7 NG/ML (ref 5–25)
CK SERPL-CCNC: 50 U/L (ref 26–192)
CO2 SERPL-SCNC: 17 MMOL/L (ref 21–32)
CO2 SERPL-SCNC: 17 MMOL/L (ref 21–32)
CREAT SERPL-MCNC: 3.15 MG/DL (ref 0.6–1.3)
CREAT SERPL-MCNC: 3.18 MG/DL (ref 0.6–1.3)
DIFFERENTIAL METHOD BLD: ABNORMAL
DIFFERENTIAL METHOD BLD: ABNORMAL
EOSINOPHIL # BLD: 0.1 K/UL (ref 0–0.4)
EOSINOPHIL # BLD: 0.1 K/UL (ref 0–0.4)
EOSINOPHIL NFR BLD: 1 % (ref 0–5)
EOSINOPHIL NFR BLD: 1 % (ref 0–5)
ERYTHROCYTE [DISTWIDTH] IN BLOOD BY AUTOMATED COUNT: 14.8 % (ref 11.6–14.5)
ERYTHROCYTE [DISTWIDTH] IN BLOOD BY AUTOMATED COUNT: 15 % (ref 11.6–14.5)
GLUCOSE BLD STRIP.AUTO-MCNC: 114 MG/DL (ref 70–110)
GLUCOSE BLD STRIP.AUTO-MCNC: 124 MG/DL (ref 70–110)
GLUCOSE BLD STRIP.AUTO-MCNC: 183 MG/DL (ref 70–110)
GLUCOSE BLD STRIP.AUTO-MCNC: 258 MG/DL (ref 70–110)
GLUCOSE BLD STRIP.AUTO-MCNC: 289 MG/DL (ref 70–110)
GLUCOSE SERPL-MCNC: 107 MG/DL (ref 74–99)
GLUCOSE SERPL-MCNC: 97 MG/DL (ref 74–99)
HCT VFR BLD AUTO: 23.2 % (ref 35–45)
HCT VFR BLD AUTO: 24.3 % (ref 35–45)
HGB BLD-MCNC: 7.6 G/DL (ref 12–16)
HGB BLD-MCNC: 7.7 G/DL (ref 12–16)
LYMPHOCYTES # BLD: 1 K/UL (ref 0.9–3.6)
LYMPHOCYTES # BLD: 1 K/UL (ref 0.9–3.6)
LYMPHOCYTES NFR BLD: 11 % (ref 21–52)
LYMPHOCYTES NFR BLD: 13 % (ref 21–52)
MAGNESIUM SERPL-MCNC: 1.9 MG/DL (ref 1.6–2.6)
MCH RBC QN AUTO: 24.4 PG (ref 24–34)
MCH RBC QN AUTO: 24.8 PG (ref 24–34)
MCHC RBC AUTO-ENTMCNC: 31.7 G/DL (ref 31–37)
MCHC RBC AUTO-ENTMCNC: 32.8 G/DL (ref 31–37)
MCV RBC AUTO: 75.8 FL (ref 74–97)
MCV RBC AUTO: 76.9 FL (ref 74–97)
MONOCYTES # BLD: 0.7 K/UL (ref 0.05–1.2)
MONOCYTES # BLD: 0.8 K/UL (ref 0.05–1.2)
MONOCYTES NFR BLD: 8 % (ref 3–10)
MONOCYTES NFR BLD: 9 % (ref 3–10)
NEUTS SEG # BLD: 6.1 K/UL (ref 1.8–8)
NEUTS SEG # BLD: 6.8 K/UL (ref 1.8–8)
NEUTS SEG NFR BLD: 78 % (ref 40–73)
NEUTS SEG NFR BLD: 79 % (ref 40–73)
PHOSPHATE SERPL-MCNC: 6.7 MG/DL (ref 2.5–4.9)
PLATELET # BLD AUTO: 398 K/UL (ref 135–420)
PLATELET # BLD AUTO: 425 K/UL (ref 135–420)
PMV BLD AUTO: 9.6 FL (ref 9.2–11.8)
PMV BLD AUTO: 9.8 FL (ref 9.2–11.8)
POTASSIUM SERPL-SCNC: 4 MMOL/L (ref 3.5–5.5)
POTASSIUM SERPL-SCNC: 4.1 MMOL/L (ref 3.5–5.5)
RBC # BLD AUTO: 3.06 M/UL (ref 4.2–5.3)
RBC # BLD AUTO: 3.16 M/UL (ref 4.2–5.3)
SODIUM SERPL-SCNC: 132 MMOL/L (ref 136–145)
SODIUM SERPL-SCNC: 135 MMOL/L (ref 136–145)
TROPONIN I SERPL-MCNC: 0.05 NG/ML (ref 0–0.04)
WBC # BLD AUTO: 7.9 K/UL (ref 4.6–13.2)
WBC # BLD AUTO: 8.7 K/UL (ref 4.6–13.2)

## 2019-10-10 PROCEDURE — 65270000029 HC RM PRIVATE

## 2019-10-10 PROCEDURE — 74011636637 HC RX REV CODE- 636/637: Performed by: NURSE PRACTITIONER

## 2019-10-10 PROCEDURE — 85025 COMPLETE CBC W/AUTO DIFF WBC: CPT

## 2019-10-10 PROCEDURE — 74011250636 HC RX REV CODE- 250/636: Performed by: NURSE PRACTITIONER

## 2019-10-10 PROCEDURE — 82962 GLUCOSE BLOOD TEST: CPT

## 2019-10-10 PROCEDURE — 74011250636 HC RX REV CODE- 250/636: Performed by: INTERNAL MEDICINE

## 2019-10-10 PROCEDURE — 74011250637 HC RX REV CODE- 250/637: Performed by: NURSE PRACTITIONER

## 2019-10-10 PROCEDURE — 74011250637 HC RX REV CODE- 250/637: Performed by: INTERNAL MEDICINE

## 2019-10-10 PROCEDURE — 36415 COLL VENOUS BLD VENIPUNCTURE: CPT

## 2019-10-10 PROCEDURE — 80048 BASIC METABOLIC PNL TOTAL CA: CPT

## 2019-10-10 PROCEDURE — 77030037878 HC DRSG MEPILEX >48IN BORD MOLN -B

## 2019-10-10 RX ORDER — SODIUM CHLORIDE 9 MG/ML
500 INJECTION, SOLUTION INTRAVENOUS ONCE
Status: COMPLETED | OUTPATIENT
Start: 2019-10-10 | End: 2019-10-10

## 2019-10-10 RX ORDER — NYSTATIN 100000 [USP'U]/G
POWDER TOPICAL 2 TIMES DAILY
Status: DISCONTINUED | OUTPATIENT
Start: 2019-10-10 | End: 2019-10-12 | Stop reason: HOSPADM

## 2019-10-10 RX ADMIN — DOCUSATE SODIUM 100 MG: 100 CAPSULE, LIQUID FILLED ORAL at 10:41

## 2019-10-10 RX ADMIN — NYSTATIN: 100000 POWDER TOPICAL at 22:55

## 2019-10-10 RX ADMIN — PANTOPRAZOLE SODIUM 40 MG: 40 TABLET, DELAYED RELEASE ORAL at 10:41

## 2019-10-10 RX ADMIN — SODIUM CHLORIDE 150 ML/HR: 900 INJECTION, SOLUTION INTRAVENOUS at 02:24

## 2019-10-10 RX ADMIN — INSULIN LISPRO 6 UNITS: 100 INJECTION, SOLUTION INTRAVENOUS; SUBCUTANEOUS at 22:55

## 2019-10-10 RX ADMIN — INSULIN LISPRO 2 UNITS: 100 INJECTION, SOLUTION INTRAVENOUS; SUBCUTANEOUS at 18:00

## 2019-10-10 RX ADMIN — SODIUM CHLORIDE 500 ML: 900 INJECTION, SOLUTION INTRAVENOUS at 17:17

## 2019-10-10 RX ADMIN — INSULIN LISPRO 6 UNITS: 100 INJECTION, SOLUTION INTRAVENOUS; SUBCUTANEOUS at 13:16

## 2019-10-10 RX ADMIN — MONTELUKAST 10 MG: 10 TABLET, FILM COATED ORAL at 10:41

## 2019-10-10 NOTE — PROGRESS NOTES

## 2019-10-10 NOTE — CONSULTS
Consult Note  Consult requested by: dr Diana Guidry is a 81 y.o. female [facility] who is being seen on consult for renal failure  Chief Complaint   Patient presents with   [facility] Fall    Hypotension     Admission diagnosis: <principal problem not specified>     HPI: 81 y o white female admitted yesterday with altered mental status ,hypotension,worsening renal failure,hyponatremia. dr Lucero Rodríguez was consulted yesterday. however,he defered management to our group as she was seen by dr Gerhardt Hinders in the past.I was notified of her admission 2 hours ago. she was on multiple medications,including acei,lasix,hctz ,psych meds. has hx of crf stage 3,afib,diabetes,htn,pulm emboli,sleep apnea. unable to get much hx from pt,reviewed medical records and labs. reviewed prior meds  Past Medical History:   Diagnosis Date    Anxiety     Arthritis     Asbestosis ([address]     Atrial fibrillation ([address]     Depression     Diabetes ([address]     DVT (deep venous thrombosis) ([facility])     right leg    Fibromyalgia     GERD (gastroesophageal reflux disease)     Hypercholesteremia     Hypertension     Pulmonary emboli ([address] 2016    Renal insufficiency     Skin abscess     Sleep apnea     does not use cpap regularly      Past Surgical History:   Procedure Laterality Date    HX BREAST LUMPECTOMY      HX CARPAL TUNNEL RELEASE      HX GASTRIC BYPASS  2015    HX HYSTERECTOMY      HX KNEE REPLACEMENT      bilater    HX ORTHOPAEDIC Left 09/26/2018    ORIF left patella    HX PARTIAL THYROIDECTOMY      HX SHOULDER ARTHROSCOPY      HX TUBAL LIGATION         Social History     Socioeconomic History    Marital status: LEGALLY      Spouse name: Not on file    Number of children: Not on file    Years of education: Not on file    Highest education level: Not on file   Occupational History    Not on file   Social Needs    Financial resource strain: Not on file    Food insecurity:     Worry: Not on file     Inability: Not on file    Transportation needs:     Medical: Not on file     Non-medical: Not on file   Tobacco Use    Smoking status: Never Smoker    Smokeless tobacco: Never Used   Substance and Sexual Activity    Alcohol use: No    Drug use: No    Sexual activity: Yes   Lifestyle    Physical activity:     Days per week: Not on file     Minutes per session: Not on file    Stress: Not on file   Relationships    Social connections:     Talks on phone: Not on file     Gets together: Not on file     Attends Jainism service: Not on file     Active member of club or organization: Not on file     Attends meetings of clubs or organizations: Not on file     Relationship status: Not on file    Intimate partner violence:     Fear of current or ex partner: Not on file     Emotionally abused: Not on file     Physically abused: Not on file     Forced sexual activity: Not on file   Other Topics Concern    Not on file   Social History Narrative    Not on file       Family History   Problem Relation Age of Onset    Asthma Mother     Hypertension Mother     Kidney Disease Mother     Thyroid Disease Mother     Diabetes Maternal Grandmother     Thyroid Disease Maternal Grandmother      No Known Allergies     Home Medications:     Prior to Admission Medications   Prescriptions Last Dose Informant Patient Reported? Taking? DULoxetine (CYMBALTA) 60 mg capsule 10/9/2019 at Unknown time  No Yes   Sig: Take 1 Cap by mouth daily. Patient taking differently: Take 120 mg by mouth daily. Loratadine 5 mg chew Not Taking at Unknown time  Yes No   Sig: Take 1 Tab by mouth daily. acetaminophen (TYLENOL) 500 mg tablet   Yes No   Sig: Take 500 mg by mouth every six (6) hours as needed for Pain. amiodarone (CORDARONE) 200 mg tablet 10/9/2019 at Unknown time  Yes Yes   Sig: Take 200 mg by mouth daily. buffered aspirin (BUFFERIN) 325 mg tablet 10/9/2019 at Unknown time  No Yes   Sig: Take 1 Tab by mouth two (2) times a day. Patient taking differently: Take 325 mg by mouth daily. dextroamphetamine-amphetamine (ADDERALL) 30 mg tablet 10/3/2019 at Unknown time  Yes Yes   Sig: Take 30 mg by mouth three (3) times daily. ferrous sulfate 325 mg (65 mg iron) tablet 10/9/2019 at Unknown time  No Yes   Sig: Take 1 Tab by mouth two (2) times daily (with meals). furosemide (LASIX) 40 mg tablet 10/9/2019 at Unknown time  No Yes   Sig: Take 1 Tab by mouth daily. Indications: Peripheral Edema due to Chronic Heart Failure, For leg edema or SOB   insulin glargine (LANTUS) 100 unit/mL injection 10/3/2019 at Unknown time  Yes Yes   Si Units by SubCUTAneous route daily. lisinopril-hydroCHLOROthiazide (PRINZIDE, ZESTORETIC) 20-25 mg per tablet 10/9/2019 at Unknown time  Yes Yes   Sig: Take 2 Tabs by mouth daily. magnesium hydroxide (DAVIS MILK OF MAGNESIA) 400 mg/5 mL suspension Not Taking at Unknown time  Yes No   Sig: Take 30 mL by mouth daily as needed for Constipation. methocarbamol (ROBAXIN-750) 750 mg tablet Not Taking at Unknown time  No No   Sig: Take 1 Tab by mouth every twelve (12) hours as needed for up to 5 doses. Pain; muscle spasms   montelukast (SINGULAIR) 10 mg tablet 10/9/2019 at Unknown time  Yes Yes   Sig: Take 10 mg by mouth daily. naloxone (NARCAN) 4 mg/actuation nasal spray Not Taking at Unknown time  Yes No   Si [place] by IntraNASal route once as needed. Use 1 spray intranasally, then discard. Repeat with new spray every 2 min as needed for opioid overdose symptoms, alternating nostrils. omeprazole (PRILOSEC) 40 mg capsule Unknown at Unknown time  Yes No   Sig: Take 40 mg by mouth daily. pregabalin (LYRICA) 150 mg capsule 10/9/2019 at Unknown time  Yes Yes   Sig: Take 150 mg by mouth three (3) times daily. traZODone (DESYREL) 100 mg tablet Not Taking at Unknown time  Yes No   Sig: Take  mg by mouth nightly.       Facility-Administered Medications: None       Current Facility-Administered Medications   Medication Dose Route Frequency    influenza vaccine 2019-20 (6 mos+)(PF) (FLUARIX/FLULAVAL/FLUZONE QUAD) injection 0.5 mL  0.5 mL IntraMUSCular PRIOR TO DISCHARGE    acetaminophen (TYLENOL) tablet 650 mg  650 mg Oral Q4H PRN    ondansetron (ZOFRAN) injection 4 mg  4 mg IntraVENous Q4H PRN    heparin (porcine) injection 5,000 Units  5,000 Units SubCUTAneous Q8H    0.9% sodium chloride infusion  150 mL/hr IntraVENous CONTINUOUS    montelukast (SINGULAIR) tablet 10 mg  10 mg Oral DAILY    pantoprazole (PROTONIX) tablet 40 mg  40 mg Oral ACB    docusate sodium (COLACE) capsule 100 mg  100 mg Oral DAILY    polyethylene glycol (MIRALAX) packet 17 g  17 g Oral DAILY    insulin lispro (HUMALOG) injection   SubCUTAneous AC&HS    glucose chewable tablet 16 g  16 g Oral PRN    glucagon (GLUCAGEN) injection 1 mg  1 mg IntraMUSCular PRN    dextrose (D50W) injection syrg 12.5-25 g  25-50 mL IntraVENous PRN       Review of Systems:   Review of systems not obtained due to patient factors. Data Review:    Labs: Results:       Chemistry Recent Labs     10/09/19  2328 10/09/19  1220   * 211*   * 127*   K 4.0 4.3    96*   CO2 17* 19*   * 120*   CREA 3.18* 3.75*   CA 7.4* 8.3*   AGAP 11 12   BUCR 34* 32*   AP  --  278*   TP  --  7.1   ALB  --  2.6*   GLOB  --  4.5*   AGRAT  --  0.6*      PTH  Lab Results   Component Value Date/Time    Calcium 7.4 (L) 10/09/2019 11:28 PM    CALCIUM,IONIZED 5.00 10/27/2015 08:34 AM    Phosphorus 6.7 (H) 10/09/2019 11:28 PM      CBC w/Diff Recent Labs     10/10/19  0544 10/09/19  2328 10/09/19  1220   WBC 7.9 8.7 15.5*   RBC 3.16* 3.06* 3.31*   HGB 7.7* 7.6* 8.1*   HCT 24.3* 23.2* 25.1*   * 398 485*   GRANS 78* 79* 80*   LYMPH 13* 11* 9*   EOS 1 1 0      Coagulation No results for input(s): PTP, INR, APTT, INREXT in the last 72 hours. Iron/Ferritin No results for input(s): IRON in the last 72 hours.     No lab exists for component: TIBCCALC BNP No results for input(s): BNPP in the last 72 hours. Cardiac Enzymes Recent Labs     10/09/19  2328 10/09/19  1220   CPK 50 79   CKND1 5.4* 3.0      Liver Enzymes Recent Labs     10/09/19  1220   TP 7.1   ALB 2.6*   *   SGOT 21      Thyroid Studies Lab Results   Component Value Date/Time    TSH 2.190 01/20/2019 09:48 AM        Urinalysis Lab Results   Component Value Date/Time    Color YELLOW 10/09/2019 12:12 PM    Appearance CLEAR 10/09/2019 12:12 PM    Specific gravity 1.013 10/09/2019 12:12 PM    pH (UA) 5.0 10/09/2019 12:12 PM    Protein 30 (A) 10/09/2019 12:12 PM    Glucose NEGATIVE  10/09/2019 12:12 PM    Ketone NEGATIVE  10/09/2019 12:12 PM    Bilirubin NEGATIVE  10/09/2019 12:12 PM    Urobilinogen 0.2 10/09/2019 12:12 PM    Nitrites NEGATIVE  10/09/2019 12:12 PM    Leukocyte Esterase SMALL (A) 10/09/2019 12:12 PM    Epithelial cells 1+ 10/09/2019 12:12 PM    Bacteria 1+ (A) 10/09/2019 12:12 PM    WBC 5 to 10 10/09/2019 12:12 PM    RBC 0 to 1 05/12/2018 02:05 PM         IMAGES:   XR Results (maximum last 3): Results from [place] encounter on 10/09/19   XR CHEST PORT    Narrative EXAM:  XR CHEST PORT    INDICATION:   ams    COMPARISON: 9/26/2018. FINDINGS:  Upper normal cardiac silhouette, stable. Aortic atherosclerosis. Linear scar or  atelectasis in the left midlung. Pulmonary vasculature is within normal limits. No pneumothorax or pleural effusions. No air space opacity. No acute osseous  abnormality. Impression Impression:    Linear scar or atelectasis left midlung. Otherwise no acute abnormalities. Results from [place] encounter on 08/08/19   XR CHEST SNGL V    Narrative INDICATION:  ams       EXAMINATION:  XR CHEST SNGL V    COMPARISON:  12/13/2018    FINDINGS:  Mild clinically. . Mild pulmonary vascular congestion. . Shallow inspiration. Impression IMPRESSION:  Mild congestive heart failure.      Results from [place] encounter on 01/09/19   XR SPINE LUMB 2 OR 3 V    Narrative EXAM: X-ray of the Lumbar Spine. INDICATION: Back pain    TECHNIQUE: AP, lateral and spot views of the lumbar spine obtained. COMPARISON: None    FINDINGS: There are 5 lumbar-type vertebra. There is anterolisthesis of L5 on S1  by 5 mm. No fracture appreciated. The facets are appropriately aligned. Moderate  to severe disc space height loss is noted at every level. Consultations are in  the abdominal aorta without evidence of aneurysm. Impression IMPRESSION:  1. Moderate to severe multilevel degenerative disc disease. CT Results (maximum last 3): Results from [place] encounter on 10/09/19   CT HEAD WO CONT    Narrative EXAM:  CT Head without Contrast              CLINICAL INDICATION:  Trauma. Mahad Alie and hit the back of head. Groggy and barely  able to keep eyes open. COMPARISON:  01/09/19. TECHNIQUE:      - Helical volumetric CT imaging of the head is performed from the base of the  skull to the vertex without IV contrast administration. Axial, coronal and  sagittal images are generated from the volumetric data set. - Dose optimization techniques are utilized as appropriate to the performed exam  with combination of automated exposure control, adjustment of mA and/or kV  according to patient size, and use of iterative reconstructive technique. FINDINGS:     Brain:    - Hemorrhage/ hematoma:  No acute intracranial hemorrhage/ hematoma. - Mass, mass effect:  None. - Gray-white matter differentiation:  Some deep white matter hypodensities,  suggestive of chronic small vessel ischemia. Left cerebellar hemisphere with a  small old lacunar infarct. - Interval assessment:  No significant interval change. CSF spaces:  No evidence of abnormal effacement or enlargement. Calvarium:  Intact. Sinuses, mastoids:  Well-aerated. Impression IMPRESSION:                1.  No acute intracranial abnormalities.    2. Slight chronic small vessel ischemic changes. Results from [place] encounter on 08/08/19   CT HEAD WO CONT    Narrative DICOM format image data is available to nonaffiliated external healthcare  facilities or entities on a secure, media free, reciprocally searchable basis  with patient authorization for 12 months following the date of the study. Indication: Altered mental status  Technique:  4 millimeter axial images without contrast were obtained through the  brain. Comparison: Head CT 11/27/2018    Findings: There is generalized mild cerebral atrophy. Low attenuation  surrounds the lateral ventricles. No hemorrhage, mass, or infarct. Sella,  pineal, craniocervical junction, orbits, paranasal sinuses, and temporal bones  are unremarkable. Bony structures intact. Impression Impression: cerebral atrophy and nonspecific white matter changes not unusual  for age; no acute abnormality. Results from [place] encounter on 01/09/19   CT HEAD WO CONT    Narrative EXAM: CT HEAD WITHOUT CONTRAST. CLINICAL HISTORY/INDICATION:  fall head pain and swelling status post fall  hitting head prior to arrival, also acute generalized headache    COMPARISON: None. TECHNIQUE: Routine axial images have been obtained from skull base to vertex at  5 mm thick slices. All CT scans at this facility are performed using dose  optimization technique as appropriate to a performed exam, to include automated  exposure control, adjustment of the mA and/or kV according to patient's size  (including appropriate matching for site-specific examinations), or use of  iterative reconstruction technique. FINDINGS:    There are no intra nor extra axial fluid collections. There is no hemorrhage. The gray white differentiation is normal.    The ventricular system is midline without mass effect or shift.      The paranasal sinuses which are included on this exam are well aerated and  unremarkable. Moderate to large left posterior parietal scalp hematoma demonstrated. Impression IMPRESSION:    Left posterior scalp hematoma. There is no hemorrhage, mass, nor acute infarct. Report provided to the emergency department at [facility]. MRI Results (maximum last 3): No results found for this or any previous visit. Nuclear Medicine Results (maximum last 3): Results from [place] encounter on 04/24/17   NM ACUTE GI BLEED SCAN    Narrative NUCLEAR MEDICINE TAGGED RED BLOOD CELL IMAGING OF THE ABDOMEN FOR  GASTROINTESTINAL BLEEDING    INDICATION: Maroon-colored stool for 2 days. .    DESCRIPTION: Following intravenous administration of 28.5 mCi  99mTechnetium-Ultratag labeled autologous red blood cells, dynamic imaging of  the abdomen was performed for 90 minutes. FINDINGS:     The biodistribution of radiopharmaceutical appears physiologic. There is no  evidence of abnormal extravasation of labeled red blood cells into bowel to  indicate active gastrointestinal bleeding during the examination. Impression IMPRESSION:    No scintigraphic evidence of active gastrointestinal bleeding. US Results (maximum last 3): Results from Hospital Encounter encounter on 10/23/17   US RUQ    Narrative Clinical history: Right upper quadrant pain, nausea and vomiting    EXAMINATION:  Right upper quadrant ultrasound 10/23/2017    FINDINGS:  Pancreas, proximal aorta and IVC are within normal limits. Liver demonstrates  homogeneous echotexture. No intrahepatic bile duct dilatation. Bile duct  measures 4 mm. No stones are seen in the gallbladder. Gallbladder wall measures 2 mm. Right  kidney measures 11.3 x 6.2 x 4.5 cm. Normal corticomedullary differentiation. No  hydronephrosis. Impression IMPRESSION:  Normal right upper quadrant ultrasound. DEXA Results (maximum last 3):   Results from [place] encounter on 05/23/18   DEXA BONE DENSITY STUDY AXIAL    Narrative EXAMINATION: DEXA BONE DENSITY STUDY AXIAL    CLINICAL INDICATION: M81.0  vitamin D therapy     COMPARISON: 1/14/2015    TECHNIQUE: Dual Energy X-ray Absorbitometry (DEXA) performed on the lumbar spine  and proximal femurs with a [employer] unit. FINDINGS:  Lumbar Spine:  Measurements from L1-4 revealed a bone mineral density of   1.243grams/cm2. The \"T-score\", comparing to young adult normals, is 0.5 and the  Z-score, comparing to age-matched normals, is 1.7. Proximal Femurs:      Bone mineral density of the left femoral neck is  0.834 grams/cm2 with \"T score\"  -1.5, Z-score is -0.2. Bone mineral density of the right femoral neck is  0.802 grams/cm2 with T score  -1.7, Z-score is -0.4. Bone mineral density of the mean total  proximal femurs is 0.786 grams/cm2. \"T-score\" of the mean total proximal femurs is -1.8  and the Z-score is -0.8. Impression IMPRESSION: Osteopenia of the femurs. Since the baseline exam there has been a  15.6% decrease in bone mineral density of the lumbar spine and a 25.6% decrease  in bone mineral density of the femurs. The complete data acquisition used to generate the above report is available on  request to the Radiology department. Lowest density of either femoral neck, mean  proximal femurs, or spine is typically basis of initial diagnosis. Subsequent  exams typically follow bone density of mean total proximal femurs to assess for  change. A 1% change from prior exam is typically statistically significant. World Health Organization criteria for bone mineral density impression classify  patients as normal with T score at or above -1.0, osteopenia with T score  between -1.0 and -2.5, or osteoporosis with T score at or below -2.5 referencing  the lowest value obtained for the above anatomic sites. [facility] Results (maximum last 3):   Results from [place] encounter on 05/23/18   [facility] 3D SALO W MAMMO BI SCREENING INCL CAD Narrative  - [facility] 3D SALO W MAMMO BI SCREENING INCL CAD  BILATERAL DIGITAL SCREENING MAMMOGRAM 3D/2D WITH CAD: 5/23/2018  CLINICAL: Routine Screening. The study was acquired using full field digital technology and interpreted from   soft copy. 3D Digital Breast Tomosynthesis (DBT) images were obtained and used to assist in   the interpretation of this examination. Current study was also evaluated with a Computer Aided Detection (CAD). Comparison is made to exams dated:  1/14/2015, 4/3/2009, and 3/25/2009   [facility]. The tissue of both breasts is predominantly fatty. There are benign vascular calcifications in both breasts. No significant masses, calcifications, or other findings are seen in either   breast.    There has been no significant interval change. IMPRESSION: BENIGN  There is no mammographic evidence of malignancy. A 1 year screening mammogram is   recommended. Jessie Stanley calculations report this patient's 10-year risk for   developing breast cancer at 6.6% and lifetime risk at 13.0%. The patient was notified of the results by mail. Electronically signed by:  Philly zuñiga/kristyn:5/24/2018 14:05:34        letter sent: Mary Kelly 1/2 Normal    Mammogram BI-RADS: 2: Benign       IR Results (maximum last 3): Results from [place] encounter on 11/19/18   IR PICC INSERT WO PORT OVER 5 YEARS    Narrative DUAL LUMEN PICC    : Dr Hogan Reusing:   Long-term venous access. Assistant: None    Complications: None    Estimated Blood loss: Minimal    Fluoroscopy radiation dose: Cumulative Air KERMA = 1 mGy. TECHNIQUE: After informed consent was obtained, the right arm was prepped and  draped in usual sterile fashion. Maximum sterile barrier technique was used. Under direct ultrasonographic guidance patent right basilic vein was identified  and localized.  Under direct ultrasonographic guidance with assistance of  21-gauge needle and microwire patent right basilic vein  was entered via  Seldinger technique. Subsequently, the 5 Haitian dual lumen Bard PICC catheter  was cut to 36 cm and inserted via the peel away sheath, and manipulated under  fluoroscopic guidance, to the level of the cavoatrial junction. The catheter  was flushed and secured and is ready for use. No immediate complications were  observed. Single static grayscale ultrasonographic image of the patent vein as well as  proper access needle position was obtained and recorded for documentation  purposes. Single spot fluoroscopic image of the chest was also obtained demonstrating  proper position of the PICC line catheter. Impression IMPRESSION:     Successful placement dual lumen PICC catheter. PICC line catheter can be used  immediately. Results from [place] encounter on 04/24/17   IR PICC INSERT WO PORT OVER 5 YEARS    Narrative DUAL LUMEN PICC    : Dr Celaya Body:   Long-term venous access. Assistant: None    Complications: None    Estimated Blood loss: Minimal    Fluoroscopy radiation dose:  Cumulative DAP - 71 cGy/cm2. TECHNIQUE: After informed consent was obtained, the left arm was prepped and  draped in usual sterile fashion. Maximum sterile barrier technique was used. Under direct ultrasonographic guidance patent left basilic vein was identified  and localized. Under direct ultrasonographic guidance with assistance of  21-gauge needle and microwire patent left basilic vein was entered via Seldinger  technique. Subsequently, the 5 [place]ian dual lumen Bard PICC catheter was cut to  39 cm and inserted via the peel away sheath, and manipulated under fluoroscopic  guidance, to the level of the cavoatrial junction. The catheter was flushed and  secured and is ready for use. No immediate complications were observed.     Single static grayscale ultrasonographic image of the patent vein as well as  proper access needle position was obtained and recorded for documentation  purposes. Single spot fluoroscopic image of the chest was also obtained demonstrating  proper position of the PICC line catheter. Impression IMPRESSION:     Successful placement dual lumen PICC catheter. PICC line catheter can be used  immediately. VAS/US Results (maximum last 3): Results from [place] encounter on 04/24/17   DUPLEX LOWER EXT VENOUS RIGHT       PET Results (maximum last 3): No results found for this or any previous visit. No results found for this or any previous visit. @LASTPROCAMB([id number])    CULTURE:   )  Recent Labs     10/09/19  2348 10/09/19  2324 10/09/19  1212   CULT NO GROWTH AFTER 7 HOURS NO GROWTH AFTER 7 HOURS CULTURE IN PROGRESS,FURTHER UPDATES TO FOLLOW     Recent Labs     10/09/19  2348 10/09/19  2324 10/09/19  1212   CULT NO GROWTH AFTER 7 HOURS NO GROWTH AFTER 7 HOURS CULTURE IN PROGRESS,FURTHER UPDATES TO FOLLOW       Physical Assessment:     Visit Vitals  /45   Pulse 87   Temp 98.7 °F (37.1 °C)   Resp 12   Wt 81.9 kg (180 lb 8.9 oz)   SpO2 98%   BMI 33.02 kg/m²     Last 3 Recorded Weights in this Encounter    10/09/19 1152 10/10/19 0035   Weight: 76.2 kg (168 lb) 81.9 kg (180 lb 8.9 oz)       Intake/Output Summary (Last 24 hours) at 10/10/2019 1223  Last data filed at 10/10/2019 1118  Gross per 24 hour   Intake 71095.25 ml   Output 3600 ml   Net 6556.25 ml       Physial Exam:  General appearance: no distress, appears stated age  Skin: normal coloration and turgor, no rashes, no suspicious skin lesions noted. HEENT: Head; normocephalic, atraumatic. YUVAL. ENT- ENT exam normal, no neck nodes or sinus tenderness. Lungs: clear to auscultation bilaterally  Heart: S1, S2 normal  Abdomen: soft, non-tender.  Bowel sounds normal. No masses,  no organomegaly  Extremities: extremities normal, atraumatic, no cyanosis or edema    PLAN / RECOMMENDATION:    Hyponatremia,probably multifactorial,related to dehydration,renal failure ,hctz.she had corrected 5 points yesterday,no labs since last night. check stat bmp.goal is to correct 8 points in 24 hours. may have to give hypotonic fluids and ddavp if rapid correction to prevent osmotic demyelination. decrease iv ns for now  Acute/crf stage 3,related to dehydration ,acei,.continue ivf and monitor  Hypotension,had resolved with fluids     Thank you for the consultation to participate in patient's care. I have personally discussed my plan with the referring physician.      Izaiah Tidwell MD  October 10, 2019

## 2019-10-10 NOTE — PROGRESS NOTES
Reason for Admission:   AMS (altered mental status) [R41.82]  Chronic renal failure [N18.9]               RRAT Score:     38             Resources/supports as identified by patient/family:       Top Challenges facing patient (as identified by patient/family and CM): Family lives close by    Finances/Medication cost?     No concerns  Transportation      Medicaid or family  Support system or lack thereof? Good family support  Living arrangements? Lives alone now but once discharged will live with son   Self-care/ADLs/Cognition? Independent before admit        Current Advanced Directive/Advance Care Plan:   no                          Plan for utilizing home health:    yes                      Likelihood of readmission:   HIGH    Transition of Care Plan:                    Initial assessment completed with patient. Cognitive status of patient: oriented to time, place, person and situation. Face sheet information confirmed:  yes. The patient designates sonVivian to participate in her discharge plan and to receive any needed information. This patient lives in a single family home with patient and son. Patient is able to navigate steps as needed. Prior to hospitalization, patient was considered to be independent with ADLs/IADLS : yes . Patient has a current ACP document on file: no  The patient and son will be available to transport patient home upon discharge. The patient already has [employer], medical equipment available in the home. Patient is not currently active with home health. Patient has not stayed in a skilled nursing facility or rehab. This patient is on dialysis :no    List of available Home Health agencies were provided and reviewed with the patient prior to discharge. Freedom of choice signed: yes, for [facility]. Currently, the discharge plan is Home with [address] [place] [address] Yury Grimm.     The patient states that she can obtain her medications from the pharmacy, and take her medications as directed. Patient's current insurance is Medicare. Medicaid. Care Management Interventions  PCP Verified by CM:  Yes  Mode of Transport at Discharge: Self  Transition of Care Consult (CM Consult): [address]: Yes  Current Support Network: Relative's Home  Confirm Follow Up Transport: Family  Plan discussed with Pt/Family/Caregiver: Yes  Freedom of Choice Offered: Yes  Discharge Location  Discharge Placement: Home with home health        Libby Bhardwaj RN BSN  Case Management [phone]

## 2019-10-10 NOTE — PROGRESS NOTES
Patient with transient hypotension, improving  Received call from Dr Clover Costello that in review of chart, the patient has been treated by Dr Doak Numbers. Leigh Hashimoto in past hospitalization. He is available to assist in case if needed. I notified Dr Eunice Dominguez, Nephrology, who agrees for group to follow and is available this evening if needed. She will notify Dr Racheal Rodriguez in am.   Notified Dr Clover Costello that Dr Eunice Dominguez' group will accept the consult.

## 2019-10-10 NOTE — ED NOTES
TRANSFER - OUT REPORT:    Verbal report given to Roxanna  on Cleo Rocha  being transferred to cvt stepdown(unit) for routine progression of care       Report consisted of patients Situation, Background, Assessment and   Recommendations(SBAR). Information from the following report(s) SBAR, ED Summary and MAR was reviewed with the receiving nurse. Lines:   Peripheral IV 10/09/19 Right Antecubital (Active)   Site Assessment Clean, dry, & intact 10/9/2019 12:57 PM   Phlebitis Assessment 0 10/9/2019 12:57 PM   Infiltration Assessment 0 10/9/2019 12:57 PM   Dressing Status Clean, dry, & intact 10/9/2019 12:57 PM        Opportunity for questions and clarification was provided.       Patient transported with:   Monitor  Registered Nurse

## 2019-10-10 NOTE — PROGRESS NOTES
Pt with hypotension, asymptomatic. Pulse 80, 98% O2 on room air, 15 respirations. 85/47 prior to bolus order received by MD Lindsay. Will continue to monitor.

## 2019-10-10 NOTE — PROGRESS NOTES
[facility] Hospitalist Group  Progress Note    Patient: Duncan Dobbs Age: 81 y.o. : 1950 MR#: [id number] SSN: [id number]  Date/Time: 10/10/2019    Subjective:     Pt seen w/ son at bedside. She reports not feeling well overall and does not have specific complaints. Assessment/Plan:     81 y o female w/ h/o metabolic encephalopathy secondary to what is reported as unintended polypharmacy admitted on 10/09 after presenting w/ reports of fall and hypotension.    -Failure to thrive unclear cause, possibly polypharmacy, per son, pt has history of taking medications not according to directions, has had previous episode about 6 months ago. -Fall recent with reports of hitting her head. Head CT 10/09 w/o acute changes. -JAMES likely due to pre renal azotemia in setting of hypotension also on ACE i    FEN: Hypona-->improving, nephrology following likely multifactorial, pt on HCTZ, dehydrated on NS @75 cc/hr, on cardiac soft solids     History of:  -Afib not on [address]  -DM2 hgb A1c--> 7.8, lantus listed as outpt medication  -DVT, PE not on anticoagulation currently  -HTN  -PE  -Normocytic anemia  PLAN:  -Cont to follow Na closely  -IVF  -Hold her lyrica, cymbalta, adderall, lasix, ACEi    -PT/OT consult  -Son states he will have her stay with him after d/c and help w/ monitoring her medication intake.         Additional Notes:      Case discussed with:  [x]Patient  [x]Family  []Nursing  []Case Management  DVT Prophylaxis:  []Lovenox  []Hep SQ  [x]SCDs  []Coumadin   []On Heparin gtt    Objective:   VS:   Visit Vitals  BP (!) 84/49 Comment: MD ordered 500 NS bolus   Pulse 80   Temp 97.4 °F (36.3 °C)   Resp 21   Wt 81.9 kg (180 lb 8.9 oz)   SpO2 96%   BMI 33.02 kg/m²      Tmax/24hrs: Temp (24hrs), Av.1 °F (36.7 °C), Min:97.4 °F (36.3 °C), Max:98.7 °F (37.1 °C)    Input/Output:     Intake/Output Summary (Last 24 hours) at 10/10/2019 1067  Last data filed at 10/10/2019 1118  Gross per 24 hour   Intake 9656.25 ml   Output 3600 ml   Net 6056.25 ml       General:  Alert, awake, in nad  Cardiovascular: rrr, no murmurs   Pulmonary:  ctab  GI:  Soft, nt, nd  Extremities:no edema    Additional:      Labs:    Recent Results (from the past 24 hour(s))   DRUG SCREEN, URINE    Collection Time: 10/09/19 10:32 PM   Result Value Ref Range    BENZODIAZEPINES NEGATIVE  NEG      BARBITURATES NEGATIVE  NEG      THC (TH-CANNABINOL) NEGATIVE  NEG      OPIATES POSITIVE (A) NEG      PCP(PHENCYCLIDINE) NEGATIVE  NEG      COCAINE NEGATIVE  NEG      AMPHETAMINES POSITIVE (A) NEG      METHADONE NEGATIVE  NEG      HDSCOM (NOTE)    CULTURE, BLOOD    Collection Time: 10/09/19 11:24 PM   Result Value Ref Range    Special Requests: NO SPECIAL REQUESTS      Culture result: NO GROWTH AFTER 7 HOURS     METABOLIC PANEL, BASIC    Collection Time: 10/09/19 11:28 PM   Result Value Ref Range    Sodium 132 (L) 136 - 145 mmol/L    Potassium 4.0 3.5 - 5.5 mmol/L    Chloride 104 100 - 111 mmol/L    CO2 17 (L) 21 - 32 mmol/L    Anion gap 11 3.0 - 18 mmol/L    Glucose 107 (H) 74 - 99 mg/dL     (H) 7.0 - 18 MG/DL    Creatinine 3.18 (H) 0.6 - 1.3 MG/DL    BUN/Creatinine ratio 34 (H) 12 - 20      GFR est AA 18 (L) >60 ml/min/1.73m2    GFR est non-AA 15 (L) >60 ml/min/1.73m2    Calcium 7.4 (L) 8.5 - 10.1 MG/DL   MAGNESIUM    Collection Time: 10/09/19 11:28 PM   Result Value Ref Range    Magnesium 1.9 1.6 - 2.6 mg/dL   PHOSPHORUS    Collection Time: 10/09/19 11:28 PM   Result Value Ref Range    Phosphorus 6.7 (H) 2.5 - 4.9 MG/DL   CBC WITH AUTOMATED DIFF    Collection Time: 10/09/19 11:28 PM   Result Value Ref Range    WBC 8.7 4.6 - 13.2 K/uL    RBC 3.06 (L) 4.20 - 5.30 M/uL    HGB 7.6 (L) 12.0 - 16.0 g/dL    HCT 23.2 (L) 35.0 - 45.0 %    MCV 75.8 74.0 - 97.0 FL    MCH 24.8 24.0 - 34.0 PG    MCHC 32.8 31.0 - 37.0 g/dL    RDW 14.8 (H) 11.6 - 14.5 %    PLATELET 258 419 - 180 K/uL    MPV 9.6 9.2 - 11.8 FL    NEUTROPHILS 79 (H) 40 - 73 % LYMPHOCYTES 11 (L) 21 - 52 %    MONOCYTES 9 3 - 10 %    EOSINOPHILS 1 0 - 5 %    BASOPHILS 0 0 - 2 %    ABS. NEUTROPHILS 6.8 1.8 - 8.0 K/UL    ABS. LYMPHOCYTES 1.0 0.9 - 3.6 K/UL    ABS. MONOCYTES 0.8 0.05 - 1.2 K/UL    ABS. EOSINOPHILS 0.1 0.0 - 0.4 K/UL    ABS. BASOPHILS 0.0 0.0 - 0.1 K/UL    DF AUTOMATED     CARDIAC PANEL,(CK, CKMB & TROPONIN)    Collection Time: 10/09/19 11:28 PM   Result Value Ref Range    CK 50 26 - 192 U/L    CK - MB 2.7 <3.6 ng/ml    CK-MB Index 5.4 (H) 0.0 - 4.0 %    Troponin-I, QT 0.05 (H) 0.0 - 0.045 NG/ML   POC LACTIC ACID    Collection Time: 10/09/19 11:34 PM   Result Value Ref Range    Lactic Acid (POC) <0.30 (L) 0.40 - 2.00 mmol/L   CULTURE, BLOOD    Collection Time: 10/09/19 11:48 PM   Result Value Ref Range    Special Requests: NO SPECIAL REQUESTS      Culture result: NO GROWTH AFTER 7 HOURS     GLUCOSE, POC    Collection Time: 10/10/19 12:33 AM   Result Value Ref Range    Glucose (POC) 124 (H) 70 - 110 mg/dL   CBC WITH AUTOMATED DIFF    Collection Time: 10/10/19  5:44 AM   Result Value Ref Range    WBC 7.9 4.6 - 13.2 K/uL    RBC 3.16 (L) 4.20 - 5.30 M/uL    HGB 7.7 (L) 12.0 - 16.0 g/dL    HCT 24.3 (L) 35.0 - 45.0 %    MCV 76.9 74.0 - 97.0 FL    MCH 24.4 24.0 - 34.0 PG    MCHC 31.7 31.0 - 37.0 g/dL    RDW 15.0 (H) 11.6 - 14.5 %    PLATELET 015 (H) 825 - 420 K/uL    MPV 9.8 9.2 - 11.8 FL    NEUTROPHILS 78 (H) 40 - 73 %    LYMPHOCYTES 13 (L) 21 - 52 %    MONOCYTES 8 3 - 10 %    EOSINOPHILS 1 0 - 5 %    BASOPHILS 0 0 - 2 %    ABS. NEUTROPHILS 6.1 1.8 - 8.0 K/UL    ABS. LYMPHOCYTES 1.0 0.9 - 3.6 K/UL    ABS. MONOCYTES 0.7 0.05 - 1.2 K/UL    ABS. EOSINOPHILS 0.1 0.0 - 0.4 K/UL    ABS.  BASOPHILS 0.0 0.0 - 0.1 K/UL    DF AUTOMATED     METABOLIC PANEL, BASIC    Collection Time: 10/10/19  5:44 AM   Result Value Ref Range    Sodium 135 (L) 136 - 145 mmol/L    Potassium 4.1 3.5 - 5.5 mmol/L    Chloride 106 100 - 111 mmol/L    CO2 17 (L) 21 - 32 mmol/L    Anion gap 12 3.0 - 18 mmol/L    Glucose 97 74 - 99 mg/dL     (H) 7.0 - 18 MG/DL    Creatinine 3.15 (H) 0.6 - 1.3 MG/DL    BUN/Creatinine ratio 37 (H) 12 - 20      GFR est AA 18 (L) >60 ml/min/1.73m2    GFR est non-AA 15 (L) >60 ml/min/1.73m2    Calcium 8.0 (L) 8.5 - 10.1 MG/DL   GLUCOSE, POC    Collection Time: 10/10/19  8:35 AM   Result Value Ref Range    Glucose (POC) 114 (H) 70 - 110 mg/dL   GLUCOSE, POC    Collection Time: 10/10/19 11:27 AM   Result Value Ref Range    Glucose (POC) 289 (H) 70 - 110 mg/dL   GLUCOSE, POC    Collection Time: 10/10/19  5:23 PM   Result Value Ref Range    Glucose (POC) 183 (H) 70 - 110 mg/dL     Additional Data Reviewed:      Signed By: Aimee Moreno MD     October 10, 2019 5:35 PM

## 2019-10-10 NOTE — DIABETES MGMT
GLYCEMIC CONTROL PLAN OF CARE    Assessment/Recommendations:  Pt is a 81year old female with a past medical history significant for acute metabolic encephalopathy, hypotension, CKD 3, A-fib, HLD, and type 2 diabetes who presented with altered mental status, fall, and hypotension. Continue correctional Lispro insulin coverage. Continue inpatient monitoring and intervention. Most recent blood glucose values:  10/9/2019 12:24 10/10/2019 00:33 10/10/2019 08:35   237 (H) 124 (H) 114 (H)     Current A1C of 7.8% is equivalent to average blood glucose of 177 mg/dl over the past 2-3 months.     Current hospital diabetes medications:   Correctional Lispro insulin 4 times daily ACHS    Home diabetes medications:  Lantus insulin 20 units daily     Diet: Cardiac soft solids     Education:  ____Refer to Diabetes Education Record             _x___Education not indicated at this time      Eladio Hernandez RD, CDE

## 2019-10-10 NOTE — PROGRESS NOTES
Patient is not available to be assessed at this time. 2x Attempted visits- patient in deep sleep not aroused when name was called.        [facility] [address]   [phone]

## 2019-10-10 NOTE — ROUTINE PROCESS
0020-Pt arrived to the unit from the ED, placed on cardiac monitor, no apparent pain, no SOB noted on room air, four eyes skin assessment with Hugo Monte RN. No pressure ulcers noted. Left groin excoriation and rash noted, EPC cream placed. Left Forearm skin tear DEL. No drainage. Pt has fallen in the last week. Bed alarm on. Pt alert and oriented times four. 0200-Pt resting, NAD, no c/o pain, NSR on the monitor, no SOB on room air, call bell within reach, bed alarm on.  
 
0400-Pt resting, NAD, NSR on the monitor, no c/o pain, no SOB on room air, call bell within reach. 0600-Pt resting, NAD, NSR on the monitor, no c/o pain, no SOB on room air, call bell within reach. Bed alarm on.  
 
0700-Bedside and verbal report given to Oc Grossman RN.

## 2019-10-11 ENCOUNTER — APPOINTMENT (OUTPATIENT)
Dept: NON INVASIVE DIAGNOSTICS | Age: 69
DRG: 917 | End: 2019-10-11
Attending: NURSE PRACTITIONER
Payer: MEDICARE

## 2019-10-11 LAB
ANION GAP SERPL CALC-SCNC: 9 MMOL/L (ref 3–18)
BACTERIA SPEC CULT: ABNORMAL
BUN SERPL-MCNC: 100 MG/DL (ref 7–18)
BUN/CREAT SERPL: 36 (ref 12–20)
CALCIUM SERPL-MCNC: 7.8 MG/DL (ref 8.5–10.1)
CHLORIDE SERPL-SCNC: 112 MMOL/L (ref 100–111)
CO2 SERPL-SCNC: 17 MMOL/L (ref 21–32)
CREAT SERPL-MCNC: 2.81 MG/DL (ref 0.6–1.3)
ECHO AO ROOT DIAM: 3 CM
ECHO LA AREA 4C: 35.2 CM2
ECHO LA VOL 2C: 149.62 ML (ref 22–52)
ECHO LA VOL 4C: 122.14 ML (ref 22–52)
ECHO LA VOL BP: 149.03 ML (ref 22–52)
ECHO LA VOL/BSA BIPLANE: 80.04 ML/M2 (ref 16–28)
ECHO LA VOLUME INDEX A2C: 80.36 ML/M2 (ref 16–28)
ECHO LA VOLUME INDEX A4C: 65.6 ML/M2 (ref 16–28)
ECHO LV E' LATERAL VELOCITY: 4.64 CM/S
ECHO LV E' SEPTAL VELOCITY: 3.64 CM/S
ECHO LV EDV A2C: 133.6 ML
ECHO LV EDV A4C: 82.7 ML
ECHO LV EDV BP: 104.9 ML (ref 56–104)
ECHO LV EDV INDEX A4C: 44.4 ML/M2
ECHO LV EDV INDEX BP: 56.3 ML/M2
ECHO LV EDV NDEX A2C: 71.8 ML/M2
ECHO LV EJECTION FRACTION A2C: 62 %
ECHO LV EJECTION FRACTION A4C: 61 %
ECHO LV EJECTION FRACTION BIPLANE: 59.5 % (ref 55–100)
ECHO LV ESV A2C: 51 ML
ECHO LV ESV A4C: 32.2 ML
ECHO LV ESV BP: 42.5 ML (ref 19–49)
ECHO LV ESV INDEX A2C: 27.4 ML/M2
ECHO LV ESV INDEX A4C: 17.3 ML/M2
ECHO LV ESV INDEX BP: 22.8 ML/M2
ECHO LV INTERNAL DIMENSION DIASTOLIC: 5.11 CM (ref 3.9–5.3)
ECHO LV INTERNAL DIMENSION SYSTOLIC: 3.38 CM
ECHO LV IVSD: 1.39 CM (ref 0.6–0.9)
ECHO LV MASS 2D: 324.8 G (ref 67–162)
ECHO LV MASS INDEX 2D: 174.4 G/M2 (ref 43–95)
ECHO LV POSTERIOR WALL DIASTOLIC: 1.22 CM (ref 0.6–0.9)
ECHO LVOT DIAM: 2.05 CM
ECHO LVOT PEAK GRADIENT: 10.6 MMHG
ECHO LVOT PEAK VELOCITY: 162.55 CM/S
ECHO LVOT VTI: 39.28 CM
ECHO MV A VELOCITY: 162.57 CM/S
ECHO MV AREA PHT: 1.7 CM2
ECHO MV AREA VTI: 1.9 CM2
ECHO MV E DECELERATION TIME (DT): 359.8 MS
ECHO MV E VELOCITY: 134.39 CM/S
ECHO MV E/A RATIO: 0.83
ECHO MV E/E' LATERAL: 28.96
ECHO MV E/E' RATIO (AVERAGED): 32.94
ECHO MV E/E' SEPTAL: 36.92
ECHO MV MAX VELOCITY: 191.96 CM/S
ECHO MV MEAN GRADIENT: 7.3 MMHG
ECHO MV PEAK GRADIENT: 14.7 MMHG
ECHO MV PRESSURE HALF TIME (PHT): 131.8 MS
ECHO MV VTI: 68.16 CM
ECHO PULMONARY ARTERY SYSTOLIC PRESSURE (PASP): 45 MMHG
ECHO RV TAPSE: 2.62 CM (ref 1.5–2)
ECHO TV REGURGITANT MAX VELOCITY: 323.85 CM/S
ECHO TV REGURGITANT PEAK GRADIENT: 42 MMHG
GLUCOSE BLD STRIP.AUTO-MCNC: 132 MG/DL (ref 70–110)
GLUCOSE BLD STRIP.AUTO-MCNC: 167 MG/DL (ref 70–110)
GLUCOSE BLD STRIP.AUTO-MCNC: 189 MG/DL (ref 70–110)
GLUCOSE BLD STRIP.AUTO-MCNC: 237 MG/DL (ref 70–110)
GLUCOSE SERPL-MCNC: 101 MG/DL (ref 74–99)
HCT VFR BLD AUTO: 22 % (ref 35–45)
HGB BLD-MCNC: 7.1 G/DL (ref 12–16)
POTASSIUM SERPL-SCNC: 4.3 MMOL/L (ref 3.5–5.5)
SERVICE CMNT-IMP: ABNORMAL
SODIUM SERPL-SCNC: 138 MMOL/L (ref 136–145)

## 2019-10-11 PROCEDURE — 85018 HEMOGLOBIN: CPT

## 2019-10-11 PROCEDURE — 80048 BASIC METABOLIC PNL TOTAL CA: CPT

## 2019-10-11 PROCEDURE — 82962 GLUCOSE BLOOD TEST: CPT

## 2019-10-11 PROCEDURE — 74011636637 HC RX REV CODE- 636/637: Performed by: NURSE PRACTITIONER

## 2019-10-11 PROCEDURE — 65660000000 HC RM CCU STEPDOWN

## 2019-10-11 PROCEDURE — 74011250637 HC RX REV CODE- 250/637: Performed by: INTERNAL MEDICINE

## 2019-10-11 PROCEDURE — 36415 COLL VENOUS BLD VENIPUNCTURE: CPT

## 2019-10-11 PROCEDURE — 94762 N-INVAS EAR/PLS OXIMTRY CONT: CPT

## 2019-10-11 PROCEDURE — 74011250636 HC RX REV CODE- 250/636: Performed by: INTERNAL MEDICINE

## 2019-10-11 PROCEDURE — 74011250636 HC RX REV CODE- 250/636: Performed by: NURSE PRACTITIONER

## 2019-10-11 PROCEDURE — 93306 TTE W/DOPPLER COMPLETE: CPT

## 2019-10-11 PROCEDURE — 74011250637 HC RX REV CODE- 250/637: Performed by: NURSE PRACTITIONER

## 2019-10-11 RX ORDER — PREGABALIN 75 MG/1
150 CAPSULE ORAL 2 TIMES DAILY
Status: DISCONTINUED | OUTPATIENT
Start: 2019-10-11 | End: 2019-10-12

## 2019-10-11 RX ADMIN — NYSTATIN: 100000 POWDER TOPICAL at 18:11

## 2019-10-11 RX ADMIN — ACETAMINOPHEN 650 MG: 325 TABLET ORAL at 08:52

## 2019-10-11 RX ADMIN — NYSTATIN: 100000 POWDER TOPICAL at 08:52

## 2019-10-11 RX ADMIN — INSULIN LISPRO 4 UNITS: 100 INJECTION, SOLUTION INTRAVENOUS; SUBCUTANEOUS at 18:11

## 2019-10-11 RX ADMIN — DOCUSATE SODIUM 100 MG: 100 CAPSULE, LIQUID FILLED ORAL at 08:52

## 2019-10-11 RX ADMIN — POLYETHYLENE GLYCOL 3350 17 G: 17 POWDER, FOR SOLUTION ORAL at 08:52

## 2019-10-11 RX ADMIN — HEPARIN SODIUM 5000 UNITS: 5000 INJECTION INTRAVENOUS; SUBCUTANEOUS at 00:57

## 2019-10-11 RX ADMIN — PREGABALIN 150 MG: 75 CAPSULE ORAL at 20:10

## 2019-10-11 RX ADMIN — PANTOPRAZOLE SODIUM 40 MG: 40 TABLET, DELAYED RELEASE ORAL at 08:52

## 2019-10-11 RX ADMIN — INSULIN LISPRO 2 UNITS: 100 INJECTION, SOLUTION INTRAVENOUS; SUBCUTANEOUS at 12:23

## 2019-10-11 RX ADMIN — SODIUM CHLORIDE 75 ML/HR: 900 INJECTION, SOLUTION INTRAVENOUS at 15:11

## 2019-10-11 RX ADMIN — INSULIN LISPRO 2 UNITS: 100 INJECTION, SOLUTION INTRAVENOUS; SUBCUTANEOUS at 21:40

## 2019-10-11 RX ADMIN — MONTELUKAST 10 MG: 10 TABLET, FILM COATED ORAL at 08:52

## 2019-10-11 RX ADMIN — ACETAMINOPHEN 650 MG: 325 TABLET ORAL at 14:28

## 2019-10-11 NOTE — PROGRESS NOTES
RENAL DAILY PROGRESS NOTE    Patient: Merritt Camacho               Sex: female          DOA: 10/9/2019 11:54 AM        YOB: 1950      Age:  81 y.o.        LOS:  LOS: 2 days     Subjective:     Merritt Camacho is a 81 y.o.  who presents with AMS (altered mental status) [R41.82]  Chronic renal failure [N18.9]. Asked to evaluate for renal failure. pt was admitted with hypotension,dehydration,worsening renal failure,hyponatremia,was on numerous meds  Chief complains: Patient denies nausea, vomiting, chest pain, dizziness, shortness of breath or headache.  - Reviewed last 24 hrs events     Current Facility-Administered Medications   Medication Dose Route Frequency    influenza vaccine 2019-20 (6 mos+)(PF) (FLUARIX/FLULAVAL/FLUZONE QUAD) injection 0.5 mL  0.5 mL IntraMUSCular PRIOR TO DISCHARGE    nystatin (MYCOSTATIN) 100,000 unit/gram powder   Topical BID    acetaminophen (TYLENOL) tablet 650 mg  650 mg Oral Q4H PRN    ondansetron (ZOFRAN) injection 4 mg  4 mg IntraVENous Q4H PRN    heparin (porcine) injection 5,000 Units  5,000 Units SubCUTAneous Q8H    0.9% sodium chloride infusion  75 mL/hr IntraVENous CONTINUOUS    montelukast (SINGULAIR) tablet 10 mg  10 mg Oral DAILY    pantoprazole (PROTONIX) tablet 40 mg  40 mg Oral ACB    docusate sodium (COLACE) capsule 100 mg  100 mg Oral DAILY    polyethylene glycol (MIRALAX) packet 17 g  17 g Oral DAILY    insulin lispro (HUMALOG) injection   SubCUTAneous AC&HS    glucose chewable tablet 16 g  16 g Oral PRN    glucagon (GLUCAGEN) injection 1 mg  1 mg IntraMUSCular PRN    dextrose (D50W) injection syrg 12.5-25 g  25-50 mL IntraVENous PRN       Objective:     Visit Vitals  /60   Pulse 80   Temp 98.1 °F (36.7 °C)   Resp 16   Ht 5' 2\" (1.575 m)   Wt 85.3 kg (188 lb)   SpO2 97%   BMI 34.39 kg/m²       Intake/Output Summary (Last 24 hours) at 10/11/2019 1407  Last data filed at 10/11/2019 1246  Gross per 24 hour   Intake 240 ml Output 2300 ml   Net -2060 ml       Physical Examination:       RS: Chest is bilateral equal, no wheezing / rales / crackles  CVS: S1-S2 heard, RRR, No S3 / murmur  Abdomen: Soft, Non tender, Not distended, Positive bowel sounds, no organomegaly, no CVA / supra pubic tenderness  Extremities: No edema, no cyanosis, skin is warm on touch  CNS: Awake   HEENT: Head is atraumatic, PERRLA, conjunctiva pink & non icteric. No JVD or carotid bruit       Data Review:      Labs:     Hematology:   Recent Labs     10/11/19  0548 10/10/19  0544 10/09/19  2328 10/09/19  1220   WBC  --  7.9 8.7 15.5*   HGB 7.1* 7.7* 7.6* 8.1*   HCT 22.0* 24.3* 23.2* 25.1*     Chemistry:   Recent Labs     10/11/19  0548 10/10/19  0544 10/09/19  2328 10/09/19  1220   * 116* 109* 120*   CREA 2.81* 3.15* 3.18* 3.75*   CA 7.8* 8.0* 7.4* 8.3*   ALB  --   --   --  2.6*   K 4.3 4.1 4.0 4.3    135* 132* 127*   * 106 104 96*   CO2 17* 17* 17* 19*   PHOS  --   --  6.7*  --    * 97 107* 211*        Images:    XR (Most Recent). CXR reviewed by me and compared with previous CXR Results from [place] encounter on 10/09/19   XR CHEST PORT    Narrative EXAM:  XR CHEST PORT    INDICATION:   ams    COMPARISON: 9/26/2018. FINDINGS:  Upper normal cardiac silhouette, stable. Aortic atherosclerosis. Linear scar or  atelectasis in the left midlung. Pulmonary vasculature is within normal limits. No pneumothorax or pleural effusions. No air space opacity. No acute osseous  abnormality. Impression Impression:    Linear scar or atelectasis left midlung. Otherwise no acute abnormalities. CT (Most Recent) Results from Hospital Encounter encounter on 10/09/19   CT HEAD WO CONT    Narrative EXAM:  CT Head without Contrast              CLINICAL INDICATION:  Trauma. Eulas Werner and hit the back of head. Groggy and barely  able to keep eyes open. COMPARISON:  01/09/19.            TECHNIQUE:      - Helical volumetric CT imaging of the head is performed from the base of the  skull to the vertex without IV contrast administration. Axial, coronal and  sagittal images are generated from the volumetric data set. - Dose optimization techniques are utilized as appropriate to the performed exam  with combination of automated exposure control, adjustment of mA and/or kV  according to patient size, and use of iterative reconstructive technique. FINDINGS:     Brain:    - Hemorrhage/ hematoma:  No acute intracranial hemorrhage/ hematoma. - Mass, mass effect:  None. - Gray-white matter differentiation:  Some deep white matter hypodensities,  suggestive of chronic small vessel ischemia. Left cerebellar hemisphere with a  small old lacunar infarct. - Interval assessment:  No significant interval change. CSF spaces:  No evidence of abnormal effacement or enlargement. Calvarium:  Intact. Sinuses, mastoids:  Well-aerated. Impression IMPRESSION:                1.  No acute intracranial abnormalities. 2.  Slight chronic small vessel ischemic changes. EKG No results found for this or any previous visit. I have personally reviewed the old medical records and patient's labs    Plan / Recommendation:      1. Acute/crf stage 3 ,related to dehydration,acei,hypotension,diuretics. improving . continue iv ns,monitor renal function and volume status  2.hyponatremia,resolved. avoid hctz    D/w Dr. Daniela Ferreira MD  Nephrology  10/11/2019

## 2019-10-11 NOTE — PROGRESS NOTES
[facility] Hospitalist Group  Progress Note    Patient: Duncan Dobbs Age: 81 y.o. : 1950 MR#: [id number] SSN: [id number]  Date/Time: 10/11/2019    Subjective:     Pt seen w/ son at bedside. She reports she feels worse today, states she is in pain, generalized and states she withdraws from lyrica if not given this medication. Assessment/Plan:     81 y o female w/ h/o metabolic encephalopathy secondary to what is reported as unintended polypharmacy admitted on 10/09 after presenting w/ reports of fall and hypotension.    -Failure to thrive unclear cause, possibly polypharmacy, per son, pt has history of taking medications not according to directions, has had previous episode about 6 months ago. -Fall recent with reports of hitting her head. Head CT 10/09 w/o acute changes. -JAMES likely due to pre renal azotemia in setting of hypotension also on ACE I, improving    FEN: Hypona-->improving, nephrology following likely multifactorial, pt on HCTZ, dehydrated on NS @75 cc/hr, on cardiac soft solids     History of:  -Afib not on [address]  -DM2 hgb A1c--> 7.8, lantus listed as outpt medication, although blood sugars are not ideal, mostly within acceptable limits. -DVT, PE not on anticoagulation currently  -HTN  -PE  -Normocytic anemia  PLAN:  -Cont to follow Na closely  -IVF  -restart lyrica, cont to holdcymbalta, adderall, lasix, ACEi    -PT/OT   -Son states he will have her stay with him after d/c and help w/ monitoring her medication intake.  -Stable for transfer out of stepdown.         Additional Notes:      Case discussed with:  [x]Patient  [x]Family  []Nursing  []Case Management  DVT Prophylaxis:  []Lovenox  []Hep SQ  [x]SCDs  []Coumadin   []On Heparin gtt    Objective:   VS:   Visit Vitals  /55   Pulse 67   Temp 98.1 °F (36.7 °C)   Resp 11   Ht 5' 2\" (1.575 m)   Wt 85.3 kg (188 lb)   SpO2 99%   BMI 34.39 kg/m²      Tmax/24hrs: Temp (24hrs), Av.9 °F (36.6 °C), Min:97.4 °F (36.3 °C), Max:98.3 °F (36.8 °C)    Input/Output:     Intake/Output Summary (Last 24 hours) at 10/11/2019 1723  Last data filed at 10/11/2019 1702  Gross per 24 hour   Intake 240 ml   Output 2851 ml   Net -2611 ml       General:  Alert, awake, in nad  Cardiovascular: rrr, no murmurs   Pulmonary:  ctab  GI:  Soft, nt, nd  Extremities:no edema    Additional:      Labs:    Recent Results (from the past 24 hour(s))   GLUCOSE, POC    Collection Time: 10/10/19 10:15 PM   Result Value Ref Range    Glucose (POC) 258 (H) 70 - 110 mg/dL   HGB & HCT    Collection Time: 10/11/19  5:48 AM   Result Value Ref Range    HGB 7.1 (L) 12.0 - 16.0 g/dL    HCT 22.0 (L) 35.0 - 82.6 %   METABOLIC PANEL, BASIC    Collection Time: 10/11/19  5:48 AM   Result Value Ref Range    Sodium 138 136 - 145 mmol/L    Potassium 4.3 3.5 - 5.5 mmol/L    Chloride 112 (H) 100 - 111 mmol/L    CO2 17 (L) 21 - 32 mmol/L    Anion gap 9 3.0 - 18 mmol/L    Glucose 101 (H) 74 - 99 mg/dL     (H) 7.0 - 18 MG/DL    Creatinine 2.81 (H) 0.6 - 1.3 MG/DL    BUN/Creatinine ratio 36 (H) 12 - 20      GFR est AA 20 (L) >60 ml/min/1.73m2    GFR est non-AA 17 (L) >60 ml/min/1.73m2    Calcium 7.8 (L) 8.5 - 10.1 MG/DL   GLUCOSE, POC    Collection Time: 10/11/19  7:19 AM   Result Value Ref Range    Glucose (POC) 132 (H) 70 - 110 mg/dL   ECHO ADULT COMPLETE    Collection Time: 10/11/19 10:15 AM   Result Value Ref Range    LA Volume 149.03 22 - 52 mL    LV E' Lateral Velocity 4.64 cm/s    LV E' Septal Velocity 3.64 cm/s    Tapse 2.62 (A) 1.5 - 2.0 cm    Ao Root D 3.00 cm    LA Vol Index 80.04 16 - 28 ml/m2    PASP 45.0 mmHg    LVIDd 5.11 3.9 - 5.3 cm    LVPWd 1.22 (A) 0.6 - 0.9 cm    LVIDs 3.38 cm    IVSd 1.39 (A) 0.6 - 0.9 cm    LV ED Vol A2C 133.6 mL    LV ES Vol A4C 32.2 mL    LV ES Vol BP 42.5 19 - 49 mL    LVOT d 2.05 cm    LVOT Peak Velocity 162.55 cm/s    LVOT Peak Gradient 10.6 mmHg    LVOT VTI 39.28 cm    MVA (PHT) 1.7 cm2    MV A Brent 162.57 cm/s    MV E Brent 134.39 cm/s    MV E/A 0.83     MV Mean Gradient 7.3 mmHg    Mitral Valve Annulus Velocity Time Integral 68.16 cm    BP EF 59.5 55 - 100 %    LV Ejection Fraction MOD 4C 61 %    LV Ejection Fraction MOD 2C 62 %    LA Vol 4C 122.14 (A) 22 - 52 mL    LA Vol 2C 149.62 (A) 22 - 52 mL    LA Area 4C 35.2 cm2    LV Mass .8 (A) 67 - 162 g    LV Mass AL Index 174.4 (A) 43 - 95 g/m2    E/E' lateral 28.96     E/E' septal 36.92     MV Peak Gradient 14.7 mmHg    E/E' ratio (averaged) 32.94     LV ES Vol A2C 51.0 mL    LVES Vol Index BP 22.8 mL/m2    LV ED Vol A4C 82.7 mL    LVED Vol Index BP 56.3 mL/m2    Mitral Valve E Wave Deceleration Time 359.8 ms    Mitral Valve Pressure Half-time 131.8 ms    Triscuspid Valve Regurgitation Peak Gradient 42.0 mmHg    Mitral Valve Max Velocity 191.96 cm/s    MVA VTI 1.9 cm2    LV ED Vol .9 (A) 56 - 104 ml    TR Max Velocity 323.85 cm/s    LA Vol Index 80.36 16 - 28 ml/m2    LA Vol Index 65.60 16 - 28 ml/m2    LVED Vol Index A4C 44.4 mL/m2    LVED Vol Index A2C 71.8 mL/m2    LVES Vol Index A4C 17.3 mL/m2    LVES Vol Index A2C 27.4 mL/m2   GLUCOSE, POC    Collection Time: 10/11/19 11:24 AM   Result Value Ref Range    Glucose (POC) 167 (H) 70 - 110 mg/dL   GLUCOSE, POC    Collection Time: 10/11/19  3:56 PM   Result Value Ref Range    Glucose (POC) 237 (H) 70 - 110 mg/dL     Additional Data Reviewed:      Signed By: Reggie Osei MD     October 11, 2019 5:35 PM

## 2019-10-11 NOTE — ROUTINE PROCESS
Bedside and Verbal shift change report given to [address] [place] [address] (oncoming nurse) by Hipolito Adams (offgoing nurse). Report included the following information SBAR, Kardex, Intake/Output, MAR, Recent Results and Cardiac Rhythm NSR.  
 
0945 Pt is stable with no complaints of pain. Will continue to monitor. Pt transported for Echo.  
 
1100 Pt returned to floor. Pt's son at bedside. Will continue to monitor. 1500 Pt resting in bed with no complaints of pain and no change to condition. Removed pt's thomas catheter with no difficulty. Will continue to monitor. 1720 Pt voided post thomas removal. Pt assisted to bedside commode. Pt is stable at this time. Will continue to monitor. Bedside and Verbal shift change report given to Vinod Wolff (oncoming nurse) by [address] [place] [address] (offgoing nurse). Report included the following information SBAR, Kardex, Intake/Output, MAR, Recent Results and Cardiac Rhythm NSR.

## 2019-10-11 NOTE — PROGRESS NOTES
Problem: Hypotension  Goal: *Blood pressure within specified parameters  Outcome: Progressing Towards Goal  Goal: *Fluid volume balance  Outcome: Progressing Towards Goal  Goal: *Labs within defined limits  Outcome: Progressing Towards Goal     Problem: Falls - Risk of  Goal: *Absence of Falls  Description  Document Azra Sorto Fall Risk and appropriate interventions in the flowsheet. Outcome: Progressing Towards Goal  Note:   Fall Risk Interventions:  Mobility Interventions: Communicate number of staff needed for ambulation/transfer, Patient to call before getting OOB         Medication Interventions: Evaluate medications/consider consulting pharmacy, Patient to call before getting OOB, Teach patient to arise slowly    Elimination Interventions: Call light in reach    History of Falls Interventions: Room close to nurse's station, Evaluate medications/consider consulting pharmacy, Door open when patient unattended         Problem: Pressure Injury - Risk of  Goal: *Prevention of pressure injury  Description  Document Neel Scale and appropriate interventions in the flowsheet.   Outcome: Progressing Towards Goal  Note:   Pressure Injury Interventions:  Sensory Interventions: Avoid rigorous massage over bony prominences, Assess changes in LOC, Keep linens dry and wrinkle-free, Minimize linen layers, Maintain/enhance activity level, Pressure redistribution bed/mattress (bed type)    Moisture Interventions: Absorbent underpads, Apply protective barrier, creams and emollients, Internal/External urinary devices    Activity Interventions: PT/OT evaluation, Pressure redistribution bed/mattress(bed type)    Mobility Interventions: Pressure redistribution bed/mattress (bed type), PT/OT evaluation, HOB 30 degrees or less    Nutrition Interventions: Document food/fluid/supplement intake    Friction and Shear Interventions: HOB 30 degrees or less, Foam dressings/transparent film/skin sealants, Apply protective barrier, creams and emollients, Minimize layers                Problem: Pain  Goal: *Control of Pain  Outcome: Progressing Towards Goal

## 2019-10-12 VITALS
DIASTOLIC BLOOD PRESSURE: 54 MMHG | OXYGEN SATURATION: 99 % | TEMPERATURE: 98.6 F | RESPIRATION RATE: 15 BRPM | SYSTOLIC BLOOD PRESSURE: 117 MMHG | HEART RATE: 69 BPM | BODY MASS INDEX: 34.58 KG/M2 | HEIGHT: 62 IN | WEIGHT: 187.9 LBS

## 2019-10-12 LAB
ANION GAP SERPL CALC-SCNC: 6 MMOL/L (ref 3–18)
BUN SERPL-MCNC: 89 MG/DL (ref 7–18)
BUN/CREAT SERPL: 38 (ref 12–20)
CALCIUM SERPL-MCNC: 8 MG/DL (ref 8.5–10.1)
CHLORIDE SERPL-SCNC: 115 MMOL/L (ref 100–111)
CO2 SERPL-SCNC: 20 MMOL/L (ref 21–32)
CREAT SERPL-MCNC: 2.35 MG/DL (ref 0.6–1.3)
GLUCOSE BLD STRIP.AUTO-MCNC: 139 MG/DL (ref 70–110)
GLUCOSE BLD STRIP.AUTO-MCNC: 171 MG/DL (ref 70–110)
GLUCOSE BLD STRIP.AUTO-MCNC: 266 MG/DL (ref 70–110)
GLUCOSE SERPL-MCNC: 125 MG/DL (ref 74–99)
HCT VFR BLD AUTO: 25.8 % (ref 35–45)
HGB BLD-MCNC: 8.1 G/DL (ref 12–16)
POTASSIUM SERPL-SCNC: 4.3 MMOL/L (ref 3.5–5.5)
SODIUM SERPL-SCNC: 141 MMOL/L (ref 136–145)

## 2019-10-12 PROCEDURE — 85018 HEMOGLOBIN: CPT

## 2019-10-12 PROCEDURE — 74011250636 HC RX REV CODE- 250/636: Performed by: NURSE PRACTITIONER

## 2019-10-12 PROCEDURE — 36415 COLL VENOUS BLD VENIPUNCTURE: CPT

## 2019-10-12 PROCEDURE — 82962 GLUCOSE BLOOD TEST: CPT

## 2019-10-12 PROCEDURE — 74011250637 HC RX REV CODE- 250/637: Performed by: NURSE PRACTITIONER

## 2019-10-12 PROCEDURE — 74011636637 HC RX REV CODE- 636/637: Performed by: NURSE PRACTITIONER

## 2019-10-12 PROCEDURE — 80048 BASIC METABOLIC PNL TOTAL CA: CPT

## 2019-10-12 PROCEDURE — 90471 IMMUNIZATION ADMIN: CPT

## 2019-10-12 PROCEDURE — 74011250636 HC RX REV CODE- 250/636: Performed by: INTERNAL MEDICINE

## 2019-10-12 PROCEDURE — 90686 IIV4 VACC NO PRSV 0.5 ML IM: CPT | Performed by: HOSPITALIST

## 2019-10-12 PROCEDURE — 74011250636 HC RX REV CODE- 250/636: Performed by: HOSPITALIST

## 2019-10-12 PROCEDURE — 94762 N-INVAS EAR/PLS OXIMTRY CONT: CPT

## 2019-10-12 PROCEDURE — 74011250637 HC RX REV CODE- 250/637: Performed by: INTERNAL MEDICINE

## 2019-10-12 RX ORDER — PREGABALIN 75 MG/1
75 CAPSULE ORAL 2 TIMES DAILY
Status: DISCONTINUED | OUTPATIENT
Start: 2019-10-12 | End: 2019-10-12 | Stop reason: HOSPADM

## 2019-10-12 RX ORDER — PREGABALIN 75 MG/1
75 CAPSULE ORAL 2 TIMES DAILY
Qty: 60 CAP | Refills: 0 | Status: SHIPPED | OUTPATIENT
Start: 2019-10-12

## 2019-10-12 RX ORDER — AMLODIPINE BESYLATE 5 MG/1
5 TABLET ORAL DAILY
Qty: 30 TAB | Refills: 0 | Status: SHIPPED | OUTPATIENT
Start: 2019-10-12

## 2019-10-12 RX ADMIN — PREGABALIN 150 MG: 75 CAPSULE ORAL at 09:24

## 2019-10-12 RX ADMIN — PANTOPRAZOLE SODIUM 40 MG: 40 TABLET, DELAYED RELEASE ORAL at 09:24

## 2019-10-12 RX ADMIN — INFLUENZA VIRUS VACCINE 0.5 ML: 15; 15; 15; 15 SUSPENSION INTRAMUSCULAR at 09:37

## 2019-10-12 RX ADMIN — ACETAMINOPHEN 650 MG: 325 TABLET ORAL at 09:24

## 2019-10-12 RX ADMIN — HEPARIN SODIUM 5000 UNITS: 5000 INJECTION INTRAVENOUS; SUBCUTANEOUS at 01:28

## 2019-10-12 RX ADMIN — INSULIN LISPRO 2 UNITS: 100 INJECTION, SOLUTION INTRAVENOUS; SUBCUTANEOUS at 18:03

## 2019-10-12 RX ADMIN — NYSTATIN: 100000 POWDER TOPICAL at 09:24

## 2019-10-12 RX ADMIN — INSULIN LISPRO 6 UNITS: 100 INJECTION, SOLUTION INTRAVENOUS; SUBCUTANEOUS at 13:13

## 2019-10-12 RX ADMIN — ACETAMINOPHEN 650 MG: 325 TABLET ORAL at 13:45

## 2019-10-12 RX ADMIN — MONTELUKAST 10 MG: 10 TABLET, FILM COATED ORAL at 09:24

## 2019-10-12 RX ADMIN — DOCUSATE SODIUM 100 MG: 100 CAPSULE, LIQUID FILLED ORAL at 09:24

## 2019-10-12 RX ADMIN — HEPARIN SODIUM 5000 UNITS: 5000 INJECTION INTRAVENOUS; SUBCUTANEOUS at 09:37

## 2019-10-12 RX ADMIN — POLYETHYLENE GLYCOL 3350 17 G: 17 POWDER, FOR SOLUTION ORAL at 09:24

## 2019-10-12 RX ADMIN — ACETAMINOPHEN 650 MG: 325 TABLET ORAL at 01:27

## 2019-10-12 RX ADMIN — SODIUM CHLORIDE 75 ML/HR: 900 INJECTION, SOLUTION INTRAVENOUS at 05:14

## 2019-10-12 NOTE — ROUTINE PROCESS
Bedside and Verbal shift change report given to [facility] (oncoming nurse) by Aguila Brooks RN (offgoing nurse). Report included the following information SBAR, Kardex, Intake/Output, MAR, Recent Results and Cardiac Rhythm NSR. 0845 Pt resting in bed with no complaints of pain and no change to condition. Will continue to monitor. 80 Pt's son at bedside speaking with Dr. Emily Olivarez. Verbal order with readback from Dr. Emily Olivarez for H/H received. 1400 Pt ambulated in hallway with walker. Pt stated that she felt dizzy upon initial ambulation but dizziness resolved during ambulation. Pt assisted back to bed. Will continue to monitor. 1500 Confirmed pt's H/H at 8.1/ 25.8 with Dr. Emily Olivarez via telephone. Received verbal order with readback for discharge order from Dr. Emily Olivarez.

## 2019-10-12 NOTE — DISCHARGE INSTRUCTIONS
Patient Education        Altered Mental Status: Care Instructions  Your Care Instructions    Altered mental status is a change in how well your brain is working. As a result, you may be confused, be less alert than usual, or act in odd ways. This may include seeing or hearing things that aren't really there (hallucinations). A mental status change has many possible causes. For example, it may be the result of an infection, an imbalance of chemicals in the body, or a chronic disease such as diabetes or COPD. It can also be caused by things such as a head injury, taking certain medicines, or using alcohol or drugs. The doctor may do tests to look for the cause. These tests may include urine tests, blood tests, and imaging tests such as a CT scan. Sometimes a clear cause isn't found. But tests can help the doctor rule out a serious cause of your symptoms. A change in mental status can be scary. But mental status will often return to normal when the cause is treated. So it is important to get any follow-up testing or treatment the doctor has suggested. The doctor has checked you carefully, but problems can develop later. If you notice any problems or new symptoms, get medical treatment right away. Follow-up care is a key part of your treatment and safety. Be sure to make and go to all appointments, and call your doctor if you are having problems. It's also a good idea to know your test results and keep a list of the medicines you take. How can you care for yourself at home? · Be safe with medicines. Take your medicines exactly as prescribed. Call your doctor if you think you are having a problem with your medicine. · Have another adult stay with you until you are better. This can help keep you safe. Ask that person to watch for signs that your mental status is getting worse. When should you call for help? Call 911 anytime you think you may need emergency care.  For example, call if:    · You passed out (lost consciousness).    Call your doctor now or seek immediate medical care if:    · Your mental status is getting worse.     · You have new symptoms, such as a fever, chills, or shortness of breath.     · You do not feel safe.    Watch closely for changes in your health, and be sure to contact your doctor if:    · You do not get better as expected. Where can you learn more? Go to http://niurka-michi.info/. Enter T339 in the search box to learn more about \"Altered Mental Status: Care Instructions. \"  Current as of: March 28, 2019  Content Version: 12.2  © 8910-1344 [employer]. Care instructions adapted under license by [employer] (which disclaims liability or warranty for this information). If you have questions about a medical condition or this instruction, always ask your healthcare professional. [address] any warranty or liability for your use of this information. Patient armband removed and shredded. What to do at Home:  Recommended activity: Activity as tolerated. If you experience any of the following symptoms nausea, vomiting, diarrhea, shortness of breath, difficulty breathing, change in mental status, or temperature greater than 100.5 please follow up with PCP or call 911. *  Please give a list of your current medications to your Primary Care Provider. *  Please update this list whenever your medications are discontinued, doses are      changed, or new medications (including over-the-counter products) are added. *  Please carry medication information at all times in case of emergency situations. These are general instructions for a healthy lifestyle:    No smoking/ No tobacco products/ Avoid exposure to second hand smoke  Surgeon General's Warning:  Quitting smoking now greatly reduces serious risk to your health.     Obesity, smoking, and sedentary lifestyle greatly increases your risk for illness    A healthy diet, regular physical exercise & weight monitoring are important for maintaining a healthy lifestyle    You may be retaining fluid if you have a history of heart failure or if you experience any of the following symptoms:  Weight gain of 3 pounds or more overnight or 5 pounds in a week, increased swelling in our hands or feet or shortness of breath while lying flat in bed. Please call your doctor as soon as you notice any of these symptoms; do not wait until your next office visit. The discharge information has been reviewed with the patient. The patient verbalized understanding. Discharge medications reviewed with the patient and appropriate educational materials and side effects teaching were provided.   ___________________________________________________________________________________________________________________________________

## 2019-10-12 NOTE — DISCHARGE SUMMARY
[facility]  Discharge Summary       Patient: Jovanny Molina Age: 81 y.o. : 1950 MR#: [id number] SSN: [id number]  PCP on record: Yahir Reyes MD  Admit date: 10/9/2019  Discharge date: 10/12/2019    Consults:  -Dr Jacinto Ohara., -nephrology  -   Procedures: none  -     Significant Diagnostic Studies:-Cardiac echo 10/11/19:  · Left Ventricle: Normal cavity size and systolic function (ejection fraction normal). Mild concentric hypertrophy. Estimated left ventricular ejection fraction is 56 - 60%. Biplane method used to measure ejection fraction. No regional wall motion abnormality noted. · Left Atrium: Severely dilated left atrium. · Aortic Valve: Mild aortic valve focal thickening present. · Mitral Valve: Mitral valve non-specific thickening. Mild mitral annular calcification. Moderate mitral valve stenosis is present. Trace mitral valve regurgitation is present. · Pulmonary Artery: Mild pulmonary hypertension. Mitral valve mean gradient is 7.6 mmHg. Mitral valve area is 1.6 cm2.  Moderate stenosis present.   -    Discharge Diagnoses: Acute metabolic encephalopathy  -Fall  -JAMES  -Hyponatremia                                       Patient Active Problem List   Diagnosis Code    Sleep apnea G47.30    Type II diabetes mellitus ([address] E11.9    Enthesopathy of hip region M76.899    Acquired trigger finger M65.30    Primary localized osteoarthrosis, hand M19.049    Myalgia and myositis [id number]    Osteoarthrosis M19.90    Incomplete bladder emptying R33.9    Stress incontinence N39.3    Cellulitis L03.90    Sepsis ([address] A41.9    Fibromyalgia M79.7    HTN (hypertension) I10    Septic joint of right shoulder region ([address] M00.9    Atrial fibrillation ([address] I48.91    Gastric bypass status for obesity Z98.84    History of pulmonary embolism Z86.711    CHF (congestive heart failure) ([facility]) I50.9    Type 2 diabetes mellitus without complication (HCC) C93.6    Mild mitral stenosis I05.0    Anemia D64.9    Open fracture T14. 8XXA    Dog bite W54. 0XXA    Type 2 diabetes with nephropathy ([facility] E11.21    Open displaced fracture of neck of second metacarpal bone of left hand with routine healing S62.331D    Periprosthetic fracture around internal prosthetic left knee joint M97. 12XA    Closed left hip fracture ([facility]) S72.002A    Neuropathy G62.9    Dehydration E86.0    Type 2 diabetes mellitus with diabetic neuropathy ([facility]) E11.40    Wound dehiscence T81.30XA    Aspiration pneumonia ([facility]) J69.0    Altered mental status R41.82    Acute on chronic kidney failure ([facility]) N17.9, N18.9    Chronic renal failure N18.9    AMS (altered mental status) R41.82       Hospital Course by Problem      81 y o female w/ h/o metabolic encephalopathy secondary to what is reported as unintended polypharmacy admitted on 10/09 after presenting w/ reports of fall and hypotension.     -Acute metabolic encephalopathy likely due to polypharmacy, per son, pt has history of taking medications not according to directions, has had previous episode about 6 months ago. She is on Cymbalta, lyrica, narcotics. In the setting of renal failure, the sedative effects of these medications is likely enhanced. Discussed at length w/ son, daughter in law and pt. Son stated his intentions to have the pt live with him so he can better manage her med intake. Discussed my recommendations to not take cymbalta, to take lyrica at a lower dose given her renal insufficiency and to not take her narcotics at all. I have recommended that she talk to her pcp about her adderall use as well.      -Fall recent with reports of hitting her head. Head CT 10/09 w/o acute changes. Per nursing, pt was ambulated on date of d/c and was noted to tolerate ambulation w/o obvious risk of fall.      -JAMES likely due to pre renal azotemia in setting of hypotension also on ACE I, improving.  Discussed w/ nephrologist, has been cleared for discharge. I have advised the pt to not take her acei, lasix and hctz until seen by pcp/neprhologist. I have also recommended that she f/u w/ nephrology in the near future in the outpt setting.    -Hypotension? Due to bp med overdose prior to admission? Improved w/ IVF. Given the above reasons, pt to not take her bp meds (acei and hctz) she will be dc'd on norvasc.       FEN: Hypona-->improving, nephrology followed, likely multifactorial, pt on HCTZ, dehydrated given IVF, on cardiac soft solids          Today's examination of the patient revealed:     Subjective:   Pt has no complaints. Agrees w/ d/c plan.   Objective:   VS:   Visit Vitals  /68   Pulse 73   Temp 98.3 °F (36.8 °C)   Resp 12   Ht 5' 2\" (1.575 m)   Wt 85.2 kg (187 lb 14.4 oz)   SpO2 97%   BMI 34.37 kg/m²      Tmax/24hrs: Temp (24hrs), Av.1 °F (36.7 °C), Min:97.9 °F (36.6 °C), Max:98.3 °F (36.8 °C)     Input/Output:     Intake/Output Summary (Last 24 hours) at 10/12/2019 1458  Last data filed at 10/12/2019 1308  Gross per 24 hour   Intake 6178.33 ml   Output 901 ml   Net 5277.33 ml       General:  Alert, awake, in nad  Cardiovascular: rrr, no murmurs   Pulmonary:  ctab  GI: soft, nt, nd   Extremities:  No edema  Additional:      Labs:    Recent Results (from the past 24 hour(s))   GLUCOSE, POC    Collection Time: 10/11/19  3:56 PM   Result Value Ref Range    Glucose (POC) 237 (H) 70 - 110 mg/dL   GLUCOSE, POC    Collection Time: 10/11/19  9:23 PM   Result Value Ref Range    Glucose (POC) 189 (H) 70 - 553 mg/dL   METABOLIC PANEL, BASIC    Collection Time: 10/12/19  2:52 AM   Result Value Ref Range    Sodium 141 136 - 145 mmol/L    Potassium 4.3 3.5 - 5.5 mmol/L    Chloride 115 (H) 100 - 111 mmol/L    CO2 20 (L) 21 - 32 mmol/L    Anion gap 6 3.0 - 18 mmol/L    Glucose 125 (H) 74 - 99 mg/dL    BUN 89 (H) 7.0 - 18 MG/DL    Creatinine 2.35 (H) 0.6 - 1.3 MG/DL    BUN/Creatinine ratio 38 (H) 12 - 20      GFR est AA 25 (L) >60 ml/min/1.73m2    GFR est non-AA 21 (L) >60 ml/min/1.73m2    Calcium 8.0 (L) 8.5 - 10.1 MG/DL   GLUCOSE, POC    Collection Time: 10/12/19  8:02 AM   Result Value Ref Range    Glucose (POC) 139 (H) 70 - 110 mg/dL   GLUCOSE, POC    Collection Time: 10/12/19 10:40 AM   Result Value Ref Range    Glucose (POC) 266 (H) 70 - 110 mg/dL   HGB & HCT    Collection Time: 10/12/19  1:40 PM   Result Value Ref Range    HGB 8.1 (L) 12.0 - 16.0 g/dL    HCT 25.8 (L) 35.0 - 45.0 %     Additional Data Reviewed:     Condition: stable  Disposition:    [x]Home   []Home with Home Health   []SNF/NH   []Rehab   []Home with family   []Alternate Facility:____________________      Discharge Medications:     Current Discharge Medication List      START taking these medications    Details   amLODIPine (NORVASC) 5 mg tablet Take 1 Tab by mouth daily. Qty: 30 Tab, Refills: 0         CONTINUE these medications which have CHANGED    Details   pregabalin (LYRICA) 75 mg capsule Take 1 Cap by mouth two (2) times a day. Max Daily Amount: 150 mg.  Qty: 60 Cap, Refills: 0    Associated Diagnoses: Fibromyalgia         CONTINUE these medications which have NOT CHANGED    Details   montelukast (SINGULAIR) 10 mg tablet Take 10 mg by mouth daily. dextroamphetamine-amphetamine (ADDERALL) 30 mg tablet Take 30 mg by mouth three (3) times daily. amiodarone (CORDARONE) 200 mg tablet Take 200 mg by mouth daily. ferrous sulfate 325 mg (65 mg iron) tablet Take 1 Tab by mouth two (2) times daily (with meals). Qty: 60 Tab, Refills: 1    Associated Diagnoses: Wound dehiscence      furosemide (LASIX) 40 mg tablet Take 1 Tab by mouth daily. Indications: Peripheral Edema due to Chronic Heart Failure, For leg edema or SOB  Qty: 90 Tab, Refills: 1      insulin glargine (LANTUS) 100 unit/mL injection 20 Units by SubCUTAneous route daily. Qty: 1 Vial, Refills: 0      Loratadine 5 mg chew Take 1 Tab by mouth daily. omeprazole (PRILOSEC) 40 mg capsule Take 40 mg by mouth daily. acetaminophen (TYLENOL) 500 mg tablet Take 500 mg by mouth every six (6) hours as needed for Pain.      naloxone (NARCAN) 4 mg/actuation nasal spray 1 [place] by IntraNASal route once as needed. Use 1 spray intranasally, then discard. Repeat with new spray every 2 min as needed for opioid overdose symptoms, alternating nostrils. magnesium hydroxide (DAVIS MILK OF MAGNESIA) 400 mg/5 mL suspension Take 30 mL by mouth daily as needed for Constipation. STOP taking these medications       lisinopril-hydroCHLOROthiazide (PRINZIDE, ZESTORETIC) 20-25 mg per tablet Comments:   Reason for Stopping:         buffered aspirin (BUFFERIN) 325 mg tablet Comments:   Reason for Stopping:         DULoxetine (CYMBALTA) 60 mg capsule Comments:   Reason for Stopping:         traZODone (DESYREL) 100 mg tablet Comments:   Reason for Stopping:         methocarbamol (ROBAXIN-750) 750 mg tablet Comments:   Reason for Stopping: Follow-up Appointments:   1. Your PCP: OtherYahir MD, within 7-10days  2.  Nephrology in 1-2 wks          >30 minutes spent coordinating this discharge (review instructions/follow-up, prescriptions, preparing report for sign off)    Signed:  Barron Weiner MD  10/12/2019  2:58 PM

## 2019-10-12 NOTE — PROGRESS NOTES
RENAL DAILY PROGRESS NOTE    Subjective:     Admitted for AMS seen for JAMES    Complaint: feels better, going home today. Pt moving in with son here in [place]. Current Facility-Administered Medications   Medication Dose Route Frequency    pregabalin (LYRICA) capsule 75 mg  75 mg Oral BID    nystatin (MYCOSTATIN) 100,000 unit/gram powder   Topical BID    acetaminophen (TYLENOL) tablet 650 mg  650 mg Oral Q4H PRN    ondansetron (ZOFRAN) injection 4 mg  4 mg IntraVENous Q4H PRN    heparin (porcine) injection 5,000 Units  5,000 Units SubCUTAneous Q8H    montelukast (SINGULAIR) tablet 10 mg  10 mg Oral DAILY    pantoprazole (PROTONIX) tablet 40 mg  40 mg Oral ACB    docusate sodium (COLACE) capsule 100 mg  100 mg Oral DAILY    polyethylene glycol (MIRALAX) packet 17 g  17 g Oral DAILY    insulin lispro (HUMALOG) injection   SubCUTAneous AC&HS    glucose chewable tablet 16 g  16 g Oral PRN    glucagon (GLUCAGEN) injection 1 mg  1 mg IntraMUSCular PRN    dextrose (D50W) injection syrg 12.5-25 g  25-50 mL IntraVENous PRN           Objective:     Patient Vitals for the past 24 hrs:   Temp Pulse Resp BP SpO2   10/12/19 1037 98.3 °F (36.8 °C) 73 12 148/68 97 %   10/12/19 0801 98.1 °F (36.7 °C) 68 15 143/60 97 %   10/12/19 0403 98 °F (36.7 °C) 73 16 129/52 96 %   10/11/19 2348 98.1 °F (36.7 °C) 69 15 143/69 99 %   10/11/19 1900 97.9 °F (36.6 °C) 78 14 148/61 98 %   10/11/19 1553 98.1 °F (36.7 °C) 67 11 112/55 99 %        Weight change:      10/10 1901 - 10/12 0700  In: 5938.3 [P.O.:600;  I.V.:5338.3]  Out: 3401 [Urine:3400]    Intake/Output Summary (Last 24 hours) at 10/12/2019 1513  Last data filed at 10/12/2019 1308  Gross per 24 hour   Intake 6178.33 ml   Output 901 ml   Net 5277.33 ml     Physical Exam: alert, oriented x 3     HEENT: non icteric  Neck: no JVD  Cardiovascular: regular no rub  C/L: clear  Ext: no LE edema    Data Review:     LABS:   Hematology:   Recent Labs     10/12/19  1340 10/11/19  0548 10/10/19  0544 10/09/19  2328   WBC  --   --  7.9 8.7   HGB 8.1* 7.1* 7.7* 7.6*   HCT 25.8* 22.0* 24.3* 23.2*     Chemistry:   Recent Labs     10/12/19  0252 10/11/19  0548 10/10/19  0544 10/09/19  2328   BUN 89* 100* 116* 109*   CREA 2.35* 2.81* 3.15* 3.18*   CA 8.0* 7.8* 8.0* 7.4*   K 4.3 4.3 4.1 4.0    138 135* 132*   * 112* 106 104   CO2 20* 17* 17* 17*   PHOS  --   --   --  6.7*   * 101* 97 107*        IMPRESSION AND PLAN:   JAMES prerenal,  improving slowly but not back to baseline. This is her 2nd episode of JAMES in 1 year. Discussed need to avoid repeated JAMES.  Follow up in the office in 2 weeks   Anemia Hgb better  DM2 poor control  HTN trend off ACEI and diuretics         Ratna Chin MD  10/12/2019

## 2019-10-12 NOTE — PROGRESS NOTES
1930-Bedside and verbal report from Heidy Mello RN. Pt resting, NAD, NSR on the monitor, no c/o pain, Alert and oriented times four, no SOB on room air, call bell within reach, pt instructed not to get up without assistance. 2030-Pt resting, NAD, NSR on the monitor, no c/o pain, no SOB, call bell within reach,     2130-Pt assisted to bedside commode, NAD, NSR on the monitor, no c/o pain, no SOB on room air. 2230-Pt resting, NAD, NSR on the monitor, no c/o pain, no SOB on room air. Call bell within reach, bed alarm on.     0000-Pt resting, vital signs stable, NAD, no c/o pain, no SOB on room air, bed alarm on, call bell within reach. 0127-Pt given tylenol for cramps in her legs. No SOB. Call bell within reach. 0300-Pt resting, NAD, NSR on the monitor, no c/o pain, no SOB on room air, call bell within reach, bed alarm on.     0500-Pt resting, NAD, NSR on the monitor, no c/o pain, no SOB on room air, call bell within reach, bed alarm on.     0700-Bedside and verbal report given to Heidy Mello RN.

## 2019-10-22 ENCOUNTER — HOSPITAL ENCOUNTER (OUTPATIENT)
Dept: LAB | Age: 69
Discharge: HOME | End: 2019-10-22
Payer: MEDICARE

## 2019-10-22 LAB
ALBUMIN SERPL-MCNC: 2.9 G/DL (ref 3.4–5)
ANION GAP SERPL CALC-SCNC: 7 MMOL/L (ref 3–18)
BUN SERPL-MCNC: 40 MG/DL (ref 7–18)
BUN/CREAT SERPL: 22 (ref 12–20)
CALCIUM SERPL-MCNC: 8 MG/DL (ref 8.5–10.1)
CHLORIDE SERPL-SCNC: 116 MMOL/L (ref 100–111)
CO2 SERPL-SCNC: 22 MMOL/L (ref 21–32)
CREAT SERPL-MCNC: 1.78 MG/DL (ref 0.6–1.3)
GLUCOSE SERPL-MCNC: 35 MG/DL (ref 74–99)
HBA1C MFR BLD: 7.7 % (ref 4.2–5.6)
PHOSPHATE SERPL-MCNC: 4.1 MG/DL (ref 2.5–4.9)
POTASSIUM SERPL-SCNC: 5 MMOL/L (ref 3.5–5.5)
SODIUM SERPL-SCNC: 145 MMOL/L (ref 136–145)

## 2019-10-22 PROCEDURE — 80069 RENAL FUNCTION PANEL: CPT

## 2019-10-22 PROCEDURE — 83036 HEMOGLOBIN GLYCOSYLATED A1C: CPT

## 2019-10-22 PROCEDURE — 36415 COLL VENOUS BLD VENIPUNCTURE: CPT

## 2019-10-24 ENCOUNTER — OFFICE VISIT (OUTPATIENT)
Dept: CARDIOLOGY CLINIC | Age: 69
End: 2019-10-24

## 2019-10-24 VITALS
HEIGHT: 62 IN | BODY MASS INDEX: 32.57 KG/M2 | SYSTOLIC BLOOD PRESSURE: 133 MMHG | DIASTOLIC BLOOD PRESSURE: 64 MMHG | WEIGHT: 177 LBS | HEART RATE: 81 BPM

## 2019-10-24 DIAGNOSIS — I48.0 PAROXYSMAL ATRIAL FIBRILLATION (HCC): Primary | ICD-10-CM

## 2019-10-24 DIAGNOSIS — I50.32 CHRONIC DIASTOLIC CONGESTIVE HEART FAILURE (HCC): ICD-10-CM

## 2019-10-24 DIAGNOSIS — I05.0 MILD MITRAL STENOSIS: ICD-10-CM

## 2019-10-24 DIAGNOSIS — I10 ESSENTIAL HYPERTENSION: ICD-10-CM

## 2019-10-24 NOTE — PROGRESS NOTES
HISTORY OF PRESENT ILLNESS  Duncan Dobbs is a 81 y.o. female. Hospital Follow Up   Associated symptoms include shortness of breath. Pertinent negatives include no chest pain. Irregular Heart Beat    Associated symptoms include shortness of breath. Pertinent negatives include no chest pain. CHF   The history is provided by the patient. This is a chronic problem. The problem occurs daily. The problem has been gradually worsening. Associated symptoms include shortness of breath. Pertinent negatives include no chest pain. Shortness of Breath   The history is provided by the patient. This is a chronic problem. The problem occurs intermittently. The current episode started more than 1 week ago. The problem has been gradually worsening. Associated symptoms include leg swelling. Pertinent negatives include no ear pain, no neck pain, no wheezing, no chest pain and no rash. Associated medical issues do not include CAD or heart failure. Review of Systems   Constitutional: Negative for chills and weight loss. HENT: Negative for ear pain and hearing loss. Eyes: Negative for blurred vision. Respiratory: Positive for shortness of breath. Negative for wheezing and stridor. Cardiovascular: Positive for palpitations and leg swelling. Negative for chest pain. Gastrointestinal: Negative for heartburn. Musculoskeletal: Negative for myalgias and neck pain. Skin: Negative for rash. Neurological: Negative for tingling, tremors, focal weakness and loss of consciousness. Psychiatric/Behavioral: Negative for depression and suicidal ideas.      Family History   Problem Relation Age of Onset   [de-identified] Asthma Mother     Hypertension Mother     Kidney Disease Mother     Thyroid Disease Mother     Diabetes Maternal Grandmother     Thyroid Disease Maternal Grandmother        Past Medical History:   Diagnosis Date    Anxiety     Arthritis     [facility]bestosis [facility]     Atrial fibrillation ([facility]     Depression     Diabetes ([facility]     DVT (deep venous thrombosis) (HCC)     right leg    Fibromyalgia     GERD (gastroesophageal reflux disease)     Hypercholesteremia     Hypertension     Pulmonary emboli ([facility] 2016    Renal insufficiency     Skin abscess     Sleep apnea     does not use cpap regularly       Past Surgical History:   Procedure Laterality Date    HX BREAST LUMPECTOMY      HX CARPAL TUNNEL RELEASE      HX GASTRIC BYPASS  2015    HX HYSTERECTOMY      HX KNEE REPLACEMENT      bilater    HX ORTHOPAEDIC Left 09/26/2018    ORIF left patella    HX PARTIAL THYROIDECTOMY      HX SHOULDER ARTHROSCOPY      HX TUBAL LIGATION         Social History     Tobacco Use    Smoking status: Never Smoker    Smokeless tobacco: Never Used   Substance Use Topics    Alcohol use: No       No Known Allergies    Outpatient Medications Marked as Taking for the 10/24/19 encounter (Office Visit) with Letty Holloway MD   Medication Sig Dispense Refill    pregabalin (LYRICA) 75 mg capsule Take 1 Cap by mouth two (2) times a day. Max Daily Amount: 150 mg. 60 Cap 0    Loratadine 5 mg chew Take 1 Tab by mouth daily.  dextroamphetamine-amphetamine (ADDERALL) 30 mg tablet Take 30 mg by mouth three (3) times daily.  acetaminophen (TYLENOL) 500 mg tablet Take 500 mg by mouth every six (6) hours as needed for Pain.  amiodarone (CORDARONE) 200 mg tablet Take 200 mg by mouth daily.  naloxone (NARCAN) 4 mg/actuation nasal spray 1 [place] by IntraNASal route once as needed. Use 1 spray intranasally, then discard. Repeat with new spray every 2 min as needed for opioid overdose symptoms, alternating nostrils.  ferrous sulfate 325 mg (65 mg iron) tablet Take 1 Tab by mouth two (2) times daily (with meals). 60 Tab 1    insulin glargine (LANTUS) 100 unit/mL injection 20 Units by SubCUTAneous route daily.  1 Vial 0        Visit Vitals  /64   Pulse 81   Ht 5' 2\" (1.575 m)   Wt 80.3 kg (177 lb)   BMI 32.37 kg/m² Physical Exam   Constitutional: She is oriented to person, place, and time. She appears well-developed and well-nourished. No distress. HENT:   Head: Atraumatic. Mouth/Throat: No oropharyngeal exudate. Eyes: Conjunctivae are normal. Right eye exhibits no discharge. Left eye exhibits no discharge. No scleral icterus. Neck: Neck supple. No JVD present. No tracheal deviation present. No thyromegaly present. Cardiovascular: Normal rate and regular rhythm. Exam reveals no gallop. Murmur: 2/6 diastolic murmur best heard at apex. Pulmonary/Chest: Effort normal and breath sounds normal. No stridor. She has no wheezes. She has no rales. Abdominal: Soft. There is no tenderness. There is no rebound and no guarding. Musculoskeletal: Normal range of motion. She exhibits edema (ble edema). She exhibits no tenderness. Lymphadenopathy:     She has no cervical adenopathy. Neurological: She is alert and oriented to person, place, and time. She exhibits normal muscle tone. Skin: Skin is warm. She is not diaphoretic. Psychiatric: She has a normal mood and affect. Her behavior is normal.     10/09/19   ECHO ADULT COMPLETE 10/11/2019 10/11/2019    Narrative · Left Ventricle: Normal cavity size and systolic function (ejection   fraction normal). Mild concentric hypertrophy. Estimated left ventricular   ejection fraction is 56 - 60%. Biplane method used to measure ejection   fraction. No regional wall motion abnormality noted. · Left Atrium: Severely dilated left atrium. · Aortic Valve: Mild aortic valve focal thickening present. · Mitral Valve: Mitral valve non-specific thickening. Mild mitral annular   calcification. Moderate mitral valve stenosis is present. Trace mitral   valve regurgitation is present. · Pulmonary Artery: Mild pulmonary hypertension. · Mitral valve mean gradient is 7.6 mmHg. Mitral valve area is 1.6 cm2. Moderate stenosis present.         Signed by: Hilda Olvera MD     ASSESSMENT and PLAN    ICD-10-CM ICD-9-CM    1. Paroxysmal atrial fibrillation (HCC) I48.0 427.31 PFT DLCO   2. Essential hypertension I10 401.9    3. Moderate mitral stenosis I05.0 394.0    4. Chronic diastolic congestive heart failure ([facility] I50.32 428.32      428.0      Orders Placed This Encounter    PFT DLCO     Standing Status:   Future     Standing Expiration Date:   4/22/2020     Follow-up and Dispositions    · Return in about 2 months (around 12/24/2019). current treatment plan is effective, no change in therapy  reviewed diet, exercise and weight control  cardiovascular risk and specific lipid/LDL goals reviewed  use of aspirin to prevent MI and TIA's discussed. Patient with PAF on amiodarone and aspirin. She is not on oral anticoagulants due to profound anemia. She remains in normal sinus rhythm. Was recently hospitalized for acute metabolic encephalopathy. Continues to be on amiodarone for proximal atrial fibrillation. She has lost for follow-up. We will repeat PFTs soon as possible. Follow-up in 2 months.

## 2022-04-02 ASSESSMENT — VISUAL ACUITY
OD_SC: 20/25
OS_SC: 20/25

## 2022-04-02 ASSESSMENT — TONOMETRY
OS_IOP_MMHG: 15
OD_IOP_MMHG: 15

## 2022-06-24 NOTE — ED NOTES
Pt reports sharp, intermittent pain to upper right arm and right leg that started last night rated 10/10  Pt states comfortable level of pain for her is 5/10  Pt to be medicated per [facility] instructions-attempting IV access  Pt updated on plan of care  Call bell within reach 18

## 2023-06-26 NOTE — INTERVAL H&P NOTE
H&P Update:  Akash Abad was seen and examined. History and physical has been reviewed. The patient has been examined. There have been no significant clinical changes since the completion of the originally dated History and Physical.  Patient identified by surgeon; surgical site was confirmed by patient and surgeon.     Signed By: Davide Velazquez MD     April 27, 2017 9:38 PM DC instructions

## 2023-12-02 NOTE — PERIOP NOTES
TRANSFER - OUT REPORT: 
 
Verbal report given to MODESTO VARGAS [facility]) on Irean Severs  being transferred to [address] [place] [address](unit) for routine post - op Report consisted of patients Situation, Background, Assessment and  
Recommendations(SBAR). Information from the following report(s) SBAR, OR Summary, Procedure Summary, Intake/Output and MAR was reviewed with the receiving nurse. Lines:  
Peripheral IV 10/01/18 Right Forearm (Active) Site Assessment Clean, dry, & intact 10/1/2018  1:48 PM  
Phlebitis Assessment 0 10/1/2018  1:48 PM  
Infiltration Assessment 0 10/1/2018  1:48 PM  
Dressing Status Clean, dry, & intact 10/1/2018  1:48 PM  
Dressing Type Transparent;Tape 10/1/2018  1:48 PM  
Hub Color/Line Status Pink; Infusing 10/1/2018  1:48 PM  
  
 
Opportunity for questions and clarification was provided. Patient transported with: 
 O2 @ 3 liters Tech <-- Click to add NO pertinent Past Medical History

## 2025-04-25 NOTE — ED PROVIDER NOTES
EMERGENCY DEPARTMENT HISTORY AND PHYSICAL EXAM 
 
Date: 9/26/2018 Patient Name: Irean Severs History of Presenting Illness Chief Complaint Patient presents with  Knee Pain  
  left History Provided By: Patient and Patient's Son Chief Complaint: left knee pain and swelling Duration: just prior to arrival  
Timing:  Acute Location: left knee Quality: Aching and Clement Dawkins Severity: 10 out of 10 Modifying Factors: has bilateral TKR that was done by Retired Dr. Rivera Staff. , had a recent surgery on 9/22/18 for a left hand wash out from dog bite Associated Symptoms: denies fevers, chills, LOC, head injury, back pain Additional History (Context): Irean Severs is a 79 y.o. female with DM, HTN, HLD, fibromyalgia, sleep apnea, depression, Anxiety, GERD, arthritis, bilateral TKR who presents with C/O left knee pain, swelling, inability to bear weight after sustaining a fall when she tripped on an uneven curb. States that she was coming from Dr. Brandy Arenas office, as she just had a left hand washout on 9/22/16 after a dog bite. She is supposed to have another hand surgery on Monday. Denies fevers, chills, LOC, head injury, back pain, neck pain. States she is unable to bear weight on the knee. PCP: Benjamín Hicks NP Current Facility-Administered Medications Medication Dose Route Frequency Provider Last Rate Last Dose  ondansetron (ZOFRAN) injection 4 mg  4 mg IntraVENous Q6H PRN Michelle Helton MD      
 0.9% sodium chloride infusion  75 mL/hr IntraVENous CONTINUOUS Michelle Helton MD      
 morphine injection 2 mg  2 mg IntraVENous Q4H PRN Michelle Helton MD      
 ceFAZolin (ANCEF) 2g IVPB in 50 mL D5W  2 g IntraVENous ON CALL TO OR Evelyn Mendieta PA-C      
 bupivacaine liposome (PF) susp (EXPAREL) infiltration 266 mg  20 mL Infiltration ONCE Evelyn Mendieta PA-C      
 lactated Ringers infusion  75 mL/hr IntraVENous CONTINUOUS Louvenia Limb, Pending order for home oxygen. Please sign.    MD 75 mL/hr at 09/26/18 1704 75 mL/hr at 09/26/18 1704  insulin lispro (HUMALOG) injection   SubCUTAneous Berkley Traore MD   Stopped at 09/26/18 1700 Past History Past Medical History: 
Past Medical History:  
Diagnosis Date  Anxiety  Arthritis  Asbestosis ([facility] [address]  Depression  Diabetes ([facility] [address] [facility]  Fibromyalgia  GERD (gastroesophageal reflux disease)  Hypercholesteremia  Hypertension  Renal insufficiency  Skin abscess  Sleep apnea Past Surgical History: 
Past Surgical History:  
Procedure Laterality Date  HX BREAST LUMPECTOMY  HX CARPAL TUNNEL RELEASE  HX KNEE REPLACEMENT    
 bilater  HX PARTIAL THYROIDECTOMY  HX SHOULDER ARTHROSCOPY    
 HX TUBAL LIGATION Family History: 
Family History Problem Relation Age of Onset  Asthma Mother  Hypertension Mother  Kidney Disease Mother  Thyroid Disease Mother  Diabetes Maternal Grandmother  Thyroid Disease Maternal Grandmother Social History: 
Social History Substance Use Topics  Smoking status: Never Smoker  Smokeless tobacco: Never Used  Alcohol use No  
 
 
Allergies: 
No Known Allergies Review of Systems Review of Systems Constitutional: Negative for chills and fever. Respiratory: Negative for chest tightness, shortness of breath and wheezing. Gastrointestinal: Negative for abdominal pain, diarrhea, nausea and vomiting. Genitourinary: Negative for dysuria, flank pain and frequency. Musculoskeletal: Positive for arthralgias. Left knee pain and lower leg pain Skin: Negative. Neurological: Negative. All other systems reviewed and are negative. Physical Exam  
 
Vitals:  
 09/26/18 1712 09/26/18 1717 09/26/18 1728 09/26/18 1736 BP:  129/83 160/60 106/59 Pulse:  (!) 57 (!) 57 (!) 56 Resp:  14 18 12 Temp: 97.9 °F (36.6 °C) SpO2:  100% 98% 97% Weight: 77.2 kg (170 lb 3 oz) Height: 5' 1\" (1.549 m) Physical Exam  
Constitutional: She is oriented to person, place, and time. She appears well-developed and well-nourished. No distress. HENT:  
Head: Normocephalic and atraumatic. Eyes: EOM are normal. Pupils are equal, round, and reactive to light. Neck: Neck supple. Cardiovascular: Normal rate and regular rhythm. Exam reveals no gallop and no friction rub. No murmur heard. Pulmonary/Chest: Effort normal and breath sounds normal. No respiratory distress. She has no wheezes. She has no rales. Abdominal: Soft. Bowel sounds are normal. She exhibits no distension and no mass. There is no tenderness. There is no rebound and no guarding. Musculoskeletal: The left knee is edematous and there is a deformity to the medial aspect of the leg. She cannot bear weight. Any movement of the knee creates pain. She can wiggle all toes. She has full sensation and she has strong bilateral DP pulses. Cap refill is less than 2 sec. Lymphadenopathy:  
  She has no cervical adenopathy. Neurological: She is alert and oriented to person, place, and time. No cranial nerve deficit. Skin: Skin is warm and dry. No rash noted. She is not diaphoretic. Psychiatric: She has a normal mood and affect. Her behavior is normal.  
Nursing note and vitals reviewed. Diagnostic Study Results Labs - Recent Results (from the past 12 hour(s)) CBC WITH AUTOMATED DIFF Collection Time: 09/26/18 12:30 PM  
Result Value Ref Range WBC 7.6 4.6 - 13.2 K/uL  
 RBC 3.62 (L) 4.20 - 5.30 M/uL HGB 9.2 (L) 12.0 - 16.0 g/dL HCT 29.5 (L) 35.0 - 45.0 % MCV 81.5 74.0 - 97.0 FL  
 MCH 25.4 24.0 - 34.0 PG  
 MCHC 31.2 31.0 - 37.0 g/dL  
 RDW 14.4 11.6 - 14.5 % PLATELET 682 161 - 737 K/uL MPV 10.9 9.2 - 11.8 FL  
 NEUTROPHILS 72 40 - 73 % LYMPHOCYTES 13 (L) 21 - 52 % MONOCYTES 13 (H) 3 - 10 % EOSINOPHILS 2 0 - 5 % BASOPHILS 0 0 - 2 % ABS. NEUTROPHILS 5.4 1.8 - 8.0 K/UL  
 ABS. LYMPHOCYTES 1.0 0.9 - 3.6 K/UL  
 ABS. MONOCYTES 1.0 0.05 - 1.2 K/UL  
 ABS. EOSINOPHILS 0.2 0.0 - 0.4 K/UL  
 ABS. BASOPHILS 0.0 0.0 - 0.1 K/UL  
 DF AUTOMATED METABOLIC PANEL, COMPREHENSIVE Collection Time: 09/26/18 12:30 PM  
Result Value Ref Range Sodium 139 136 - 145 mmol/L Potassium 4.3 3.5 - 5.5 mmol/L Chloride 108 100 - 108 mmol/L  
 CO2 25 21 - 32 mmol/L Anion gap 6 3.0 - 18 mmol/L Glucose 155 (H) 74 - 99 mg/dL BUN 40 (H) 7.0 - 18 MG/DL Creatinine 1.73 (H) 0.6 - 1.3 MG/DL  
 BUN/Creatinine ratio 23 (H) 12 - 20 GFR est AA 36 (L) >60 ml/min/1.73m2 GFR est non-AA 29 (L) >60 ml/min/1.73m2 Calcium 8.5 8.5 - 10.1 MG/DL Bilirubin, total 0.3 0.2 - 1.0 MG/DL  
 ALT (SGPT) 27 13 - 56 U/L  
 AST (SGOT) 36 15 - 37 U/L Alk. phosphatase 259 (H) 45 - 117 U/L Protein, total 6.6 6.4 - 8.2 g/dL Albumin 2.4 (L) 3.4 - 5.0 g/dL Globulin 4.2 (H) 2.0 - 4.0 g/dL A-G Ratio 0.6 (L) 0.8 - 1.7 PROTHROMBIN TIME + INR Collection Time: 09/26/18 12:30 PM  
Result Value Ref Range Prothrombin time 12.9 11.5 - 15.2 sec INR 1.0 0.8 - 1.2 EKG, 12 LEAD, INITIAL Collection Time: 09/26/18  2:47 PM  
Result Value Ref Range Ventricular Rate 56 BPM  
 Atrial Rate 56 BPM  
 P-R Interval 208 ms QRS Duration 100 ms Q-T Interval 486 ms QTC Calculation (Bezet) 468 ms Calculated P Axis 28 degrees Calculated R Axis 59 degrees Calculated T Axis 37 degrees Diagnosis Sinus bradycardia Otherwise normal ECG When compared with ECG of 22-SEP-2018 17:57, No significant change was found GLUCOSE, POC Collection Time: 09/26/18  4:56 PM  
Result Value Ref Range Glucose (POC) 101 70 - 110 mg/dL Radiologic Studies -  
XR CHEST PORT Final Result XR KNEE LT 3 V Final Result XR PELV 1 OR 2 V    (Results Pending) XR KNEE RT MAX 2 VWS    (Results Pending) NC XR TECHNOLOGIST SERVICE    (Results Pending) XR KNEE LT MAX 2 VWS    (Results Pending) CT Results  (Last 48 hours) None CXR Results  (Last 48 hours) 09/26/18 1348  XR CHEST PORT Final result Impression:  IMPRESSION:  
   
1. No evidence of acute cardiopulmonary disease. Narrative:  Portable Chest X-Ray CPT CODE: [id number] INDICATION: Preoperative clearance for orthopedic surgery. COMPARISON: Prior exam 9/22/2018. FINDINGS:   
Heart size is mildly enlarged but unchanged. Atherosclerotic calcification  
present in the aortic arch. No pneumothorax appreciated. No overt pulmonary  
edema. No focal infiltrate or consolidation. No significant effusion. 2  
partially threaded screws extending through the left humeral head. Anny Whitney Medical Decision Making I am the first provider for this patient. I reviewed the vital signs, available nursing notes, past medical history, past surgical history, family history and social history. Vital Signs-Reviewed the patient's vital signs. EKG: Interpreted by the RJ JOEL Time Interpreted:  7778 Rate:  56 Rhythm: sinus bradycardia Interpretation: no STEMI, no changes Records Reviewed: Nursing Notes and Old Medical Records Procedures: 
Reduction of Joint Date/Time: 9/26/2018 6:04 PM 
Performed by: Sea Vera Authorized by: Sea Vera Consent:  
  Consent obtained:  Verbal 
  Consent given by:  Patient (Daughter, Michael Cotto) Risks discussed:  Pain Alternatives discussed:  Alternative treatment Injury:  
  Injury location:  Knee Knee injury location:  L knee Knee fracture type comment:  Periprosthestic knee fracture Sedation:  
  Sedation type: fentanyl 100 mcg. Procedure details:  
  Skeletal traction used: yes Reduction successful: there is mild improvement of the displaced joint. X-ray confirmed reduction: XR confirmed state of displacement. Immobilization:  Splint Splint type: long posterior leg splint. Supplies used:  [employer] Post-procedure assessment:  
  Neurological function: normal   
  Distal perfusion: normal   
  Range of motion: normal   
  Patient tolerance of procedure: Tolerated well, no immediate complications Provider Notes (Medical Decision Making):  
Noted distal femur fracture. Updated Dr. Azeem Gibbs. Patient still will pain. Will obtain labs and CXR and pelvis XR. Patient agrees with the plan. NPO status. [facility] [place] Spoke with Dr. Netta Michaud on call. He agrees with plan for admission,  Would like for me to admit her to the hospitalist service. He would like for us to reduce the femur and place into a long posterior splint. Patient to be NPO. Dr. Denia Modi will work her into the OR schedule. Updated Dr. Azeem Gibbs about it. He agrees with the plan. [facility] [place] Spoke with Dr. Maine Gordon about the patient. He agrees with plan for admission. He is aware that Dr. Denia Modi wants patient NPO and will work her into the OR schedule. RJ Garcia Dr. and I discussed how perform the reduction. Will give patient 100 mcg Fentanyl, plan on traction to the leg. Attempted reduction to the leg, it was positionally better than her original exam.  Will obtain XR. OR here to get patient for preop. Linh Arellano Noted post reduction XR. The femur is aligned better, but still not completely reduced. OR is here for patient. They will take her up on a monitor. She is still drowsy from Fentanyl, but wakes up and can talk to me with light touch. Impression:   Periprosthetic left knee fracture after sustaining mechanical fall. Will admit to hospitalist team and have ortho consult and repair the leg. Patient agrees with the plan.   Attempted to call patient's daughter -- Nitin Vasquez at [phone] -- got voicemail, asked her to return call so I could update her. RJ Ahn [place] 
 
MED RECONCILIATION: 
Current Facility-Administered Medications Medication Dose Route Frequency  ondansetron (ZOFRAN) injection 4 mg  4 mg IntraVENous Q6H PRN  
 0.9% sodium chloride infusion  75 mL/hr IntraVENous CONTINUOUS  
 morphine injection 2 mg  2 mg IntraVENous Q4H PRN  
 ceFAZolin (ANCEF) 2g IVPB in 50 mL D5W  2 g IntraVENous ON CALL TO OR  
 bupivacaine liposome (PF) susp (EXPAREL) infiltration 266 mg  20 mL Infiltration ONCE  
 lactated Ringers infusion  75 mL/hr IntraVENous CONTINUOUS  
 insulin lispro (HUMALOG) injection   SubCUTAneous ONCE Disposition: 
Admit Diagnosis Clinical Impression: 1. Periprosthetic fracture around internal prosthetic left knee joint, initial encounter 2. Fall, initial encounter

## (undated) DEVICE — SUTURE VCRL SZ 0 L27IN ABSRB UD L36MM CT-1 1/2 CIR J260H

## (undated) DEVICE — KIT CLN UP BON SECOURS MARYV

## (undated) DEVICE — DRAPE XR C ARM 41X74IN LF --

## (undated) DEVICE — PREP SKN CHLRAPRP APPL 10.5ML --

## (undated) DEVICE — INTENDED FOR TISSUE SEPARATION, AND OTHER PROCEDURES THAT REQUIRE A SHARP SURGICAL BLADE TO PUNCTURE OR CUT.: Brand: BARD-PARKER ® STAINLESS STEEL BLADES

## (undated) DEVICE — SHEET, T, LAPAROTOMY, STERILE: Brand: MEDLINE

## (undated) DEVICE — KENDALL SCD EXPRESS SLEEVES, KNEE LENGTH, MEDIUM: Brand: KENDALL SCD

## (undated) DEVICE — BANDAGE COMPR EXSANGUATION SGL LAYERED NO CLSR 9FT LEN 4IN W

## (undated) DEVICE — SOLUTION IRRIG 3000ML 0.9% SOD CHL FLX CONT 0797208] ICU MEDICAL INC]

## (undated) DEVICE — NEEDLE HYPO 18GA L1.5IN PNK S STL HUB POLYPR SHLD REG BVL

## (undated) DEVICE — BLADE ASSEMB CLP HAIR FINE --

## (undated) DEVICE — STOCKING COMPR XL L16-18IN LNG 19MMHG ANK 10-11IN CALF

## (undated) DEVICE — HANDPIECE SET WITH HIGH FLOW TIP AND SUCTION TUBE: Brand: INTERPULSE

## (undated) DEVICE — SUT ETHLN 3-0 18IN PS1 BLK --

## (undated) DEVICE — SWAB CULT LIQ STUART AGR AERB MOD IN BRK SGL RAYON TIP PLAS 220099] BECTON DICKINSON MICRO]

## (undated) DEVICE — DERMACEA GAUZE ROLL: Brand: DERMACEA

## (undated) DEVICE — Z DISCONTINUED BY MEDLINE USE 2711682 TRAY SKIN PREP DRY W/ PREM GLV

## (undated) DEVICE — STRETCH BANDAGE ROLL: Brand: DERMACEA

## (undated) DEVICE — LIGHT HANDLE: Brand: DEVON

## (undated) DEVICE — COVER LT HNDL BLU STRL -- MEDICHOICE

## (undated) DEVICE — HEX-LOCKING BLADE ELECTRODE: Brand: EDGE

## (undated) DEVICE — 1.1 K-WIRE, TROCAR, 100MM, 10/PKG: Brand: APTUS

## (undated) DEVICE — BIT DRL L180MM DIA4.3MM QUIK CPL W/O STP REUSE

## (undated) DEVICE — REM POLYHESIVE ADULT PATIENT RETURN ELECTRODE: Brand: VALLEYLAB

## (undated) DEVICE — MEDI-VAC SUCTION HIGH CAPACITY: Brand: CARDINAL HEALTH

## (undated) DEVICE — IMPLANTABLE DEVICE
Type: IMPLANTABLE DEVICE | Site: FEMUR | Status: NON-FUNCTIONAL
Removed: 2018-09-26

## (undated) DEVICE — DRAPE TWL SURG 16X26IN BLU ORB04] ALLCARE INC]

## (undated) DEVICE — FLEX ADVANTAGE 3000CC: Brand: FLEX ADVANTAGE

## (undated) DEVICE — SPONGE LAP 18X18IN STRL -- 5/PK

## (undated) DEVICE — 2.5MM DRILL TIP GUIDE WIRE 200MM

## (undated) DEVICE — BIPOLAR SEALER 23-112-1 AQM 6.0: Brand: AQUAMANTYS ®

## (undated) DEVICE — DRAPE,HAND,STERILE: Brand: MEDLINE

## (undated) DEVICE — NEEDLE NRV BLK 21GA L4IN 30DEG INSUL BVL EXTN SET STIMUPLEX

## (undated) DEVICE — SUTURE VCRL SZ 1 L18IN ABSRB VLT CTX L48MM 1/2 CIR J765D

## (undated) DEVICE — TABLE COVER: Brand: CONVERTORS

## (undated) DEVICE — 0.8 K-WIRE, TROCAR, 100MM, 10/PKG: Brand: APTUS

## (undated) DEVICE — 3M™ IOBAN™ 2 ANTIMICROBIAL INCISE DRAPE 6650EZ: Brand: IOBAN™ 2

## (undated) DEVICE — SUTURE VCRL SZ 3-0 L27IN ABSRB UD L26MM SH 1/2 CIR J416H

## (undated) DEVICE — OCCLUSIVE GAUZE STRIP,3% BISMUTH TRIBROMOPHENATE IN PETROLATUM BLEND: Brand: XEROFORM

## (undated) DEVICE — 3M™ BAIR PAWS FLEX™ WARMING GOWN, STANDARD, 20 PER CASE 81003: Brand: BAIR PAWS™

## (undated) DEVICE — GAUZE SPONGES,16 PLY: Brand: CURITY

## (undated) DEVICE — SUTURE VCRL SZ 2 L27IN ABSRB VLT L65MM TP-1 1/2 CIR J649G

## (undated) DEVICE — SUTURE MCRYL SZ 2-0 L36IN ABSRB UD L36MM CT-1 1/2 CIR Y945H

## (undated) DEVICE — SUTURE CHROMIC GUT SZ 5-0 L18IN ABSRB BRN L16MM PS-3 3/8 1636G

## (undated) DEVICE — SYR 50ML LR LCK 1ML GRAD NSAF --

## (undated) DEVICE — THREE-QUARTER SHEET: Brand: CONVERTORS

## (undated) DEVICE — PREP CHLORAPREP 10.5 ML ORG --

## (undated) DEVICE — (D)COVER PD SURG 24X40IN LF -- DISC BY MFR NO SUB

## (undated) DEVICE — SOLUTION IV 1000ML 0.9% SOD CHL

## (undated) DEVICE — Device

## (undated) DEVICE — INTENDED FOR TISSUE SEPARATION, AND OTHER PROCEDURES THAT REQUIRE A SHARP SURGICAL BLADE TO PUNCTURE OR CUT.: Brand: BARD-PARKER SAFETY BLADES SIZE 10, STERILE

## (undated) DEVICE — GOWN,REINFORCED,POLY,AURORA,XXLARGE,STR: Brand: MEDLINE

## (undated) DEVICE — MEDI-VAC NON-CONDUCTIVE SUCTION TUBING: Brand: CARDINAL HEALTH

## (undated) DEVICE — BIT DRL L145MM DIA3.2MM QUIK CPL W/O STP REUSE

## (undated) DEVICE — SUTURE VCRL SZ 1 L27IN ABSRB VLT CTX L48MM 1 2 CIR SGL ARMED J365H

## (undated) DEVICE — INTENDED FOR TISSUE SEPARATION, AND OTHER PROCEDURES THAT REQUIRE A SHARP SURGICAL BLADE TO PUNCTURE OR CUT.: Brand: BARD-PARKER SAFETY BLADES SIZE 15, STERILE

## (undated) DEVICE — PENROSE TUBING RADIOPAQUE: Brand: ARGYLE

## (undated) DEVICE — 2.2 CANNULATED TWIST DRILL Ø1.8,L87MM,AO: Brand: APTUS

## (undated) DEVICE — GEL BAG FOR WRAPS --

## (undated) DEVICE — SYR 10ML LUER LOK 1/5ML GRAD --

## (undated) DEVICE — 3M™ STERI-DRAPE™ INSTRUMENT POUCH 1018: Brand: STERI-DRAPE™

## (undated) DEVICE — DRESSING PETRO W3XL8IN N ADH OIL EMUL GZ CURAD

## (undated) DEVICE — BIPOLAR FORCEPS CORD,BANANA LEADS: Brand: VALLEYLAB

## (undated) DEVICE — CULTURETTE SGL EVAC TUBE PALL -- 100/CA

## (undated) DEVICE — ELASTIC BANDAGE 6": Brand: CARDINAL HEALTH

## (undated) DEVICE — SYRINGE MED 3ML NDL 22GA L1 1/2IN REG BVL SFGLDE

## (undated) DEVICE — KERLIX BANDAGE ROLL: Brand: KERLIX

## (undated) DEVICE — IMMOBILIZER KNEE PREMIER PRO TRI PNL 24INCH FOAM TIETEX PAT

## (undated) DEVICE — SUTURE ETHLN SZ 5-0 L18IN NONABSORBABLE BLK L13MM P-3 3/8 698H

## (undated) DEVICE — Z DISCONTINUED USE 2744636  DRESSING AQUACEL 14 IN ALG W3.5XL14IN POLYUR FLM CVR W/ HYDRCOLL

## (undated) DEVICE — TWIST DRILL Ø1.2MM X 25MM, L81MM, AO: Brand: APTUS

## (undated) DEVICE — 3M™ MICROFOAM™ TAPE 1528-4: Brand: 3M™ MICROFOAM™

## (undated) DEVICE — SOFT SILICONE HYDROCELLULAR SACRUM DRESSING WITH LOCK AWAY LAYER: Brand: ALLEVYN LIFE SACRUM (LARGE) PACK OF 10

## (undated) DEVICE — PACK PROCEDURE SURG MAJ W/ BASIN LF

## (undated) DEVICE — STERILE POLYISOPRENE POWDER-FREE SURGICAL GLOVES: Brand: PROTEXIS

## (undated) DEVICE — SUTURE PDS II SZ 0 L36IN ABSRB VLT L36MM CT-1 1/2 CIR Z346H

## (undated) DEVICE — AIRLIFE™ NASAL OXYGEN CANNULA CURVED, NONFLARED TIP WITH 14 FOOT (4.3 M) CRUSH-RESISTANT TUBING, OVER-THE-EAR STYLE: Brand: AIRLIFE™

## (undated) DEVICE — TRAY PREP DRY W/ PREM GLV 2 APPL 6 SPNG 2 UNDPD 1 OVERWRAP

## (undated) DEVICE — SLIM BODY SKIN STAPLER: Brand: APPOSE ULC

## (undated) DEVICE — PACK SURG BSHR TOT KNEE LF

## (undated) DEVICE — 4-PORT MANIFOLD: Brand: NEPTUNE 2

## (undated) DEVICE — STOCKINETTE,IMPERVIOUS,12X48,STERILE: Brand: MEDLINE

## (undated) DEVICE — C-WIRE PAK DOUBLE ENDED ORTHOPAEDIC WIRE, SPADE, .028" (0.71 MM): Brand: C-WIRE

## (undated) DEVICE — SMARTSLEEVE SURGICAL GOWN, 3XL LONG: Brand: CONVERTORS

## (undated) DEVICE — GAUZE SPONGES,USP TYPE VII GAUZE, 12 PLY: Brand: CURITY

## (undated) DEVICE — ABDOMINAL PAD: Brand: DERMACEA

## (undated) DEVICE — GOWN,REINFORCE,POLY,SIRUS,SETSLV,XLARGE: Brand: MEDLINE

## (undated) DEVICE — DISPOSABLE MULTI BAG ADAPTERS Y                                    TUBING, STERILE, 2 TO A SET 6 SETS                                    PER BOX

## (undated) DEVICE — WRAP KNE NO GEL BAGS --

## (undated) DEVICE — GOWN,REINFORCED,POLY,AURORA,XLARGE,STRL: Brand: MEDLINE

## (undated) DEVICE — MAYO STAND COVER: Brand: CONVERTORS

## (undated) DEVICE — (D)PREP SKN CHLRAPRP APPL 26ML -- CONVERT TO ITEM 371833

## (undated) DEVICE — SUTURE VCRL SZ 0 L36IN ABSRB UD L36MM CT-1 1/2 CIR J946H

## (undated) DEVICE — SUTURE PDS II SZ 4-0 L27IN ABSRB VLT SH L26MM 1/2 CIR Z315H

## (undated) DEVICE — STOCKING ANTIEMB KNEE SM REG --

## (undated) DEVICE — T5 HOOD WITH PEEL AWAY FACE SHIELD

## (undated) DEVICE — X-RAY SPONGES,12 PLY: Brand: DERMACEA

## (undated) DEVICE — PREP SKN PREVAIL 40ML APPL --